# Patient Record
Sex: FEMALE | Race: WHITE | NOT HISPANIC OR LATINO | Employment: OTHER | URBAN - METROPOLITAN AREA
[De-identification: names, ages, dates, MRNs, and addresses within clinical notes are randomized per-mention and may not be internally consistent; named-entity substitution may affect disease eponyms.]

---

## 2017-02-06 RX ORDER — FUROSEMIDE 20 MG/1
TABLET ORAL
Qty: 60 TABLET | Refills: 0 | Status: SHIPPED | OUTPATIENT
Start: 2017-02-06 | End: 2017-06-07 | Stop reason: SDUPTHER

## 2017-02-06 RX ORDER — SPIRONOLACTONE 50 MG/1
TABLET, FILM COATED ORAL
Qty: 60 TABLET | Refills: 0 | Status: SHIPPED | OUTPATIENT
Start: 2017-02-06 | End: 2017-05-04 | Stop reason: SDUPTHER

## 2017-02-09 ENCOUNTER — DOCUMENTATION ONLY (OUTPATIENT)
Dept: FAMILY MEDICINE | Facility: CLINIC | Age: 62
End: 2017-02-09

## 2017-02-09 ENCOUNTER — LAB VISIT (OUTPATIENT)
Dept: LAB | Facility: HOSPITAL | Age: 62
End: 2017-02-09
Attending: INTERNAL MEDICINE
Payer: COMMERCIAL

## 2017-02-09 DIAGNOSIS — Z79.899 ENCOUNTER FOR LONG-TERM (CURRENT) USE OF OTHER MEDICATIONS: ICD-10-CM

## 2017-02-09 DIAGNOSIS — E03.9 UNSPECIFIED HYPOTHYROIDISM: ICD-10-CM

## 2017-02-09 DIAGNOSIS — E78.00 PURE HYPERCHOLESTEROLEMIA: ICD-10-CM

## 2017-02-09 DIAGNOSIS — E10.9 TYPE I (JUVENILE TYPE) DIABETES MELLITUS WITHOUT MENTION OF COMPLICATION, NOT STATED AS UNCONTROLLED: ICD-10-CM

## 2017-02-09 LAB
ALBUMIN SERPL BCP-MCNC: 3.6 G/DL
ALP SERPL-CCNC: 87 U/L
ALT SERPL W/O P-5'-P-CCNC: 16 U/L
ANION GAP SERPL CALC-SCNC: 7 MMOL/L
AST SERPL-CCNC: 21 U/L
BILIRUB SERPL-MCNC: 0.4 MG/DL
BUN SERPL-MCNC: 11 MG/DL
CALCIUM SERPL-MCNC: 8.9 MG/DL
CHLORIDE SERPL-SCNC: 104 MMOL/L
CHOLEST/HDLC SERPL: 3.5 {RATIO}
CO2 SERPL-SCNC: 30 MMOL/L
CREAT SERPL-MCNC: 1 MG/DL
EST. GFR  (AFRICAN AMERICAN): >60 ML/MIN/1.73 M^2
EST. GFR  (NON AFRICAN AMERICAN): >60 ML/MIN/1.73 M^2
GLUCOSE SERPL-MCNC: 121 MG/DL
HDL/CHOLESTEROL RATIO: 28.2 %
HDLC SERPL-MCNC: 195 MG/DL
HDLC SERPL-MCNC: 55 MG/DL
LDLC SERPL CALC-MCNC: 120 MG/DL
NONHDLC SERPL-MCNC: 140 MG/DL
POTASSIUM SERPL-SCNC: 4.2 MMOL/L
PROT SERPL-MCNC: 6.8 G/DL
SODIUM SERPL-SCNC: 141 MMOL/L
TRIGL SERPL-MCNC: 100 MG/DL
TSH SERPL DL<=0.005 MIU/L-ACNC: 0.99 UIU/ML

## 2017-02-09 PROCEDURE — 83036 HEMOGLOBIN GLYCOSYLATED A1C: CPT

## 2017-02-09 PROCEDURE — 80061 LIPID PANEL: CPT

## 2017-02-09 PROCEDURE — 84443 ASSAY THYROID STIM HORMONE: CPT

## 2017-02-09 PROCEDURE — 80053 COMPREHEN METABOLIC PANEL: CPT

## 2017-02-09 PROCEDURE — 36415 COLL VENOUS BLD VENIPUNCTURE: CPT | Mod: PO

## 2017-02-09 NOTE — PROGRESS NOTES
Pre-Visit Chart Review  For Appointment Scheduled on 2-13-17    Health Maintenance Due   Topic Date Due    TETANUS VACCINE  01/22/1975    Pneumococcal PPSV23 (Medium Risk) (1) 01/22/1975    Influenza Vaccine  08/01/2016    Eye Exam  01/14/2017    Zoster Vaccine  01/22/2017

## 2017-02-10 LAB
ESTIMATED AVG GLUCOSE: 126 MG/DL
HBA1C MFR BLD HPLC: 6 %

## 2017-02-13 ENCOUNTER — OFFICE VISIT (OUTPATIENT)
Dept: FAMILY MEDICINE | Facility: CLINIC | Age: 62
End: 2017-02-13
Payer: COMMERCIAL

## 2017-02-13 VITALS
OXYGEN SATURATION: 97 % | WEIGHT: 179.25 LBS | TEMPERATURE: 99 F | RESPIRATION RATE: 20 BRPM | DIASTOLIC BLOOD PRESSURE: 72 MMHG | BODY MASS INDEX: 28.81 KG/M2 | HEART RATE: 90 BPM | SYSTOLIC BLOOD PRESSURE: 125 MMHG | HEIGHT: 66 IN

## 2017-02-13 DIAGNOSIS — I10 GOOD HYPERTENSION CONTROL: ICD-10-CM

## 2017-02-13 DIAGNOSIS — I15.2 HYPERTENSION ASSOCIATED WITH DIABETES: ICD-10-CM

## 2017-02-13 DIAGNOSIS — E11.59 HYPERTENSION ASSOCIATED WITH DIABETES: ICD-10-CM

## 2017-02-13 DIAGNOSIS — E78.5 HYPERLIPIDEMIA ASSOCIATED WITH TYPE 2 DIABETES MELLITUS: ICD-10-CM

## 2017-02-13 DIAGNOSIS — E78.5 HYPERLIPIDEMIA, UNSPECIFIED HYPERLIPIDEMIA TYPE: ICD-10-CM

## 2017-02-13 DIAGNOSIS — E10.319 CONTROLLED TYPE 1 DIABETES MELLITUS WITH RETINOPATHY, MACULAR EDEMA PRESENCE UNSPECIFIED, UNSPECIFIED LATERALITY, UNSPECIFIED RETINOPATHY SEVERITY: ICD-10-CM

## 2017-02-13 DIAGNOSIS — Z00.00 ANNUAL PHYSICAL EXAM: Primary | ICD-10-CM

## 2017-02-13 DIAGNOSIS — E11.69 HYPERLIPIDEMIA ASSOCIATED WITH TYPE 2 DIABETES MELLITUS: ICD-10-CM

## 2017-02-13 PROCEDURE — 3074F SYST BP LT 130 MM HG: CPT | Mod: S$GLB,,, | Performed by: FAMILY MEDICINE

## 2017-02-13 PROCEDURE — 3078F DIAST BP <80 MM HG: CPT | Mod: S$GLB,,, | Performed by: FAMILY MEDICINE

## 2017-02-13 PROCEDURE — 99396 PREV VISIT EST AGE 40-64: CPT | Mod: S$GLB,,, | Performed by: FAMILY MEDICINE

## 2017-02-13 PROCEDURE — 99999 PR PBB SHADOW E&M-EST. PATIENT-LVL III: CPT | Mod: PBBFAC,,, | Performed by: FAMILY MEDICINE

## 2017-02-13 RX ORDER — THIAMINE HCL 100 MG
1 TABLET ORAL DAILY
COMMUNITY
Start: 2017-02-13 | End: 2018-01-04 | Stop reason: ALTCHOICE

## 2017-02-13 RX ORDER — MINOCYCLINE HYDROCHLORIDE 75 MG/1
75 CAPSULE ORAL 2 TIMES DAILY PRN
Refills: 0 | COMMUNITY
Start: 2016-12-14 | End: 2017-12-26 | Stop reason: ALTCHOICE

## 2017-02-13 RX ORDER — PANTOPRAZOLE SODIUM 40 MG/1
40 TABLET, DELAYED RELEASE ORAL 2 TIMES DAILY
Qty: 180 TABLET | Refills: 3 | Status: SHIPPED | OUTPATIENT
Start: 2017-02-13 | End: 2018-02-12 | Stop reason: SDUPTHER

## 2017-02-13 NOTE — PROGRESS NOTES
Subjective:       Patient ID: Kateryna Desai is a 62 y.o. female.    Chief Complaint: Annual Exam    HPI Comments: 62 year old female in for annual exam.  She had diabetes with retinopathy and neuropathy and hypothyroidism followed by dr. Benjamin.  She has a history of foot ulcers followed by Dr. Rhoades.  She had hiatal hernia and gerd, previously followed by Dr. Rose before he retired and will be due for a colonscopy in August of this year so she will be seeing Dr. Gusman or Zulma for that.  She is reluctant to have another colonoscopy as the prep for the previous one in 2007 dehydrated her and impaired her glucose control.    Past Medical History:    Allergy                                                       Arthritis                                                       Comment:degnerative disease low back,muscle spasms,                shoulders    Diabetes mellitus type I                                        Comment:since age 13    Diabetic gastroparesis                                          Comment:takes Cytotec    Diabetic retinopathy of both eyes                               Comment:gets periodic laser treatments    Difficult intubation                                            Comment:as a child had sore throat after a procedure    GERD (gastroesophageal reflux disease)                        Headache(784.0)                                               Hiatal hernia                                                   Comment:with GERD    Hyperlipidemia                                                Hypertension                                                  Infection of the upper respiratory tract        June 2012     Lumbar spinal stenosis                                        Osteopenia                                                    Rosacea                                                       Seizures                                                        Comment:Pt states  during pgy with stroke    Stroke                                          ?           Comment:while pregnant    Tachycardia                                                     Comment:asymptomatic with Toprol    Thyroid disease                                                 Comment:hypothyroidism    Venous insufficiency of leg                                     Comment:improved since EVLT    Past Surgical History:     SECTION                                               VEIN SURGERY                                     2012      Comment:EVLT right greater saphenous, IV sedation    EXOSTECTOMY                                      12         Comment:left foot, local anesthesia, in office    CARDIAC CATHETERIZATION                                      Comment:no stents    EYE SURGERY                                                      Comment:has had many laser procedures for diabetic                retinopathy    COLONOSCOPY                                      2007       Comment:Dr. Rose, 10 year recheck    Review of patient's family history indicates:    Cancer                         Maternal Aunt               Comment: breast    Diabetes                       Maternal Grandmother      Cancer                         Maternal Grandfather        Comment: prostate    Diabetes                       Maternal Grandfather      Diabetes                       Cousin                    Arthritis                      Mother                    Heart disease                  Father                    Stroke                         Father                    No Known Problems              Sister                    No Known Problems              Brother                   No Known Problems              Daughter                  No Known Problems              Paternal Grandmother      No Known Problems              Paternal Grandfather      Alzheimer's disease            Maternal Uncle            " Alcohol abuse                  Maternal Uncle              Comment: x2    Heart disease                  Maternal Uncle            No Known Problems              Paternal Aunt             Heart disease                  Paternal Uncle            Social History    Marital status:              Spouse name:                       Years of education:                 Number of children:               Social History Main Topics    Smoking status: Never Smoker                                                                Smokeless status: Never Used                        Alcohol use: Yes                Comment: social, 1 drink monthly    Drug use: No                Current Outpatient Prescriptions:     BD INSULIN SYRINGE ULTRA-FINE 1/2 mL 31 x 15/64" Syrg, 3 (three) times daily. , Disp: , Rfl: 1    BD ULTRA-FINE JCARLOS PEN NEEDLES 32 gauge x 5/32" Ndle, once daily. , Disp: , Rfl: 0    BENEFIBER, GUAR GUM, ORAL, Take 1 Dose by mouth once daily. , Disp: , Rfl:     biotin 300 mcg Tab, Take 1 tablet by mouth once daily., Disp: , Rfl:     calcium citrate-vitamin D3 315-200 mg (CITRACAL+D) 315-200 mg-unit per tablet, Take 1 tablet by mouth 2 (two) times daily., Disp: , Rfl:     CRESTOR 40 mg Tab, Take 40 mg by mouth once daily. , Disp: , Rfl:     ESTRACE 0.01 % (0.1 mg/gram) vaginal cream, Place vaginally twice a week. , Disp: , Rfl: 0    fluticasone (FLONASE) 50 mcg/actuation nasal spray, 1 spray by Each Nare route once daily., Disp: 16 g, Rfl: 0    furosemide (LASIX) 20 MG tablet, take 1 tablet by mouth twice a day, Disp: 60 tablet, Rfl: 0    gabapentin (NEURONTIN) 600 MG tablet, Take 1 tablet (600 mg total) by mouth once daily., Disp: 30 tablet, Rfl: 11    GLUCAGON EMERGENCY 1 mg injection, 1 mg as needed. , Disp: , Rfl:     HUMALOG 100 unit/mL injection, Inject into the skin. 10-17 units tid sliding scale, Disp: , Rfl: 1    INSULIN GLARGINE,HUM.REC.ANLOG (TOUJEO SOLOSTAR SUBQ), Inject 46 Units into the " skin once daily., Disp: , Rfl:     metoprolol succinate (TOPROL-XL) 25 MG 24 hr tablet, Take 1 tablet (25 mg total) by mouth once daily., Disp: 30 tablet, Rfl: 5    minocycline (MINOCIN,DYNACIN) 75 MG capsule, Take 75 mg by mouth 2 (two) times daily as needed. , Disp: , Rfl: 0    ondansetron (ZOFRAN-ODT) 4 MG TbDL, Take 1 tablet (4 mg total) by mouth every 8 (eight) hours as needed., Disp: 12 tablet, Rfl: 0    ONE TOUCH ULTRA TEST Strp, 6-8 times daily, Disp: , Rfl:     pantoprazole (PROTONIX) 40 MG tablet, Take 1 tablet (40 mg total) by mouth 2 (two) times daily., Disp: 180 tablet, Rfl: 3    spironolactone (ALDACTONE) 50 MG tablet, take 1 tablet by mouth twice a day, Disp: 60 tablet, Rfl: 0    SYNTHROID 88 mcg tablet, Take 88 mcg by mouth once daily., Disp: , Rfl: 0    TRANSDERM-SCOP 1.5 mg (1 mg over 3 days), apply 1 patch BEHIND EAR every 3 days as directed, Disp: , Rfl: 0    trazodone (DESYREL) 50 MG tablet, take 1 tablet by mouth NIGHTLY, Disp: 30 tablet, Rfl: 5    vitamin D 1000 units Tab, Take 1,000 mg by mouth 2 (two) times daily. , Disp: , Rfl:     WELCHOL 625 mg tablet, Take by mouth once daily. 6 tabs qam, Disp: , Rfl:     cyanocobalamin, vitamin B-12, 2,500 mcg Lozg, Place 1 lozenge under the tongue once daily., Disp: , Rfl:     TETANUS VACCINE due on 01/22/1975  Pneumococcal PPSV23 (Medium Risk)(1) due on 01/22/1975  Eye Exam due on 01/14/2017      Review of Systems   Constitutional: Negative for chills, diaphoresis, fatigue, fever and unexpected weight change.   HENT: Positive for congestion, postnasal drip and rhinorrhea. Negative for ear pain, hearing loss, sinus pressure, sneezing, sore throat, tinnitus and trouble swallowing.    Eyes: Negative for itching and visual disturbance.   Respiratory: Negative for cough, chest tightness, shortness of breath and wheezing.    Cardiovascular: Negative for chest pain, palpitations and leg swelling.   Gastrointestinal: Negative for abdominal pain,  blood in stool, constipation, diarrhea, nausea and vomiting.   Genitourinary: Negative for dysuria, frequency, hematuria, menstrual problem, pelvic pain, vaginal bleeding and vaginal discharge.   Musculoskeletal: Negative for arthralgias, back pain, joint swelling and myalgias.   Skin: Negative for color change, pallor and rash.   Neurological: Negative for dizziness and headaches.   Hematological: Negative for adenopathy.   Psychiatric/Behavioral: Negative for sleep disturbance. The patient is not nervous/anxious.        Objective:      Physical Exam   Constitutional: She is oriented to person, place, and time. She appears well-developed. No distress.   Good blood pressure control  Patient is overweight with bmi of 29.4.   Weight is increased by 6.8lb since last visit of 11/12/15.     HENT:   Head: Normocephalic and atraumatic.   Right Ear: External ear normal.   Left Ear: External ear normal.   Nose: Nose normal.   Mouth/Throat: Oropharynx is clear and moist. No oropharyngeal exudate.   Mild nasal turbinate swelling and erythema   Eyes: Conjunctivae and EOM are normal. Pupils are equal, round, and reactive to light. Right eye exhibits no discharge. Left eye exhibits no discharge. No scleral icterus.   Neck: Normal range of motion. Neck supple. No JVD present. No tracheal deviation present. No thyromegaly present.   Cardiovascular: Normal rate, regular rhythm and normal heart sounds.  Exam reveals no gallop and no friction rub.    No murmur heard.  Pulmonary/Chest: Effort normal and breath sounds normal. No respiratory distress. She has no wheezes. She has no rales. She exhibits no tenderness.   Abdominal: Soft. Bowel sounds are normal. She exhibits no distension and no mass. There is no tenderness. There is no rebound and no guarding. No hernia.   Musculoskeletal: Normal range of motion. She exhibits no edema, tenderness or deformity.   She declined foot exam as she is seeing Dr. Rhoades regularly and also having  it done by Dr. Benjamin.   Lymphadenopathy:     She has no cervical adenopathy.   Neurological: She is alert and oriented to person, place, and time. She has normal reflexes. She displays normal reflexes. No cranial nerve deficit. She exhibits normal muscle tone.   Skin: Skin is warm and dry. No rash noted. She is not diaphoretic. No erythema. No pallor.   Psychiatric: She has a normal mood and affect. Her behavior is normal. Judgment and thought content normal.   Nursing note and vitals reviewed.      Assessment:       1. Annual physical exam    2. Good hypertension control    3. Controlled type 1 diabetes mellitus with retinopathy, macular edema presence unspecified, unspecified laterality, unspecified retinopathy severity    4. Hyperlipidemia, unspecified hyperlipidemia type    5. Hypertension associated with diabetes    6. Hyperlipidemia associated with type 2 diabetes mellitus    7. BMI 29.0-29.9,adult        Plan:       1. Annual physical exam  Normal exam    2. Good hypertension control  No changes needed    3. Controlled type 1 diabetes mellitus with retinopathy, macular edema presence unspecified, unspecified laterality, unspecified retinopathy severity  Followed by Dr. Benjamin  Lab Results   Component Value Date    HGBA1C 6.0 02/09/2017       4. Hyperlipidemia, unspecified hyperlipidemia type  Followed by dr. Benjamin  Lab Results   Component Value Date    CHOL 195 02/09/2017    CHOL 183 10/03/2016    CHOL 179 05/28/2016     Lab Results   Component Value Date    HDL 55 02/09/2017    HDL 55 10/03/2016    HDL 47 05/28/2016     Lab Results   Component Value Date    LDLCALC 120.0 02/09/2017    LDLCALC 111.8 10/03/2016    LDLCALC 108.6 05/28/2016     Lab Results   Component Value Date    TRIG 100 02/09/2017    TRIG 81 10/03/2016    TRIG 117 05/28/2016     Lab Results   Component Value Date    CHOLHDL 28.2 02/09/2017    CHOLHDL 30.1 10/03/2016    CHOLHDL 26.3 05/28/2016         5. Hypertension associated with  diabetes  No changes needed    6. Hyperlipidemia associated with type 2 diabetes mellitus      7. BMI 29.0-29.9,adult  Encouraged exercise and weight loss.   had back surgery in December and is still at home recuperating, she plans to resume exercise program when he goes back to work.    Patient readiness: acceptance and barriers:none    During the course of the visit the patient was educated and counseled about the following:     Diabetes:  Discussed general issues about diabetes pathophysiology and management.  Discussed foot care.  Reminded to get yearly retinal exam.  Hypertension:   Medication: no change.  Dietary sodium restriction.  Regular aerobic exercise.    Goals: Diabetes: Maintain Hemoglobin A1C below 7 and Hypertension: Reduce Blood Pressure    Did patient meet goals/outcomes: Yes    The following self management tools provided: blood pressure log  blood glucose log    Patient Instructions (the written plan) was given to the patient/family.     Time spent with patient: 45 minutes

## 2017-02-13 NOTE — MR AVS SNAPSHOT
[facility]  [address] [place] [address]  Phone: [phone]  Fax: [phone]                  Kateryna Desai   2017 1:20 PM   Office Visit    Description:  Female : 1955   Provider:  Endy Mendoza MD   Department:  [facility]           Reason for Visit     Annual Exam                To Do List           Future Appointments        Provider Department Dept Phone    3/7/2017 9:00 AM Lei Rhoades DPM [facility] [phone]      Goals (5 Years of Data)     None       These Medications        Disp Refills Start End    pantoprazole (PROTONIX) 40 MG tablet 180 tablet 3 2017     Take 1 tablet (40 mg total) by mouth 2 (two) times daily. - Oral    [facility] [place], [place] [facility] [address] [place] Ph #: [phone]         PURCHASE these Medications (No prescription required)        Start End    cyanocobalamin, vitamin B-12, 2,500 mcg Lozg 2017     Sig - Route: Place 1 lozenge under the tongue once daily. - Sublingual    Class: OTC      [facility]     [facility] On Call Nurse Care Line -  Assistance  Registered nurses in the [facility] On Call Center provide clinical advisement, health education, appointment booking, and other advisory services.  Call for this free service at [phone].             Medications           Message regarding Medications     Verify the changes and/or additions to your medication regime listed below are the same as discussed with your clinician today.  If any of these changes or additions are incorrect, please notify your healthcare provider.        START taking these NEW medications        Refills    cyanocobalamin, vitamin B-12, 2,500 mcg Lozg     Sig: Place 1 lozenge under the tongue once daily.    Class: OTC    Route: Sublingual      CHANGE how you are taking these medications     Start Taking Instead of    pantoprazole (PROTONIX) 40 MG tablet pantoprazole  "(PROTONIX) 40 MG tablet    Dosage:  Take 1 tablet (40 mg total) by mouth 2 (two) times daily. Dosage:  take 1 tablet by mouth twice a day    Reason for Change:  Reorder       STOP taking these medications     gabapentin (NEURONTIN) 300 MG capsule Take 1 capsule (300 mg total) by mouth every evening.    promethazine (PHENERGAN) 25 MG suppository Place 1 suppository (25 mg total) rectally every 6 (six) hours as needed for Nausea.    insulin glargine 300 unit/mL (1.5 mL) InPn Inject 46 Units into the skin once daily.           Verify that the below list of medications is an accurate representation of the medications you are currently taking.  If none reported, the list may be blank. If incorrect, please contact your healthcare provider. Carry this list with you in case of emergency.           Current Medications     BD INSULIN SYRINGE ULTRA-FINE 1/2 mL 31 x 15/64" Syrg 3 (three) times daily.     BD ULTRA-FINE JCARLOS PEN NEEDLES 32 gauge x 5/32" Ndle once daily.     BENEFIBER, GUAR GUM, ORAL Take 1 Dose by mouth once daily.     biotin 300 mcg Tab Take 1 tablet by mouth once daily.    calcium citrate-vitamin D3 315-200 mg (CITRACAL+D) 315-200 mg-unit per tablet Take 1 tablet by mouth 2 (two) times daily.    CRESTOR 40 mg Tab Take 40 mg by mouth once daily.     ESTRACE 0.01 % (0.1 mg/gram) vaginal cream Place vaginally twice a week.     fluticasone (FLONASE) 50 mcg/actuation nasal spray 1 spray by Each Nare route once daily.    furosemide (LASIX) 20 MG tablet take 1 tablet by mouth twice a day    gabapentin (NEURONTIN) 600 MG tablet Take 1 tablet (600 mg total) by mouth once daily.    GLUCAGON EMERGENCY 1 mg injection 1 mg as needed.     HUMALOG 100 unit/mL injection Inject into the skin. 10-17 units tid sliding scale    INSULIN GLARGINE,HUM.REC.ANLOG (TOUJEO SOLOSTAR SUBQ) Inject 46 Units into the skin once daily.    metoprolol succinate (TOPROL-XL) 25 MG 24 hr tablet Take 1 tablet (25 mg total) by mouth once daily.    " "minocycline (MINOCIN,DYNACIN) 75 MG capsule Take 75 mg by mouth 2 (two) times daily as needed.     ondansetron (ZOFRAN-ODT) 4 MG TbDL Take 1 tablet (4 mg total) by mouth every 8 (eight) hours as needed.    ONE TOUCH ULTRA TEST Strp 6-8 times daily    pantoprazole (PROTONIX) 40 MG tablet Take 1 tablet (40 mg total) by mouth 2 (two) times daily.    spironolactone (ALDACTONE) 50 MG tablet take 1 tablet by mouth twice a day    SYNTHROID 88 mcg tablet Take 88 mcg by mouth once daily.    TRANSDERM-SCOP 1.5 mg (1 mg over 3 days) apply 1 patch BEHIND EAR every 3 days as directed    trazodone (DESYREL) 50 MG tablet take 1 tablet by mouth NIGHTLY    vitamin D 1000 units Tab Take 1,000 mg by mouth 2 (two) times daily.     WELCHOL 625 mg tablet Take by mouth once daily. 6 tabs qam    cyanocobalamin, vitamin B-12, 2,500 mcg Lozg Place 1 lozenge under the tongue once daily.           Clinical Reference Information           Your Vitals Were     BP Pulse Temp Resp Height Weight    125/72 (BP Location: Right arm, Patient Position: Sitting, BP Method: Automatic) 90 98.5 °F (36.9 °C) (Oral) 20 5' 5.5" (1.664 m) 81.3 kg (179 lb 3.7 oz)    SpO2 BMI             97% 29.37 kg/m2         Blood Pressure          Most Recent Value    BP  125/72      Allergies as of 2/13/2017     Bactrim [Sulfamethoxazole-trimethoprim]    Percocet [Oxycodone-acetaminophen]    Iodinated Contrast Media - Iv Dye    Norco [Hydrocodone-acetaminophen]    Niaspan Extended-release [Niacin]      Immunizations Administered on Date of Encounter - 2/13/2017     None      Language Assistance Services     ATTENTION: Language assistance services are available, free of charge. Please call [phone].      ATENCIÓN: Si luis munoz, tiene a arias disposición servicios gratuitos de asistencia lingüística. Llame al [phone].     CHÚ Ý: N?u b?n nói Ti?ng Vi?t, có các d?ch v? h? tr? ngôn ng? mi?n phí dành cho b?n. G?i s? [phone].         [facility] " complies with applicable Federal civil rights laws and does not discriminate on the basis of race, color, national origin, age, disability, or sex.

## 2017-03-07 ENCOUNTER — OFFICE VISIT (OUTPATIENT)
Dept: PODIATRY | Facility: CLINIC | Age: 62
End: 2017-03-07
Payer: COMMERCIAL

## 2017-03-07 VITALS — WEIGHT: 177.69 LBS | HEIGHT: 66 IN | BODY MASS INDEX: 28.56 KG/M2

## 2017-03-07 DIAGNOSIS — E10.42 CONTROLLED TYPE 1 DIABETES MELLITUS WITH DIABETIC POLYNEUROPATHY: Primary | ICD-10-CM

## 2017-03-07 DIAGNOSIS — B35.1 ONYCHOMYCOSIS DUE TO DERMATOPHYTE: ICD-10-CM

## 2017-03-07 DIAGNOSIS — L84 CORN OR CALLUS: ICD-10-CM

## 2017-03-07 DIAGNOSIS — R20.2 PARESTHESIA OF FOOT, BILATERAL: ICD-10-CM

## 2017-03-07 PROCEDURE — 11721 DEBRIDE NAIL 6 OR MORE: CPT | Mod: 59,Q9,S$GLB, | Performed by: PODIATRIST

## 2017-03-07 PROCEDURE — 11056 PARNG/CUTG B9 HYPRKR LES 2-4: CPT | Mod: Q9,S$GLB,, | Performed by: PODIATRIST

## 2017-03-07 PROCEDURE — 99214 OFFICE O/P EST MOD 30 MIN: CPT | Mod: 25,S$GLB,, | Performed by: PODIATRIST

## 2017-03-07 PROCEDURE — 1160F RVW MEDS BY RX/DR IN RCRD: CPT | Mod: S$GLB,,, | Performed by: PODIATRIST

## 2017-03-07 PROCEDURE — 3044F HG A1C LEVEL LT 7.0%: CPT | Mod: S$GLB,,, | Performed by: PODIATRIST

## 2017-03-07 PROCEDURE — 3060F POS MICROALBUMINURIA REV: CPT | Mod: S$GLB,,, | Performed by: PODIATRIST

## 2017-03-07 PROCEDURE — 2022F DILAT RTA XM EVC RTNOPTHY: CPT | Mod: S$GLB,,, | Performed by: PODIATRIST

## 2017-03-07 PROCEDURE — 99999 PR PBB SHADOW E&M-EST. PATIENT-LVL III: CPT | Mod: PBBFAC,,, | Performed by: PODIATRIST

## 2017-03-07 NOTE — MR AVS SNAPSHOT
"    [facility]  [address] [address]  Phone: [phone]                  Kateryna Desai   3/7/2017 9:00 AM   Office Visit    Description:  Female : 1955   Provider:  Lei Rhoades DPM   Department:  [facility]           Reason for Visit     Diabetic Foot Exam     Nail Care     Heel Pain                To Do List           Future Appointments        Provider Department Dept Phone    2017 9:00 AM Lei Rhoades DPM [facility] [phone]      Goals (5 Years of Data)     None      [facility]     [facility] Nurse Care [facility] -  Assistance  Registered nurses in the [facility] On Call Center provide clinical advisement, health education, appointment booking, and other advisory services.  Call for this free service at [phone].             Medications           Message regarding Medications     Verify the changes and/or additions to your medication regime listed below are the same as discussed with your clinician today.  If any of these changes or additions are incorrect, please notify your healthcare provider.             Verify that the below list of medications is an accurate representation of the medications you are currently taking.  If none reported, the list may be blank. If incorrect, please contact your healthcare provider. Carry this list with you in case of emergency.           Current Medications     BD INSULIN SYRINGE ULTRA-FINE 1/2 mL 31 x 15/64" Syrg 3 (three) times daily.     BD ULTRA-FINE JCARLOS PEN NEEDLES 32 gauge x 5/32" Ndle once daily.     BENEFIBER, GUAR GUM, ORAL Take 1 Dose by mouth once daily.     biotin 300 mcg Tab Take 1 tablet by mouth once daily.    calcium citrate-vitamin D3 315-200 mg (CITRACAL+D) 315-200 mg-unit per tablet Take 1 tablet by mouth 2 (two) times daily.    CRESTOR 40 mg Tab Take 40 mg by mouth once daily.     cyanocobalamin, vitamin B-12, 2,500 mcg Lozg Place 1 lozenge under the tongue once " "daily.    ESTRACE 0.01 % (0.1 mg/gram) vaginal cream Place vaginally twice a week.     fluticasone (FLONASE) 50 mcg/actuation nasal spray 1 spray by Each Nare route once daily.    furosemide (LASIX) 20 MG tablet take 1 tablet by mouth twice a day    gabapentin (NEURONTIN) 600 MG tablet Take 1 tablet (600 mg total) by mouth once daily.    GLUCAGON EMERGENCY 1 mg injection 1 mg as needed.     HUMALOG 100 unit/mL injection Inject into the skin. 10-17 units tid sliding scale    INSULIN GLARGINE,HUM.REC.ANLOG (TOUJEO SOLOSTAR SUBQ) Inject 46 Units into the skin once daily.    metoprolol succinate (TOPROL-XL) 25 MG 24 hr tablet Take 1 tablet (25 mg total) by mouth once daily.    minocycline (MINOCIN,DYNACIN) 75 MG capsule Take 75 mg by mouth 2 (two) times daily as needed.     ondansetron (ZOFRAN-ODT) 4 MG TbDL Take 1 tablet (4 mg total) by mouth every 8 (eight) hours as needed.    ONE TOUCH ULTRA TEST Strp 6-8 times daily    pantoprazole (PROTONIX) 40 MG tablet Take 1 tablet (40 mg total) by mouth 2 (two) times daily.    spironolactone (ALDACTONE) 50 MG tablet take 1 tablet by mouth twice a day    SYNTHROID 88 mcg tablet Take 88 mcg by mouth once daily.    TRANSDERM-SCOP 1.5 mg (1 mg over 3 days) apply 1 patch BEHIND EAR every 3 days as directed    trazodone (DESYREL) 50 MG tablet take 1 tablet by mouth NIGHTLY    vitamin D 1000 units Tab Take 1,000 mg by mouth 2 (two) times daily.     WELCHOL 625 mg tablet Take by mouth once daily. 6 tabs qam           Clinical Reference Information           Your Vitals Were     Height Weight BMI          5' 5.5" (1.664 m) 80.6 kg (177 lb 11.1 oz) 29.12 kg/m2        Allergies as of 3/7/2017     Bactrim [Sulfamethoxazole-trimethoprim]    Percocet [Oxycodone-acetaminophen]    Iodinated Contrast Media - Iv Dye    Norco [Hydrocodone-acetaminophen]    Niaspan Extended-release [Niacin]      Immunizations Administered on Date of Encounter - 3/7/2017     None      Language Assistance Services  "    ATTENTION: Language assistance services are available, free of charge. Please call [phone].      ATENCIÓN: Si habla alexander, tiene a arias disposición servicios gratuitos de asistencia lingüística. Llame al [phone].     CHÚ Ý: N?u b?n nói Ti?ng Vi?t, có các d?ch v? h? tr? ngôn ng? mi?n phí dành cho b?n. G?i s? [phone].         [facility] complies with applicable Federal civil rights laws and does not discriminate on the basis of race, color, national origin, age, disability, or sex.

## 2017-03-07 NOTE — PROGRESS NOTES
Subjective:      Patient ID: Kateryna Desai is a 62 y.o. female.    Chief Complaint: Diabetic Foot Exam (GIOVANA Mendoza 12/13/17 A1C 2/9/17 6.0); Nail Care; and Heel Pain (pain at Left heel)    Kateryna is a 62 y.o. female who presents to the clinic for routine evaluation and treatment of diabetic feet. Kateryna has a past medical history of Allergy; Arthritis; Diabetes mellitus type I; Diabetic gastroparesis; Diabetic retinopathy of both eyes; Difficult intubation; GERD (gastroesophageal reflux disease); Headache(784.0); Hiatal hernia; Hyperlipidemia; Hypertension; Infection of the upper respiratory tract (June 2012); Lumbar spinal stenosis; Osteopenia; Rosacea; Seizures; Stroke (1985?); Tachycardia; Thyroid disease; and Venous insufficiency of leg. Patient relates that the oral gabapentin has really helped to lessen burning pain in bilateral foot, specifically around the Lt. Posterior heel.  States that she continues to have callus build up to the tips of bilateral 2nd toe, however, she relates filing the lesions with a pumice stone regularly.  Denies any additional pedal complaints.     PCP: Endy Mendoza MD    Date Last Seen by PCP: 12/13/17      Hemoglobin A1C   Date Value Ref Range Status   02/09/2017 6.0 4.5 - 6.2 % Final     Comment:     According to ADA guidelines, hemoglobin A1C <7.0% represents  optimal control in non-pregnant diabetic patients.  Different  metrics may apply to specific populations.   Standards of Medical Care in Diabetes - 2016.  For the purpose of screening for the presence of diabetes:  <5.7%     Consistent with the absence of diabetes  5.7-6.4%  Consistent with increasing risk for diabetes   (prediabetes)  >or=6.5%  Consistent with diabetes  Currently no consensus exists for use of hemoglobin A1C  for diagnosis of diabetes for children.     10/03/2016 6.0 4.5 - 6.2 % Final     Comment:     According to ADA guidelines, hemoglobin A1C <7.0% represents  optimal control in  non-pregnant diabetic patients.  Different  metrics may apply to specific populations.   Standards of Medical Care in Diabetes - 2016.  For the purpose of screening for the presence of diabetes:  <5.7%     Consistent with the absence of diabetes  5.7-6.4%  Consistent with increasing risk for diabetes   (prediabetes)  >or=6.5%  Consistent with diabetes  Currently no consensus exists for use of hemoglobin A1C  for diagnosis of diabetes for children.     2016 6.2 4.5 - 6.2 % Final           Past Medical History:   Diagnosis Date    Allergy     Arthritis     degnerative disease low back,muscle spasms, shoulders    Diabetes mellitus type I     since age 13    Diabetic gastroparesis     takes Cytotec    Diabetic retinopathy of both eyes     gets periodic laser treatments    Difficult intubation     as a child had sore throat after a procedure    GERD (gastroesophageal reflux disease)     Headache(784.0)     Hiatal hernia     with GERD    Hyperlipidemia     Hypertension     Infection of the upper respiratory tract 2012    Lumbar spinal stenosis     Osteopenia     Rosacea     Seizures     Pt states during pgy with stroke    Stroke ?    while pregnant    Tachycardia     asymptomatic with Toprol    Thyroid disease     hypothyroidism    Venous insufficiency of leg     improved since EVLT       Past Surgical History:   Procedure Laterality Date    CARDIAC CATHETERIZATION      no stents     SECTION      COLONOSCOPY  2007    Dr. Rose, 10 year recheck    EXOSTECTOMY  12    left foot, local anesthesia, in office    EYE SURGERY      has had many laser procedures for diabetic retinopathy    VEIN SURGERY  2012    EVLT right greater saphenous, IV sedation       Family History   Problem Relation Age of Onset    Cancer Maternal Aunt      breast    Diabetes Maternal Grandmother     Cancer Maternal Grandfather      prostate    Diabetes Maternal Grandfather      "Diabetes Cousin     Arthritis Mother     Heart disease Father     Stroke Father     No Known Problems Sister     No Known Problems Brother     No Known Problems Daughter     No Known Problems Paternal Grandmother     No Known Problems Paternal Grandfather     Alzheimer's disease Maternal Uncle     Alcohol abuse Maternal Uncle      x2    Heart disease Maternal Uncle     No Known Problems Paternal Aunt     Heart disease Paternal Uncle        Social History     Social History    Marital status:      Spouse name: N/A    Number of children: N/A    Years of education: N/A     Social History Main Topics    Smoking status: Never Smoker    Smokeless tobacco: Never Used    Alcohol use Yes      Comment: social, 1 drink monthly    Drug use: No    Sexual activity: Not on file     Other Topics Concern    Not on file     Social History Narrative       Current Outpatient Prescriptions   Medication Sig Dispense Refill    BD INSULIN SYRINGE ULTRA-FINE 1/2 mL 31 x 15/64" Syrg 3 (three) times daily.   1    BD ULTRA-FINE JCARLOS PEN NEEDLES 32 gauge x 5/32" Ndle once daily.   0    BENEFIBER, GUAR GUM, ORAL Take 1 Dose by mouth once daily.       biotin 300 mcg Tab Take 1 tablet by mouth once daily.      calcium citrate-vitamin D3 315-200 mg (CITRACAL+D) 315-200 mg-unit per tablet Take 1 tablet by mouth 2 (two) times daily.      CRESTOR 40 mg Tab Take 40 mg by mouth once daily.       cyanocobalamin, vitamin B-12, 2,500 mcg Lozg Place 1 lozenge under the tongue once daily.      ESTRACE 0.01 % (0.1 mg/gram) vaginal cream Place vaginally twice a week.   0    fluticasone (FLONASE) 50 mcg/actuation nasal spray 1 spray by Each Nare route once daily. 16 g 0    furosemide (LASIX) 20 MG tablet take 1 tablet by mouth twice a day 60 tablet 0    gabapentin (NEURONTIN) 600 MG tablet Take 1 tablet (600 mg total) by mouth once daily. 30 tablet 11    GLUCAGON EMERGENCY 1 mg injection 1 mg as needed.       HUMALOG " 100 unit/mL injection Inject into the skin. 10-17 units tid sliding scale  1    INSULIN GLARGINE,HUM.REC.ANLOG (TOUJEO SOLOSTAR SUBQ) Inject 46 Units into the skin once daily.      metoprolol succinate (TOPROL-XL) 25 MG 24 hr tablet Take 1 tablet (25 mg total) by mouth once daily. 30 tablet 5    minocycline (MINOCIN,DYNACIN) 75 MG capsule Take 75 mg by mouth 2 (two) times daily as needed.   0    ondansetron (ZOFRAN-ODT) 4 MG TbDL Take 1 tablet (4 mg total) by mouth every 8 (eight) hours as needed. 12 tablet 0    ONE TOUCH ULTRA TEST Strp 6-8 times daily      pantoprazole (PROTONIX) 40 MG tablet Take 1 tablet (40 mg total) by mouth 2 (two) times daily. 180 tablet 3    spironolactone (ALDACTONE) 50 MG tablet take 1 tablet by mouth twice a day 60 tablet 0    SYNTHROID 88 mcg tablet Take 88 mcg by mouth once daily.  0    TRANSDERM-SCOP 1.5 mg (1 mg over 3 days) apply 1 patch BEHIND EAR every 3 days as directed  0    trazodone (DESYREL) 50 MG tablet take 1 tablet by mouth NIGHTLY 30 tablet 5    vitamin D 1000 units Tab Take 1,000 mg by mouth 2 (two) times daily.       WELCHOL 625 mg tablet Take by mouth once daily. 6 tabs qam       No current facility-administered medications for this visit.        Review of patient's allergies indicates:   Allergen Reactions    Bactrim [sulfamethoxazole-trimethoprim] Rash     Patient experienced on 12/3/14 redness to face and rash to chest and arms/ with no SOB or airway obstruction    Percocet [oxycodone-acetaminophen] Nausea And Vomiting     Also caused dizziness and passed out    Iodinated contrast media - iv dye Swelling and Rash     Says topical iodine OK    Norco [hydrocodone-acetaminophen] Itching, Swelling and Rash     Can tolerate if she takes Benadryl with it    Niaspan extended-release [niacin] Itching and Other (See Comments)     Skin flushing and redness         Review of Systems   Constitution: Negative for chills, decreased appetite, diaphoresis and  fever.   Cardiovascular: Positive for claudication. Negative for leg swelling.   Skin: Positive for color change, dry skin and nail changes. Negative for flushing and itching.   Musculoskeletal: Negative for arthritis, back pain, falls, gout, joint pain and joint swelling.   Neurological: Positive for numbness and paresthesias.   Psychiatric/Behavioral: Negative for altered mental status.           Objective:      Physical Exam   Constitutional: She is oriented to person, place, and time. She appears well-developed and well-nourished. No distress.   Cardiovascular:   Pulses:       Dorsalis pedis pulses are 2+ on the right side, and 2+ on the left side.        Posterior tibial pulses are 1+ on the right side, and 1+ on the left side.   CFT <3 seconds bilateral.  Pedal hair growth decreased bilateral.  Varicosities noted to bilateral lower extremity. Mild nonpitting edema noted to bilateral lower extremity.  Toes are cool to touch bilateral.       Musculoskeletal: Normal range of motion. She exhibits edema. She exhibits no tenderness.   Muscle strength 5/5 in all muscle groups bilateral.  No tenderness nor crepitation with active and passive ROM of foot/ankle joints bilateral.  No pain with palpation of bilateral foot and ankle.  Bilateral pes planus foot type.  Bilateral hallux abducto valgus.  Bilateral semi-rigid contracture of toes 2-5.     Neurological: She is alert and oriented to person, place, and time. She has normal strength. A sensory deficit is present.   Protective sensation per [place] monofilament decreased bilateral.    Vibratory sensation intact bilateral.    Light touch intact bilateral.   Skin: Skin is warm, dry and intact. No abrasion, no bruising, no burn, no ecchymosis, no laceration, no lesion, no petechiae and no rash noted. She is not diaphoretic. No cyanosis or erythema. No pallor. Nails show no clubbing.   Pedal skin is thin and atrophic bilateral.  Toenails x 10 appear thickened by  2 mm's, elongated by 4 mm's, and discolored with subungual debris.  Focal hyperkeratotic lesions noted to bilateral 2nd toe.  No open wounds, interdigital maceration noted bilateral.                    Assessment:       Encounter Diagnoses   Name Primary?    Controlled type 1 diabetes mellitus with diabetic polyneuropathy Yes    Onychomycosis due to dermatophyte     Corn or callus     Paresthesia of foot, bilateral          Plan:       Kateryna was seen today for diabetic foot exam, nail care and heel pain.    Diagnoses and all orders for this visit:    Controlled type 1 diabetes mellitus with diabetic polyneuropathy    Onychomycosis due to dermatophyte    Corn or callus    Paresthesia of foot, bilateral      I counseled the patient on her conditions, their implications and medical management.    Was informed by [facility], that there are no signs of arterial occlusion of bilateral lower extremity.  Previous symptoms of claudication has since resolved.    Advised to continue with oral gabapentin as this has helped to reduce symptoms of paresthesias of bilateral foot.      Shoe inspection. Diabetic Foot Education. Patient reminded of the importance of good nutrition and blood sugar control to help prevent podiatric complications of diabetes. Patient instructed on proper foot hygeine. We discussed wearing proper shoe gear, daily foot inspections, never walking without protective shoe gear, never putting sharp instruments to feet    With patient's permission, nails were aggressively reduced and debrided x 10 to their soft tissue attachment mechanically and with electric , removing all offending nail and debris.  With a sterile #15 scalpel was used to trim lesions x 2 down to smooth appearing skin without incident.  Patient relates relief following the procedure. She will continue to monitor the areas daily, inspect her feet, wear protective shoe gear when ambulatory, moisturizer to maintain skin  integrity and follow in this office in approximately 2-3 months, sooner p.r.n.      Return in about 3 months (around 6/7/2017).    Lei Rhoades DPM

## 2017-03-27 ENCOUNTER — TELEPHONE (OUTPATIENT)
Dept: FAMILY MEDICINE | Facility: CLINIC | Age: 62
End: 2017-03-27

## 2017-03-27 NOTE — TELEPHONE ENCOUNTER
----- Message from Mirna Mai sent at 3/27/2017  7:52 AM CDT -----  Contact: patient  Patient is calling in regards to speak with a Nurse. She fell last week in the tub and over the weekend her body aches and soreness got worse. She wants to know should she come in or should there be an Xray ordered. Please advise.  Call back .  Thanks!

## 2017-03-28 ENCOUNTER — DOCUMENTATION ONLY (OUTPATIENT)
Dept: FAMILY MEDICINE | Facility: CLINIC | Age: 62
End: 2017-03-28

## 2017-03-28 NOTE — PROGRESS NOTES
Pre-Visit Chart Review  For Appointment Scheduled on 03/29/2017    Health Maintenance Due   Topic Date Due    TETANUS VACCINE  01/22/1975    Pneumococcal PPSV23 (Medium Risk) (1) 01/22/1975

## 2017-03-29 ENCOUNTER — OFFICE VISIT (OUTPATIENT)
Dept: FAMILY MEDICINE | Facility: CLINIC | Age: 62
End: 2017-03-29
Payer: COMMERCIAL

## 2017-03-29 ENCOUNTER — HOSPITAL ENCOUNTER (OUTPATIENT)
Dept: RADIOLOGY | Facility: CLINIC | Age: 62
Discharge: HOME | End: 2017-03-29
Attending: FAMILY MEDICINE
Payer: COMMERCIAL

## 2017-03-29 ENCOUNTER — TELEPHONE (OUTPATIENT)
Dept: FAMILY MEDICINE | Facility: CLINIC | Age: 62
End: 2017-03-29

## 2017-03-29 VITALS
HEIGHT: 66 IN | WEIGHT: 177.69 LBS | BODY MASS INDEX: 28.56 KG/M2 | DIASTOLIC BLOOD PRESSURE: 70 MMHG | SYSTOLIC BLOOD PRESSURE: 110 MMHG

## 2017-03-29 DIAGNOSIS — M53.3 SACRAL PAIN: ICD-10-CM

## 2017-03-29 DIAGNOSIS — M53.3 SACRAL PAIN: Primary | ICD-10-CM

## 2017-03-29 DIAGNOSIS — M53.3 COCCYGEAL PAIN, ACUTE: ICD-10-CM

## 2017-03-29 DIAGNOSIS — M25.532 WRIST PAIN, ACUTE, LEFT: ICD-10-CM

## 2017-03-29 DIAGNOSIS — S62.102A WRIST FRACTURE, LEFT, CLOSED, INITIAL ENCOUNTER: Primary | ICD-10-CM

## 2017-03-29 PROCEDURE — 73110 X-RAY EXAM OF WRIST: CPT | Mod: 26,LT,S$GLB, | Performed by: RADIOLOGY

## 2017-03-29 PROCEDURE — 3078F DIAST BP <80 MM HG: CPT | Mod: S$GLB,,, | Performed by: PHYSICIAN ASSISTANT

## 2017-03-29 PROCEDURE — 1160F RVW MEDS BY RX/DR IN RCRD: CPT | Mod: S$GLB,,, | Performed by: PHYSICIAN ASSISTANT

## 2017-03-29 PROCEDURE — 72220 X-RAY EXAM SACRUM TAILBONE: CPT | Mod: TC,PO

## 2017-03-29 PROCEDURE — 73110 X-RAY EXAM OF WRIST: CPT | Mod: TC,PO,LT

## 2017-03-29 PROCEDURE — 72220 X-RAY EXAM SACRUM TAILBONE: CPT | Mod: 26,,, | Performed by: RADIOLOGY

## 2017-03-29 PROCEDURE — 99213 OFFICE O/P EST LOW 20 MIN: CPT | Mod: S$GLB,,, | Performed by: PHYSICIAN ASSISTANT

## 2017-03-29 PROCEDURE — 99999 PR PBB SHADOW E&M-EST. PATIENT-LVL V: CPT | Mod: PBBFAC,,, | Performed by: PHYSICIAN ASSISTANT

## 2017-03-29 PROCEDURE — 3074F SYST BP LT 130 MM HG: CPT | Mod: S$GLB,,, | Performed by: PHYSICIAN ASSISTANT

## 2017-03-29 RX ORDER — MELOXICAM 15 MG/1
15 TABLET ORAL DAILY
Qty: 30 TABLET | Refills: 0 | Status: SHIPPED | OUTPATIENT
Start: 2017-03-29 | End: 2017-05-25

## 2017-03-29 RX ORDER — TIZANIDINE 4 MG/1
4 TABLET ORAL EVERY 8 HOURS
Qty: 30 TABLET | Refills: 0 | Status: SHIPPED | OUTPATIENT
Start: 2017-03-29 | End: 2017-04-08

## 2017-03-29 NOTE — PROGRESS NOTES
Subjective:       Patient ID: Kateryna Desai is a 62 y.o. female.    Chief Complaint: Tailbone Pain and Wrist Pain (left)    HPI Comments: Patient presents for evaluation of tailbone pain and left wrist pain.  She slipped and fell down in her shower 3 day ago.  Pain has increased in intensity since the fall.  She is unable to sleep secondary to pain.  She cannot sit comfortable without pain.  She has tried ice which helps temporarily.  She describes the pain as soreness.      Review of Systems   Gastrointestinal:        Denies bowel changes     Genitourinary:        Denies bladder incontinence or retention      Musculoskeletal: Positive for arthralgias. Negative for back pain, gait problem and joint swelling.   Skin: Positive for color change (right upper arm). Negative for rash and wound.   Neurological: Negative for weakness and numbness.       Objective:      Physical Exam   Constitutional: She appears well-developed and well-nourished. No distress.   Musculoskeletal:        Left wrist: She exhibits decreased range of motion and tenderness. She exhibits no swelling.        Right hip: She exhibits normal range of motion, normal strength and no tenderness.        Left hip: She exhibits normal range of motion, normal strength and no tenderness.        Lumbar back: She exhibits decreased range of motion. She exhibits no tenderness.        Left hand: She exhibits normal range of motion, no tenderness and normal capillary refill. Normal sensation noted. Normal strength noted.   Tender to palpation along the sacrum    Skin:        Vitals reviewed.      Assessment:       1. Sacral pain    2. Coccygeal pain, acute    3. Wrist pain, acute, left        Plan:       Kateryna was seen today for tailbone pain and wrist pain.    Diagnoses and all orders for this visit:    Sacral pain  -     X-Ray Sacrum And Coccyx; Future    Coccygeal pain, acute  -     X-Ray Sacrum And Coccyx; Future  Donut pillow for sitting       Wrist pain, acute, left  -     X-Ray Wrist Complete 3 views Left; Future    Other orders  -     tizanidine (ZANAFLEX) 4 MG tablet; Take 1 tablet (4 mg total) by mouth every 8 (eight) hours.  -     meloxicam (MOBIC) 15 MG tablet; Take 1 tablet (15 mg total) by mouth once daily.    Further recommendations will be made based on results   May continue ice

## 2017-03-29 NOTE — PATIENT INSTRUCTIONS
Diet: Diabetes  Food is an important tool that you can use to control diabetes and stay healthy. Eating well-balanced meals in the correct amounts will help you control your blood glucose levels and prevent low blood sugar reactions. It will also help you reduce the health risks of diabetes. There is no one specific diet that is right for everyone with diabetes. But there are general guidelines to follow. A registered dietitian (RD) will create a tailored diet approach thats just right for you. He or she will also help you plan healthy meals and snacks. If you have any questions, call your dietitian for advice.    Guidelines for success  Talk with your healthcare provider before starting a diabetes diet or weight loss program. If you haven't talked with a dietitian yet, ask your provider for a referral. The following guidelines can help you succeed:  · Select foods from the 6 food groups below. Your dietitian will help you find food choices within each group. He or she will also show you serving sizes and how many servings you can have at each meal.  ¨ Grains, beans, and starchy vegetables  ¨ Vegetables  ¨ Fruit  ¨ Milk or yogurt  ¨ Meat, poultry, fish, or tofu  ¨ Healthy fats  · Check your blood sugar levels as directed by your provider. Take any medicine as prescribed by your provider.  · Learn to read food labels and pick the right portion sizes.  · Eat only the amount of food in your meal plan. Eat about the same amount of food at regular times each day. Dont skip meals. Eat meals 4 to 5 hours apart, with snacks in between.  · Limit alcohol. It raises blood sugar levels. Drink water or calorie-free diet drinks that use safe sweeteners.  · Eat less fat to help lower your risk of heart disease. Use nonfat or low-fat dairy products and lean meats. Avoid fried foods. Use cooking oils that are unsaturated, such as olive, canola, or peanut oil.  · Talk with your dietitian about safe sugar substitutes.  · Avoid  added salt. It can contribute to high blood pressure, which can cause heart disease. People with diabetes already have a risk of high blood pressure and heart disease.  · Stay at a healthy weight. If you need to lose weight, cut down on your portion sizes. But dont skip meals. Exercise is an important part of any weight management program. Talk with your provider about an exercise program thats right for you.  · For more information about the best diet plan for you, talk with a registered dietitian (RD). To find an RD in your area, contact:  ¨ Academy of Nutrition and Dietetics www.eatright.org  ¨ The American Diabetes Association 979-623-8300 www.diabetes.org  Date Last Reviewed: 8/1/2016 © 2000-2016 [employer]. [address] [place] [address], [place] [address]. All rights reserved. This information is not intended as a substitute for professional medical care. Always follow your healthcare professional's instructions.        Controlling High Blood Pressure  High blood pressure (hypertension) is often called the silent killer. This is because many people who have it dont know it. High blood pressure is defined as 140/90 mm Hg or higher. Know your blood pressure and remember to check it regularly. Doing so can save your life. Here are some things you can do to help control your blood pressure.    Choose heart-healthy foods  · Select low-salt, low-fat foods. Limit sodium intake to 2,400 mg per day or the amount suggested by your healthcare provider.  · Limit canned, dried, cured, packaged, and fast foods. These can contain a lot of salt.  · Eat 8 to 10 servings of fruits and vegetables every day.  · Choose lean meats, fish, or chicken.  · Eat whole-grain pasta, brown rice, and beans.  · Eat 2 to 3 servings of low-fat or fat-free dairy products.  · Ask your doctor about the DASH eating plan. This plan helps reduce blood pressure.  · When you go to a restaurant, ask that your meal be prepared with no added  salt.  Maintain a healthy weight  · Ask your healthcare provider how many calories to eat a day. Then stick to that number.  · Ask your healthcare provider what weight range is healthiest for you. If you are overweight, a weight loss of only 3% to 5% of your body weight can help lower blood pressure. Generally, a good weight loss goal is to lose 10% of your body weight in a year.  · Limit snacks and sweets.  · Get regular exercise.  Get up and get active  · Choose activities you enjoy. Find ones you can do with friends or family. This includes bicycling, dancing, walking, and jogging.  · Park farther away from building entrances.  · Use stairs instead of the elevator.  · When you can, walk or bike instead of driving.  · [place] leaves, garden, or do household repairs.  · Be active at a moderate to vigorous level of physical activity for at least 40 minutes for a minimum of 3 to 4 days a week.   Manage stress  · Make time to relax and enjoy life. Find time to laugh.  · Communicate your concerns with your loved ones and your healthcare provider.  · Visit with family and friends, and keep up with hobbies.  Limit alcohol and quit smoking  · Men should have no more than 2 drinks per day.  · Women should have no more than 1 drink per day.  · Talk with your healthcare provider about quitting smoking. Smoking significantly increases your risk for heart disease and stroke. Ask your healthcare provider about community smoking cessation programs and other options.  Medicines  If lifestyle changes arent enough, your healthcare provider may prescribe high blood pressure medicine. Take all medicines as prescribed. If you have any questions about your medicines, ask your healthcare provider before stopping or changing them.   Date Last Reviewed: 4/27/2016  © 4130-4687 [employer]. [address] [place] [address], [place] [address]. All rights reserved. This information is not intended as a substitute for professional medical  care. Always follow your healthcare professional's instructions.

## 2017-03-29 NOTE — TELEPHONE ENCOUNTER
Her xray showed the Possibility of a healing stress fracture of the wrist-  i would like for her to see orthopedics for this.  In the meantime I want her wrist to be immobilized in a wrist splint (can be purchase from drug store)   The sacrum xray is normal   Continue with the recommended donut pillow, mobic and zanaflex & ice

## 2017-03-29 NOTE — MR AVS SNAPSHOT
[facility]  [address] [place] [address]  Phone: [phone]  Fax: [phone]                  Kateryna Desai   3/29/2017 8:20 AM   Office Visit    Description:  Female : 1955   Provider:  MELLISSA Henderson   Department:  [place] - [facility]           Reason for Visit     Tailbone Pain     Wrist Pain           Diagnoses this Visit        Comments    Sacral pain    -  Primary     Coccygeal pain, acute         Wrist pain, acute, left                To Do List           Future Appointments        Provider Department Dept Phone    2017 9:00 AM Lei Rhoades DPM [facility] [phone]      Goals (5 Years of Data)     None       These Medications        Disp Refills Start End    tizanidine (ZANAFLEX) 4 MG tablet 30 tablet 0 3/29/2017 2017    Take 1 tablet (4 mg total) by mouth every 8 (eight) hours. - Oral    Pharmacy: [facility] [address] Ph #: [phone]       meloxicam (MOBIC) 15 MG tablet 30 tablet 0 3/29/2017     Take 1 tablet (15 mg total) by mouth once daily. - Oral    Pharmacy: [facility] [address] Ph #: [phone]         [facility] On [facility]     [facility] -  Assistance  Registered nurses in the [facility] Center provide clinical advisement, health education, appointment booking, and other advisory services.  Call for this free service at [phone].             Medications           Message regarding Medications     Verify the changes and/or additions to your medication regime listed below are the same as discussed with your clinician today.  If any of these changes or additions are incorrect, please notify your healthcare provider.        START taking these NEW medications        Refills    tizanidine (ZANAFLEX) 4 MG tablet 0    Sig: Take 1 tablet (4 mg total) by mouth every 8 (eight) hours.     "Class: Normal    Route: Oral    meloxicam (MOBIC) 15 MG tablet 0    Sig: Take 1 tablet (15 mg total) by mouth once daily.    Class: Normal    Route: Oral           Verify that the below list of medications is an accurate representation of the medications you are currently taking.  If none reported, the list may be blank. If incorrect, please contact your healthcare provider. Carry this list with you in case of emergency.           Current Medications     BD INSULIN SYRINGE ULTRA-FINE 1/2 mL 31 x 15/64" Syrg 3 (three) times daily.     BD ULTRA-FINE JCARLOS PEN NEEDLES 32 gauge x 5/32" Ndle once daily.     BENEFIBER, GUAR GUM, ORAL Take 1 Dose by mouth once daily.     biotin 300 mcg Tab Take 1 tablet by mouth once daily.    calcium citrate-vitamin D3 315-200 mg (CITRACAL+D) 315-200 mg-unit per tablet Take 1 tablet by mouth 2 (two) times daily.    CRESTOR 40 mg Tab Take 40 mg by mouth once daily.     cyanocobalamin, vitamin B-12, 2,500 mcg Lozg Place 1 lozenge under the tongue once daily.    ESTRACE 0.01 % (0.1 mg/gram) vaginal cream Place vaginally twice a week.     fluticasone (FLONASE) 50 mcg/actuation nasal spray 1 spray by Each Nare route once daily.    furosemide (LASIX) 20 MG tablet take 1 tablet by mouth twice a day    gabapentin (NEURONTIN) 600 MG tablet Take 1 tablet (600 mg total) by mouth once daily.    GLUCAGON EMERGENCY 1 mg injection 1 mg as needed.     HUMALOG 100 unit/mL injection Inject into the skin. 10-17 units tid sliding scale    INSULIN GLARGINE,HUM.REC.ANLOG (TOUJEO SOLOSTAR SUBQ) Inject 46 Units into the skin once daily.    metoprolol succinate (TOPROL-XL) 25 MG 24 hr tablet Take 1 tablet (25 mg total) by mouth once daily.    minocycline (MINOCIN,DYNACIN) 75 MG capsule Take 75 mg by mouth 2 (two) times daily as needed.     ondansetron (ZOFRAN-ODT) 4 MG TbDL Take 1 tablet (4 mg total) by mouth every 8 (eight) hours as needed.    ONE TOUCH ULTRA TEST Strp 6-8 times daily    pantoprazole (PROTONIX) " "40 MG tablet Take 1 tablet (40 mg total) by mouth 2 (two) times daily.    spironolactone (ALDACTONE) 50 MG tablet take 1 tablet by mouth twice a day    SYNTHROID 88 mcg tablet Take 88 mcg by mouth once daily.    TRANSDERM-SCOP 1.5 mg (1 mg over 3 days) apply 1 patch BEHIND EAR every 3 days as directed    trazodone (DESYREL) 50 MG tablet take 1 tablet by mouth NIGHTLY    vitamin D 1000 units Tab Take 1,000 mg by mouth 2 (two) times daily.     WELCHOL 625 mg tablet Take by mouth once daily. 6 tabs qam    meloxicam (MOBIC) 15 MG tablet Take 1 tablet (15 mg total) by mouth once daily.    tizanidine (ZANAFLEX) 4 MG tablet Take 1 tablet (4 mg total) by mouth every 8 (eight) hours.           Clinical Reference Information           Your Vitals Were     Height Weight BMI          5' 5.5" (1.664 m) 80.6 kg (177 lb 11.1 oz) 29.12 kg/m2        Allergies as of 3/29/2017     Bactrim [Sulfamethoxazole-trimethoprim]    Percocet [Oxycodone-acetaminophen]    Iodinated Contrast Media - Iv Dye    Norco [Hydrocodone-acetaminophen]    Niaspan Extended-release [Niacin]      Immunizations Administered on Date of Encounter - 3/29/2017     None      Orders Placed During Today's Visit     Future Labs/Procedures Expected by Expires    X-Ray Sacrum And Coccyx  3/29/2017 3/29/2018    X-Ray Wrist Complete 3 views Left  3/29/2017 3/29/2018      Instructions      Diet: Diabetes  Food is an important tool that you can use to control diabetes and stay healthy. Eating well-balanced meals in the correct amounts will help you control your blood glucose levels and prevent low blood sugar reactions. It will also help you reduce the health risks of diabetes. There is no one specific diet that is right for everyone with diabetes. But there are general guidelines to follow. A registered dietitian (RD) will create a tailored diet approach thats just right for you. He or she will also help you plan healthy meals and snacks. If you have any questions, call " your dietitian for advice.    Guidelines for success  Talk with your healthcare provider before starting a diabetes diet or weight loss program. If you haven't talked with a dietitian yet, ask your provider for a referral. The following guidelines can help you succeed:  · Select foods from the 6 food groups below. Your dietitian will help you find food choices within each group. He or she will also show you serving sizes and how many servings you can have at each meal.  ¨ Grains, beans, and starchy vegetables  ¨ Vegetables  ¨ Fruit  ¨ Milk or yogurt  ¨ Meat, poultry, fish, or tofu  ¨ Healthy fats  · Check your blood sugar levels as directed by your provider. Take any medicine as prescribed by your provider.  · Learn to read food labels and pick the right portion sizes.  · Eat only the amount of food in your meal plan. Eat about the same amount of food at regular times each day. Dont skip meals. Eat meals 4 to 5 hours apart, with snacks in between.  · Limit alcohol. It raises blood sugar levels. Drink water or calorie-free diet drinks that use safe sweeteners.  · Eat less fat to help lower your risk of heart disease. Use nonfat or low-fat dairy products and lean meats. Avoid fried foods. Use cooking oils that are unsaturated, such as olive, canola, or peanut oil.  · Talk with your dietitian about safe sugar substitutes.  · Avoid added salt. It can contribute to high blood pressure, which can cause heart disease. People with diabetes already have a risk of high blood pressure and heart disease.  · Stay at a healthy weight. If you need to lose weight, cut down on your portion sizes. But dont skip meals. Exercise is an important part of any weight management program. Talk with your provider about an exercise program thats right for you.  · For more information about the best diet plan for you, talk with a registered dietitian (RD). To find an RD in your area, contact:  ¨ Academy of Nutrition and Dietetics  www.eatright.org  ¨ The American Diabetes Association 064-078-6097 www.diabetes.org  Date Last Reviewed: 8/1/2016 © 2000-2016 [employer]. [address] [place] [address], [place] [address]. All rights reserved. This information is not intended as a substitute for professional medical care. Always follow your healthcare professional's instructions.        Controlling High Blood Pressure  High blood pressure (hypertension) is often called the silent killer. This is because many people who have it dont know it. High blood pressure is defined as 140/90 mm Hg or higher. Know your blood pressure and remember to check it regularly. Doing so can save your life. Here are some things you can do to help control your blood pressure.    Choose heart-healthy foods  · Select low-salt, low-fat foods. Limit sodium intake to 2,400 mg per day or the amount suggested by your healthcare provider.  · Limit canned, dried, cured, packaged, and fast foods. These can contain a lot of salt.  · Eat 8 to 10 servings of fruits and vegetables every day.  · Choose lean meats, fish, or chicken.  · Eat whole-grain pasta, brown rice, and beans.  · Eat 2 to 3 servings of low-fat or fat-free dairy products.  · Ask your doctor about the DASH eating plan. This plan helps reduce blood pressure.  · When you go to a restaurant, ask that your meal be prepared with no added salt.  Maintain a healthy weight  · Ask your healthcare provider how many calories to eat a day. Then stick to that number.  · Ask your healthcare provider what weight range is healthiest for you. If you are overweight, a weight loss of only 3% to 5% of your body weight can help lower blood pressure. Generally, a good weight loss goal is to lose 10% of your body weight in a year.  · Limit snacks and sweets.  · Get regular exercise.  Get up and get active  · Choose activities you enjoy. Find ones you can do with friends or family. This includes bicycling, dancing, walking, and  jogging.  · Park farther away from building entrances.  · Use stairs instead of the elevator.  · When you can, walk or bike instead of driving.  · Morrison leaves, garden, or do household repairs.  · Be active at a moderate to vigorous level of physical activity for at least 40 minutes for a minimum of 3 to 4 days a week.   Manage stress  · Make time to relax and enjoy life. Find time to laugh.  · Communicate your concerns with your loved ones and your healthcare provider.  · Visit with family and friends, and keep up with hobbies.  Limit alcohol and quit smoking  · Men should have no more than 2 drinks per day.  · Women should have no more than 1 drink per day.  · Talk with your healthcare provider about quitting smoking. Smoking significantly increases your risk for heart disease and stroke. Ask your healthcare provider about community smoking cessation programs and other options.  Medicines  If lifestyle changes arent enough, your healthcare provider may prescribe high blood pressure medicine. Take all medicines as prescribed. If you have any questions about your medicines, ask your healthcare provider before stopping or changing them.   Date Last Reviewed: 4/27/2016 © 2000-2016 [employer]. [address] [place] [address]. All rights reserved. This information is not intended as a substitute for professional medical care. Always follow your healthcare professional's instructions.             Language Assistance Services     ATTENTION: Language assistance services are available, free of charge. Please call [phone].      ATENCIÓN: Si habla español, tiene a arias disposición servicios gratuitos de asistencia lingüística. Llame al [phone].     CHÚ Ý: N?u b?n nói Ti?ng Vi?t, có các d?ch v? h? tr? ngôn ng? mi?n phí dành cho b?n. G?i s? [phone].         [facility] complies with applicable Federal civil rights laws and does not discriminate on the basis of race,  color, national origin, age, disability, or sex.

## 2017-04-07 ENCOUNTER — HOSPITAL ENCOUNTER (OUTPATIENT)
Dept: RADIOLOGY | Facility: HOSPITAL | Age: 62
Discharge: HOME | End: 2017-04-07
Attending: ORTHOPAEDIC SURGERY
Payer: COMMERCIAL

## 2017-04-07 ENCOUNTER — OFFICE VISIT (OUTPATIENT)
Dept: ORTHOPEDICS | Facility: CLINIC | Age: 62
End: 2017-04-07
Payer: COMMERCIAL

## 2017-04-07 VITALS
WEIGHT: 177 LBS | DIASTOLIC BLOOD PRESSURE: 56 MMHG | BODY MASS INDEX: 29.49 KG/M2 | HEART RATE: 77 BPM | HEIGHT: 65 IN | SYSTOLIC BLOOD PRESSURE: 106 MMHG

## 2017-04-07 DIAGNOSIS — M79.642 LEFT HAND PAIN: Primary | ICD-10-CM

## 2017-04-07 DIAGNOSIS — M79.642 LEFT HAND PAIN: ICD-10-CM

## 2017-04-07 DIAGNOSIS — S60.032A CONTUSION OF LEFT MIDDLE FINGER WITHOUT DAMAGE TO NAIL, INITIAL ENCOUNTER: ICD-10-CM

## 2017-04-07 DIAGNOSIS — S60.212A CONTUSION OF LEFT WRIST, INITIAL ENCOUNTER: Primary | ICD-10-CM

## 2017-04-07 DIAGNOSIS — M65.332 TRIGGER MIDDLE FINGER OF LEFT HAND: ICD-10-CM

## 2017-04-07 PROCEDURE — 99999 PR PBB SHADOW E&M-EST. PATIENT-LVL III: CPT | Mod: PBBFAC,,, | Performed by: ORTHOPAEDIC SURGERY

## 2017-04-07 PROCEDURE — 73130 X-RAY EXAM OF HAND: CPT | Mod: TC,PN,LT

## 2017-04-07 PROCEDURE — 1160F RVW MEDS BY RX/DR IN RCRD: CPT | Mod: S$GLB,,, | Performed by: ORTHOPAEDIC SURGERY

## 2017-04-07 PROCEDURE — 20550 NJX 1 TENDON SHEATH/LIGAMENT: CPT | Mod: F2,S$GLB,, | Performed by: ORTHOPAEDIC SURGERY

## 2017-04-07 PROCEDURE — 3074F SYST BP LT 130 MM HG: CPT | Mod: S$GLB,,, | Performed by: ORTHOPAEDIC SURGERY

## 2017-04-07 PROCEDURE — 3078F DIAST BP <80 MM HG: CPT | Mod: S$GLB,,, | Performed by: ORTHOPAEDIC SURGERY

## 2017-04-07 PROCEDURE — 99203 OFFICE O/P NEW LOW 30 MIN: CPT | Mod: 25,S$GLB,, | Performed by: ORTHOPAEDIC SURGERY

## 2017-04-07 PROCEDURE — 73130 X-RAY EXAM OF HAND: CPT | Mod: 26,LT,, | Performed by: RADIOLOGY

## 2017-04-07 RX ORDER — TRIAMCINOLONE ACETONIDE 40 MG/ML
40 INJECTION, SUSPENSION INTRA-ARTICULAR; INTRAMUSCULAR
Status: DISCONTINUED | OUTPATIENT
Start: 2017-04-07 | End: 2017-04-07 | Stop reason: HOSPADM

## 2017-04-07 RX ADMIN — TRIAMCINOLONE ACETONIDE 40 MG: 40 INJECTION, SUSPENSION INTRA-ARTICULAR; INTRAMUSCULAR at 02:04

## 2017-04-07 NOTE — PROCEDURES
Tendon Sheath  Date/Time: 4/7/2017 2:00 PM  Performed by: BERNICE GILMORE  Authorized by: BERNICE GILMORE     Consent Done?: Yes (Verbal)  Timeout: prior to procedure the correct patient, procedure, and site was verified    Indications: Pain  Timeout: prior to procedure the correct patient, procedure, and site was verified    Location:  Long finger  Prep: patient was prepped and draped in usual sterile fashion    Approach:  Volar  Medications:  40 mg triamcinolone acetonide 40 mg/mL

## 2017-04-07 NOTE — PROGRESS NOTES
"Past Medical History:   Diagnosis Date    Allergy     Arthritis     degnerative disease low back,muscle spasms, shoulders    Diabetes mellitus type I     since age 13    Diabetic gastroparesis     takes Cytotec    Diabetic retinopathy of both eyes     gets periodic laser treatments    Difficult intubation     as a child had sore throat after a procedure    GERD (gastroesophageal reflux disease)     Headache     Hiatal hernia     with GERD    Hyperlipidemia     Hypertension     Infection of the upper respiratory tract 2012    Lumbar spinal stenosis     Osteopenia     Rosacea     Seizures     Pt states during pgy with stroke    Stroke ?    while pregnant    Tachycardia     asymptomatic with Toprol    Thyroid disease     hypothyroidism    Venous insufficiency of leg     improved since EVLT       Past Surgical History:   Procedure Laterality Date    CARDIAC CATHETERIZATION      no stents     SECTION      COLONOSCOPY  2007    Dr. Rose, 10 year recheck    EXOSTECTOMY  12    left foot, local anesthesia, in office    EYE SURGERY      has had many laser procedures for diabetic retinopathy    VEIN SURGERY  2012    EVLT right greater saphenous, IV sedation       Current Outpatient Prescriptions   Medication Sig    BD INSULIN SYRINGE ULTRA-FINE 1/2 mL 31 x 15/64" Syrg 3 (three) times daily.     BD ULTRA-FINE JCARLOS PEN NEEDLES 32 gauge x 5/32" Ndle once daily.     BENEFIBER, GUAR GUM, ORAL Take 1 Dose by mouth once daily.     biotin 300 mcg Tab Take 1 tablet by mouth once daily.    calcium citrate-vitamin D3 315-200 mg (CITRACAL+D) 315-200 mg-unit per tablet Take 1 tablet by mouth 2 (two) times daily.    CRESTOR 40 mg Tab Take 40 mg by mouth once daily.     cyanocobalamin, vitamin B-12, 2,500 mcg Lozg Place 1 lozenge under the tongue once daily.    ESTRACE 0.01 % (0.1 mg/gram) vaginal cream Place vaginally twice a week.     fluticasone (FLONASE) 50 " mcg/actuation nasal spray 1 spray by Each Nare route once daily.    furosemide (LASIX) 20 MG tablet take 1 tablet by mouth twice a day    gabapentin (NEURONTIN) 600 MG tablet Take 1 tablet (600 mg total) by mouth once daily.    GLUCAGON EMERGENCY 1 mg injection 1 mg as needed.     HUMALOG 100 unit/mL injection Inject into the skin. 10-17 units tid sliding scale    INSULIN GLARGINE,HUM.REC.ANLOG (TOUJEO SOLOSTAR SUBQ) Inject 46 Units into the skin once daily.    meloxicam (MOBIC) 15 MG tablet Take 1 tablet (15 mg total) by mouth once daily.    metoprolol succinate (TOPROL-XL) 25 MG 24 hr tablet Take 1 tablet (25 mg total) by mouth once daily.    minocycline (MINOCIN,DYNACIN) 75 MG capsule Take 75 mg by mouth 2 (two) times daily as needed.     ondansetron (ZOFRAN-ODT) 4 MG TbDL Take 1 tablet (4 mg total) by mouth every 8 (eight) hours as needed.    ONE TOUCH ULTRA TEST Strp 6-8 times daily    pantoprazole (PROTONIX) 40 MG tablet Take 1 tablet (40 mg total) by mouth 2 (two) times daily.    spironolactone (ALDACTONE) 50 MG tablet take 1 tablet by mouth twice a day    SYNTHROID 88 mcg tablet Take 88 mcg by mouth once daily.    tizanidine (ZANAFLEX) 4 MG tablet Take 1 tablet (4 mg total) by mouth every 8 (eight) hours.    TRANSDERM-SCOP 1.5 mg (1 mg over 3 days) apply 1 patch BEHIND EAR every 3 days as directed    trazodone (DESYREL) 50 MG tablet take 1 tablet by mouth NIGHTLY    vitamin D 1000 units Tab Take 1,000 mg by mouth 2 (two) times daily.     WELCHOL 625 mg tablet Take by mouth once daily. 6 tabs qam     No current facility-administered medications for this visit.        Review of patient's allergies indicates:   Allergen Reactions    Bactrim [sulfamethoxazole-trimethoprim] Rash     Patient experienced on 12/3/14 redness to face and rash to chest and arms/ with no SOB or airway obstruction    Percocet [oxycodone-acetaminophen] Nausea And Vomiting     Also caused dizziness and passed out     Iodinated contrast media - iv dye Swelling and Rash     Says topical iodine OK    Norco [hydrocodone-acetaminophen] Itching, Swelling and Rash     Can tolerate if she takes Benadryl with it    Niaspan extended-release [niacin] Itching and Other (See Comments)     Skin flushing and redness       Family History   Problem Relation Age of Onset    Cancer Maternal Aunt      breast    Diabetes Maternal Grandmother     Cancer Maternal Grandfather      prostate    Diabetes Maternal Grandfather     Diabetes Cousin     Arthritis Mother     Heart disease Father     Stroke Father     No Known Problems Sister     No Known Problems Brother     No Known Problems Daughter     No Known Problems Paternal Grandmother     No Known Problems Paternal Grandfather     Alzheimer's disease Maternal Uncle     Alcohol abuse Maternal Uncle      x2    Heart disease Maternal Uncle     No Known Problems Paternal Aunt     Heart disease Paternal Uncle        Social History     Social History    Marital status:      Spouse name: N/A    Number of children: N/A    Years of education: N/A     Occupational History    Not on file.     Social History Main Topics    Smoking status: Never Smoker    Smokeless tobacco: Never Used    Alcohol use Yes      Comment: social, 1 drink monthly    Drug use: No    Sexual activity: Not on file     Other Topics Concern    Not on file     Social History Narrative       Chief Complaint:   Chief Complaint   Patient presents with    Left Wrist - Pain       Consulting Physician: Arvind Pino MD    History of present illness:    This is a 62 y.o. year old female who complains of left wrist and finger pain since a fall in the tub on 3/18/17.  She puts her pain at a 4 out of 10.  She states her pain is on the radial side of the wrist and that she is unable to fully extend her left middle finger.  She also reports popping in the middle finger when trying to extend it.  She is worse with use  "of the hand.    Review of Systems:    Constitution: Denies chills, fever, and sweats.  HENT: Denies headaches or blurry vision.  Cardiovascular: Denies chest pain or irregular heart beat.  Respiratory: Denies cough or shortness of breath.  Gastrointestinal: Denies abdominal pain, nausea, or vomiting.  Musculoskeletal:  Denies muscle cramps.  Neurological: Denies dizziness or focal weakness.  Psychiatric/Behavioral: Normal mental status.  Hematologic/Lymphatic: Denies bleeding problem or easy bruising/bleeding.  Skin: Denies rash or suspicious lesions.    Examination:    Vital Signs:    Vitals:    04/07/17 1259   BP: (!) 106/56   Pulse: 77   Weight: 80.3 kg (177 lb)   Height: 5' 5" (1.651 m)   PainSc:   4   PainLoc: Wrist       Body mass index is 29.45 kg/(m^2).    This a well-developed, well nourished patient in no acute distress.    Alert and oriented and cooperative to examination.       Physical Exam: Left Wrist Exam    Skin  Scars:   None  Rash:   None    Inspection  Erythema:  None  Bruising:  None  Swelling:  None  Masses  None  Lymphadenopathy: None    Coordination:  Normal  Instability:  None    Range of Motion  Volar Flexion:  Normal  Dorsal Extension: Normal  Radial Deviation: Normal  Ulnar Deviation: Normal  Finger ROM:  Full    Strength:  Normal  except middle finger    Tenderness  Radial:   mild  Ulnar:   None  Snuffbox:  None    Pulse:   2+ radial  Sensation:  Intact      Left Hand Exam    Skin  Scars:   None  Rash:   None    Inspection  Erythema:  None  Bruising:  None  Swelling:  Middle finger PIP  Masses:  None  Lymphadenopathy: None    Coordination:  Normal  Instability:  None    Range of Motion  Finger ROM:  Full except middle PIP, trigger middle    Strength:  Normal  with pain and restricted ROM Middle  Tenderness:  a1 pulley middle    Pulse:   2+ radial  Capillary Refill: Normal    Sensation:  Intact          Imaging: X-rays ordered and reviewed today of the hand and wrist reveal no " obvious bony abnormality, fracture or dislocation.  There is evidence of what appears to be an old healing injury which she reports occurred back in October of last year.        Assessment: Contusion of left wrist, initial encounter    Contusion of left middle finger without damage to nail, initial encounter    Trigger middle finger of left hand  -     Tendon Sheath        Plan:  In regards to her wrist she has some tenderness over the radial styloid.  There is a questionable step-off and possible old healing fracture.  There is some periosteal reaction consistent with an old injury.  We will go ahead and immobilize her wrist for comfort.  In regards to her middle finger she is unable to fully extend her middle finger PIP.  This is a active and passive constraint.  I'm concerned that she might have something blocking her joint.  We will refer her to Dr. Macias for evaluation.  Finally she has trigger finger of the left middle finger.  We injected the tender spot over the A1 pulley and hopefully this will resolve that for her.  She can follow-up with us as needed.      DISCLAIMER: This note may have been dictated using voice recognition software and may contain grammatical errors.     NOTE: Consult report sent to referring provider via [employer].

## 2017-04-10 ENCOUNTER — TELEPHONE (OUTPATIENT)
Dept: ORTHOPEDICS | Facility: CLINIC | Age: 62
End: 2017-04-10

## 2017-04-10 NOTE — TELEPHONE ENCOUNTER
Left message for patient to return call to office to schedule appointment.    ----- Message from Emliia Sosa LPN sent at 4/10/2017 10:53 AM CDT -----  Dr. Iqbal has referred this patient to be seen by Dr. Macias.  Please contact her to schedule appt.      Thank you,  Emilia

## 2017-04-21 ENCOUNTER — LAB VISIT (OUTPATIENT)
Dept: LAB | Facility: HOSPITAL | Age: 62
End: 2017-04-21
Attending: INTERNAL MEDICINE
Payer: COMMERCIAL

## 2017-04-21 DIAGNOSIS — I10 HYPERTENSION, ESSENTIAL: ICD-10-CM

## 2017-04-21 DIAGNOSIS — N18.30 CHRONIC KIDNEY DISEASE, STAGE III (MODERATE): ICD-10-CM

## 2017-04-21 DIAGNOSIS — N17.9 ACUTE KIDNEY FAILURE, UNSPECIFIED: ICD-10-CM

## 2017-04-21 LAB
25(OH)D3+25(OH)D2 SERPL-MCNC: 56 NG/ML
ALBUMIN SERPL BCP-MCNC: 3.7 G/DL
ANION GAP SERPL CALC-SCNC: 10 MMOL/L
BUN SERPL-MCNC: 16 MG/DL
CALCIUM SERPL-MCNC: 9.3 MG/DL
CHLORIDE SERPL-SCNC: 103 MMOL/L
CO2 SERPL-SCNC: 26 MMOL/L
CREAT SERPL-MCNC: 1.2 MG/DL
EST. GFR  (AFRICAN AMERICAN): 56.8 ML/MIN/1.73 M^2
EST. GFR  (NON AFRICAN AMERICAN): 49.2 ML/MIN/1.73 M^2
GLUCOSE SERPL-MCNC: 227 MG/DL
PHOSPHATE SERPL-MCNC: 3.1 MG/DL
PHOSPHATE SERPL-MCNC: 3.1 MG/DL
POTASSIUM SERPL-SCNC: 4.6 MMOL/L
PTH-INTACT SERPL-MCNC: 83 PG/ML
SODIUM SERPL-SCNC: 139 MMOL/L

## 2017-04-21 PROCEDURE — 82306 VITAMIN D 25 HYDROXY: CPT

## 2017-04-21 PROCEDURE — 36415 COLL VENOUS BLD VENIPUNCTURE: CPT | Mod: PO

## 2017-04-21 PROCEDURE — 83970 ASSAY OF PARATHORMONE: CPT

## 2017-04-21 PROCEDURE — 80069 RENAL FUNCTION PANEL: CPT

## 2017-04-27 ENCOUNTER — LAB VISIT (OUTPATIENT)
Dept: LAB | Facility: HOSPITAL | Age: 62
End: 2017-04-27
Attending: OBSTETRICS & GYNECOLOGY
Payer: COMMERCIAL

## 2017-04-27 DIAGNOSIS — R31.9 HEMATURIA: ICD-10-CM

## 2017-04-27 DIAGNOSIS — R31.9 HEMATURIA: Primary | ICD-10-CM

## 2017-04-27 LAB
BACTERIA #/AREA URNS AUTO: NORMAL /HPF
BILIRUB UR QL STRIP: NEGATIVE
CAOX CRY UR QL COMP ASSIST: NORMAL
CLARITY UR REFRACT.AUTO: ABNORMAL
COLOR UR AUTO: ABNORMAL
GLUCOSE UR QL STRIP: ABNORMAL
HGB UR QL STRIP: NEGATIVE
HYALINE CASTS UR QL AUTO: 1 /LPF
KETONES UR QL STRIP: ABNORMAL
LEUKOCYTE ESTERASE UR QL STRIP: NEGATIVE
MICROSCOPIC COMMENT: NORMAL
NITRITE UR QL STRIP: NEGATIVE
PH UR STRIP: 5 [PH] (ref 5–8)
PROT UR QL STRIP: NEGATIVE
RBC #/AREA URNS AUTO: 3 /HPF (ref 0–4)
SP GR UR STRIP: 1.03 (ref 1–1.03)
SQUAMOUS #/AREA URNS AUTO: 3 /HPF
URN SPEC COLLECT METH UR: ABNORMAL
UROBILINOGEN UR STRIP-ACNC: NEGATIVE EU/DL
WBC #/AREA URNS AUTO: 1 /HPF (ref 0–5)

## 2017-04-27 PROCEDURE — 81001 URINALYSIS AUTO W/SCOPE: CPT

## 2017-04-28 RX ORDER — METOPROLOL SUCCINATE 25 MG/1
TABLET, EXTENDED RELEASE ORAL
Qty: 30 TABLET | Refills: 11 | Status: SHIPPED | OUTPATIENT
Start: 2017-04-28 | End: 2018-04-14 | Stop reason: SDUPTHER

## 2017-05-04 RX ORDER — SPIRONOLACTONE 50 MG/1
TABLET, FILM COATED ORAL
Qty: 60 TABLET | Refills: 5 | Status: SHIPPED | OUTPATIENT
Start: 2017-05-04 | End: 2017-10-19 | Stop reason: SDUPTHER

## 2017-05-24 ENCOUNTER — TELEPHONE (OUTPATIENT)
Dept: PODIATRY | Facility: CLINIC | Age: 62
End: 2017-05-24

## 2017-05-24 ENCOUNTER — TELEPHONE (OUTPATIENT)
Dept: VASCULAR SURGERY | Facility: CLINIC | Age: 62
End: 2017-05-24

## 2017-05-24 DIAGNOSIS — R60.0 BILATERAL EDEMA OF LOWER EXTREMITY: Primary | ICD-10-CM

## 2017-05-24 NOTE — TELEPHONE ENCOUNTER
----- Message from Sonja Blue sent at 5/24/2017  7:37 AM CDT -----  Patient is requesting a return call regarding her referral to a vascular specialist contact patient at [phone].    Thank you

## 2017-05-24 NOTE — TELEPHONE ENCOUNTER
Spoke with patient asking if she would be able to get some information from [facility], instructed Patient to call them and find out what they would need to release her information, Verbalized understanding.

## 2017-05-24 NOTE — TELEPHONE ENCOUNTER
----- Message from Edis Martinez sent at 5/24/2017  8:21 AM CDT -----  Contact: same  Patient called in and last saw Dr. Armen torres back in 2012 for poor circulation and pain in legs and now patient stated she is having the same issues.  Patient stated her right leg is worse then left.    Patient would like to see if should could schedule and appt with Dr. Armen torres.  Patient is a current and established patient of [facility] with Dr. Endy Mendoza as her PCP.    Patient call back number is [phone]

## 2017-05-25 ENCOUNTER — OFFICE VISIT (OUTPATIENT)
Dept: FAMILY MEDICINE | Facility: CLINIC | Age: 62
End: 2017-05-25
Payer: COMMERCIAL

## 2017-05-25 ENCOUNTER — HOSPITAL ENCOUNTER (OUTPATIENT)
Dept: RADIOLOGY | Facility: CLINIC | Age: 62
Discharge: HOME | End: 2017-05-25
Attending: FAMILY MEDICINE
Payer: COMMERCIAL

## 2017-05-25 VITALS
DIASTOLIC BLOOD PRESSURE: 74 MMHG | HEART RATE: 92 BPM | BODY MASS INDEX: 28.53 KG/M2 | HEIGHT: 66 IN | WEIGHT: 177.5 LBS | RESPIRATION RATE: 18 BRPM | TEMPERATURE: 99 F | SYSTOLIC BLOOD PRESSURE: 125 MMHG

## 2017-05-25 DIAGNOSIS — M79.661 RIGHT CALF PAIN: ICD-10-CM

## 2017-05-25 DIAGNOSIS — M79.661 RIGHT CALF PAIN: Primary | ICD-10-CM

## 2017-05-25 PROCEDURE — 93971 EXTREMITY STUDY: CPT | Mod: TC,PO

## 2017-05-25 PROCEDURE — 99214 OFFICE O/P EST MOD 30 MIN: CPT | Mod: S$GLB,,, | Performed by: FAMILY MEDICINE

## 2017-05-25 PROCEDURE — 93971 EXTREMITY STUDY: CPT | Mod: 26,,, | Performed by: RADIOLOGY

## 2017-05-25 PROCEDURE — 99999 PR PBB SHADOW E&M-EST. PATIENT-LVL III: CPT | Mod: PBBFAC,,, | Performed by: FAMILY MEDICINE

## 2017-05-25 RX ORDER — HYDROCODONE BITARTRATE AND ACETAMINOPHEN 5; 325 MG/1; MG/1
1 TABLET ORAL EVERY 4 HOURS PRN
Qty: 30 TABLET | Refills: 0 | Status: SHIPPED | OUTPATIENT
Start: 2017-05-25 | End: 2017-12-26

## 2017-05-25 RX ORDER — DOXYCYCLINE HYCLATE 50 MG/1
1 CAPSULE ORAL 2 TIMES DAILY PRN
Refills: 0 | COMMUNITY
Start: 2017-04-05 | End: 2019-02-18 | Stop reason: CLARIF

## 2017-05-25 RX ORDER — DAPSONE 50 MG/G
1 GEL TOPICAL 2 TIMES DAILY PRN
Refills: 0 | COMMUNITY
Start: 2017-05-22

## 2017-05-25 NOTE — PROGRESS NOTES
"Subjective:       Patient ID: Kateryna Desai is a 62 y.o. female.    Chief Complaint: Leg Pain (X 2 weeks, lower right throbbing leg pain, hx vericose veins)    Has had Vein and Arterial eval in past - Dr Weston" and -  an outside vascular clinic a few months ago; procedures recommended but unable to do so as taking care of her  who is post-op back surgery.           Leg Pain    There was no injury mechanism. The pain is present in the right leg. The quality of the pain is described as aching. Pain severity now: Mod to severe. The symptoms are aggravated by palpation and movement. She has tried elevation (tramadol) for the symptoms. The treatment provided no relief.     Review of Systems   Constitutional: Negative for fever.   Respiratory: Negative for shortness of breath.    Cardiovascular: Negative for chest pain.   Gastrointestinal: Negative for abdominal pain and nausea.   Skin: Negative for rash.   All other systems reviewed and are negative.      Objective:      Physical Exam   Constitutional: She appears well-developed and well-nourished.   Eyes: Pupils are equal, round, and reactive to light. No scleral icterus.   Neck: Neck supple.   Cardiovascular: Normal rate and regular rhythm.    No murmur heard.  Pulmonary/Chest: Effort normal and breath sounds normal.   Musculoskeletal: She exhibits no edema or tenderness.   Pain to palpation of right calf; trace edema right leg.   Lymphadenopathy:     She has no cervical adenopathy.   Skin: Skin is warm and dry.       Assessment:       1. Right calf pain        Plan:         Kateryna was seen today for leg pain.    Diagnoses and all orders for this visit:    Right calf pain  -     US Lower Extremity Veins Right; Future  -     hydrocodone-acetaminophen 5-325mg (NORCO) 5-325 mg per tablet; Take 1 tablet by mouth every 4 (four) hours as needed for Pain.     US - negative for DVT; elevate and rest leg but do AROM to prevent stasis; f/u with Dr Weston" as " planned.

## 2017-06-07 RX ORDER — FUROSEMIDE 20 MG/1
TABLET ORAL
Qty: 60 TABLET | Refills: 5 | Status: SHIPPED | OUTPATIENT
Start: 2017-06-07 | End: 2017-11-29 | Stop reason: SDUPTHER

## 2017-06-08 DIAGNOSIS — E11.9 TYPE 2 DIABETES MELLITUS WITHOUT COMPLICATION: ICD-10-CM

## 2017-06-10 ENCOUNTER — LAB VISIT (OUTPATIENT)
Dept: LAB | Facility: HOSPITAL | Age: 62
End: 2017-06-10
Attending: INTERNAL MEDICINE
Payer: COMMERCIAL

## 2017-06-10 DIAGNOSIS — E03.9 UNSPECIFIED HYPOTHYROIDISM: ICD-10-CM

## 2017-06-10 DIAGNOSIS — E78.00 PURE HYPERCHOLESTEROLEMIA: ICD-10-CM

## 2017-06-10 DIAGNOSIS — E10.9 DIABETES MELLITUS TYPE I: ICD-10-CM

## 2017-06-10 DIAGNOSIS — E03.9 UNSPECIFIED HYPOTHYROIDISM: Primary | ICD-10-CM

## 2017-06-10 LAB
ANION GAP SERPL CALC-SCNC: 10 MMOL/L
BUN SERPL-MCNC: 15 MG/DL
CALCIUM SERPL-MCNC: 9.4 MG/DL
CHLORIDE SERPL-SCNC: 102 MMOL/L
CHOLEST/HDLC SERPL: 3.3 {RATIO}
CO2 SERPL-SCNC: 28 MMOL/L
CREAT SERPL-MCNC: 1 MG/DL
EST. GFR  (AFRICAN AMERICAN): >60 ML/MIN/1.73 M^2
EST. GFR  (NON AFRICAN AMERICAN): >60 ML/MIN/1.73 M^2
GLUCOSE SERPL-MCNC: 136 MG/DL
HDL/CHOLESTEROL RATIO: 30.3 %
HDLC SERPL-MCNC: 198 MG/DL
HDLC SERPL-MCNC: 60 MG/DL
LDLC SERPL CALC-MCNC: 119.2 MG/DL
NONHDLC SERPL-MCNC: 138 MG/DL
POTASSIUM SERPL-SCNC: 3.8 MMOL/L
SODIUM SERPL-SCNC: 140 MMOL/L
TRIGL SERPL-MCNC: 94 MG/DL

## 2017-06-10 PROCEDURE — 80061 LIPID PANEL: CPT

## 2017-06-10 PROCEDURE — 36415 COLL VENOUS BLD VENIPUNCTURE: CPT | Mod: PO

## 2017-06-10 PROCEDURE — 80048 BASIC METABOLIC PNL TOTAL CA: CPT

## 2017-06-10 PROCEDURE — 83036 HEMOGLOBIN GLYCOSYLATED A1C: CPT

## 2017-06-11 LAB
ESTIMATED AVG GLUCOSE: 134 MG/DL
HBA1C MFR BLD HPLC: 6.3 %

## 2017-06-13 ENCOUNTER — OFFICE VISIT (OUTPATIENT)
Dept: PODIATRY | Facility: CLINIC | Age: 62
End: 2017-06-13
Payer: COMMERCIAL

## 2017-06-13 VITALS — WEIGHT: 175.94 LBS | HEIGHT: 66 IN | BODY MASS INDEX: 28.27 KG/M2

## 2017-06-13 DIAGNOSIS — R20.2 PARESTHESIA OF FOOT, BILATERAL: ICD-10-CM

## 2017-06-13 DIAGNOSIS — E10.42 CONTROLLED TYPE 1 DIABETES MELLITUS WITH DIABETIC POLYNEUROPATHY: Primary | ICD-10-CM

## 2017-06-13 DIAGNOSIS — L84 CORN OR CALLUS: ICD-10-CM

## 2017-06-13 DIAGNOSIS — B35.1 ONYCHOMYCOSIS DUE TO DERMATOPHYTE: ICD-10-CM

## 2017-06-13 PROCEDURE — 99999 PR PBB SHADOW E&M-EST. PATIENT-LVL IV: CPT | Mod: PBBFAC,,, | Performed by: PODIATRIST

## 2017-06-13 PROCEDURE — 11721 DEBRIDE NAIL 6 OR MORE: CPT | Mod: 59,Q9,S$GLB, | Performed by: PODIATRIST

## 2017-06-13 PROCEDURE — 99499 UNLISTED E&M SERVICE: CPT | Mod: S$GLB,,, | Performed by: PODIATRIST

## 2017-06-13 PROCEDURE — 11056 PARNG/CUTG B9 HYPRKR LES 2-4: CPT | Mod: Q9,S$GLB,, | Performed by: PODIATRIST

## 2017-06-13 NOTE — PROGRESS NOTES
Subjective:      Patient ID: Kateryna Desai is a 62 y.o. female.    Chief Complaint: Diabetic Foot Exam (GIOVANA Mendoza  5/25/17  A1C 6/10/17  6.3) and Nail Care    Kateryna is a 62 y.o. female who presents to the clinic for routine evaluation and treatment of diabetic feet. Kateryna has a past medical history of Allergy; Arthritis; Diabetes mellitus type I; Diabetic gastroparesis; Diabetic retinopathy of both eyes; Difficult intubation; GERD (gastroesophageal reflux disease); Headache; Hiatal hernia; Hyperlipidemia; Hypertension; Infection of the upper respiratory tract (June 2012); Lumbar spinal stenosis; Osteopenia; Rosacea; Seizures; Stroke (1985?); Tachycardia; Thyroid disease; and Venous insufficiency of leg. Patient relates that the oral gabapentin has really helped to lessen burning pain, however, she has discontinued use due to weight gain.  States that previous pain symptoms associated with neuropathy are manageable even without the medication.  Also, relates having continued callus build up to the tips of bilateral 2nd toe, however, she continues filing the lesions with a pumice stone regularly.  Denies any additional pedal complaints.      PCP: Endy Mendoza MD    Date Last Seen by PCP: 5/25/17      Hemoglobin A1C   Date Value Ref Range Status   06/10/2017 6.3 (H) 4.5 - 6.2 % Final     Comment:     According to ADA guidelines, hemoglobin A1C <7.0% represents  optimal control in non-pregnant diabetic patients.  Different  metrics may apply to specific populations.   Standards of Medical Care in Diabetes - 2016.  For the purpose of screening for the presence of diabetes:  <5.7%     Consistent with the absence of diabetes  5.7-6.4%  Consistent with increasing risk for diabetes   (prediabetes)  >or=6.5%  Consistent with diabetes  Currently no consensus exists for use of hemoglobin A1C  for diagnosis of diabetes for children.     02/09/2017 6.0 4.5 - 6.2 % Final     Comment:     According to ADA  guidelines, hemoglobin A1C <7.0% represents  optimal control in non-pregnant diabetic patients.  Different  metrics may apply to specific populations.   Standards of Medical Care in Diabetes - 2016.  For the purpose of screening for the presence of diabetes:  <5.7%     Consistent with the absence of diabetes  5.7-6.4%  Consistent with increasing risk for diabetes   (prediabetes)  >or=6.5%  Consistent with diabetes  Currently no consensus exists for use of hemoglobin A1C  for diagnosis of diabetes for children.     10/03/2016 6.0 4.5 - 6.2 % Final     Comment:     According to ADA guidelines, hemoglobin A1C <7.0% represents  optimal control in non-pregnant diabetic patients.  Different  metrics may apply to specific populations.   Standards of Medical Care in Diabetes - 2016.  For the purpose of screening for the presence of diabetes:  <5.7%     Consistent with the absence of diabetes  5.7-6.4%  Consistent with increasing risk for diabetes   (prediabetes)  >or=6.5%  Consistent with diabetes  Currently no consensus exists for use of hemoglobin A1C  for diagnosis of diabetes for children.             Past Medical History:   Diagnosis Date    Allergy     Arthritis     degnerative disease low back,muscle spasms, shoulders    Diabetes mellitus type I     since age 13    Diabetic gastroparesis     takes Cytotec    Diabetic retinopathy of both eyes     gets periodic laser treatments    Difficult intubation     as a child had sore throat after a procedure    GERD (gastroesophageal reflux disease)     Headache     Hiatal hernia     with GERD    Hyperlipidemia     Hypertension     Infection of the upper respiratory tract June 2012    Lumbar spinal stenosis     Osteopenia     Rosacea     Seizures     Pt states during pgy with stroke    Stroke 1985?    while pregnant    Tachycardia     asymptomatic with Toprol    Thyroid disease     hypothyroidism    Venous insufficiency of leg     improved since EVLT  "      Past Surgical History:   Procedure Laterality Date    CARDIAC CATHETERIZATION      no stents     SECTION      COLONOSCOPY  2007    Dr. Rose, 10 year recheck    EXOSTECTOMY  12    left foot, local anesthesia, in office    EYE SURGERY      has had many laser procedures for diabetic retinopathy    VEIN SURGERY  2012    EVLT right greater saphenous, IV sedation       Family History   Problem Relation Age of Onset    Cancer Maternal Aunt      breast    Diabetes Maternal Grandmother     Cancer Maternal Grandfather      prostate    Diabetes Maternal Grandfather     Diabetes Cousin     Arthritis Mother     Heart disease Father     Stroke Father     No Known Problems Sister     No Known Problems Brother     No Known Problems Daughter     No Known Problems Paternal Grandmother     No Known Problems Paternal Grandfather     Alzheimer's disease Maternal Uncle     Alcohol abuse Maternal Uncle      x2    Heart disease Maternal Uncle     No Known Problems Paternal Aunt     Heart disease Paternal Uncle        Social History     Social History    Marital status:      Spouse name: N/A    Number of children: N/A    Years of education: N/A     Social History Main Topics    Smoking status: Never Smoker    Smokeless tobacco: Never Used    Alcohol use Yes      Comment: social, 1 drink monthly    Drug use: No    Sexual activity: Not on file     Other Topics Concern    Not on file     Social History Narrative    No narrative on file       Current Outpatient Prescriptions   Medication Sig Dispense Refill    ACZONE 5 % topical gel   0    BD INSULIN SYRINGE ULTRA-FINE 1/2 mL 31 x 15/64" Syrg 3 (three) times daily.   1    BD ULTRA-FINE JCARLOS PEN NEEDLES 32 gauge x 5/32" Ndle once daily.   0    BENEFIBER, GUAR GUM, ORAL Take 1 Dose by mouth once daily.       biotin 300 mcg Tab Take 1 tablet by mouth once daily.      calcium citrate-vitamin D3 315-200 mg " (CITRACAL+D) 315-200 mg-unit per tablet Take 1 tablet by mouth 2 (two) times daily.      CRESTOR 40 mg Tab Take 40 mg by mouth once daily.       cyanocobalamin, vitamin B-12, 2,500 mcg Lozg Place 1 lozenge under the tongue once daily.      doxycycline (VIBRAMYCIN) 50 MG capsule Take 1 capsule by mouth 2 (two) times daily as needed.  0    ESTRACE 0.01 % (0.1 mg/gram) vaginal cream Place vaginally twice a week.   0    fluticasone (FLONASE) 50 mcg/actuation nasal spray 1 spray by Each Nare route once daily. 16 g 0    furosemide (LASIX) 20 MG tablet take 1 tablet by mouth twice a day 60 tablet 5    GLUCAGON EMERGENCY 1 mg injection 1 mg as needed.       HUMALOG 100 unit/mL injection Inject into the skin. 10-17 units tid sliding scale  1    hydrocodone-acetaminophen 5-325mg (NORCO) 5-325 mg per tablet Take 1 tablet by mouth every 4 (four) hours as needed for Pain. 30 tablet 0    INSULIN GLARGINE,HUM.REC.ANLOG (TOUJEO SOLOSTAR SUBQ) Inject 46 Units into the skin once daily.      metoprolol succinate (TOPROL-XL) 25 MG 24 hr tablet take 1 tablet by mouth once daily 30 tablet 11    minocycline (MINOCIN,DYNACIN) 75 MG capsule Take 75 mg by mouth 2 (two) times daily as needed.   0    ondansetron (ZOFRAN-ODT) 4 MG TbDL Take 1 tablet (4 mg total) by mouth every 8 (eight) hours as needed. 12 tablet 0    ONE TOUCH ULTRA TEST Strp 6-8 times daily      pantoprazole (PROTONIX) 40 MG tablet Take 1 tablet (40 mg total) by mouth 2 (two) times daily. 180 tablet 3    spironolactone (ALDACTONE) 50 MG tablet take 1 tablet by mouth twice a day 60 tablet 5    SYNTHROID 88 mcg tablet Take 88 mcg by mouth once daily.  0    vitamin D 1000 units Tab Take 1,000 mg by mouth 2 (two) times daily.       WELCHOL 625 mg tablet Take by mouth once daily. 6 tabs qam       No current facility-administered medications for this visit.        Review of patient's allergies indicates:   Allergen Reactions    Bactrim  [sulfamethoxazole-trimethoprim] Rash     Patient experienced on 12/3/14 redness to face and rash to chest and arms/ with no SOB or airway obstruction    Percocet [oxycodone-acetaminophen] Nausea And Vomiting     Also caused dizziness and passed out    Iodinated contrast media - iv dye Swelling and Rash     Says topical iodine OK    Norco [hydrocodone-acetaminophen] Itching, Swelling and Rash     Can tolerate if she takes Benadryl with it    Niaspan extended-release [niacin] Itching and Other (See Comments)     Skin flushing and redness         Review of Systems   Constitution: Negative for chills, decreased appetite, diaphoresis and fever.   Cardiovascular: Negative for claudication and leg swelling.   Skin: Positive for color change, dry skin and nail changes. Negative for flushing and itching.   Musculoskeletal: Negative for arthritis, back pain, falls, gout, joint pain and joint swelling.   Neurological: Positive for numbness and paresthesias.   Psychiatric/Behavioral: Negative for altered mental status.           Objective:      Physical Exam   Constitutional: She is oriented to person, place, and time. She appears well-developed and well-nourished. No distress.   Cardiovascular:   Pulses:       Dorsalis pedis pulses are 2+ on the right side, and 2+ on the left side.        Posterior tibial pulses are 1+ on the right side, and 1+ on the left side.   CFT <3 seconds bilateral.  Pedal hair growth decreased bilateral.  Varicosities noted to bilateral lower extremity. Mild nonpitting edema noted to bilateral lower extremity.  Toes are cool to touch bilateral.       Musculoskeletal: Normal range of motion. She exhibits edema. She exhibits no tenderness.   Muscle strength 5/5 in all muscle groups bilateral.  No tenderness nor crepitation with active and passive ROM of foot/ankle joints bilateral.  No pain with palpation of bilateral foot and ankle.  Bilateral pes planus foot type.  Bilateral hallux abducto valgus.   Bilateral semi-rigid contracture of toes 2-5.     Neurological: She is alert and oriented to person, place, and time. She has normal strength. A sensory deficit is present.   Protective sensation per [place] monofilament decreased bilateral.    Vibratory sensation intact bilateral.    Light touch intact bilateral.   Skin: Skin is warm, dry and intact. Lesion noted. No abrasion, no bruising, no burn, no ecchymosis, no laceration, no petechiae and no rash noted. She is not diaphoretic. No cyanosis or erythema. No pallor. Nails show no clubbing.   Pedal skin is thin and atrophic bilateral.  Toenails x 10 appear thickened by 2 mm's, elongated by 2 mm's, and discolored with subungual debris.  Focal hyperkeratotic lesions noted to the tip of bilateral 2nd toe.  No open wounds, interdigital maceration noted bilateral.                    Assessment:       Encounter Diagnoses   Name Primary?    Controlled type 1 diabetes mellitus with diabetic polyneuropathy Yes    Onychomycosis due to dermatophyte     Corn or callus     Paresthesia of foot, bilateral          Plan:       Kateryna was seen today for diabetic foot exam and nail care.    Diagnoses and all orders for this visit:    Controlled type 1 diabetes mellitus with diabetic polyneuropathy    Onychomycosis due to dermatophyte    Corn or callus    Paresthesia of foot, bilateral      I counseled the patient on her conditions, their implications and medical management.    Being that patient has discontinued taking gabapentin, I suggested that she continue with taking a B-complex vitamin and begin taking 600mg of alpha lipoic acid daily.    Shoe inspection. Diabetic Foot Education. Patient reminded of the importance of good nutrition and blood sugar control to help prevent podiatric complications of diabetes. Patient instructed on proper foot hygeine. We discussed wearing proper shoe gear, daily foot inspections, never walking without protective shoe gear, never  putting sharp instruments to feet    With patient's permission, nails were aggressively reduced and debrided x 10 to their soft tissue attachment mechanically and with electric , removing all offending nail and debris.  With a sterile #15 scalpel was used to trim lesions x 2 down to smooth appearing skin without incident.  Patient relates relief following the procedure. She will continue to monitor the areas daily, inspect her feet, wear protective shoe gear when ambulatory, moisturizer to maintain skin integrity and follow in this office in approximately 2-3 months, sooner p.r.n.    Return in about 3 months (around 9/13/2017).    Lei Rhoades DPM

## 2017-07-10 ENCOUNTER — TELEPHONE (OUTPATIENT)
Dept: PODIATRY | Facility: CLINIC | Age: 62
End: 2017-07-10

## 2017-07-10 NOTE — TELEPHONE ENCOUNTER
----- Message from Corrine Wilson sent at 7/10/2017 12:12 PM CDT -----  Contact: [phone]  Patient is requesting a call back from the nurse stated she need the vascular results sent to dr araujo, she stated the test wasn't done at [facility] but the results was sent to dr hankins.    Please call the patient upon request at phone number [phone].

## 2017-07-10 NOTE — TELEPHONE ENCOUNTER
Attempted to call patient to inform her that  did not get all results from Vascular tests, advises patient to contact Vascular office, Will attempt to call back.Could not leave message.

## 2017-07-11 ENCOUNTER — TELEPHONE (OUTPATIENT)
Dept: VASCULAR SURGERY | Facility: CLINIC | Age: 62
End: 2017-07-11

## 2017-07-11 NOTE — TELEPHONE ENCOUNTER
----- Message from Luz Marina France sent at 7/11/2017 12:56 PM CDT -----  Contact: self  Patient [phone] is asking if the medical records have been received from [facility] [place]/please advise

## 2017-07-11 NOTE — TELEPHONE ENCOUNTER
----- Message from Mirna Mai sent at 7/11/2017  2:09 PM CDT -----  Contact: patient  Patient returning a missed call. Please advise. Call to pod. Call connected to pod. Warm transferred.  Thanks!

## 2017-07-13 ENCOUNTER — OFFICE VISIT (OUTPATIENT)
Dept: VASCULAR SURGERY | Facility: CLINIC | Age: 62
End: 2017-07-13
Payer: COMMERCIAL

## 2017-07-13 VITALS
HEIGHT: 66 IN | SYSTOLIC BLOOD PRESSURE: 116 MMHG | BODY MASS INDEX: 28.12 KG/M2 | HEART RATE: 93 BPM | WEIGHT: 175 LBS | DIASTOLIC BLOOD PRESSURE: 69 MMHG | RESPIRATION RATE: 18 BRPM

## 2017-07-13 DIAGNOSIS — I83.813 VARICOSE VEINS OF BOTH LOWER EXTREMITIES WITH PAIN: ICD-10-CM

## 2017-07-13 DIAGNOSIS — I87.2 VENOUS INSUFFICIENCY OF BOTH LOWER EXTREMITIES: Primary | ICD-10-CM

## 2017-07-13 PROCEDURE — 99999 PR PBB SHADOW E&M-EST. PATIENT-LVL III: CPT | Mod: PBBFAC,,, | Performed by: THORACIC SURGERY (CARDIOTHORACIC VASCULAR SURGERY)

## 2017-07-13 PROCEDURE — 99205 OFFICE O/P NEW HI 60 MIN: CPT | Mod: S$GLB,,, | Performed by: THORACIC SURGERY (CARDIOTHORACIC VASCULAR SURGERY)

## 2017-07-13 NOTE — PROGRESS NOTES
OFFICE VISIT      The patient is a 62-year-old female who underwent endovenous laser treatment of right greater saphenous vein in 2012.  I did that procedure and the patient states that she got a lot of relief of her pain and swelling after the procedure was done.  For relief lasted until about last year.  About a year ago she started to develop the same symptoms that she had prior to the EVLT.  She states that she gets pain in bilateral lower extremities which is much worse towards the end of the day and that might it is the worse.  She elevates the leg and get some relief.  She is also experiencing some edema which is worse towards the end of the day.  Over the last several years she's been developing darkening of the skin on the anteromedial aspect of the distal one third of bilateral legs.  She went to see another vascular surgeon and she was told that she had venous insufficiency.  However she change her mind about having her procedures done by a vascular surgeon but I'm unclear as to why.  She came to me for another opinion.      Past Medical History:   Diagnosis Date    Allergy     Arthritis     degnerative disease low back,muscle spasms, shoulders    Diabetes mellitus type I     since age 13    Diabetic gastroparesis     takes Cytotec    Diabetic retinopathy of both eyes     gets periodic laser treatments    Difficult intubation     as a child had sore throat after a procedure    GERD (gastroesophageal reflux disease)     Headache(784.0)     Hiatal hernia     with GERD    Hyperlipidemia     Hypertension     Infection of the upper respiratory tract June 2012    Lumbar spinal stenosis     Osteopenia     Rosacea     Seizures     Pt states during pgy with stroke    Stroke 1985?    while pregnant    Tachycardia     asymptomatic with Toprol    Thyroid disease     hypothyroidism    Venous insufficiency of leg     improved since EVLT       Past Surgical History:   Procedure Laterality Date     "BUNIONECTOMY Right 2012    CARDIAC CATHETERIZATION      no stents     SECTION      COLONOSCOPY  2007    Dr. Rose, 10 year recheck    EXOSTECTOMY  12    left foot, local anesthesia, in office    EYE SURGERY      has had many laser procedures for diabetic retinopathy    VEIN SURGERY  2012    EVLT right greater saphenous, IV sedation       Review of patient's allergies indicates:   Allergen Reactions    Bactrim [sulfamethoxazole-trimethoprim] Rash     Patient experienced on 12/3/14 redness to face and rash to chest and arms/ with no SOB or airway obstruction    Percocet [oxycodone-acetaminophen] Nausea And Vomiting     Also caused dizziness and passed out    Iodinated contrast- oral and iv dye Swelling and Rash     Says topical iodine OK    Niaspan extended-release [niacin] Itching and Other (See Comments)     Skin flushing and redness    Norco [hydrocodone-acetaminophen] Itching, Swelling and Rash     Can tolerate if she takes Benadryl with it       Current Outpatient Prescriptions   Medication Sig Dispense Refill    ACZONE 5 % topical gel   0    BD INSULIN SYRINGE ULTRA-FINE 1/2 mL 31 x 15/64" Syrg 3 (three) times daily.   1    BD ULTRA-FINE JCARLOS PEN NEEDLES 32 gauge x 5/32" Ndle once daily.   0    BENEFIBER, GUAR GUM, ORAL Take 1 Dose by mouth once daily.       biotin 300 mcg Tab Take 1 tablet by mouth once daily.      calcium citrate-vitamin D3 315-200 mg (CITRACAL+D) 315-200 mg-unit per tablet Take 1 tablet by mouth 2 (two) times daily.      CRESTOR 40 mg Tab Take 40 mg by mouth once daily.       cyanocobalamin, vitamin B-12, 2,500 mcg Lozg Place 1 lozenge under the tongue once daily.      doxycycline (VIBRAMYCIN) 50 MG capsule Take 1 capsule by mouth 2 (two) times daily as needed.  0    ESTRACE 0.01 % (0.1 mg/gram) vaginal cream Place vaginally twice a week.   0    fluticasone (FLONASE) 50 mcg/actuation nasal spray 1 spray by Each Nare route once daily. 16 g 0 "    furosemide (LASIX) 20 MG tablet take 1 tablet by mouth twice a day 60 tablet 5    GLUCAGON EMERGENCY 1 mg injection 1 mg as needed.       HUMALOG 100 unit/mL injection Inject into the skin. 10-17 units tid sliding scale  1    hydrocodone-acetaminophen 5-325mg (NORCO) 5-325 mg per tablet Take 1 tablet by mouth every 4 (four) hours as needed for Pain. 30 tablet 0    INSULIN GLARGINE,HUM.REC.ANLOG (TOUJEO SOLOSTAR SUBQ) Inject 46 Units into the skin once daily.      metoprolol succinate (TOPROL-XL) 25 MG 24 hr tablet take 1 tablet by mouth once daily 30 tablet 11    ondansetron (ZOFRAN-ODT) 4 MG TbDL Take 1 tablet (4 mg total) by mouth every 8 (eight) hours as needed. 12 tablet 0    ONE TOUCH ULTRA TEST Strp 6-8 times daily      pantoprazole (PROTONIX) 40 MG tablet Take 1 tablet (40 mg total) by mouth 2 (two) times daily. 180 tablet 3    spironolactone (ALDACTONE) 50 MG tablet take 1 tablet by mouth twice a day 60 tablet 5    SYNTHROID 88 mcg tablet Take 88 mcg by mouth once daily.  0    vitamin D 1000 units Tab Take 1,000 mg by mouth 2 (two) times daily.       WELCHOL 625 mg tablet Take by mouth once daily. 6 tabs qam      minocycline (MINOCIN,DYNACIN) 75 MG capsule Take 75 mg by mouth 2 (two) times daily as needed.   0     No current facility-administered medications for this visit.        Family History   Problem Relation Age of Onset    Cancer Maternal Aunt      breast    Diabetes Maternal Grandmother     Cancer Maternal Grandfather      prostate    Diabetes Maternal Grandfather     Diabetes Cousin     Arthritis Mother     Heart disease Father     Stroke Father     No Known Problems Sister     No Known Problems Brother     No Known Problems Daughter     No Known Problems Paternal Grandmother     No Known Problems Paternal Grandfather     Alzheimer's disease Maternal Uncle     Alcohol abuse Maternal Uncle      x2    Heart disease Maternal Uncle     No Known Problems Paternal Aunt      Heart disease Paternal Uncle        Social History     Social History    Marital status:      Spouse name: N/A    Number of children: N/A    Years of education: N/A     Social History Main Topics    Smoking status: Never Smoker    Smokeless tobacco: Never Used    Alcohol use Yes      Comment: social, 1 drink monthly    Drug use: No    Sexual activity: Yes     Partners: Male     Other Topics Concern    None     Social History Narrative    None       REVIEW OF SYSTEMS: She complains of pain, hyperpigmentation, edema and varicose veins,A bilateral lower extremities.  Leg elevation helps.  All other systems are reviewed and are negative        PHYSICAL EXAM:  Physical Exam   Constitutional: She is oriented to person, place, and time. She appears well-developed and well-nourished. No distress.   HENT:   Head: Normocephalic and atraumatic.   Eyes: Conjunctivae and EOM are normal. Pupils are equal, round, and reactive to light.   Neck: Normal range of motion. Neck supple. No JVD present. No tracheal deviation present.   Cardiovascular: Normal rate.    Pulmonary/Chest: Effort normal. She exhibits no tenderness.   Abdominal: Soft. She exhibits no distension. There is no tenderness. There is no rebound and no guarding.   Musculoskeletal: She exhibits edema.   Neurological: She is alert and oriented to person, place, and time. No cranial nerve deficit. She exhibits normal muscle tone. Coordination normal.   Skin: Skin is warm. She is not diaphoretic.   Varicose veins of bilateral lower extremities.  She has an area of approximately 4-5 cm in diameter of hyperpigmentation on the anteromedial aspect of the distal one third of bilateral legs.   PULSE EXAMINATION: 2+ brachial, 2+ radial, 2+ femoral, 2+ posterior tibial pulses bilaterally, 2+ right dorsalis pedis pulse    Vitals:    07/13/17 1519   BP: 116/69   Pulse: 93   Resp: 18         IMPRESSION:  1. varicose veins with pain and edema bilateral lower  extremities.  2.  Venous stasis of bilateral lower extremities.    3.  Status post endovenous laser ablation of the right greater saphenous vein.  4.  Venous insufficiency of the right greater saphenous vein.     5.  Diabetes mellitus      RECOMMENDATIONS: Patient had a venous ultrasound was done.  The vascular surgeon's office but I cannot use these to guide therapy.  I do not have any idea about the quality of these studies.  We will obtain new venous ultrasound was done in the radiology department and then recommendations will follow up.  I explained to the patient that if her left greater saphenous vein is normal in size then we will not ablate it since she is a diabetic and normal size greater saphenous vein can be used as conduit for bypass.  We will await the results of the ultrasound.        Lamine Whipple MD

## 2017-07-14 DIAGNOSIS — I87.2 VENOUS INSUFFICIENCY: Primary | ICD-10-CM

## 2017-07-14 DIAGNOSIS — R60.0 LOCALIZED EDEMA: ICD-10-CM

## 2017-07-17 ENCOUNTER — TELEPHONE (OUTPATIENT)
Dept: PODIATRY | Facility: CLINIC | Age: 62
End: 2017-07-17

## 2017-07-17 ENCOUNTER — HOSPITAL ENCOUNTER (OUTPATIENT)
Dept: RADIOLOGY | Facility: CLINIC | Age: 62
Discharge: HOME | End: 2017-07-17
Attending: THORACIC SURGERY (CARDIOTHORACIC VASCULAR SURGERY)
Payer: COMMERCIAL

## 2017-07-17 DIAGNOSIS — I87.2 VENOUS INSUFFICIENCY: ICD-10-CM

## 2017-07-17 DIAGNOSIS — R60.0 LOCALIZED EDEMA: ICD-10-CM

## 2017-07-17 PROCEDURE — 93970 EXTREMITY STUDY: CPT | Mod: TC,PO

## 2017-07-17 PROCEDURE — 93970 EXTREMITY STUDY: CPT | Mod: 26,,, | Performed by: RADIOLOGY

## 2017-07-17 NOTE — TELEPHONE ENCOUNTER
----- Message from Simran Ardon sent at 7/17/2017  3:02 PM CDT -----  Contact: Luzmaria- [phone]  Pharmacy called regarding rx refill tramadol 50 mg or above patient.  Thanks!

## 2017-07-18 ENCOUNTER — TELEPHONE (OUTPATIENT)
Dept: VASCULAR SURGERY | Facility: CLINIC | Age: 62
End: 2017-07-18

## 2017-07-18 DIAGNOSIS — I87.2 VENOUS INSUFFICIENCY OF BOTH LOWER EXTREMITIES: Primary | ICD-10-CM

## 2017-07-18 NOTE — TELEPHONE ENCOUNTER
Advised of results and 's recommendations regarding ultrasounds.  No interventions needed at this time.  Continue with compression stockings,  elevation of BLE and walking exercise.  Will repeat studies in 1 year.  Verbalized understanding.

## 2017-07-24 ENCOUNTER — TELEPHONE (OUTPATIENT)
Dept: INTERNAL MEDICINE | Facility: CLINIC | Age: 62
End: 2017-07-24

## 2017-07-24 NOTE — TELEPHONE ENCOUNTER
Informed she has not been seen by Dr. Alexander, requesting letter stating she has not been seen by him.

## 2017-07-24 NOTE — TELEPHONE ENCOUNTER
----- Message from Krista Pak sent at 7/24/2017  9:09 AM CDT -----  Contact: gely dan/law firm  Would like to speak to nurse about pt francisco for records. Please call back at [phone]. thanks

## 2017-08-15 ENCOUNTER — TELEPHONE (OUTPATIENT)
Dept: PODIATRY | Facility: CLINIC | Age: 62
End: 2017-08-15

## 2017-08-15 DIAGNOSIS — E10.42 CONTROLLED TYPE 1 DIABETES MELLITUS WITH DIABETIC POLYNEUROPATHY: ICD-10-CM

## 2017-08-15 DIAGNOSIS — R20.2 PARESTHESIA OF FOOT, BILATERAL: Primary | ICD-10-CM

## 2017-08-15 RX ORDER — TRAMADOL HYDROCHLORIDE 100 MG/1
100 TABLET, EXTENDED RELEASE ORAL DAILY
Qty: 30 TABLET | Refills: 1 | Status: SHIPPED | OUTPATIENT
Start: 2017-08-15 | End: 2017-10-17

## 2017-08-15 NOTE — TELEPHONE ENCOUNTER
----- Message from Jay Parra sent at 8/15/2017  1:13 PM CDT -----  Contact: patient  Place call to pod,Patient called to advise that she can come at anytime.just call to advise when script is ready at [phone]. Thanks,

## 2017-08-15 NOTE — TELEPHONE ENCOUNTER
----- Message from Simran Ardon sent at 8/15/2017  9:07 AM CDT -----  Contact: self   [phone]  Patient called asking for the nurse to leave written rx at the  . Thanks!

## 2017-09-07 ENCOUNTER — OFFICE VISIT (OUTPATIENT)
Dept: FAMILY MEDICINE | Facility: CLINIC | Age: 62
End: 2017-09-07
Payer: COMMERCIAL

## 2017-09-07 VITALS
DIASTOLIC BLOOD PRESSURE: 69 MMHG | SYSTOLIC BLOOD PRESSURE: 120 MMHG | HEART RATE: 99 BPM | WEIGHT: 169.06 LBS | BODY MASS INDEX: 27.17 KG/M2 | HEIGHT: 66 IN | TEMPERATURE: 99 F

## 2017-09-07 DIAGNOSIS — J06.9 VIRAL URI WITH COUGH: Primary | ICD-10-CM

## 2017-09-07 PROCEDURE — 3008F BODY MASS INDEX DOCD: CPT | Mod: S$GLB,,, | Performed by: FAMILY MEDICINE

## 2017-09-07 PROCEDURE — 99999 PR PBB SHADOW E&M-EST. PATIENT-LVL III: CPT | Mod: PBBFAC,,, | Performed by: FAMILY MEDICINE

## 2017-09-07 PROCEDURE — 99214 OFFICE O/P EST MOD 30 MIN: CPT | Mod: S$GLB,,, | Performed by: FAMILY MEDICINE

## 2017-09-07 PROCEDURE — 3078F DIAST BP <80 MM HG: CPT | Mod: S$GLB,,, | Performed by: FAMILY MEDICINE

## 2017-09-07 PROCEDURE — 3074F SYST BP LT 130 MM HG: CPT | Mod: S$GLB,,, | Performed by: FAMILY MEDICINE

## 2017-09-07 RX ORDER — CODEINE PHOSPHATE AND GUAIFENESIN 10; 100 MG/5ML; MG/5ML
5 SOLUTION ORAL EVERY 4 HOURS PRN
Qty: 180 ML | Refills: 0 | Status: SHIPPED | OUTPATIENT
Start: 2017-09-07 | End: 2017-12-26

## 2017-09-07 RX ORDER — ALBUTEROL SULFATE 90 UG/1
2 AEROSOL, METERED RESPIRATORY (INHALATION) 4 TIMES DAILY
Qty: 1 INHALER | Refills: 1 | Status: SHIPPED | OUTPATIENT
Start: 2017-09-07 | End: 2017-12-26

## 2017-09-07 NOTE — PROGRESS NOTES
Subjective:       Patient ID: Kateryna Desai is a 62 y.o. female.    Chief Complaint: URI    URI    This is a new problem. Episode onset: 10 days. The problem has been waxing and waning. There has been no fever. Associated symptoms include chest pain, congestion, coughing, rhinorrhea and a sore throat. Pertinent negatives include no abdominal pain, nausea or rash. She has tried antihistamine (Ciro DM) for the symptoms.     Review of Systems   Constitutional: Negative for fever.   HENT: Positive for congestion, rhinorrhea and sore throat.    Respiratory: Positive for cough. Negative for shortness of breath.    Cardiovascular: Positive for chest pain.   Gastrointestinal: Negative for abdominal pain and nausea.   Skin: Negative for rash.   All other systems reviewed and are negative.      Objective:      Physical Exam   Constitutional: She appears well-developed. No distress.   HENT:   Right Ear: Tympanic membrane is not erythematous.   Left Ear: Tympanic membrane is not erythematous.   Nose: Mucosal edema present. Right sinus exhibits no maxillary sinus tenderness. Left sinus exhibits no maxillary sinus tenderness.   Mouth/Throat: Posterior oropharyngeal erythema present.   Neck: Neck supple.   Cardiovascular: Normal rate and regular rhythm.    No murmur heard.  Pulmonary/Chest: Effort normal and breath sounds normal.   Lymphadenopathy:     She has no cervical adenopathy.       Assessment:       1. Viral URI with cough        Plan:         Kateryna was seen today for uri.    Diagnoses and all orders for this visit:    Viral URI with cough  -     albuterol 90 mcg/actuation inhaler; Inhale 2 puffs into the lungs 4 (four) times daily.  -     guaifenesin-codeine 100-10 mg/5 ml (TUSSI-ORGANIDIN NR)  mg/5 mL syrup; Take 5 mLs by mouth every 4 (four) hours as needed for Cough.

## 2017-09-19 ENCOUNTER — TELEPHONE (OUTPATIENT)
Dept: PODIATRY | Facility: CLINIC | Age: 62
End: 2017-09-19

## 2017-09-19 NOTE — TELEPHONE ENCOUNTER
Attempted to call patient to reschedule appointment due to  no longer having clinic in [place], Left message on both numbers provided.

## 2017-09-19 NOTE — TELEPHONE ENCOUNTER
----- Message from Simran Ardon sent at 9/19/2017 11:13 AM CDT -----  Contact: self   [phone] 1:30 pm  Patient states she is returning the nurse phone call. Thanks!

## 2017-10-03 ENCOUNTER — OFFICE VISIT (OUTPATIENT)
Dept: PODIATRY | Facility: CLINIC | Age: 62
End: 2017-10-03
Payer: COMMERCIAL

## 2017-10-03 VITALS — WEIGHT: 169.06 LBS | HEIGHT: 66 IN | BODY MASS INDEX: 27.17 KG/M2

## 2017-10-03 DIAGNOSIS — B35.1 ONYCHOMYCOSIS DUE TO DERMATOPHYTE: ICD-10-CM

## 2017-10-03 DIAGNOSIS — R20.2 PARESTHESIA OF FOOT, BILATERAL: ICD-10-CM

## 2017-10-03 DIAGNOSIS — M20.42 HAMMER TOES OF BOTH FEET: ICD-10-CM

## 2017-10-03 DIAGNOSIS — M72.2 PLANTAR FASCIITIS: ICD-10-CM

## 2017-10-03 DIAGNOSIS — L84 CORN OR CALLUS: ICD-10-CM

## 2017-10-03 DIAGNOSIS — E10.42 CONTROLLED TYPE 1 DIABETES MELLITUS WITH DIABETIC POLYNEUROPATHY: Primary | ICD-10-CM

## 2017-10-03 DIAGNOSIS — M20.41 HAMMER TOES OF BOTH FEET: ICD-10-CM

## 2017-10-03 DIAGNOSIS — M21.6X9 EQUINUS DEFORMITY OF FOOT: ICD-10-CM

## 2017-10-03 PROCEDURE — 99499 UNLISTED E&M SERVICE: CPT | Mod: S$GLB,,, | Performed by: PODIATRIST

## 2017-10-03 PROCEDURE — 99999 PR PBB SHADOW E&M-EST. PATIENT-LVL III: CPT | Mod: PBBFAC,,, | Performed by: PODIATRIST

## 2017-10-03 PROCEDURE — 11056 PARNG/CUTG B9 HYPRKR LES 2-4: CPT | Mod: Q9,S$GLB,, | Performed by: PODIATRIST

## 2017-10-03 PROCEDURE — 11721 DEBRIDE NAIL 6 OR MORE: CPT | Mod: 59,Q9,S$GLB, | Performed by: PODIATRIST

## 2017-10-19 RX ORDER — SPIRONOLACTONE 50 MG/1
TABLET, FILM COATED ORAL
Qty: 60 TABLET | Refills: 1 | Status: SHIPPED | OUTPATIENT
Start: 2017-10-19 | End: 2017-12-16 | Stop reason: SDUPTHER

## 2017-10-20 ENCOUNTER — LAB VISIT (OUTPATIENT)
Dept: LAB | Facility: HOSPITAL | Age: 62
End: 2017-10-20
Attending: INTERNAL MEDICINE
Payer: COMMERCIAL

## 2017-10-20 DIAGNOSIS — N18.30 CHRONIC KIDNEY DISEASE, STAGE III (MODERATE): Primary | ICD-10-CM

## 2017-10-20 DIAGNOSIS — E10.9 DIABETES MELLITUS TYPE I: Primary | ICD-10-CM

## 2017-10-20 DIAGNOSIS — E78.00 PURE HYPERCHOLESTEROLEMIA: ICD-10-CM

## 2017-10-20 DIAGNOSIS — E11.22 TYPE 2 DIABETES MELLITUS WITH END-STAGE RENAL DISEASE: ICD-10-CM

## 2017-10-20 DIAGNOSIS — I51.9 MYXEDEMA HEART DISEASE: ICD-10-CM

## 2017-10-20 DIAGNOSIS — E10.9 DIABETES MELLITUS TYPE I: ICD-10-CM

## 2017-10-20 DIAGNOSIS — Z12.31 VISIT FOR SCREENING MAMMOGRAM: Primary | ICD-10-CM

## 2017-10-20 DIAGNOSIS — E03.9 MYXEDEMA HEART DISEASE: ICD-10-CM

## 2017-10-20 DIAGNOSIS — Z79.899 POLYPHARMACY: ICD-10-CM

## 2017-10-20 DIAGNOSIS — N18.6 TYPE 2 DIABETES MELLITUS WITH END-STAGE RENAL DISEASE: ICD-10-CM

## 2017-10-20 DIAGNOSIS — I10 HYPERTENSION, ESSENTIAL: ICD-10-CM

## 2017-10-20 LAB
ALBUMIN SERPL BCP-MCNC: 3.4 G/DL
ALP SERPL-CCNC: 85 U/L
ALT SERPL W/O P-5'-P-CCNC: 17 U/L
ANION GAP SERPL CALC-SCNC: 9 MMOL/L
AST SERPL-CCNC: 22 U/L
BILIRUB SERPL-MCNC: 0.4 MG/DL
BUN SERPL-MCNC: 6 MG/DL
CALCIUM SERPL-MCNC: 9.4 MG/DL
CHLORIDE SERPL-SCNC: 106 MMOL/L
CHOLEST SERPL-MCNC: 176 MG/DL
CHOLEST/HDLC SERPL: 3.5 {RATIO}
CO2 SERPL-SCNC: 28 MMOL/L
CREAT SERPL-MCNC: 1 MG/DL
EST. GFR  (AFRICAN AMERICAN): >60 ML/MIN/1.73 M^2
EST. GFR  (NON AFRICAN AMERICAN): >60 ML/MIN/1.73 M^2
ESTIMATED AVG GLUCOSE: 126 MG/DL
GLUCOSE SERPL-MCNC: 97 MG/DL
HBA1C MFR BLD HPLC: 6 %
HDLC SERPL-MCNC: 51 MG/DL
HDLC SERPL: 29 %
LDLC SERPL CALC-MCNC: 109.8 MG/DL
NONHDLC SERPL-MCNC: 125 MG/DL
POTASSIUM SERPL-SCNC: 4 MMOL/L
PROT SERPL-MCNC: 6.8 G/DL
SODIUM SERPL-SCNC: 143 MMOL/L
TRIGL SERPL-MCNC: 76 MG/DL
TSH SERPL DL<=0.005 MIU/L-ACNC: 2.15 UIU/ML

## 2017-10-20 PROCEDURE — 80061 LIPID PANEL: CPT

## 2017-10-20 PROCEDURE — 80053 COMPREHEN METABOLIC PANEL: CPT

## 2017-10-20 PROCEDURE — 84443 ASSAY THYROID STIM HORMONE: CPT

## 2017-10-20 PROCEDURE — 83036 HEMOGLOBIN GLYCOSYLATED A1C: CPT

## 2017-10-20 PROCEDURE — 36415 COLL VENOUS BLD VENIPUNCTURE: CPT | Mod: PO

## 2017-10-27 ENCOUNTER — HOSPITAL ENCOUNTER (OUTPATIENT)
Dept: RADIOLOGY | Facility: CLINIC | Age: 62
Discharge: HOME | End: 2017-10-27
Attending: OBSTETRICS & GYNECOLOGY
Payer: COMMERCIAL

## 2017-10-27 DIAGNOSIS — Z12.31 VISIT FOR SCREENING MAMMOGRAM: ICD-10-CM

## 2017-10-27 PROCEDURE — 77067 SCR MAMMO BI INCL CAD: CPT | Mod: TC

## 2017-10-27 PROCEDURE — 77063 BREAST TOMOSYNTHESIS BI: CPT | Mod: 26,,, | Performed by: RADIOLOGY

## 2017-10-27 PROCEDURE — 77067 SCR MAMMO BI INCL CAD: CPT | Mod: 26,,, | Performed by: RADIOLOGY

## 2017-11-15 ENCOUNTER — HOSPITAL ENCOUNTER (OUTPATIENT)
Dept: RADIOLOGY | Facility: HOSPITAL | Age: 62
Discharge: HOME | End: 2017-11-15
Attending: OBSTETRICS & GYNECOLOGY
Payer: COMMERCIAL

## 2017-11-15 DIAGNOSIS — R92.8 ABNORMAL MAMMOGRAM OF LEFT BREAST: ICD-10-CM

## 2017-11-15 PROCEDURE — 76642 ULTRASOUND BREAST LIMITED: CPT | Mod: TC,LT

## 2017-11-15 PROCEDURE — 77061 BREAST TOMOSYNTHESIS UNI: CPT | Mod: TC,LT

## 2017-11-29 RX ORDER — FUROSEMIDE 20 MG/1
TABLET ORAL
Qty: 60 TABLET | Refills: 5 | Status: ON HOLD | OUTPATIENT
Start: 2017-11-29 | End: 2017-12-28 | Stop reason: HOSPADM

## 2017-12-18 ENCOUNTER — HOSPITAL ENCOUNTER (EMERGENCY)
Facility: HOSPITAL | Age: 62
Discharge: HOME | End: 2017-12-18
Attending: EMERGENCY MEDICINE
Payer: COMMERCIAL

## 2017-12-18 VITALS
DIASTOLIC BLOOD PRESSURE: 60 MMHG | RESPIRATION RATE: 20 BRPM | BODY MASS INDEX: 27.16 KG/M2 | HEIGHT: 66 IN | TEMPERATURE: 99 F | HEART RATE: 92 BPM | WEIGHT: 169 LBS | SYSTOLIC BLOOD PRESSURE: 124 MMHG | OXYGEN SATURATION: 97 %

## 2017-12-18 DIAGNOSIS — K59.00 CONSTIPATION, UNSPECIFIED CONSTIPATION TYPE: Primary | ICD-10-CM

## 2017-12-18 DIAGNOSIS — R10.9 ABDOMINAL PAIN, UNSPECIFIED ABDOMINAL LOCATION: ICD-10-CM

## 2017-12-18 LAB
ALBUMIN SERPL BCP-MCNC: 3.7 G/DL
ALP SERPL-CCNC: 71 U/L
ALT SERPL W/O P-5'-P-CCNC: 19 U/L
ANION GAP SERPL CALC-SCNC: 10 MMOL/L
AST SERPL-CCNC: 23 U/L
BACTERIA #/AREA URNS HPF: ABNORMAL /HPF
BASOPHILS # BLD AUTO: 0 K/UL
BASOPHILS NFR BLD: 0.2 %
BILIRUB SERPL-MCNC: 0.3 MG/DL
BILIRUB UR QL STRIP: NEGATIVE
BUN SERPL-MCNC: 10 MG/DL
CALCIUM SERPL-MCNC: 9.2 MG/DL
CHLORIDE SERPL-SCNC: 96 MMOL/L
CLARITY UR: ABNORMAL
CO2 SERPL-SCNC: 30 MMOL/L
COLOR UR: YELLOW
CREAT SERPL-MCNC: 1.2 MG/DL
DIFFERENTIAL METHOD: ABNORMAL
EOSINOPHIL # BLD AUTO: 0 K/UL
EOSINOPHIL NFR BLD: 0.6 %
ERYTHROCYTE [DISTWIDTH] IN BLOOD BY AUTOMATED COUNT: 14.1 %
EST. GFR  (AFRICAN AMERICAN): 57 ML/MIN/1.73 M^2
EST. GFR  (NON AFRICAN AMERICAN): 49 ML/MIN/1.73 M^2
GLUCOSE SERPL-MCNC: 205 MG/DL
GLUCOSE UR QL STRIP: NEGATIVE
HCT VFR BLD AUTO: 36.6 %
HGB BLD-MCNC: 12.2 G/DL
HGB UR QL STRIP: NEGATIVE
KETONES UR QL STRIP: NEGATIVE
LEUKOCYTE ESTERASE UR QL STRIP: ABNORMAL
LYMPHOCYTES # BLD AUTO: 1.1 K/UL
LYMPHOCYTES NFR BLD: 16.4 %
MCH RBC QN AUTO: 30.7 PG
MCHC RBC AUTO-ENTMCNC: 33.3 G/DL
MCV RBC AUTO: 92 FL
MICROSCOPIC COMMENT: ABNORMAL
MONOCYTES # BLD AUTO: 0.3 K/UL
MONOCYTES NFR BLD: 4.4 %
NEUTROPHILS # BLD AUTO: 5.5 K/UL
NEUTROPHILS NFR BLD: 78.4 %
NITRITE UR QL STRIP: NEGATIVE
PH UR STRIP: 5 [PH] (ref 5–8)
PLATELET # BLD AUTO: 206 K/UL
PMV BLD AUTO: 8.6 FL
POTASSIUM SERPL-SCNC: 3 MMOL/L
PROT SERPL-MCNC: 6.7 G/DL
PROT UR QL STRIP: NEGATIVE
RBC # BLD AUTO: 3.98 M/UL
RBC #/AREA URNS HPF: 0 /HPF (ref 0–4)
SODIUM SERPL-SCNC: 136 MMOL/L
SP GR UR STRIP: 1.02 (ref 1–1.03)
URN SPEC COLLECT METH UR: ABNORMAL
UROBILINOGEN UR STRIP-ACNC: NEGATIVE EU/DL
WBC # BLD AUTO: 7 K/UL
WBC #/AREA URNS HPF: 80 /HPF (ref 0–5)
WBC CLUMPS URNS QL MICRO: ABNORMAL

## 2017-12-18 PROCEDURE — 85025 COMPLETE CBC W/AUTO DIFF WBC: CPT

## 2017-12-18 PROCEDURE — 81000 URINALYSIS NONAUTO W/SCOPE: CPT

## 2017-12-18 PROCEDURE — 25000003 PHARM REV CODE 250: Performed by: PHYSICIAN ASSISTANT

## 2017-12-18 PROCEDURE — 63600175 PHARM REV CODE 636 W HCPCS: Performed by: PHYSICIAN ASSISTANT

## 2017-12-18 PROCEDURE — 96374 THER/PROPH/DIAG INJ IV PUSH: CPT

## 2017-12-18 PROCEDURE — 80053 COMPREHEN METABOLIC PANEL: CPT

## 2017-12-18 PROCEDURE — 36415 COLL VENOUS BLD VENIPUNCTURE: CPT

## 2017-12-18 PROCEDURE — 99284 EMERGENCY DEPT VISIT MOD MDM: CPT | Mod: 25

## 2017-12-18 RX ORDER — MORPHINE SULFATE 4 MG/ML
INJECTION, SOLUTION INTRAMUSCULAR; INTRAVENOUS
Status: DISCONTINUED
Start: 2017-12-18 | End: 2017-12-19 | Stop reason: HOSPADM

## 2017-12-18 RX ORDER — SODIUM CHLORIDE 9 MG/ML
1000 INJECTION, SOLUTION INTRAVENOUS
Status: COMPLETED | OUTPATIENT
Start: 2017-12-18 | End: 2017-12-18

## 2017-12-18 RX ORDER — MORPHINE SULFATE 4 MG/ML
4 INJECTION, SOLUTION INTRAMUSCULAR; INTRAVENOUS
Status: COMPLETED | OUTPATIENT
Start: 2017-12-18 | End: 2017-12-18

## 2017-12-18 RX ORDER — SPIRONOLACTONE 50 MG/1
TABLET, FILM COATED ORAL
Qty: 60 TABLET | Refills: 1 | Status: ON HOLD | OUTPATIENT
Start: 2017-12-18 | End: 2017-12-28 | Stop reason: HOSPADM

## 2017-12-18 RX ADMIN — MORPHINE SULFATE 4 MG: 4 INJECTION, SOLUTION INTRAMUSCULAR; INTRAVENOUS at 10:12

## 2017-12-18 RX ADMIN — SODIUM CHLORIDE 1000 ML: 0.9 INJECTION, SOLUTION INTRAVENOUS at 07:12

## 2017-12-19 NOTE — ED NOTES
Patient identifiers for Kateryna Desai checked and correct.  LOC: Patient is awake, alert, and aware of environment with an appropriate affect. Patient is oriented x 3 and speaking appropriately.  APPEARANCE: Patient resting comfortably and in no acute distress. Patient is clean and well groomed, patient's clothing is properly fastened.  SKIN: The skin is warm and dry. Patient has normal skin turgor and moist mucus membrances. Skin is intact; no bruising or breakdown noted.  MUSKULOSKELETAL: Patient is moving all extremities well, no obvious deformities noted. Pulses intact.   RESPIRATORY: Airway is open and patent. Respirations are spontaneous and non-labored with normal effort and rate.  CARDIAC: Patient has a normal rate and rhythm. No peripheral edema noted. Capillary refill < 3 seconds.  ABDOMEN: No distention noted. Bowel sounds active in all 4 quadrants. Soft but tender upon palpation to LLQ.  NEUROLOGICAL: PERRL. Facial expression is symmetrical. Hand grasps are equal bilaterally. Normal sensation in all extremities when touched with finger.  Allergies reported:   Review of patient's allergies indicates:   Allergen Reactions    Bactrim [sulfamethoxazole-trimethoprim] Rash     Patient experienced on 12/3/14 redness to face and rash to chest and arms/ with no SOB or airway obstruction    Percocet [oxycodone-acetaminophen] Nausea And Vomiting     Also caused dizziness and passed out    Iodinated contrast- oral and iv dye Swelling and Rash     Says topical iodine OK    Niaspan extended-release [niacin] Itching and Other (See Comments)     Skin flushing and redness    Norco [hydrocodone-acetaminophen] Itching, Swelling and Rash     Can tolerate if she takes Benadryl with it

## 2017-12-19 NOTE — ED PROVIDER NOTES
"Encounter Date: 12/18/2017    SCRIBE #1 NOTE: Rose Mary PLASCENCIA, am scribing for, and in the presence of, Mckayla Jacques PA-C.       History     Chief Complaint   Patient presents with    Urinary Tract Infection    Dysuria       12/18/2017 7:18 PM     Chief complaint: Abdominal Pain      Kateryna Desai is a 62 y.o. female with history of HTN, HLD, DM1 diabetic gastroparesis who presents to the ED with complaint lower abdominal pain that began this morning. She states that the pain worsened when straining to urinate. She describes suprapubic and LLQ abdominal pain that is constant and radiates into the left flank. Patient reports an episode when she became "clammy and overheated" earlier today, but states it only lasted a few minutes. Patient states that she also was concerned that her blood sugar was elevated in the 260s ths evening and states her blood sugar is usually well-controlled. Her last bowel movement was this morning and was normal. Patient states that her last abdominal CT scan was likely in 2014 and she has an appointment with GI on December 28. She denies measured fever, diarrhea, blood in stool, dysuria, or hematuria. She denies history of diverticulitis, kidney stones, but endorses previous RUI.       The history is provided by the patient.     Review of patient's allergies indicates:   Allergen Reactions    Bactrim [sulfamethoxazole-trimethoprim] Rash     Patient experienced on 12/3/14 redness to face and rash to chest and arms/ with no SOB or airway obstruction    Percocet [oxycodone-acetaminophen] Nausea And Vomiting     Also caused dizziness and passed out    Iodinated contrast- oral and iv dye Swelling and Rash     Says topical iodine OK    Niaspan extended-release [niacin] Itching and Other (See Comments)     Skin flushing and redness    Norco [hydrocodone-acetaminophen] Itching, Swelling and Rash     Can tolerate if she takes Benadryl with it     Past Medical History: "   Diagnosis Date    Allergy     Arthritis     degnerative disease low back,muscle spasms, shoulders    Diabetes mellitus type I     since age 13    Diabetic gastroparesis     takes Cytotec    Diabetic retinopathy of both eyes     gets periodic laser treatments    Difficult intubation     as a child had sore throat after a procedure    GERD (gastroesophageal reflux disease)     Headache(784.0)     Hiatal hernia     with GERD    Hyperlipidemia     Hypertension     Infection of the upper respiratory tract 2012    Lumbar spinal stenosis     Osteopenia     Rosacea     Seizures     Pt states during pgy with stroke    Stroke ?    while pregnant    Tachycardia     asymptomatic with Toprol    Thyroid disease     hypothyroidism    Venous insufficiency of leg     improved since EVLT     Past Surgical History:   Procedure Laterality Date    BUNIONECTOMY Right 2012    CARDIAC CATHETERIZATION      no stents     SECTION      COLONOSCOPY  2007    Dr. Rose, 10 year recheck    EXOSTECTOMY  12    left foot, local anesthesia, in office    EYE SURGERY      has had many laser procedures for diabetic retinopathy    VEIN SURGERY  2012    EVLT right greater saphenous, IV sedation     Family History   Problem Relation Age of Onset    Cancer Maternal Aunt      breast    Breast cancer Maternal Aunt     Diabetes Maternal Grandmother     Breast cancer Maternal Grandmother     Cancer Maternal Grandfather      prostate    Diabetes Maternal Grandfather     Diabetes Cousin     Arthritis Mother     Heart disease Father     Stroke Father     No Known Problems Sister     No Known Problems Brother     No Known Problems Daughter     No Known Problems Paternal Grandmother     No Known Problems Paternal Grandfather     Alzheimer's disease Maternal Uncle     Alcohol abuse Maternal Uncle      x2    Heart disease Maternal Uncle     No Known Problems Paternal Aunt     Heart  disease Paternal Uncle      Social History   Substance Use Topics    Smoking status: Never Smoker    Smokeless tobacco: Never Used    Alcohol use Yes      Comment: social, 1 drink monthly     Review of Systems   Constitutional: Negative for chills and fever.   HENT: Negative for facial swelling and trouble swallowing.    Eyes: Negative for discharge.   Respiratory: Negative for cough, chest tightness, shortness of breath and wheezing.    Cardiovascular: Negative for chest pain and palpitations.   Gastrointestinal: Positive for abdominal pain. Negative for blood in stool, diarrhea, nausea and vomiting.   Genitourinary: Positive for flank pain. Negative for dysuria and hematuria.   Musculoskeletal: Negative for arthralgias, back pain, joint swelling, myalgias, neck pain and neck stiffness.   Skin: Negative for color change, pallor, rash and wound.   Neurological: Negative for dizziness, syncope, weakness, light-headedness, numbness and headaches.   Hematological: Does not bruise/bleed easily.   Psychiatric/Behavioral: The patient is not nervous/anxious.        Physical Exam     Initial Vitals [12/18/17 1704]   BP Pulse Resp Temp SpO2   124/60 92 20 98.6 °F (37 °C) 97 %      MAP       81.33         Physical Exam    Nursing note and vitals reviewed.  Constitutional: She appears well-developed and well-nourished. She is not diaphoretic. No distress.   HENT:   Head: Normocephalic and atraumatic.   Mouth/Throat: Oropharynx is clear and moist.   Eyes: Conjunctivae are normal.   Neck: Normal range of motion. Neck supple.   Cardiovascular: Normal rate, regular rhythm, normal heart sounds and intact distal pulses.   No murmur heard.  Pulmonary/Chest: Breath sounds normal. No respiratory distress. She has no wheezes. She has no rhonchi. She has no rales.   Abdominal: Soft. She exhibits no distension and no mass. There is tenderness.   Suprapubic and LLQ abdominal pain on palpation.  No CVA tenderness noted.     Musculoskeletal: Normal range of motion. She exhibits no edema or tenderness.   Neurological: She is alert and oriented to person, place, and time. She has normal strength. No sensory deficit.   Skin: Skin is warm and dry. No rash and no abscess noted. No erythema.   Psychiatric: She has a normal mood and affect.         ED Course   Procedures  Labs Reviewed   URINALYSIS   CBC W/ AUTO DIFFERENTIAL   COMPREHENSIVE METABOLIC PANEL             Medical Decision Making:   History:   Old Medical Records: I decided to obtain old medical records.  Differential Diagnosis:   UTI  Diverticulitis  Pyelonephritis  Renal stone  Constipation  Clinical Tests:   Lab Tests: Ordered and Reviewed  Radiological Study: Ordered and Reviewed       APC / Resident Notes:   Urinalysis shows no evidence for infection.  Labs are stable, but she does have mild hypokalemia, which we will treat with IV fluids.  CT abdomen and pelvis shows evidence for constipation, but no other acute abnormalities.  She is made aware the findings.  She voices understanding and is agreeable to the plan for discharge home.  She will increase her Benefiber to twice daily and will start using Colace at night.  Urine culture will be sent, but no antibiotics needed today.  She is given specific return precautions.       Scribe Attestation:   Scribe #1: I performed the above scribed service and the documentation accurately describes the services I performed. I attest to the accuracy of the note.    I, Mckayla Jacques PA-C, personally performed the services described in this documentation. All medical record entries made by the scribe were at my direction and in my presence.  I have reviewed the chart and agree that the record reflects my personal performance and is accurate and complete. Mckayla Jacques PA-C.  1:47 AM 12/19/2017          ED Course      Clinical Impression:   There were no encounter diagnoses.          No diagnosis found.      1. Constipation,  unspecified constipation type    2. Abdominal pain, unspecified abdominal location                   Mckayla Jacques PA-C  12/19/17 0144

## 2017-12-26 ENCOUNTER — DOCUMENTATION ONLY (OUTPATIENT)
Dept: FAMILY MEDICINE | Facility: CLINIC | Age: 62
End: 2017-12-26

## 2017-12-26 ENCOUNTER — OFFICE VISIT (OUTPATIENT)
Dept: FAMILY MEDICINE | Facility: CLINIC | Age: 62
End: 2017-12-26
Attending: FAMILY MEDICINE
Payer: COMMERCIAL

## 2017-12-26 VITALS
HEIGHT: 66 IN | RESPIRATION RATE: 20 BRPM | OXYGEN SATURATION: 96 % | SYSTOLIC BLOOD PRESSURE: 116 MMHG | TEMPERATURE: 99 F | DIASTOLIC BLOOD PRESSURE: 56 MMHG | HEART RATE: 90 BPM | WEIGHT: 168 LBS | BODY MASS INDEX: 27 KG/M2

## 2017-12-26 DIAGNOSIS — E10.319 CONTROLLED TYPE 1 DIABETES MELLITUS WITH RETINOPATHY, MACULAR EDEMA PRESENCE UNSPECIFIED, UNSPECIFIED LATERALITY, UNSPECIFIED RETINOPATHY SEVERITY: ICD-10-CM

## 2017-12-26 DIAGNOSIS — J01.00 ACUTE NON-RECURRENT MAXILLARY SINUSITIS: ICD-10-CM

## 2017-12-26 DIAGNOSIS — J10.1 INFLUENZA A: Primary | ICD-10-CM

## 2017-12-26 PROCEDURE — 99214 OFFICE O/P EST MOD 30 MIN: CPT | Mod: S$GLB,,, | Performed by: FAMILY MEDICINE

## 2017-12-26 PROCEDURE — 99999 PR PBB SHADOW E&M-EST. PATIENT-LVL III: CPT | Mod: PBBFAC,,, | Performed by: FAMILY MEDICINE

## 2017-12-26 RX ORDER — TRAMADOL HYDROCHLORIDE 100 MG/1
100 TABLET, EXTENDED RELEASE ORAL DAILY PRN
COMMUNITY
Start: 2017-12-07 | End: 2018-01-04

## 2017-12-26 RX ORDER — INSULIN GLARGINE 300 U/ML
INJECTION, SOLUTION SUBCUTANEOUS
Refills: 0 | COMMUNITY
Start: 2017-12-18 | End: 2017-12-26 | Stop reason: SDUPTHER

## 2017-12-26 RX ORDER — AMOXICILLIN AND CLAVULANATE POTASSIUM 875; 125 MG/1; MG/1
1 TABLET, FILM COATED ORAL EVERY 12 HOURS
Qty: 20 TABLET | Refills: 0 | Status: SHIPPED | OUTPATIENT
Start: 2017-12-26 | End: 2018-07-12

## 2017-12-26 RX ORDER — BENZONATATE 100 MG/1
CAPSULE ORAL
Qty: 60 CAPSULE | Refills: 1 | Status: SHIPPED | OUTPATIENT
Start: 2017-12-26 | End: 2018-01-14 | Stop reason: SDUPTHER

## 2017-12-26 RX ORDER — ESTRADIOL 10 UG/1
1 INSERT VAGINAL
COMMUNITY
Start: 2017-12-17

## 2017-12-26 RX ORDER — EZETIMIBE 10 MG/1
TABLET ORAL
COMMUNITY
Start: 2017-11-28 | End: 2017-12-26

## 2017-12-26 RX ORDER — PREDNISONE 20 MG/1
40 TABLET ORAL DAILY
Qty: 6 TABLET | Refills: 0 | Status: SHIPPED | OUTPATIENT
Start: 2017-12-26 | End: 2017-12-29

## 2017-12-26 RX ORDER — OSELTAMIVIR PHOSPHATE 75 MG/1
75 CAPSULE ORAL 2 TIMES DAILY
COMMUNITY
Start: 2017-12-22 | End: 2017-12-27 | Stop reason: ALTCHOICE

## 2017-12-26 NOTE — PROGRESS NOTES
Pre-Visit Chart Review  For Appointment Scheduled on 12-26-17    Health Maintenance Due   Topic Date Due    TETANUS VACCINE  01/22/1975    Pneumococcal PPSV23 (Medium Risk) (1) 01/22/1975    Influenza Vaccine  08/01/2017    Colonoscopy  08/13/2017

## 2017-12-26 NOTE — PROGRESS NOTES
Subjective:       Patient ID: Kateryna Desai is a 62 y.o. female.    Chief Complaint: Follow-up ([facility])    62-year-old female, type I diabetic followed by Dr. Benjamin under good control was driving to [place] to visit family last week when she began experiencing headaches malaise bodyaches and fever.  Instead of going to the family home she checked into a hotel and went to an urgent care facility, [facility], where she was diagnosed with influenza type A and placed on Tamiflu and Norco.  She developed itching swelling and rash from the [place] and was unable to tolerate it.  She is still on the Tamiflu with 2 doses left to take after she completed this morning's.  Her fever has declined somewhat but she is experiencing intense facial pressure and headaches and a worsening dry and nonproductive cough.  She has some hoarseness.  The cough seems to be coming from the laryngeal area.  She is on sliding scale insulin and her blood sugars peaked at about 171 so far.    Past Medical History:  No date: Allergy  No date: Arthritis      Comment: degnerative disease low back,muscle spasms,                shoulders  12/22/2017: Asian flu type A  No date: Diabetes mellitus type I      Comment: since age 13  No date: Diabetic gastroparesis      Comment: takes Cytotec  No date: Diabetic retinopathy of both eyes      Comment: gets periodic laser treatments  No date: Difficult intubation      Comment: as a child had sore throat after a procedure  No date: GERD (gastroesophageal reflux disease)  No date: Headache(784.0)  No date: Hiatal hernia      Comment: with GERD  No date: Hyperlipidemia  No date: Hypertension  June 2012: Infection of the upper respiratory tract  No date: Lumbar spinal stenosis  No date: Osteopenia  No date: Rosacea  No date: Seizures      Comment: Pt states during pgy with stroke  1985?: Stroke      Comment: while pregnant  No date: Tachycardia      Comment: asymptomatic with Toprol  No date:  "Thyroid disease      Comment: hypothyroidism  No date: Venous insufficiency of leg      Comment: improved since EVLT    Past Surgical History:  2012: BUNIONECTOMY Right  2008: CARDIAC CATHETERIZATION      Comment: no stents  No date:  SECTION  2007: COLONOSCOPY      Comment: Dr. Rose, 10 year recheck  12: EXOSTECTOMY      Comment: left foot, local anesthesia, in office  No date: EYE SURGERY      Comment: has had many laser procedures for diabetic                retinopathy  2012: VEIN SURGERY      Comment: EVLT right greater saphenous, IV sedation      Current Outpatient Prescriptions:     ACZONE 5 % topical gel, Apply topically 2 (two) times daily as needed. , Disp: , Rfl: 0    BD INSULIN SYRINGE ULTRA-FINE 1/2 mL 31 x 15/64" Syrg, 3 (three) times daily. , Disp: , Rfl: 1    BD ULTRA-FINE JCARLOS PEN NEEDLES 32 gauge x 5/32" Ndle, once daily. , Disp: , Rfl: 0    BENEFIBER, GUAR GUM, ORAL, Take 1 Dose by mouth 2 (two) times daily. , Disp: , Rfl:     biotin 300 mcg Tab, Take 1 tablet by mouth once daily., Disp: , Rfl:     calcium citrate-vitamin D3 315-200 mg (CITRACAL+D) 315-200 mg-unit per tablet, Take 1 tablet by mouth 2 (two) times daily., Disp: , Rfl:     CRESTOR 40 mg Tab, Take 40 mg by mouth once daily. , Disp: , Rfl:     cyanocobalamin, vitamin B-12, 2,500 mcg Lozg, Place 1 lozenge under the tongue once daily., Disp: , Rfl:     doxycycline (VIBRAMYCIN) 50 MG capsule, Take 1 capsule by mouth 2 (two) times daily as needed., Disp: , Rfl: 0    ESTRACE 0.01 % (0.1 mg/gram) vaginal cream, Place vaginally twice a week. , Disp: , Rfl: 0    estradiol 10 mcg Tab, Take 1 tablet by mouth twice a week. , Disp: , Rfl:     fluticasone (FLONASE) 50 mcg/actuation nasal spray, 1 spray by Each Nare route once daily., Disp: 16 g, Rfl: 0    furosemide (LASIX) 20 MG tablet, take 1 tablet by mouth twice a day, Disp: 60 tablet, Rfl: 5    GLUCAGON EMERGENCY 1 mg injection, 1 mg as needed. , Disp: " , Rfl:     HUMALOG 100 unit/mL injection, Inject into the skin. 10-17 units tid sliding scale, Disp: , Rfl: 1    INSULIN GLARGINE,HUM.REC.ANLOG (TOUJEO SOLOSTAR SUBQ), Inject 46 Units into the skin once daily., Disp: , Rfl:     metoprolol succinate (TOPROL-XL) 25 MG 24 hr tablet, take 1 tablet by mouth once daily, Disp: 30 tablet, Rfl: 11    ondansetron (ZOFRAN-ODT) 4 MG TbDL, Take 1 tablet (4 mg total) by mouth every 8 (eight) hours as needed., Disp: 12 tablet, Rfl: 0    ONE TOUCH ULTRA TEST Strp, 6-8 times daily, Disp: , Rfl:     oseltamivir (TAMIFLU) 75 MG capsule, Take 75 mg by mouth 2 (two) times daily. , Disp: , Rfl:     pantoprazole (PROTONIX) 40 MG tablet, Take 1 tablet (40 mg total) by mouth 2 (two) times daily., Disp: 180 tablet, Rfl: 3    spironolactone (ALDACTONE) 50 MG tablet, take 1 tablet by mouth twice a day, Disp: 60 tablet, Rfl: 1    SYNTHROID 88 mcg tablet, Take 88 mcg by mouth once daily., Disp: , Rfl: 0    traMADol (ULTRAM-ER) 100 MG Tb24, Take 100 mg by mouth daily as needed. , Disp: , Rfl:     vitamin D 1000 units Tab, Take 1,000 mg by mouth once daily. , Disp: , Rfl:         Review of Systems   Constitutional: Positive for chills and fever.   HENT: Positive for congestion, ear pain, sinus pain and sinus pressure.    Respiratory: Positive for cough and chest tightness. Negative for shortness of breath and wheezing.    Musculoskeletal: Positive for myalgias.   Neurological: Positive for headaches. Negative for dizziness and light-headedness.       Objective:      Physical Exam   Constitutional: She appears well-developed. No distress.   No active fever at this time.  She is coughing repeatedly   HENT:   Head: Normocephalic and atraumatic.   Right Ear: Hearing, external ear and ear canal normal. Tympanic membrane is erythematous and bulging. Tympanic membrane is not injected. Tympanic membrane mobility is abnormal. A middle ear effusion is present.   Left Ear: Hearing, external ear  and ear canal normal. Tympanic membrane is bulging. Tympanic membrane is not injected and not erythematous. Tympanic membrane mobility is abnormal. A middle ear effusion is present.   Nose: Mucosal edema and rhinorrhea present. Right sinus exhibits maxillary sinus tenderness and frontal sinus tenderness. Left sinus exhibits no maxillary sinus tenderness and no frontal sinus tenderness.   Mouth/Throat: Oropharyngeal exudate, posterior oropharyngeal edema and posterior oropharyngeal erythema present. No tonsillar abscesses.   Eyes: EOM are normal. No scleral icterus.   Neck: Normal range of motion. Neck supple. No JVD present. No tracheal deviation present. No thyromegaly present.   Cardiovascular: Normal rate, regular rhythm and normal heart sounds.  Exam reveals no gallop and no friction rub.    No murmur heard.  Pulmonary/Chest: Effort normal and breath sounds normal. No respiratory distress. She has no wheezes. She has no rales. She exhibits no tenderness.   Lymphadenopathy:     She has no cervical adenopathy.   Skin: She is not diaphoretic.   Nursing note and vitals reviewed.      Assessment:       1. Influenza A    2. Acute non-recurrent maxillary sinusitis    3. Controlled type 1 diabetes mellitus with retinopathy, macular edema presence unspecified, unspecified laterality, unspecified retinopathy severity        Plan:       1. Influenza A  On Tamiflu    2. Acute non-recurrent maxillary sinusitis  Secondary infection  - predniSONE (DELTASONE) 20 MG tablet; Take 2 tablets (40 mg total) by mouth once daily.  Dispense: 6 tablet; Refill: 0  - amoxicillin-clavulanate 875-125mg (AUGMENTIN) 875-125 mg per tablet; Take 1 tablet by mouth every 12 (twelve) hours.  Dispense: 20 tablet; Refill: 0  - benzonatate (TESSALON) 100 MG capsule; 1-2 tid prn cough  Dispense: 60 capsule; Refill: 1    3. Controlled type 1 diabetes mellitus with retinopathy, macular edema presence unspecified, unspecified laterality, unspecified  retinopathy severity  Warned to expect increase in blood sugar with a brief course of steroids and increase fluid intake

## 2017-12-27 ENCOUNTER — HOSPITAL ENCOUNTER (OUTPATIENT)
Facility: HOSPITAL | Age: 62
Discharge: HOME | DRG: 312 | End: 2017-12-28
Attending: EMERGENCY MEDICINE | Admitting: HOSPITALIST
Payer: COMMERCIAL

## 2017-12-27 DIAGNOSIS — S09.93XA HEAD, FACE & NECK INJURY: ICD-10-CM

## 2017-12-27 DIAGNOSIS — S09.90XA HEAD INJURY: ICD-10-CM

## 2017-12-27 DIAGNOSIS — R55 SYNCOPE: ICD-10-CM

## 2017-12-27 DIAGNOSIS — R55 SYNCOPE, UNSPECIFIED SYNCOPE TYPE: Primary | ICD-10-CM

## 2017-12-27 DIAGNOSIS — S49.90XA SHOULDER INJURY: ICD-10-CM

## 2017-12-27 DIAGNOSIS — S19.9XXA NECK INJURY: ICD-10-CM

## 2017-12-27 DIAGNOSIS — S19.9XXA HEAD, FACE & NECK INJURY: ICD-10-CM

## 2017-12-27 DIAGNOSIS — S89.90XA KNEE INJURY: ICD-10-CM

## 2017-12-27 DIAGNOSIS — S09.90XA HEAD, FACE & NECK INJURY: ICD-10-CM

## 2017-12-27 DIAGNOSIS — R94.31 PROLONGED Q-T INTERVAL ON ECG: ICD-10-CM

## 2017-12-27 LAB
ALBUMIN SERPL BCP-MCNC: 3.7 G/DL
ALP SERPL-CCNC: 76 U/L
ALT SERPL W/O P-5'-P-CCNC: 30 U/L
ANION GAP SERPL CALC-SCNC: 15 MMOL/L
AST SERPL-CCNC: 30 U/L
BASOPHILS # BLD AUTO: 0 K/UL
BASOPHILS NFR BLD: 0 %
BILIRUB SERPL-MCNC: 0.4 MG/DL
BUN SERPL-MCNC: 16 MG/DL
CALCIUM SERPL-MCNC: 9.9 MG/DL
CHLORIDE SERPL-SCNC: 92 MMOL/L
CO2 SERPL-SCNC: 28 MMOL/L
CREAT SERPL-MCNC: 1.2 MG/DL
DIFFERENTIAL METHOD: ABNORMAL
EOSINOPHIL # BLD AUTO: 0 K/UL
EOSINOPHIL NFR BLD: 0 %
ERYTHROCYTE [DISTWIDTH] IN BLOOD BY AUTOMATED COUNT: 14.1 %
EST. GFR  (AFRICAN AMERICAN): 57 ML/MIN/1.73 M^2
EST. GFR  (NON AFRICAN AMERICAN): 49 ML/MIN/1.73 M^2
GLUCOSE SERPL-MCNC: 319 MG/DL
HCT VFR BLD AUTO: 39 %
HGB BLD-MCNC: 13.2 G/DL
LYMPHOCYTES # BLD AUTO: 0.6 K/UL
LYMPHOCYTES NFR BLD: 9.8 %
MCH RBC QN AUTO: 30.9 PG
MCHC RBC AUTO-ENTMCNC: 33.9 G/DL
MCV RBC AUTO: 91 FL
MONOCYTES # BLD AUTO: 0.3 K/UL
MONOCYTES NFR BLD: 4.3 %
NEUTROPHILS # BLD AUTO: 5.2 K/UL
NEUTROPHILS NFR BLD: 85.9 %
PLATELET # BLD AUTO: 199 K/UL
PMV BLD AUTO: 9.1 FL
POCT GLUCOSE: 95 MG/DL (ref 70–110)
POTASSIUM SERPL-SCNC: 3.5 MMOL/L
PROT SERPL-MCNC: 7.4 G/DL
RBC # BLD AUTO: 4.27 M/UL
SODIUM SERPL-SCNC: 135 MMOL/L
T4 FREE SERPL-MCNC: 1.36 NG/DL
TROPONIN I SERPL DL<=0.01 NG/ML-MCNC: 0.01 NG/ML
TSH SERPL DL<=0.005 MIU/L-ACNC: 0.29 UIU/ML
WBC # BLD AUTO: 6 K/UL

## 2017-12-27 PROCEDURE — 85025 COMPLETE CBC W/AUTO DIFF WBC: CPT

## 2017-12-27 PROCEDURE — G0378 HOSPITAL OBSERVATION PER HR: HCPCS

## 2017-12-27 PROCEDURE — 99285 EMERGENCY DEPT VISIT HI MDM: CPT | Mod: 25

## 2017-12-27 PROCEDURE — 93005 ELECTROCARDIOGRAM TRACING: CPT

## 2017-12-27 PROCEDURE — 36415 COLL VENOUS BLD VENIPUNCTURE: CPT

## 2017-12-27 PROCEDURE — 12000002 HC ACUTE/MED SURGE SEMI-PRIVATE ROOM

## 2017-12-27 PROCEDURE — 63600175 PHARM REV CODE 636 W HCPCS: Performed by: EMERGENCY MEDICINE

## 2017-12-27 PROCEDURE — 84443 ASSAY THYROID STIM HORMONE: CPT

## 2017-12-27 PROCEDURE — 83036 HEMOGLOBIN GLYCOSYLATED A1C: CPT

## 2017-12-27 PROCEDURE — 80053 COMPREHEN METABOLIC PANEL: CPT

## 2017-12-27 PROCEDURE — 96361 HYDRATE IV INFUSION ADD-ON: CPT

## 2017-12-27 PROCEDURE — 84484 ASSAY OF TROPONIN QUANT: CPT

## 2017-12-27 PROCEDURE — 25000003 PHARM REV CODE 250: Performed by: HOSPITALIST

## 2017-12-27 PROCEDURE — 84439 ASSAY OF FREE THYROXINE: CPT

## 2017-12-27 PROCEDURE — 25000003 PHARM REV CODE 250: Performed by: EMERGENCY MEDICINE

## 2017-12-27 PROCEDURE — 25000003 PHARM REV CODE 250: Performed by: NURSE PRACTITIONER

## 2017-12-27 PROCEDURE — 96374 THER/PROPH/DIAG INJ IV PUSH: CPT

## 2017-12-27 RX ORDER — BENZONATATE 100 MG/1
100 CAPSULE ORAL
Status: COMPLETED | OUTPATIENT
Start: 2017-12-27 | End: 2017-12-27

## 2017-12-27 RX ORDER — IBUPROFEN 200 MG
16 TABLET ORAL
Status: DISCONTINUED | OUTPATIENT
Start: 2017-12-27 | End: 2017-12-28 | Stop reason: HOSPADM

## 2017-12-27 RX ORDER — POTASSIUM CHLORIDE 20 MEQ/15ML
40 SOLUTION ORAL
Status: DISCONTINUED | OUTPATIENT
Start: 2017-12-27 | End: 2017-12-28 | Stop reason: HOSPADM

## 2017-12-27 RX ORDER — EZETIMIBE 10 MG/1
10 TABLET ORAL DAILY
Status: DISCONTINUED | OUTPATIENT
Start: 2017-12-28 | End: 2017-12-28 | Stop reason: HOSPADM

## 2017-12-27 RX ORDER — ROSUVASTATIN CALCIUM 20 MG/1
40 TABLET, COATED ORAL DAILY
Status: DISCONTINUED | OUTPATIENT
Start: 2017-12-28 | End: 2017-12-28 | Stop reason: HOSPADM

## 2017-12-27 RX ORDER — LANOLIN ALCOHOL/MO/W.PET/CERES
800 CREAM (GRAM) TOPICAL
Status: DISCONTINUED | OUTPATIENT
Start: 2017-12-27 | End: 2017-12-28 | Stop reason: HOSPADM

## 2017-12-27 RX ORDER — FERROUS SULFATE, DRIED 160(50) MG
1 TABLET, EXTENDED RELEASE ORAL 2 TIMES DAILY
Status: DISCONTINUED | OUTPATIENT
Start: 2017-12-27 | End: 2017-12-28 | Stop reason: HOSPADM

## 2017-12-27 RX ORDER — BENZONATATE 100 MG/1
100 CAPSULE ORAL 3 TIMES DAILY PRN
Status: DISCONTINUED | OUTPATIENT
Start: 2017-12-27 | End: 2017-12-28 | Stop reason: HOSPADM

## 2017-12-27 RX ORDER — SODIUM,POTASSIUM PHOSPHATES 280-250MG
2 POWDER IN PACKET (EA) ORAL
Status: DISCONTINUED | OUTPATIENT
Start: 2017-12-27 | End: 2017-12-28 | Stop reason: HOSPADM

## 2017-12-27 RX ORDER — PANTOPRAZOLE SODIUM 40 MG/1
40 TABLET, DELAYED RELEASE ORAL 2 TIMES DAILY
Status: DISCONTINUED | OUTPATIENT
Start: 2017-12-28 | End: 2017-12-28 | Stop reason: HOSPADM

## 2017-12-27 RX ORDER — PANTOPRAZOLE SODIUM 40 MG/1
40 TABLET, DELAYED RELEASE ORAL 2 TIMES DAILY
Status: DISCONTINUED | OUTPATIENT
Start: 2017-12-27 | End: 2017-12-27

## 2017-12-27 RX ORDER — TRAMADOL HYDROCHLORIDE 50 MG/1
100 TABLET ORAL EVERY 6 HOURS PRN
Status: DISCONTINUED | OUTPATIENT
Start: 2017-12-27 | End: 2017-12-28 | Stop reason: HOSPADM

## 2017-12-27 RX ORDER — INSULIN ASPART 100 [IU]/ML
1-10 INJECTION, SOLUTION INTRAVENOUS; SUBCUTANEOUS
Status: DISCONTINUED | OUTPATIENT
Start: 2017-12-27 | End: 2017-12-28 | Stop reason: HOSPADM

## 2017-12-27 RX ORDER — GLUCAGON 1 MG
1 KIT INJECTION
Status: DISCONTINUED | OUTPATIENT
Start: 2017-12-27 | End: 2017-12-28 | Stop reason: HOSPADM

## 2017-12-27 RX ORDER — EZETIMIBE 10 MG/1
10 TABLET ORAL DAILY
COMMUNITY

## 2017-12-27 RX ORDER — POLYETHYLENE GLYCOL 3350 17 G/17G
17 POWDER, FOR SOLUTION ORAL DAILY
Status: DISCONTINUED | OUTPATIENT
Start: 2017-12-28 | End: 2017-12-28 | Stop reason: HOSPADM

## 2017-12-27 RX ORDER — IBUPROFEN 200 MG
24 TABLET ORAL
Status: DISCONTINUED | OUTPATIENT
Start: 2017-12-27 | End: 2017-12-28 | Stop reason: HOSPADM

## 2017-12-27 RX ORDER — ONDANSETRON 2 MG/ML
4 INJECTION INTRAMUSCULAR; INTRAVENOUS EVERY 8 HOURS PRN
Status: DISCONTINUED | OUTPATIENT
Start: 2017-12-27 | End: 2017-12-28 | Stop reason: HOSPADM

## 2017-12-27 RX ORDER — MORPHINE SULFATE 4 MG/ML
4 INJECTION, SOLUTION INTRAMUSCULAR; INTRAVENOUS
Status: COMPLETED | OUTPATIENT
Start: 2017-12-27 | End: 2017-12-27

## 2017-12-27 RX ORDER — LEVOTHYROXINE SODIUM 88 UG/1
88 TABLET ORAL NIGHTLY
Status: DISCONTINUED | OUTPATIENT
Start: 2017-12-27 | End: 2017-12-28 | Stop reason: HOSPADM

## 2017-12-27 RX ORDER — IBUPROFEN 600 MG/1
600 TABLET ORAL EVERY 6 HOURS PRN
Status: DISCONTINUED | OUTPATIENT
Start: 2017-12-27 | End: 2017-12-28 | Stop reason: HOSPADM

## 2017-12-27 RX ORDER — AMOXICILLIN AND CLAVULANATE POTASSIUM 875; 125 MG/1; MG/1
1 TABLET, FILM COATED ORAL EVERY 12 HOURS
Status: DISCONTINUED | OUTPATIENT
Start: 2017-12-27 | End: 2017-12-28 | Stop reason: HOSPADM

## 2017-12-27 RX ORDER — PREDNISONE 20 MG/1
40 TABLET ORAL DAILY
Status: DISCONTINUED | OUTPATIENT
Start: 2017-12-28 | End: 2017-12-28 | Stop reason: HOSPADM

## 2017-12-27 RX ORDER — INSULIN GLARGINE 300 [IU]/ML
46 INJECTION, SOLUTION SUBCUTANEOUS DAILY
Status: DISCONTINUED | OUTPATIENT
Start: 2017-12-28 | End: 2017-12-28 | Stop reason: HOSPADM

## 2017-12-27 RX ADMIN — LEVOTHYROXINE SODIUM 88 MCG: 88 TABLET ORAL at 10:12

## 2017-12-27 RX ADMIN — MORPHINE SULFATE 4 MG: 4 INJECTION INTRAVENOUS at 04:12

## 2017-12-27 RX ADMIN — BENZONATATE 100 MG: 100 CAPSULE ORAL at 05:12

## 2017-12-27 RX ADMIN — AMOXICILLIN AND CLAVULANATE POTASSIUM 1 TABLET: 875; 125 TABLET, FILM COATED ORAL at 10:12

## 2017-12-27 RX ADMIN — SODIUM CHLORIDE 1000 ML: 0.9 INJECTION, SOLUTION INTRAVENOUS at 03:12

## 2017-12-27 RX ADMIN — CALCIUM CARBONATE 500 MG (1,250 MG)-VITAMIN D3 200 UNIT TABLET 1 TABLET: at 10:12

## 2017-12-27 RX ADMIN — TRAMADOL HYDROCHLORIDE 100 MG: 50 TABLET, COATED ORAL at 10:12

## 2017-12-27 NOTE — ED PROVIDER NOTES
"Encounter Date: 12/27/2017    SCRIBE #1 NOTE: IBrenda, am scribing for, and in the presence of, Dr. Bullard.       History     Chief Complaint   Patient presents with    Fall     2 hrs ago fell forward hitting face on tile floor. Pos loc.        12/27/2017 2:30 PM     Chief complaint: Syncope       Kateryna Desai is a 62 y.o. female with a hx of DM (complaint with medication), HTN, HLD, CVA, Seizures, GERD and Arthritis who presents to the ED with complaints of left shoulder pain, facial pain, left knee pain after a syncopal episodes 2 hours ago on a tile floor. Per spouse, pt "cracked her tooth" and was seen by a dentist PTA. Pt doesn't remembers the fall or walking to the recliner. She states she " went to the restroom and woke up in the recliner." Spouse states there was a "pool of blood" on the floor. Pt was recently dx with Influenza and finished Tamiflu today. She endorsed dizziness x 1 week. She was seen by PCP, Dr Mendoza, and dx with a sinus infection and started on Prednisone, Augmentin, and Tessalon. She denies vomiting and diarrhea. Allergens include Bactrim, Percocet, Iodinated contrast, Niaspan, and Norco.       The history is provided by the patient and the spouse.     Review of patient's allergies indicates:   Allergen Reactions    Bactrim [sulfamethoxazole-trimethoprim] Rash     Patient experienced on 12/3/14 redness to face and rash to chest and arms/ with no SOB or airway obstruction    Percocet [oxycodone-acetaminophen] Nausea And Vomiting     Also caused dizziness and passed out    Iodinated contrast- oral and iv dye Swelling and Rash     Says topical iodine OK    Niaspan extended-release [niacin] Itching and Other (See Comments)     Skin flushing and redness    Norco [hydrocodone-acetaminophen] Itching, Swelling and Rash     Can tolerate if she takes Benadryl with it     Past Medical History:   Diagnosis Date    Allergy     Arthritis     degnerative disease low " back,muscle spasms, shoulders    Asian flu type A 2017    Diabetes mellitus type I     since age 13    Diabetic gastroparesis     takes Cytotec    Diabetic retinopathy of both eyes     gets periodic laser treatments    Difficult intubation     as a child had sore throat after a procedure    GERD (gastroesophageal reflux disease)     Headache(784.0)     Hiatal hernia     with GERD    Hyperlipidemia     Hypertension     Infection of the upper respiratory tract 2012    Lumbar spinal stenosis     Osteopenia     Rosacea     Seizures     Pt states during pgy with stroke    Stroke ?    while pregnant    Tachycardia     asymptomatic with Toprol    Thyroid disease     hypothyroidism    Venous insufficiency of leg     improved since EVLT     Past Surgical History:   Procedure Laterality Date    BUNIONECTOMY Right 2012    CARDIAC CATHETERIZATION      no stents     SECTION      COLONOSCOPY  2007    Dr. Rose, 10 year recheck    EXOSTECTOMY  12    left foot, local anesthesia, in office    EYE SURGERY      has had many laser procedures for diabetic retinopathy    VEIN SURGERY  2012    EVLT right greater saphenous, IV sedation     Family History   Problem Relation Age of Onset    Cancer Maternal Aunt      breast    Breast cancer Maternal Aunt     Diabetes Maternal Grandmother     Breast cancer Maternal Grandmother     Cancer Maternal Grandfather      prostate    Diabetes Maternal Grandfather     Diabetes Cousin     Arthritis Mother     Heart disease Father     Stroke Father     No Known Problems Sister     No Known Problems Brother     No Known Problems Daughter     Alzheimer's disease Maternal Uncle     Alcohol abuse Maternal Uncle      x2    Heart disease Maternal Uncle     No Known Problems Paternal Aunt     Heart disease Paternal Uncle      Social History   Substance Use Topics    Smoking status: Never Smoker    Smokeless tobacco: Never  "Used    Alcohol use Yes      Comment: social, 1 drink monthly     Review of Systems   Constitutional: Negative for fever.   HENT: Positive for dental problem (resolved PTA). Negative for sore throat.         + for "facial pain"     Eyes: Negative for redness.   Respiratory: Negative for shortness of breath.    Cardiovascular: Negative for chest pain.   Gastrointestinal: Negative for diarrhea, nausea and vomiting.   Genitourinary: Negative for dysuria.   Musculoskeletal: Positive for myalgias. Negative for back pain.   Skin: Negative for rash.   Neurological: Positive for dizziness and syncope. Negative for weakness.   Hematological: Does not bruise/bleed easily.       Physical Exam     Initial Vitals [12/27/17 1342]   BP Pulse Resp Temp SpO2   (!) 102/59 90 16 98.1 °F (36.7 °C) 100 %      MAP       73.33         Physical Exam    Nursing note and vitals reviewed.  Constitutional: She appears well-developed and well-nourished. She is not diaphoretic. No distress.   HENT:   Head: Normocephalic.   Mouth/Throat: Lacerations present.   Ecchymosis, edema, and tenderness to upper lip with a laceration noted to inner lip. Fluid noted behind bilateral TMs.   Eyes: EOM are normal. Pupils are equal, round, and reactive to light.   Neck: Normal range of motion. Neck supple.   Cardiovascular: Normal rate, regular rhythm, normal heart sounds and intact distal pulses. Exam reveals no gallop and no friction rub.    No murmur heard.  Pulmonary/Chest: Breath sounds normal. No respiratory distress. She has no wheezes. She has no rhonchi. She has no rales.   Abdominal: Soft. Bowel sounds are normal. There is no tenderness. There is no rebound and no guarding.   Musculoskeletal: Normal range of motion.   Tenderness to left shoulder. Ecchymosis and tenderness to left knee.    Neurological: She is alert and oriented to person, place, and time.   Skin: Skin is warm and dry.   Psychiatric: She has a normal mood and affect. Her behavior is " normal. Judgment and thought content normal.         ED Course   Procedures  Labs Reviewed   COMPREHENSIVE METABOLIC PANEL - Abnormal; Notable for the following:        Result Value    Sodium 135 (*)     Chloride 92 (*)     Glucose 319 (*)     eGFR if  57 (*)     eGFR if non  49 (*)     All other components within normal limits   CBC W/ AUTO DIFFERENTIAL - Abnormal; Notable for the following:     MPV 9.1 (*)     Lymph # 0.6 (*)     Gran% 85.9 (*)     Lymph% 9.8 (*)     All other components within normal limits   TROPONIN I     EKG Readings: (Independently Interpreted)   NSR at a rate of 98 bpm. Normal axis. Prolonged QT waves. No acute ST segments.           Medical Decision Making:   Initial Assessment:   8-year-old female presented with polytrauma status post a syncopal episode.  Differential Diagnosis:   My differential diagnosis includes intracranial hemorrhage, facial fx, dehydration, and vasovagal syncope.      Clinical Tests:   Lab Tests: Ordered and Reviewed  Radiological Study: Ordered and Reviewed  Medical Tests: Ordered and Reviewed  ED Management:  The patient was emergently evaluated in the emergency Department, her evaluation was significant for an elderly female with polytrauma noted.  The patient's CT scans showed no acute processes.  The patient's x-ray showed no acute abnormalities per my independent interpretation.  The patient's labs showed no acute processes.  The patient was aggressively treated with IV fluids and IV narcotic pain medication.  The patient's EKG was concerning for a prolonged QTC on multiple EKGs done in the emergency department.  This could be the etiology of the patient's syncopal episode.  I will admit the patient to the hospitalist service for cardiac monitoring and further workup.  I've discussed the case with the hospitalist on-call, Dr. Murray.  She has accepted the patient for admission.    Other:   I have discussed this case with another  health care provider.            Scribe Attestation:   Scribe #1: I performed the above scribed service and the documentation accurately describes the services I performed. I attest to the accuracy of the note.        I, Dr. Jerrod Bullard, personally performed the services described in this documentation. All medical record entries made by the scribe were at my direction and in my presence.  I have reviewed the chart and agree that the record reflects my personal performance and is accurate and complete. Jerrod Bullard MD.  5:43 PM 12/27/2017       ED Course      Clinical Impression:     1. Syncope, unspecified syncope type    2. Head injury    3. Head, face & neck injury    4. Neck injury    5. Shoulder injury    6. Knee injury    7. Prolonged Q-T interval on ECG                               Jerrod Bullard MD  12/27/17 0959

## 2017-12-28 VITALS
HEART RATE: 92 BPM | WEIGHT: 165.38 LBS | OXYGEN SATURATION: 98 % | RESPIRATION RATE: 18 BRPM | HEIGHT: 66 IN | BODY MASS INDEX: 26.58 KG/M2 | SYSTOLIC BLOOD PRESSURE: 112 MMHG | DIASTOLIC BLOOD PRESSURE: 58 MMHG | TEMPERATURE: 98 F

## 2017-12-28 PROBLEM — J01.90 ACUTE SINUSITIS: Status: ACTIVE | Noted: 2017-12-28

## 2017-12-28 LAB
ALBUMIN SERPL BCP-MCNC: 3.3 G/DL
ANION GAP SERPL CALC-SCNC: 12 MMOL/L
ANION GAP SERPL CALC-SCNC: 9 MMOL/L
BILIRUB UR QL STRIP: NEGATIVE
BUN SERPL-MCNC: 12 MG/DL
BUN SERPL-MCNC: 13 MG/DL
CALCIUM SERPL-MCNC: 8.9 MG/DL
CALCIUM SERPL-MCNC: 9.4 MG/DL
CHLORIDE SERPL-SCNC: 96 MMOL/L
CHLORIDE SERPL-SCNC: 97 MMOL/L
CLARITY UR: CLEAR
CO2 SERPL-SCNC: 31 MMOL/L
CO2 SERPL-SCNC: 31 MMOL/L
COLOR UR: YELLOW
CREAT SERPL-MCNC: 0.8 MG/DL
CREAT SERPL-MCNC: 0.9 MG/DL
EST. GFR  (AFRICAN AMERICAN): >60 ML/MIN/1.73 M^2
EST. GFR  (AFRICAN AMERICAN): >60 ML/MIN/1.73 M^2
EST. GFR  (NON AFRICAN AMERICAN): >60 ML/MIN/1.73 M^2
EST. GFR  (NON AFRICAN AMERICAN): >60 ML/MIN/1.73 M^2
ESTIMATED AVG GLUCOSE: 126 MG/DL
GLUCOSE SERPL-MCNC: 104 MG/DL
GLUCOSE SERPL-MCNC: 197 MG/DL
GLUCOSE UR QL STRIP: NEGATIVE
HBA1C MFR BLD HPLC: 6 %
HGB UR QL STRIP: NEGATIVE
KETONES UR QL STRIP: ABNORMAL
LEUKOCYTE ESTERASE UR QL STRIP: NEGATIVE
MAGNESIUM SERPL-MCNC: 1.7 MG/DL
MAGNESIUM SERPL-MCNC: 1.7 MG/DL
MAGNESIUM SERPL-MCNC: 2.1 MG/DL
NITRITE UR QL STRIP: NEGATIVE
PH UR STRIP: 6 [PH] (ref 5–8)
PHOSPHATE SERPL-MCNC: 1.5 MG/DL
PHOSPHATE SERPL-MCNC: 2.2 MG/DL
POCT GLUCOSE: 106 MG/DL (ref 70–110)
POCT GLUCOSE: 114 MG/DL (ref 70–110)
POCT GLUCOSE: 128 MG/DL (ref 70–110)
POCT GLUCOSE: 291 MG/DL (ref 70–110)
POTASSIUM SERPL-SCNC: 2.9 MMOL/L
POTASSIUM SERPL-SCNC: 3.6 MMOL/L
PROT UR QL STRIP: NEGATIVE
SODIUM SERPL-SCNC: 137 MMOL/L
SODIUM SERPL-SCNC: 139 MMOL/L
SP GR UR STRIP: 1.01 (ref 1–1.03)
TROPONIN I SERPL DL<=0.01 NG/ML-MCNC: 0.01 NG/ML
TROPONIN I SERPL DL<=0.01 NG/ML-MCNC: 0.01 NG/ML
URN SPEC COLLECT METH UR: ABNORMAL
UROBILINOGEN UR STRIP-ACNC: NEGATIVE EU/DL

## 2017-12-28 PROCEDURE — 80048 BASIC METABOLIC PNL TOTAL CA: CPT

## 2017-12-28 PROCEDURE — 99223 1ST HOSP IP/OBS HIGH 75: CPT | Mod: ,,, | Performed by: INTERNAL MEDICINE

## 2017-12-28 PROCEDURE — 94761 N-INVAS EAR/PLS OXIMETRY MLT: CPT

## 2017-12-28 PROCEDURE — 93306 TTE W/DOPPLER COMPLETE: CPT

## 2017-12-28 PROCEDURE — 36415 COLL VENOUS BLD VENIPUNCTURE: CPT

## 2017-12-28 PROCEDURE — 25000003 PHARM REV CODE 250: Performed by: NURSE PRACTITIONER

## 2017-12-28 PROCEDURE — 84100 ASSAY OF PHOSPHORUS: CPT

## 2017-12-28 PROCEDURE — 63600175 PHARM REV CODE 636 W HCPCS: Performed by: NURSE PRACTITIONER

## 2017-12-28 PROCEDURE — 83735 ASSAY OF MAGNESIUM: CPT | Mod: 91

## 2017-12-28 PROCEDURE — 63600175 PHARM REV CODE 636 W HCPCS: Performed by: HOSPITALIST

## 2017-12-28 PROCEDURE — 83735 ASSAY OF MAGNESIUM: CPT

## 2017-12-28 PROCEDURE — 81003 URINALYSIS AUTO W/O SCOPE: CPT

## 2017-12-28 PROCEDURE — 84484 ASSAY OF TROPONIN QUANT: CPT | Mod: 91

## 2017-12-28 PROCEDURE — 25000003 PHARM REV CODE 250: Performed by: HOSPITALIST

## 2017-12-28 PROCEDURE — G0378 HOSPITAL OBSERVATION PER HR: HCPCS

## 2017-12-28 PROCEDURE — 80069 RENAL FUNCTION PANEL: CPT

## 2017-12-28 RX ORDER — POTASSIUM CHLORIDE 20 MEQ/1
40 TABLET, EXTENDED RELEASE ORAL ONCE
Status: COMPLETED | OUTPATIENT
Start: 2017-12-28 | End: 2017-12-28

## 2017-12-28 RX ORDER — POTASSIUM CHLORIDE 7.45 MG/ML
10 INJECTION INTRAVENOUS ONCE
Status: DISCONTINUED | OUTPATIENT
Start: 2017-12-28 | End: 2017-12-28 | Stop reason: HOSPADM

## 2017-12-28 RX ORDER — POTASSIUM CHLORIDE 20 MEQ/1
40 TABLET, EXTENDED RELEASE ORAL DAILY
Qty: 30 TABLET | Refills: 0 | Status: SHIPPED | OUTPATIENT
Start: 2017-12-28 | End: 2019-04-22

## 2017-12-28 RX ORDER — POTASSIUM CHLORIDE 20 MEQ/1
40 TABLET, EXTENDED RELEASE ORAL DAILY
Status: DISCONTINUED | OUTPATIENT
Start: 2017-12-28 | End: 2017-12-28 | Stop reason: HOSPADM

## 2017-12-28 RX ORDER — LANOLIN ALCOHOL/MO/W.PET/CERES
400 CREAM (GRAM) TOPICAL DAILY
Status: DISCONTINUED | OUTPATIENT
Start: 2017-12-29 | End: 2017-12-28 | Stop reason: HOSPADM

## 2017-12-28 RX ORDER — POTASSIUM CHLORIDE 7.45 MG/ML
10 INJECTION INTRAVENOUS ONCE
Status: COMPLETED | OUTPATIENT
Start: 2017-12-28 | End: 2017-12-28

## 2017-12-28 RX ADMIN — POTASSIUM CHLORIDE 10 MEQ: 7.46 INJECTION, SOLUTION INTRAVENOUS at 08:12

## 2017-12-28 RX ADMIN — CALCIUM CARBONATE 500 MG (1,250 MG)-VITAMIN D3 200 UNIT TABLET 1 TABLET: at 08:12

## 2017-12-28 RX ADMIN — INSULIN GLARGINE 46 UNITS: 300 INJECTION, SOLUTION SUBCUTANEOUS at 08:12

## 2017-12-28 RX ADMIN — EZETIMIBE 10 MG: 10 TABLET ORAL at 08:12

## 2017-12-28 RX ADMIN — ROSUVASTATIN CALCIUM 40 MG: 20 TABLET, FILM COATED ORAL at 08:12

## 2017-12-28 RX ADMIN — PANTOPRAZOLE SODIUM 40 MG: 40 TABLET, DELAYED RELEASE ORAL at 05:12

## 2017-12-28 RX ADMIN — AMOXICILLIN AND CLAVULANATE POTASSIUM 1 TABLET: 875; 125 TABLET, FILM COATED ORAL at 08:12

## 2017-12-28 RX ADMIN — TRAMADOL HYDROCHLORIDE 100 MG: 50 TABLET, COATED ORAL at 05:12

## 2017-12-28 RX ADMIN — PREDNISONE 40 MG: 20 TABLET ORAL at 08:12

## 2017-12-28 RX ADMIN — Medication 800 MG: at 03:12

## 2017-12-28 RX ADMIN — BENZONATATE 100 MG: 100 CAPSULE ORAL at 12:12

## 2017-12-28 RX ADMIN — POTASSIUM CHLORIDE 40 MEQ: 20 TABLET, EXTENDED RELEASE ORAL at 08:12

## 2017-12-28 NOTE — NURSING
Results for DOUG CHAU (MRN [id number]) as of 12/28/2017 17:36   Ref. Range 12/28/2017 16:36   POCT Glucose Latest Ref Range: 70 - 110 mg/dL 291 (H)     Pt administered 23 units of home Humalog

## 2017-12-28 NOTE — ED NOTES
Pt aaox4, resp even and unlabored, skin pink warm and dry. Dried blood observed to lips. Pt reports she was sitting on her toilet, then woke up on the sofa. Pt does not recall what happened or recall falling.  reports blood was on bathroom floor. Pt able to make all needs known. Continent of bowel and bladder. Assisted to restroom with wheelchair

## 2017-12-28 NOTE — PLAN OF CARE
PCP is Dr Mendoza.  Verified insurance as [employer].  Pharmacy is [facility].  Lives with spouse; independent with ADL's.  Denies HH.  Discharge plan is home no needs.       12/28/17 1544   Discharge Assessment   Assessment Type Discharge Planning Assessment   Confirmed/corrected address and phone number on facesheet? Yes   Assessment information obtained from? Patient   Prior to hospitilization cognitive status: Alert/Oriented   Prior to hospitalization functional status: Independent   Current cognitive status: Alert/Oriented   Current Functional Status: Independent   Lives With spouse   Able to Return to Prior Arrangements yes   Is patient able to care for self after discharge? Yes   Patient's perception of discharge disposition home or selfcare   Readmission Within The Last 30 Days no previous admission in last 30 days   Patient currently being followed by outpatient case management? No   Patient currently receives any other outside agency services? No   Equipment Currently Used at Home glucometer;walker, rolling;shower chair   Do you have any problems affording any of your prescribed medications? No   Is the patient taking medications as prescribed? yes   Does the patient have transportation home? Yes   Transportation Available family or friend will provide   Does the patient receive services at the [facility]? No   Discharge Plan A Home   Patient/Family In Agreement With Plan yes

## 2017-12-28 NOTE — NURSING
Received the order from JENNIFER Vergara NP to allow pt to follow her home insulins orders and use insulin from home. Will continue to monitor BS and cover with pt's own SS. Informed pt that the nurse caring for her needs to be notified if she covers herself and with what amount she covered with.

## 2017-12-28 NOTE — PLAN OF CARE
Problem: Patient Care Overview  Goal: Plan of Care Review  Outcome: Ongoing (interventions implemented as appropriate)  POC reviewed with pt with verbalized understanding. Safety maintained, free from falls, glycemic monitoring maintained. Mental status intact w/o any changes throughout shift. Call light in reach, bed locked/lowest position. Srx2.

## 2017-12-28 NOTE — NURSING
Pt checked her own blood sugar @ 2000. Reported a BS of 390 and covered herself with SS per her endocriologist's orders with 23units of Humolog.

## 2017-12-28 NOTE — NURSING
Results for DOUG CHAU (MRN [id number]) as of 12/28/2017 07:32   Ref. Range 12/28/2017 06:31   Potassium Latest Ref Range: 3.5 - 5.1 mmol/L 2.9 (L)     Dr. Murray notified. New orders received

## 2017-12-28 NOTE — ASSESSMENT & PLAN NOTE
Patient diagnosed by Dr. Mendoza yesterday and placed on Augmentin and prednisone.  Will continue home medications while hospitalized and monitor for any needed changes.

## 2017-12-28 NOTE — H&P
"[facility] Medicine  History & Physical    Patient Name: Kateryna Desai  MRN: [id number]  Admission Date: 12/27/2017  Attending Physician: Rosemary Murray MD   Primary Care Provider: Endy Mendoza MD         Patient information was obtained from patient and ER records.     Subjective:     Principal Problem:Syncope    Chief Complaint:   Chief Complaint   Patient presents with    Fall     2 hrs ago fell forward hitting face on tile floor. Pos loc.         HPI: Kateryna Desai is a 62 y.o. female with a PMHx of Type 1 DM, HTN, HLD, CVA, and GERD.  She is admitted to the service of Hospital Medicine with a diagnosis of syncope.  She presented to the ED with complaints of left shoulder pain, facial pain, left knee pain after a syncopal episode at home in the bathroom. Per spouse, pt "cracked her tooth" and was seen by a dentist PTA. Pt doesn't remember the fall or walking to the recliner. She states she "went to the restroom when [employer] came on and woke up in the recliner with blood on her arms." She checked her blood glucose when she woke and it was 140 mg/dl.  She called her spouse, who came home and said there was a "pool of blood" on the floor. Pt was recently dx with Influenza and finished Tamiflu today. She endorsed dizziness x 1 week. She was seen by PCP, Dr Mendoza, and dx with a sinus infection and started on Prednisone, Augmentin, and Tessalon yesterday. She denied chest pain, abdominal pain, vomiting and diarrhea.       Past Medical History:   Diagnosis Date    Allergy     Arthritis     degnerative disease low back,muscle spasms, shoulders    Asian flu type A 12/22/2017    Diabetes mellitus type I     since age 13    Diabetic gastroparesis     takes Cytotec    Diabetic retinopathy of both eyes     gets periodic laser treatments    Difficult intubation     as a child had sore throat after a procedure    Encounter for blood transfusion     " GERD (gastroesophageal reflux disease)     Headache(784.0)     Hiatal hernia     with GERD    Hyperlipidemia     Hypertension     Infection of the upper respiratory tract 2012    Lumbar spinal stenosis     Osteopenia     Rosacea     Seizures     Pt states during pgy with stroke    Stroke ?    while pregnant    Tachycardia     asymptomatic with Toprol    Thyroid disease     hypothyroidism    Venous insufficiency of leg     improved since EVLT       Past Surgical History:   Procedure Laterality Date    BUNIONECTOMY Right 2012    CARDIAC CATHETERIZATION      no stents     SECTION      COLONOSCOPY  2007    Dr. Rose, 10 year recheck    EXOSTECTOMY  12    left foot, local anesthesia, in office    EYE SURGERY      has had many laser procedures for diabetic retinopathy    VASCULAR SURGERY      VEIN SURGERY  2012    EVLT right greater saphenous, IV sedation       Review of patient's allergies indicates:   Allergen Reactions    Bactrim [sulfamethoxazole-trimethoprim] Rash     Patient experienced on 12/3/14 redness to face and rash to chest and arms/ with no SOB or airway obstruction    Heparin analogues     Percocet [oxycodone-acetaminophen] Nausea And Vomiting     Also caused dizziness and passed out    Iodinated contrast- oral and iv dye Swelling and Rash     Says topical iodine OK    Niaspan extended-release [niacin] Itching and Other (See Comments)     Skin flushing and redness    Norco [hydrocodone-acetaminophen] Itching, Swelling and Rash     Can tolerate if she takes Benadryl with it       No current facility-administered medications on file prior to encounter.      Current Outpatient Prescriptions on File Prior to Encounter   Medication Sig    ACZONE 5 % topical gel Apply topically 2 (two) times daily as needed.     amoxicillin-clavulanate 875-125mg (AUGMENTIN) 875-125 mg per tablet Take 1 tablet by mouth every 12 (twelve) hours.    BENEFIBER, GUAR  GUM, ORAL Take 1 Dose by mouth 2 (two) times daily.     benzonatate (TESSALON) 100 MG capsule 1-2 tid prn cough    biotin 300 mcg Tab Take 1 tablet by mouth once daily.    calcium citrate-vitamin D3 315-200 mg (CITRACAL+D) 315-200 mg-unit per tablet Take 1 tablet by mouth 2 (two) times daily.    CRESTOR 40 mg Tab Take 40 mg by mouth once daily.     cyanocobalamin, vitamin B-12, 2,500 mcg Lozg Place 1 lozenge under the tongue once daily.    ESTRACE 0.01 % (0.1 mg/gram) vaginal cream Place vaginally twice a week. Tuesday, Saturday    estradiol 10 mcg Tab Take 1 tablet by mouth twice a week. Tuesday, Saturday    fluticasone (FLONASE) 50 mcg/actuation nasal spray 1 spray by Each Nare route once daily. (Patient taking differently: 1 spray by Each Nare route daily as needed for Rhinitis. )    furosemide (LASIX) 20 MG tablet take 1 tablet by mouth twice a day    HUMALOG 100 unit/mL injection Inject into the skin. 10-17 units tid sliding scale    INSULIN GLARGINE,HUM.REC.ANLOG (TOUJEO SOLOSTAR SUBQ) Inject 46 Units into the skin every morning.     metoprolol succinate (TOPROL-XL) 25 MG 24 hr tablet take 1 tablet by mouth once daily    pantoprazole (PROTONIX) 40 MG tablet Take 1 tablet (40 mg total) by mouth 2 (two) times daily.    predniSONE (DELTASONE) 20 MG tablet Take 2 tablets (40 mg total) by mouth once daily.    spironolactone (ALDACTONE) 50 MG tablet take 1 tablet by mouth twice a day    SYNTHROID 88 mcg tablet Take 88 mcg by mouth every evening.     traMADol (ULTRAM-ER) 100 MG Tb24 Take 100 mg by mouth daily as needed (pain).     vitamin D 1000 units Tab Take 1,000 mg by mouth once daily.     doxycycline (VIBRAMYCIN) 50 MG capsule Take 1 capsule by mouth 2 (two) times daily as needed (roseacea).     GLUCAGON EMERGENCY 1 mg injection 1 mg as needed.     ondansetron (ZOFRAN-ODT) 4 MG TbDL Take 1 tablet (4 mg total) by mouth every 8 (eight) hours as needed.    [DISCONTINUED] BD INSULIN SYRINGE  "ULTRA-FINE 1/2 mL 31 x 15/64" Syrg 3 (three) times daily.     [DISCONTINUED] BD ULTRA-FINE JCARLOS PEN NEEDLES 32 gauge x 5/32" Ndle once daily.     [DISCONTINUED] ONE TOUCH ULTRA TEST Strp 6-8 times daily    [DISCONTINUED] oseltamivir (TAMIFLU) 75 MG capsule Take 75 mg by mouth 2 (two) times daily.      Family History     Problem Relation (Age of Onset)    Alcohol abuse Maternal Uncle    Alzheimer's disease Maternal Uncle    Arthritis Mother    Breast cancer Maternal Aunt, Maternal Grandmother    Cancer Maternal Aunt, Maternal Grandfather    Diabetes Maternal Grandmother, Maternal Grandfather, Cousin    Heart disease Father, Maternal Uncle, Paternal Uncle    No Known Problems Sister, Brother, Daughter, Paternal Aunt    Stroke Father        Social History Main Topics    Smoking status: Never Smoker    Smokeless tobacco: Never Used    Alcohol use Yes      Comment: social, 1 drink monthly    Drug use: No    Sexual activity: Yes     Partners: Male     Review of Systems   Constitutional: Positive for appetite change (decreased), chills, fatigue and fever.   HENT: Positive for ear pain (diagnosed serous otitis media), sinus pain and sinus pressure. Negative for hearing loss, postnasal drip, sore throat and trouble swallowing.    Eyes: Negative for photophobia and visual disturbance.   Respiratory: Positive for cough. Negative for shortness of breath and wheezing.    Cardiovascular: Negative for chest pain, palpitations and leg swelling.   Gastrointestinal: Negative for abdominal pain, diarrhea, nausea and vomiting.   Endocrine: Negative for polydipsia, polyphagia and polyuria.   Genitourinary: Negative for dysuria, frequency and urgency.   Musculoskeletal: Negative for gait problem, neck pain and neck stiffness.   Skin: Positive for wound (left upper lip). Negative for rash.   Neurological: Positive for dizziness and syncope. Negative for weakness, numbness and headaches.   Hematological: Does not bruise/bleed " easily.   Psychiatric/Behavioral: Negative for confusion. The patient is not nervous/anxious.      Objective:     Vital Signs (Most Recent):  Temp: 99.1 °F (37.3 °C) (12/27/17 1956)  Pulse: 100 (12/27/17 2133)  Resp: 12 (12/27/17 1956)  BP: (!) 102/58 (12/27/17 2133)  SpO2: 97 % (12/27/17 1956) Vital Signs (24h Range):  Temp:  [98.1 °F (36.7 °C)-99.1 °F (37.3 °C)] 99.1 °F (37.3 °C)  Pulse:  [] 100  Resp:  [12-16] 12  SpO2:  [96 %-100 %] 97 %  BP: (102-135)/(55-72) 102/58     Weight: 75 kg (165 lb 5.5 oz)  Body mass index is 26.69 kg/m².    Physical Exam   Constitutional: She is oriented to person, place, and time. She appears well-developed and well-nourished. No distress.   HENT:   Head: Normocephalic.   Tenderness noted to left forehead, eyebrow and maxillary sinus area.   Eyes: Conjunctivae and EOM are normal. Pupils are equal, round, and reactive to light.   Neck: Normal range of motion. Neck supple. No tracheal deviation present. No thyromegaly present.   Cardiovascular: Normal rate, regular rhythm, normal heart sounds and intact distal pulses.  Exam reveals no gallop and no friction rub.    No murmur heard.  Pulses:       Dorsalis pedis pulses are 2+ on the right side, and 2+ on the left side.   Pulmonary/Chest: Effort normal and breath sounds normal. No accessory muscle usage. No respiratory distress. She has no wheezes. She has no rhonchi. She has no rales.   Abdominal: Soft. Bowel sounds are normal. She exhibits no distension and no mass. There is no tenderness.   Musculoskeletal: Normal range of motion. She exhibits tenderness (left shoulder). She exhibits no edema or deformity.   Neurological: She is alert and oriented to person, place, and time. She has normal strength. No cranial nerve deficit. She exhibits normal muscle tone. Coordination normal.   Skin: Skin is warm and dry. Capillary refill takes less than 2 seconds. Laceration (left upper lip) noted. No rash noted. She is not diaphoretic.  There is erythema (Left knee bruising).   Psychiatric: She has a normal mood and affect. Her speech is normal and behavior is normal.   Vitals reviewed.        CRANIAL NERVES     CN III, IV, VI   Pupils are equal, round, and reactive to light.  Extraocular motions are normal.        Significant Labs:   CBC:   Recent Labs  Lab 12/27/17  1528   WBC 6.00   HGB 13.2   HCT 39.0        CMP:   Recent Labs  Lab 12/27/17  1528   *   K 3.5   CL 92*   CO2 28   *   BUN 16   CREATININE 1.2   CALCIUM 9.9   PROT 7.4   ALBUMIN 3.7   BILITOT 0.4   ALKPHOS 76   AST 30   ALT 30   ANIONGAP 15   EGFRNONAA 49*     Magnesium: No results for input(s): MG in the last 48 hours.  Troponin:   Recent Labs  Lab 12/27/17  1718   TROPONINI 0.008     TSH:   Recent Labs  Lab 12/27/17  1528   TSH 0.291*       Significant Imaging: I have reviewed all pertinent imaging results/findings within the past 24 hours.     EKG: NSR, Rate 98 bpm, Prolonged QT    CT Maxillofacial: Negative CT of the face.  CT Head: Negative head CT. The  Xray left knee: Negative radiographs of the left knee.    CT Cspine:  1. There is no acute fracture or traumatic malalignment.    2.  There is degenerative change at multiple levels with disc bulge, uncovertebral spurring and/or facet joint arthropathy.  There is, however, no significant spinal canal stenosis.  There is borderline to mild spinal stenosis at the C5-6 level where there is moderate to marked disc space narrowing.  Also, there is moderate left lateral recess and foraminal stenosis at this level.  Otherwise, the foramina are patent.    3.  There is a 1.5 x 0.7 x 0.8 cm nodule in the right lobe of the thyroid gland.  This could be further evaluated on a non-emergent basis with thyroid ultrasound.      Assessment/Plan:     * Syncope    May possibly be orthostatic 2/2 to diuretics and BB's or vasovagal in nature.    Trend CBC, CMP, Mg, Phos  Check Troponin x 3  Echocardiogram and Carotid U/S  Consult  cardiology and follow recommendations         Acute sinusitis    Patient diagnosed by Dr. Mendoza yesterday and placed on Augmentin and prednisone.  Will continue home medications while hospitalized and monitor for any needed changes.        Long QT interval    Consult cardiology  Avoid QT prolonging medications           Acquired hypothyroidism    Chronic problem. Reviewed TSH level which is low, with normal Free T4 level. Will continue home medications while hospitalized and evaluate for any changes.           Hypertension     Controlled currently.  Monitor vital signs.  Will stop BB and Lasix for now 2/2 to possible orthostatic hypotension with syncope and restart when clinically appropriate.  Was bolused 1L NS in ER.  Recheck Orthostatic V/S in am.        Diabetes mellitus type I    Well controlled.  Sees Dr. Padilla outpatient.  Allow patient to utilize home medication regimen due to rigidity and past experiences that left patient brittle for 3 months.  Accuchecks AC/HS  HgA1C  Diabetic diet            VTE Risk Mitigation         Ordered     Medium Risk of VTE  TEDS/SCD's    12/27/17 1941             Leticia Tom NP  Department of Hospital Medicine   [facility]

## 2017-12-28 NOTE — ASSESSMENT & PLAN NOTE
May possibly be orthostatic 2/2 to diuretics and BB's or vasovagal in nature.    Trend CBC, CMP, Mg, Phos  Check Troponin x 3  Echocardiogram and Carotid U/S  Consult cardiology and follow recommendations

## 2017-12-28 NOTE — NURSING
Received pt from the ED via wheelchair. VSS.  Oriented to room. Discussed pt's wishes of wanting to take home insulin and insulin regimen. Spouse at bedside.

## 2017-12-28 NOTE — PLAN OF CARE
1203  Patient sleeping at this time; no family in room.       12/28/17 1221   Discharge Assessment   Assessment Type Discharge Planning Assessment

## 2017-12-28 NOTE — HPI
"PCP: Dr. Mendoza  Endocrine: Dr. Benjamin  Podiatry: Dr. Rhoades  Cardiology: Prior heart cath with Dr. Queen in 2008  Nephrology: Dr. Rodriguez   Vascular: Dr. Cottrell     63 y/o female with a h/o type 1 DM, HTN, HLD, prior CVA, GERD, and hypothyroidism who was admitted to the hospital medicine service last night for syncopal episode.     As per initial HPI done 12/27/2017:  She presented to the ED with complaints of left shoulder pain, facial pain, left knee pain after a syncopal episode at home in the bathroom. Per spouse, pt "cracked her tooth" and was seen by a dentist PTA. Pt doesn't remember the fall or walking to the recliner. She states she "went to the restroom when [employer] came on and woke up in the recliner with blood on her arms." She checked her blood glucose when she woke and it was 140 mg/dl.  She called her spouse, who came home and said there was a "pool of blood" on the floor. Pt was recently dx with Influenza and finished Tamiflu today. She endorsed dizziness x 1 week. She was seen by PCP, Dr Mendoza, and dx with a sinus infection and started on Prednisone, Augmentin, and Tessalon yesterday. She denied chest pain, abdominal pain, vomiting and diarrhea.         "

## 2017-12-28 NOTE — CONSULTS
"[facility]  Cardiology  Consult Note    Patient Name: Kateryna Desai  MRN: [id number]  Admission Date: 12/27/2017  Hospital Length of Stay: 1 days  Code Status: Full Code   Attending Provider: Rosemary Wall MD   Consulting Provider: Ela Obando NP  Primary Care Physician: Endy Mendoza MD  Principal Problem:Syncope    Patient information was obtained from patient, spouse, and medical record.     Inpatient consult to Cardiology  Consult performed by: ELA OBANDO  Consult ordered by: ROSEMARY WALL  Reason for consult: syncope, prolonged QT interval on EKG       This is a new patient to our group   Subjective:     Chief Complaint: Fall with LOC     HPI:   PCP: Dr. Mendoza  Endocrine: Dr. Benjamin  Podiatry: Dr. Rhoades  Cardiology: Prior heart cath with Dr. Queen in 2008  Nephrology: Dr. Rodriguez   Vascular: Dr. Cottrell     63 y/o female with a h/o type 1 DM, HTN, HLD, prior CVA, GERD, and hypothyroidism who was admitted to the hospital medicine service last night for syncopal episode.     As per initial HPI done 12/27/2017:  She presented to the ED with complaints of left shoulder pain, facial pain, left knee pain after a syncopal episode at home in the bathroom. Per spouse, pt "cracked her tooth" and was seen by a dentist PTA. Pt doesn't remember the fall or walking to the recliner. She states she "went to the restroom when [employer] came on and woke up in the recliner with blood on her arms." She checked her blood glucose when she woke and it was 140 mg/dl.  She called her spouse, who came home and said there was a "pool of blood" on the floor. Pt was recently dx with Influenza and finished Tamiflu today. She endorsed dizziness x 1 week. She was seen by PCP, Dr Mendoza, and dx with a sinus infection and started on Prednisone, Augmentin, and Tessalon yesterday. She denied chest pain, abdominal pain, vomiting and diarrhea.           Past Medical History:   Diagnosis Date "    Allergy     Arthritis     degnerative disease low back,muscle spasms, shoulders    Asian flu type A 2017    Diabetes mellitus type I     since age 13    Diabetic gastroparesis     takes Cytotec    Diabetic retinopathy of both eyes     gets periodic laser treatments    Difficult intubation     as a child had sore throat after a procedure    Encounter for blood transfusion     GERD (gastroesophageal reflux disease)     Headache(784.0)     Hiatal hernia     with GERD    Hyperlipidemia     Hypertension     Infection of the upper respiratory tract 2012    Lumbar spinal stenosis     Osteopenia     Rosacea     Seizures     Pt states during pgy with stroke    Stroke ?    while pregnant    Tachycardia     asymptomatic with Toprol    Thyroid disease     hypothyroidism    Venous insufficiency of leg     improved since EVLT       Past Surgical History:   Procedure Laterality Date    BUNIONECTOMY Right 2012    CARDIAC CATHETERIZATION      no stents     SECTION      COLONOSCOPY  2007    Dr. Rose, 10 year recheck    EXOSTECTOMY  12    left foot, local anesthesia, in office    EYE SURGERY      has had many laser procedures for diabetic retinopathy    VASCULAR SURGERY      VEIN SURGERY  2012    EVLT right greater saphenous, IV sedation       Review of patient's allergies indicates:   Allergen Reactions    Bactrim [sulfamethoxazole-trimethoprim] Rash     Patient experienced on 12/3/14 redness to face and rash to chest and arms/ with no SOB or airway obstruction    Heparin analogues     Percocet [oxycodone-acetaminophen] Nausea And Vomiting     Also caused dizziness and passed out    Iodinated contrast- oral and iv dye Swelling and Rash     Says topical iodine OK    Niaspan extended-release [niacin] Itching and Other (See Comments)     Skin flushing and redness    Norco [hydrocodone-acetaminophen] Itching, Swelling and Rash     Can tolerate if she  takes Benadryl with it       No current facility-administered medications on file prior to encounter.      Current Outpatient Prescriptions on File Prior to Encounter   Medication Sig    ACZONE 5 % topical gel Apply topically 2 (two) times daily as needed.     amoxicillin-clavulanate 875-125mg (AUGMENTIN) 875-125 mg per tablet Take 1 tablet by mouth every 12 (twelve) hours.    BENEFIBER, GUAR GUM, ORAL Take 1 Dose by mouth 2 (two) times daily.     benzonatate (TESSALON) 100 MG capsule 1-2 tid prn cough    biotin 300 mcg Tab Take 1 tablet by mouth once daily.    calcium citrate-vitamin D3 315-200 mg (CITRACAL+D) 315-200 mg-unit per tablet Take 1 tablet by mouth 2 (two) times daily.    CRESTOR 40 mg Tab Take 40 mg by mouth once daily.     cyanocobalamin, vitamin B-12, 2,500 mcg Lozg Place 1 lozenge under the tongue once daily.    ESTRACE 0.01 % (0.1 mg/gram) vaginal cream Place vaginally twice a week. Tuesday, Saturday    estradiol 10 mcg Tab Take 1 tablet by mouth twice a week. Tuesday, Saturday    fluticasone (FLONASE) 50 mcg/actuation nasal spray 1 spray by Each Nare route once daily. (Patient taking differently: 1 spray by Each Nare route daily as needed for Rhinitis. )    furosemide (LASIX) 20 MG tablet take 1 tablet by mouth twice a day    HUMALOG 100 unit/mL injection Inject into the skin. 10-17 units tid sliding scale    INSULIN GLARGINE,HUM.REC.ANLOG (TOUJEO SOLOSTAR SUBQ) Inject 46 Units into the skin every morning.     metoprolol succinate (TOPROL-XL) 25 MG 24 hr tablet take 1 tablet by mouth once daily    pantoprazole (PROTONIX) 40 MG tablet Take 1 tablet (40 mg total) by mouth 2 (two) times daily.    predniSONE (DELTASONE) 20 MG tablet Take 2 tablets (40 mg total) by mouth once daily.    spironolactone (ALDACTONE) 50 MG tablet take 1 tablet by mouth twice a day    SYNTHROID 88 mcg tablet Take 88 mcg by mouth every evening.     traMADol (ULTRAM-ER) 100 MG Tb24 Take 100 mg by mouth daily  as needed (pain).     vitamin D 1000 units Tab Take 1,000 mg by mouth once daily.     doxycycline (VIBRAMYCIN) 50 MG capsule Take 1 capsule by mouth 2 (two) times daily as needed (roseacea).     GLUCAGON EMERGENCY 1 mg injection 1 mg as needed.     ondansetron (ZOFRAN-ODT) 4 MG TbDL Take 1 tablet (4 mg total) by mouth every 8 (eight) hours as needed.     Family History     Problem Relation (Age of Onset)    Alcohol abuse Maternal Uncle    Alzheimer's disease Maternal Uncle    Arthritis Mother    Breast cancer Maternal Aunt, Maternal Grandmother    Cancer Maternal Aunt, Maternal Grandfather    Diabetes Maternal Grandmother, Maternal Grandfather, Cousin    Heart disease Father, Maternal Uncle, Paternal Uncle    No Known Problems Sister, Brother, Daughter, Paternal Aunt    Stroke Father        Social History Main Topics    Smoking status: Never Smoker    Smokeless tobacco: Never Used    Alcohol use Yes      Comment: social, 1 drink monthly    Drug use: No    Sexual activity: Yes     Partners: Male     Review of Systems   Constitution: Positive for decreased appetite, fever (now resolved x 2 days), weakness and malaise/fatigue. Negative for chills, diaphoresis and weight gain.   HENT: Positive for congestion and sore throat.    Eyes: Negative for visual disturbance.   Cardiovascular: Positive for syncope. Negative for chest pain, dyspnea on exertion, irregular heartbeat, leg swelling, orthopnea and palpitations.   Respiratory: Positive for cough. Negative for hemoptysis, shortness of breath and sleep disturbances due to breathing.    Endocrine: Negative for cold intolerance, heat intolerance, polydipsia, polyphagia and polyuria.   Hematologic/Lymphatic: Does not bruise/bleed easily.   Skin: Negative for color change and rash.        Lip laceration. Bruise left face and left knee   Musculoskeletal: Positive for back pain and neck pain.        Facial pain, left shoulder pain, left knee pain. Denies h/o frequent  falls    Gastrointestinal: Positive for nausea. Negative for abdominal pain, constipation, diarrhea, heartburn and vomiting.   Genitourinary: Negative for dysuria and hematuria.   Neurological: Positive for dizziness and headaches. Negative for aphonia, focal weakness, numbness, paresthesias and sensory change.   Psychiatric/Behavioral: Negative for depression. The patient is nervous/anxious (anxious ).    Allergic/Immunologic: Negative for persistent infections.     Objective:     Vital Signs (Most Recent):  Temp: 97.7 °F (36.5 °C) (12/28/17 1114)  Pulse: 92 (12/28/17 1116)  Resp: 20 (12/28/17 1114)  BP: (!) 101/59 (12/28/17 1116)  SpO2: 95 % (12/28/17 1114) Vital Signs (24h Range):  Temp:  [97.5 °F (36.4 °C)-99.1 °F (37.3 °C)] 97.7 °F (36.5 °C)  Pulse:  [] 92  Resp:  [12-20] 20  SpO2:  [95 %-100 %] 95 %  BP: (101-135)/(52-72) 101/59     Weight: 75 kg (165 lb 5.5 oz)  Body mass index is 26.69 kg/m².    SpO2: 95 %  O2 Device (Oxygen Therapy): room air      Intake/Output Summary (Last 24 hours) at 12/28/17 1518  Last data filed at 12/28/17 0700   Gross per 24 hour   Intake              400 ml   Output                0 ml   Net              400 ml       Lines/Drains/Airways          No matching active lines, drains, or airways          Physical Exam   Constitutional: She is oriented to person, place, and time. She appears well-developed and well-nourished. No distress.   HENT:   Head: Normocephalic.   Facial tenderness, lip wound    Eyes: Conjunctivae and EOM are normal. Right eye exhibits no discharge. Left eye exhibits no discharge. No scleral icterus.   Neck: Normal range of motion. Neck supple. No JVD present. Carotid bruit is not present.   Cardiovascular: Normal rate, regular rhythm and intact distal pulses.    No murmur heard.  Pulses:       Radial pulses are 2+ on the right side, and 2+ on the left side.        Dorsalis pedis pulses are 2+ on the right side, and 2+ on the left side.   Pulmonary/Chest:  Effort normal and breath sounds normal. No respiratory distress. She has no wheezes. She has no rales.   Abdominal: Soft. Bowel sounds are normal. There is no tenderness. There is no guarding.   Musculoskeletal:   No evidence of LE pitting edema.    Neurological: She is alert and oriented to person, place, and time.   Follows commands, moves all extremities.    Skin: Skin is warm and dry. She is not diaphoretic. No cyanosis. Nails show no clubbing.   Psychiatric: She has a normal mood and affect. Her behavior is normal.   Nursing note and vitals reviewed.      Significant Labs:   Blood Culture: No results for input(s): LABBLOO in the last 48 hours., BMP:   Recent Labs  Lab 12/27/17  1528 12/28/17  0027 12/28/17  0631   *  --  104   *  --  139   K 3.5  --  2.9*   CL 92*  --  96   CO2 28  --  31*   BUN 16  --  13   CREATININE 1.2  --  0.9   CALCIUM 9.9  --  8.9   MG  --  1.7 1.7   , CMP   Recent Labs  Lab 12/27/17  1528 12/28/17  0631   * 139   K 3.5 2.9*   CL 92* 96   CO2 28 31*   * 104   BUN 16 13   CREATININE 1.2 0.9   CALCIUM 9.9 8.9   PROT 7.4  --    ALBUMIN 3.7  --    BILITOT 0.4  --    ALKPHOS 76  --    AST 30  --    ALT 30  --    ANIONGAP 15 12   ESTGFRAFRICA 57* >60   EGFRNONAA 49* >60   , CBC   Recent Labs  Lab 12/27/17  1528   WBC 6.00   HGB 13.2   HCT 39.0      , INR No results for input(s): INR, PROTIME in the last 48 hours. and Troponin   Recent Labs  Lab 12/27/17  1718 12/28/17  0027 12/28/17  0631   TROPONINI 0.008 0.006 0.010       Significant Imaging  Echo 12/29/2017:  Results pending    Carotid US 12/29/2017:  Although there is atherosclerotic plaque and calcification bilaterally, significant stenosis by real-time ultrasound or NASCET criteria is not found     CXR 12/27/2017:  Technique: Single AP portable chest radiograph.    Findings:Cardiac size is normal. No alveolar opacity, pleural effusion or pneumothorax is seen. No radiographically apparent pulmonary nodule  is seen. Atherosclerosis is seen in the aortic arch. Cardiac monitor leads overlie the chest. No acute osseous abnormality is seen.    Large patient body habitus.  Impression:  1.  No acute radiographic findings in the chest.    Maxillofacial CT without contrast 12/27/2017:   Negative CT of the face.    Head CT 12/27/2017:   Negative head CT. The      Assessment and Plan:     Active Hospital Problems    Diagnosis    *Syncope    Acute sinusitis    Long QT interval    Acquired hypothyroidism    Diabetes mellitus type I     since age 13      Hypertension     63 y/o female with a h/o type 1 DM, HTN, HLD, prior possible CVA vs hypoglycemia while pregnant in 1985, GERD, and hypothyroidism who was admitted to the hospital medicine service last night for syncopal episode. EKG x 2 showed prolonged QT intervals. QTc of 508 ms on initial EKG done yesterday vs QTc of 514ms on second EKG. Had QTc of 475 back in 05/2016. No evidence of acute ischemia on EKG. Serial trop x 2 are neg. Patient with recent illness, treated with tamilflu for influenza A, started on abx by PCP yesterday for sinus infection. Had angiogram in 2008 with Dr. Palomino in [place]. Patient states that she intially saw him for tachycardia which is managed with BB (Toprol 25mg). Angiogram was normal according to the patient.     + orthostatic hypotension this admission. She mentions that she has been on Lasix and Spironolactone for years, both of which are currently on hold. Also with hypokalemia and low magnesium this admission, IV and po repletion ordered by primary team. Denies family h/o sudden death and premature CAD. Patient expresses that her mother was diagnosed with an arrhythmia.     - Further eval with TTE today, results pending  - Monitor lytes, keep K > 4.0 and Mg > 2.0.   - Would leave spironolactone and lasix on hold as they can be contributing to her low K and Mg levels with can lead to arrhythmia   - Patient expressed that she would like  to be discharged today, however, Dr. Lambert advised her that it would be beneficial to be monitored overnight and correct electrolyte abnormalities.   - order placed for 30 day event monitor, patent can f/u in cards clinic in 4 - 6 weeks to review results.   - monitor on telemetry     This patient has been discussed with and evaluated by my collaborating physician, Dr. Lambert.  Please see Dr. Lambert's MD attestation above for additional information/recommendations.       Ela Obando NP  Cardiology   [facility]

## 2017-12-28 NOTE — PLAN OF CARE
Patient is in the shower at this time.       12/28/17 1457   Discharge Assessment   Assessment Type Discharge Planning Assessment

## 2017-12-28 NOTE — SUBJECTIVE & OBJECTIVE
Past Medical History:   Diagnosis Date    Allergy     Arthritis     degnerative disease low back,muscle spasms, shoulders    Asian flu type A 2017    Diabetes mellitus type I     since age 13    Diabetic gastroparesis     takes Cytotec    Diabetic retinopathy of both eyes     gets periodic laser treatments    Difficult intubation     as a child had sore throat after a procedure    Encounter for blood transfusion     GERD (gastroesophageal reflux disease)     Headache(784.0)     Hiatal hernia     with GERD    Hyperlipidemia     Hypertension     Infection of the upper respiratory tract 2012    Lumbar spinal stenosis     Osteopenia     Rosacea     Seizures     Pt states during pgy with stroke    Stroke ?    while pregnant    Tachycardia     asymptomatic with Toprol    Thyroid disease     hypothyroidism    Venous insufficiency of leg     improved since EVLT       Past Surgical History:   Procedure Laterality Date    BUNIONECTOMY Right 2012    CARDIAC CATHETERIZATION      no stents     SECTION      COLONOSCOPY  2007    Dr. Rose, 10 year recheck    EXOSTECTOMY  12    left foot, local anesthesia, in office    EYE SURGERY      has had many laser procedures for diabetic retinopathy    VASCULAR SURGERY      VEIN SURGERY  2012    EVLT right greater saphenous, IV sedation       Review of patient's allergies indicates:   Allergen Reactions    Bactrim [sulfamethoxazole-trimethoprim] Rash     Patient experienced on 12/3/14 redness to face and rash to chest and arms/ with no SOB or airway obstruction    Heparin analogues     Percocet [oxycodone-acetaminophen] Nausea And Vomiting     Also caused dizziness and passed out    Iodinated contrast- oral and iv dye Swelling and Rash     Says topical iodine OK    Niaspan extended-release [niacin] Itching and Other (See Comments)     Skin flushing and redness    Norco [hydrocodone-acetaminophen] Itching,  Swelling and Rash     Can tolerate if she takes Benadryl with it       No current facility-administered medications on file prior to encounter.      Current Outpatient Prescriptions on File Prior to Encounter   Medication Sig    ACZONE 5 % topical gel Apply topically 2 (two) times daily as needed.     amoxicillin-clavulanate 875-125mg (AUGMENTIN) 875-125 mg per tablet Take 1 tablet by mouth every 12 (twelve) hours.    BENEFIBER, GUAR GUM, ORAL Take 1 Dose by mouth 2 (two) times daily.     benzonatate (TESSALON) 100 MG capsule 1-2 tid prn cough    biotin 300 mcg Tab Take 1 tablet by mouth once daily.    calcium citrate-vitamin D3 315-200 mg (CITRACAL+D) 315-200 mg-unit per tablet Take 1 tablet by mouth 2 (two) times daily.    CRESTOR 40 mg Tab Take 40 mg by mouth once daily.     cyanocobalamin, vitamin B-12, 2,500 mcg Lozg Place 1 lozenge under the tongue once daily.    ESTRACE 0.01 % (0.1 mg/gram) vaginal cream Place vaginally twice a week. Tuesday, Saturday    estradiol 10 mcg Tab Take 1 tablet by mouth twice a week. Tuesday, Saturday    fluticasone (FLONASE) 50 mcg/actuation nasal spray 1 spray by Each Nare route once daily. (Patient taking differently: 1 spray by Each Nare route daily as needed for Rhinitis. )    furosemide (LASIX) 20 MG tablet take 1 tablet by mouth twice a day    HUMALOG 100 unit/mL injection Inject into the skin. 10-17 units tid sliding scale    INSULIN GLARGINE,HUM.REC.ANLOG (TOUJEO SOLOSTAR SUBQ) Inject 46 Units into the skin every morning.     metoprolol succinate (TOPROL-XL) 25 MG 24 hr tablet take 1 tablet by mouth once daily    pantoprazole (PROTONIX) 40 MG tablet Take 1 tablet (40 mg total) by mouth 2 (two) times daily.    predniSONE (DELTASONE) 20 MG tablet Take 2 tablets (40 mg total) by mouth once daily.    spironolactone (ALDACTONE) 50 MG tablet take 1 tablet by mouth twice a day    SYNTHROID 88 mcg tablet Take 88 mcg by mouth every evening.     traMADol  "(ULTRAM-ER) 100 MG Tb24 Take 100 mg by mouth daily as needed (pain).     vitamin D 1000 units Tab Take 1,000 mg by mouth once daily.     doxycycline (VIBRAMYCIN) 50 MG capsule Take 1 capsule by mouth 2 (two) times daily as needed (roseacea).     GLUCAGON EMERGENCY 1 mg injection 1 mg as needed.     ondansetron (ZOFRAN-ODT) 4 MG TbDL Take 1 tablet (4 mg total) by mouth every 8 (eight) hours as needed.    [DISCONTINUED] BD INSULIN SYRINGE ULTRA-FINE 1/2 mL 31 x 15/64" Syrg 3 (three) times daily.     [DISCONTINUED] BD ULTRA-FINE JCARLOS PEN NEEDLES 32 gauge x 5/32" Ndle once daily.     [DISCONTINUED] ONE TOUCH ULTRA TEST Strp 6-8 times daily    [DISCONTINUED] oseltamivir (TAMIFLU) 75 MG capsule Take 75 mg by mouth 2 (two) times daily.      Family History     Problem Relation (Age of Onset)    Alcohol abuse Maternal Uncle    Alzheimer's disease Maternal Uncle    Arthritis Mother    Breast cancer Maternal Aunt, Maternal Grandmother    Cancer Maternal Aunt, Maternal Grandfather    Diabetes Maternal Grandmother, Maternal Grandfather, Cousin    Heart disease Father, Maternal Uncle, Paternal Uncle    No Known Problems Sister, Brother, Daughter, Paternal Aunt    Stroke Father        Social History Main Topics    Smoking status: Never Smoker    Smokeless tobacco: Never Used    Alcohol use Yes      Comment: social, 1 drink monthly    Drug use: No    Sexual activity: Yes     Partners: Male     Review of Systems   Constitutional: Positive for appetite change (decreased), chills, fatigue and fever.   HENT: Positive for ear pain (diagnosed serous otitis media), sinus pain and sinus pressure. Negative for hearing loss, postnasal drip, sore throat and trouble swallowing.    Eyes: Negative for photophobia and visual disturbance.   Respiratory: Positive for cough. Negative for shortness of breath and wheezing.    Cardiovascular: Negative for chest pain, palpitations and leg swelling.   Gastrointestinal: Negative for abdominal " pain, diarrhea, nausea and vomiting.   Endocrine: Negative for polydipsia, polyphagia and polyuria.   Genitourinary: Negative for dysuria, frequency and urgency.   Musculoskeletal: Negative for gait problem, neck pain and neck stiffness.   Skin: Positive for wound (left upper lip). Negative for rash.   Neurological: Positive for dizziness and syncope. Negative for weakness, numbness and headaches.   Hematological: Does not bruise/bleed easily.   Psychiatric/Behavioral: Negative for confusion. The patient is not nervous/anxious.      Objective:     Vital Signs (Most Recent):  Temp: 99.1 °F (37.3 °C) (12/27/17 1956)  Pulse: 100 (12/27/17 2133)  Resp: 12 (12/27/17 1956)  BP: (!) 102/58 (12/27/17 2133)  SpO2: 97 % (12/27/17 1956) Vital Signs (24h Range):  Temp:  [98.1 °F (36.7 °C)-99.1 °F (37.3 °C)] 99.1 °F (37.3 °C)  Pulse:  [] 100  Resp:  [12-16] 12  SpO2:  [96 %-100 %] 97 %  BP: (102-135)/(55-72) 102/58     Weight: 75 kg (165 lb 5.5 oz)  Body mass index is 26.69 kg/m².    Physical Exam   Constitutional: She is oriented to person, place, and time. She appears well-developed and well-nourished. No distress.   HENT:   Head: Normocephalic.   Tenderness noted to left forehead, eyebrow and maxillary sinus area.   Eyes: Conjunctivae and EOM are normal. Pupils are equal, round, and reactive to light.   Neck: Normal range of motion. Neck supple. No tracheal deviation present. No thyromegaly present.   Cardiovascular: Normal rate, regular rhythm, normal heart sounds and intact distal pulses.  Exam reveals no gallop and no friction rub.    No murmur heard.  Pulses:       Dorsalis pedis pulses are 2+ on the right side, and 2+ on the left side.   Pulmonary/Chest: Effort normal and breath sounds normal. No accessory muscle usage. No respiratory distress. She has no wheezes. She has no rhonchi. She has no rales.   Abdominal: Soft. Bowel sounds are normal. She exhibits no distension and no mass. There is no tenderness.    Musculoskeletal: Normal range of motion. She exhibits tenderness (left shoulder). She exhibits no edema or deformity.   Neurological: She is alert and oriented to person, place, and time. She has normal strength. No cranial nerve deficit. She exhibits normal muscle tone. Coordination normal.   Skin: Skin is warm and dry. Capillary refill takes less than 2 seconds. Laceration (left upper lip) noted. No rash noted. She is not diaphoretic. There is erythema (Left knee bruising).   Psychiatric: She has a normal mood and affect. Her speech is normal and behavior is normal.   Vitals reviewed.        CRANIAL NERVES     CN III, IV, VI   Pupils are equal, round, and reactive to light.  Extraocular motions are normal.        Significant Labs:   CBC:   Recent Labs  Lab 12/27/17  1528   WBC 6.00   HGB 13.2   HCT 39.0        CMP:   Recent Labs  Lab 12/27/17  1528   *   K 3.5   CL 92*   CO2 28   *   BUN 16   CREATININE 1.2   CALCIUM 9.9   PROT 7.4   ALBUMIN 3.7   BILITOT 0.4   ALKPHOS 76   AST 30   ALT 30   ANIONGAP 15   EGFRNONAA 49*     Magnesium: No results for input(s): MG in the last 48 hours.  Troponin:   Recent Labs  Lab 12/27/17  1718   TROPONINI 0.008     TSH:   Recent Labs  Lab 12/27/17  1528   TSH 0.291*       Significant Imaging: I have reviewed all pertinent imaging results/findings within the past 24 hours.     EKG: NSR, Rate 98 bpm, Prolonged QT    CT Maxillofacial: Negative CT of the face.  CT Head: Negative head CT. The  Xray left knee: Negative radiographs of the left knee.      1. There is no acute fracture or traumatic malalignment.    2.  There is degenerative change at multiple levels with disc bulge, uncovertebral spurring and/or facet joint arthropathy.  There is, however, no significant spinal canal stenosis.  There is borderline to mild spinal stenosis at the C5-6 level where there is moderate to marked disc space narrowing.  Also, there is moderate left lateral recess and foraminal  stenosis at this level.  Otherwise, the foramina are patent.    3.  There is a 1.5 x 0.7 x 0.8 cm nodule in the right lobe of the thyroid gland.  This could be further evaluated on a non-emergent basis with thyroid ultrasound.

## 2017-12-28 NOTE — ASSESSMENT & PLAN NOTE
Chronic problem.  Will continue home medications while hospitalized and evaluate for any changes.

## 2017-12-28 NOTE — HPI
"Kateryna Desai is a 62 y.o. female with a PMHx of Type 1 DM, HTN, HLD, CVA, and GERD.  She is admitted to the service of Hospital Medicine with a diagnosis of syncope.  She presented to the ED with complaints of left shoulder pain, facial pain, left knee pain after a syncopal episode at home in the bathroom. Per spouse, pt "cracked her tooth" and was seen by a dentist PTA. Pt doesn't remember the fall or walking to the recliner. She states she "went to the restroom when [facility] [employer] [facility] came on and woke up in the recliner with blood on her arms." She checked her blood glucose when she woke and it was 140 mg/dl.  She called her spouse, who came home and said there was a "pool of blood" on the floor. Pt was recently dx with Influenza and finished Tamiflu today. She endorsed dizziness x 1 week. She was seen by PCP, Dr Mendoza, and dx with a sinus infection and started on Prednisone, Augmentin, and Tessalon yesterday. She denied chest pain, abdominal pain, vomiting and diarrhea.     "

## 2017-12-28 NOTE — ASSESSMENT & PLAN NOTE
Well controlled.  Sees Dr. Padilla outpatient.  Allow patient to utilize home medication regimen due to rigidity and past experiences that left patient brittle for 3 months.  Accuchecks AC/HS  HgA1C  Diabetic diet

## 2017-12-28 NOTE — ASSESSMENT & PLAN NOTE
Controlled currently.  Monitor vital signs.  Will stop BB and Lasix for now 2/2 to possible orthostatic hypotension with syncope and restart when clinically appropriate.

## 2017-12-28 NOTE — SUBJECTIVE & OBJECTIVE
Past Medical History:   Diagnosis Date    Allergy     Arthritis     degnerative disease low back,muscle spasms, shoulders    Asian flu type A 2017    Diabetes mellitus type I     since age 13    Diabetic gastroparesis     takes Cytotec    Diabetic retinopathy of both eyes     gets periodic laser treatments    Difficult intubation     as a child had sore throat after a procedure    Encounter for blood transfusion     GERD (gastroesophageal reflux disease)     Headache(784.0)     Hiatal hernia     with GERD    Hyperlipidemia     Hypertension     Infection of the upper respiratory tract 2012    Lumbar spinal stenosis     Osteopenia     Rosacea     Seizures     Pt states during pgy with stroke    Stroke ?    while pregnant    Tachycardia     asymptomatic with Toprol    Thyroid disease     hypothyroidism    Venous insufficiency of leg     improved since EVLT       Past Surgical History:   Procedure Laterality Date    BUNIONECTOMY Right 2012    CARDIAC CATHETERIZATION      no stents     SECTION      COLONOSCOPY  2007    Dr. Rose, 10 year recheck    EXOSTECTOMY  12    left foot, local anesthesia, in office    EYE SURGERY      has had many laser procedures for diabetic retinopathy    VASCULAR SURGERY      VEIN SURGERY  2012    EVLT right greater saphenous, IV sedation       Review of patient's allergies indicates:   Allergen Reactions    Bactrim [sulfamethoxazole-trimethoprim] Rash     Patient experienced on 12/3/14 redness to face and rash to chest and arms/ with no SOB or airway obstruction    Heparin analogues     Percocet [oxycodone-acetaminophen] Nausea And Vomiting     Also caused dizziness and passed out    Iodinated contrast- oral and iv dye Swelling and Rash     Says topical iodine OK    Niaspan extended-release [niacin] Itching and Other (See Comments)     Skin flushing and redness    Norco [hydrocodone-acetaminophen] Itching,  Swelling and Rash     Can tolerate if she takes Benadryl with it       No current facility-administered medications on file prior to encounter.      Current Outpatient Prescriptions on File Prior to Encounter   Medication Sig    ACZONE 5 % topical gel Apply topically 2 (two) times daily as needed.     amoxicillin-clavulanate 875-125mg (AUGMENTIN) 875-125 mg per tablet Take 1 tablet by mouth every 12 (twelve) hours.    BENEFIBER, GUAR GUM, ORAL Take 1 Dose by mouth 2 (two) times daily.     benzonatate (TESSALON) 100 MG capsule 1-2 tid prn cough    biotin 300 mcg Tab Take 1 tablet by mouth once daily.    calcium citrate-vitamin D3 315-200 mg (CITRACAL+D) 315-200 mg-unit per tablet Take 1 tablet by mouth 2 (two) times daily.    CRESTOR 40 mg Tab Take 40 mg by mouth once daily.     cyanocobalamin, vitamin B-12, 2,500 mcg Lozg Place 1 lozenge under the tongue once daily.    ESTRACE 0.01 % (0.1 mg/gram) vaginal cream Place vaginally twice a week. Tuesday, Saturday    estradiol 10 mcg Tab Take 1 tablet by mouth twice a week. Tuesday, Saturday    fluticasone (FLONASE) 50 mcg/actuation nasal spray 1 spray by Each Nare route once daily. (Patient taking differently: 1 spray by Each Nare route daily as needed for Rhinitis. )    furosemide (LASIX) 20 MG tablet take 1 tablet by mouth twice a day    HUMALOG 100 unit/mL injection Inject into the skin. 10-17 units tid sliding scale    INSULIN GLARGINE,HUM.REC.ANLOG (TOUJEO SOLOSTAR SUBQ) Inject 46 Units into the skin every morning.     metoprolol succinate (TOPROL-XL) 25 MG 24 hr tablet take 1 tablet by mouth once daily    pantoprazole (PROTONIX) 40 MG tablet Take 1 tablet (40 mg total) by mouth 2 (two) times daily.    predniSONE (DELTASONE) 20 MG tablet Take 2 tablets (40 mg total) by mouth once daily.    spironolactone (ALDACTONE) 50 MG tablet take 1 tablet by mouth twice a day    SYNTHROID 88 mcg tablet Take 88 mcg by mouth every evening.     traMADol  (ULTRAM-ER) 100 MG Tb24 Take 100 mg by mouth daily as needed (pain).     vitamin D 1000 units Tab Take 1,000 mg by mouth once daily.     doxycycline (VIBRAMYCIN) 50 MG capsule Take 1 capsule by mouth 2 (two) times daily as needed (roseacea).     GLUCAGON EMERGENCY 1 mg injection 1 mg as needed.     ondansetron (ZOFRAN-ODT) 4 MG TbDL Take 1 tablet (4 mg total) by mouth every 8 (eight) hours as needed.     Family History     Problem Relation (Age of Onset)    Alcohol abuse Maternal Uncle    Alzheimer's disease Maternal Uncle    Arthritis Mother    Breast cancer Maternal Aunt, Maternal Grandmother    Cancer Maternal Aunt, Maternal Grandfather    Diabetes Maternal Grandmother, Maternal Grandfather, Cousin    Heart disease Father, Maternal Uncle, Paternal Uncle    No Known Problems Sister, Brother, Daughter, Paternal Aunt    Stroke Father        Social History Main Topics    Smoking status: Never Smoker    Smokeless tobacco: Never Used    Alcohol use Yes      Comment: social, 1 drink monthly    Drug use: No    Sexual activity: Yes     Partners: Male     Review of Systems   Constitution: Positive for decreased appetite, fever (now resolved x 2 days), weakness and malaise/fatigue. Negative for chills, diaphoresis and weight gain.   HENT: Positive for congestion and sore throat.    Eyes: Negative for visual disturbance.   Cardiovascular: Positive for syncope. Negative for chest pain, dyspnea on exertion, irregular heartbeat, leg swelling, orthopnea and palpitations.   Respiratory: Positive for cough. Negative for hemoptysis, shortness of breath and sleep disturbances due to breathing.    Endocrine: Negative for cold intolerance, heat intolerance, polydipsia, polyphagia and polyuria.   Hematologic/Lymphatic: Does not bruise/bleed easily.   Skin: Negative for color change and rash.        Lip laceration. Bruise left face and left knee   Musculoskeletal: Positive for back pain and neck pain.        Facial pain, left  shoulder pain, left knee pain. Denies h/o frequent falls    Gastrointestinal: Positive for nausea. Negative for abdominal pain, constipation, diarrhea, heartburn and vomiting.   Genitourinary: Negative for dysuria and hematuria.   Neurological: Positive for dizziness and headaches. Negative for aphonia, focal weakness, numbness, paresthesias and sensory change.   Psychiatric/Behavioral: Negative for depression. The patient is nervous/anxious (anxious ).    Allergic/Immunologic: Negative for persistent infections.     Objective:     Vital Signs (Most Recent):  Temp: 97.7 °F (36.5 °C) (12/28/17 1114)  Pulse: 92 (12/28/17 1116)  Resp: 20 (12/28/17 1114)  BP: (!) 101/59 (12/28/17 1116)  SpO2: 95 % (12/28/17 1114) Vital Signs (24h Range):  Temp:  [97.5 °F (36.4 °C)-99.1 °F (37.3 °C)] 97.7 °F (36.5 °C)  Pulse:  [] 92  Resp:  [12-20] 20  SpO2:  [95 %-100 %] 95 %  BP: (101-135)/(52-72) 101/59     Weight: 75 kg (165 lb 5.5 oz)  Body mass index is 26.69 kg/m².    SpO2: 95 %  O2 Device (Oxygen Therapy): room air      Intake/Output Summary (Last 24 hours) at 12/28/17 1518  Last data filed at 12/28/17 0700   Gross per 24 hour   Intake              400 ml   Output                0 ml   Net              400 ml       Lines/Drains/Airways          No matching active lines, drains, or airways          Physical Exam   Constitutional: She is oriented to person, place, and time. She appears well-developed and well-nourished. No distress.   HENT:   Head: Normocephalic.   Facial tenderness, lip wound    Eyes: Conjunctivae and EOM are normal. Right eye exhibits no discharge. Left eye exhibits no discharge. No scleral icterus.   Neck: Normal range of motion. Neck supple. No JVD present. Carotid bruit is not present.   Cardiovascular: Normal rate, regular rhythm and intact distal pulses.    No murmur heard.  Pulses:       Radial pulses are 2+ on the right side, and 2+ on the left side.        Dorsalis pedis pulses are 2+ on the right  side, and 2+ on the left side.   Pulmonary/Chest: Effort normal and breath sounds normal. No respiratory distress. She has no wheezes. She has no rales.   Abdominal: Soft. Bowel sounds are normal. There is no tenderness. There is no guarding.   Musculoskeletal:   No evidence of LE pitting edema.    Neurological: She is alert and oriented to person, place, and time.   Follows commands, moves all extremities.    Skin: Skin is warm and dry. She is not diaphoretic. No cyanosis. Nails show no clubbing.   Psychiatric: She has a normal mood and affect. Her behavior is normal.   Nursing note and vitals reviewed.      Significant Labs:   Blood Culture: No results for input(s): LABBLOO in the last 48 hours., BMP:   Recent Labs  Lab 12/27/17  1528 12/28/17  0027 12/28/17  0631   *  --  104   *  --  139   K 3.5  --  2.9*   CL 92*  --  96   CO2 28  --  31*   BUN 16  --  13   CREATININE 1.2  --  0.9   CALCIUM 9.9  --  8.9   MG  --  1.7 1.7   , CMP   Recent Labs  Lab 12/27/17  1528 12/28/17  0631   * 139   K 3.5 2.9*   CL 92* 96   CO2 28 31*   * 104   BUN 16 13   CREATININE 1.2 0.9   CALCIUM 9.9 8.9   PROT 7.4  --    ALBUMIN 3.7  --    BILITOT 0.4  --    ALKPHOS 76  --    AST 30  --    ALT 30  --    ANIONGAP 15 12   ESTGFRAFRICA 57* >60   EGFRNONAA 49* >60   , CBC   Recent Labs  Lab 12/27/17  1528   WBC 6.00   HGB 13.2   HCT 39.0      , INR No results for input(s): INR, PROTIME in the last 48 hours. and Troponin   Recent Labs  Lab 12/27/17  1718 12/28/17  0027 12/28/17  0631   TROPONINI 0.008 0.006 0.010       Significant Imaging  Echo 12/29/2017:  Results pending    Carotid US 12/29/2017:  Although there is atherosclerotic plaque and calcification bilaterally, significant stenosis by real-time ultrasound or NASCET criteria is not found     CXR 12/27/2017:  Technique: Single AP portable chest radiograph.    Findings:Cardiac size is normal. No alveolar opacity, pleural effusion or pneumothorax is  seen. No radiographically apparent pulmonary nodule is seen. Atherosclerosis is seen in the aortic arch. Cardiac monitor leads overlie the chest. No acute osseous abnormality is seen.    Large patient body habitus.  Impression:  1.  No acute radiographic findings in the chest.    Maxillofacial CT without contrast 12/27/2017:   Negative CT of the face.    Head CT 12/27/2017:   Negative head CT. The

## 2017-12-29 ENCOUNTER — TELEPHONE (OUTPATIENT)
Dept: CARDIOLOGY | Facility: CLINIC | Age: 62
End: 2017-12-29

## 2017-12-29 ENCOUNTER — TELEPHONE (OUTPATIENT)
Dept: MEDSURG UNIT | Facility: HOSPITAL | Age: 62
End: 2017-12-29

## 2017-12-29 ENCOUNTER — TELEPHONE (OUTPATIENT)
Dept: HEPATOLOGY | Facility: HOSPITAL | Age: 62
End: 2017-12-29

## 2017-12-29 NOTE — TELEPHONE ENCOUNTER
----- Message from Petra Chávez sent at 12/29/2017  8:41 AM CST -----  Schedule a  Hospital f/u visit from [facility] in 1 week. Next appointment was showing 1-18.  Please call patient at [phone]

## 2017-12-29 NOTE — HOSPITAL COURSE
Patient admitted with syncope-noted to have orthostatic changes and hypok and mag. Lasix and aldactone held and orthostatics improved. Electrolytes replaced and rechecked. Long discussion had with pt and  re diuretic use; Cardiology recommended dc them. bp remained stable. Pt to fu with cardiology and pcp-needs bmp in one week -return sooner or to ER if sx worsen.She had no further syncopal episodes or any focal neurologic signs or symptoms so MRI was not performed.   Echo pending time of dc. Carotid ultrasound wnl     Pt also had eval by cardiology for prolonged QTc-and holter monitor recommended -to be placed outpt.   And she will fu with cardiology as per following note:  Further eval with TTE today, results pending  - Monitor lytes, keep K > 4.0 and Mg > 2.0.   - Would leave spironolactone and lasix on hold as they can be contributing to her low K and Mg levels with can lead to arrhythmia   - Patient expressed that she would like to be discharged today, however, Dr. Lambert advised her that it would be beneficial to be monitored overnight and correct electrolyte abnormalities.   - order placed for 30 day event monitor, patent can f/u in [facility] clinic in 4 - 6 weeks to review results.   - monitor on telemetry

## 2017-12-29 NOTE — DISCHARGE SUMMARY
"[facility] Medicine  Discharge Summary      Patient Name: Kateryna Desai  MRN: [id number]  Admission Date: 12/27/2017  Hospital Length of Stay: 1 days  Discharge Date and Time: No discharge date for patient encounter.  Attending Physician: Rosemary Murray MD   Discharging Provider: Rosemary Murray MD  Primary Care Provider: Endy Mendoza MD      HPI:   Kateryna Desai is a 62 y.o. female with a PMHx of Type 1 DM, HTN, HLD, CVA, and GERD.  She is admitted to the service of Hospital Medicine with a diagnosis of syncope.  She presented to the ED with complaints of left shoulder pain, facial pain, left knee pain after a syncopal episode at home in the bathroom. Per spouse, pt "cracked her tooth" and was seen by a dentist PTA. Pt doesn't remember the fall or walking to the recliner. She states she "went to the restroom when [employer] came on and woke up in the recliner with blood on her arms." She checked her blood glucose when she woke and it was 140 mg/dl.  She called her spouse, who came home and said there was a "pool of blood" on the floor. Pt was recently dx with Influenza and finished Tamiflu today. She endorsed dizziness x 1 week. She was seen by PCP, Dr Mendoza, and dx with a sinus infection and started on Prednisone, Augmentin, and Tessalon yesterday. She denied chest pain, abdominal pain, vomiting and diarrhea.       * No surgery found *      Hospital Course:   Patient admitted with syncope-noted to have orthostatic changes and hypok and mag. Lasix and aldactone held and orthostatics improved. Electrolytes replaced and rechecked. Long discussion had with pt and  re diuretic use; Cardiology recommended dc them. bp remained stable. Pt to fu with cardiology and pcp-needs bmp in one week -return sooner or to ER if sx worsen.She had no further syncopal episodes or any focal neurologic signs or symptoms so MRI was not performed.   Echo pending time of " dc. Carotid ultrasound wnl     Pt also had eval by cardiology for prolonged QTc-and holter monitor recommended -to be placed outpt.   And she will fu with cardiology as per following note:  Further eval with TTE today, results pending  - Monitor lytes, keep K > 4.0 and Mg > 2.0.   - Would leave spironolactone and lasix on hold as they can be contributing to her low K and Mg levels with can lead to arrhythmia   - Patient expressed that she would like to be discharged today, however, Dr. Lambert advised her that it would be beneficial to be monitored overnight and correct electrolyte abnormalities.   - order placed for 30 day event monitor, patent can f/u in cards clinic in 4 - 6 weeks to review results.   - monitor on telemetry    Addend-CT head and facial bones  And CT neck unremarkable     Consults:   Consults         Status Ordering Provider     Inpatient consult to Cardiology  Once     Provider:  Amor White MD    Completed [place]          No new Assessment & Plan notes have been filed under this hospital service since the last note was generated.  Service: Hospital Medicine    Final Active Diagnoses:    Diagnosis Date Noted POA    PRINCIPAL PROBLEM:  Syncope [R55] 12/27/2017 Yes    Acute sinusitis [J01.90] 12/28/2017 Yes    Long QT interval [R94.31] 12/27/2017 Yes    Acquired hypothyroidism [E03.9]  Yes    Diabetes mellitus type I [E10.9]  Yes    Hypertension [I10]  Yes      Problems Resolved During this Admission:    Diagnosis Date Noted Date Resolved POA       Discharged Condition: good    Disposition: Home or Self Care    Follow Up:  Follow-up Information     Tonio Lambert MD In 1 week.    Specialty:  Cardiology  Why:  Hospital f/u for syncope, review event monitor results in 4 - 6 weeks   Contact information:  [address]  [address]  [facility] [id number] [phone]                 Patient Instructions:   No discharge procedures on file.    Significant Diagnostic Studies:   Results for  DOUG CHAU (MRN [id number]) as of 12/28/2017 19:06   Ref. Range 12/27/2017 15:28   WBC Latest Ref Range: 3.90 - 12.70 K/uL 6.00   RBC Latest Ref Range: 4.00 - 5.40 M/uL 4.27   Hemoglobin Latest Ref Range: 12.0 - 16.0 g/dL 13.2   Hematocrit Latest Ref Range: 37.0 - 48.5 % 39.0   MCV Latest Ref Range: 82 - 98 fL 91   MCH Latest Ref Range: 27.0 - 31.0 pg 30.9   MCHC Latest Ref Range: 32.0 - 36.0 g/dL 33.9   RDW Latest Ref Range: 11.5 - 14.5 % 14.1   Platelets Latest Ref Range: 150 - 350 K/uL 199     Results for DOUG CHAU (MRN [id number]) as of 12/28/2017 19:06   Ref. Range 12/28/2017 06:31 12/28/2017 18:16   Sodium Latest Ref Range: 136 - 145 mmol/L 139 137   Potassium Latest Ref Range: 3.5 - 5.1 mmol/L 2.9 (L) 3.6   Chloride Latest Ref Range: 95 - 110 mmol/L 96 97   CO2 Latest Ref Range: 23 - 29 mmol/L 31 (H) 31 (H)   Anion Gap Latest Ref Range: 8 - 16 mmol/L 12 9   BUN, Bld Latest Ref Range: 6 - 20 mg/dL 13 12   Creatinine Latest Ref Range: 0.5 - 1.4 mg/dL 0.9 0.8   eGFR if non African American Latest Ref Range: >60 mL/min/1.73 m^2 >60 >60   eGFR if  Latest Ref Range: >60 mL/min/1.73 m^2 >60 >60   Glucose Latest Ref Range: 70 - 110 mg/dL 104 197 (H)   Calcium Latest Ref Range: 8.7 - 10.5 mg/dL 8.9 9.4   Phosphorus Latest Ref Range: 2.7 - 4.5 mg/dL 2.2 (L) 1.5 (L)   Magnesium Latest Ref Range: 1.6 - 2.6 mg/dL 1.7 2.1   Albumin Latest Ref Range: 3.5 - 5.2 g/dL  3.3 (L)   Results for DOUG CHAU (MRN [id number]) as of 12/28/2017 19:06   Ref. Range 12/27/2017 17:18 12/28/2017 00:27 12/28/2017 06:31   Troponin I Latest Ref Range: 0.000 - 0.026 ng/mL 0.008 0.006 0.010     Results for DOUG CHAU (MRN [id number]) as of 12/28/2017 19:06   Ref. Range 12/27/2017 15:28   Hemoglobin A1C Latest Ref Range: 4.0 - 5.6 % 6.0 (H)   Estimated Avg Glucose Latest Ref Range: 68 - 131 mg/dL 126   Results for DOUG CHAU (MRN [id number]) as of 12/28/2017 19:06   Ref. Range  12/28/2017 12:47   Specimen UA Unknown Urine, Clean Catch   Color, UA Latest Ref Range: Yellow, Straw, [place]  Yellow   pH, UA Latest Ref Range: 5.0 - 8.0  6.0   Specific Gravity, UA Latest Ref Range: 1.005 - 1.030  1.015   Appearance, UA Latest Ref Range: Clear  Clear   Protein, UA Latest Ref Range: Negative  Negative   Glucose, UA Latest Ref Range: Negative  Negative   Ketones, UA Latest Ref Range: Negative  Trace (A)   Occult Blood UA Latest Ref Range: Negative  Negative   Nitrite, UA Latest Ref Range: Negative  Negative   Urobilinogen, UA Latest Ref Range: <2.0 EU/dL Negative   Bilirubin (UA) Latest Ref Range: Negative  Negative   Leukocytes, UA Latest Ref Range: Negative  Negative     CXR-  Impression       1.  No acute radiographic findings in the chest.         Pending Diagnostic Studies:      echocardiogram          Medications:  Reconciled Home Medications:   Current Discharge Medication List      START taking these medications    Details   potassium chloride SA (K-DUR,KLOR-CON) 20 MEQ tablet Take 2 tablets (40 mEq total) by mouth once daily. When you take a lasix pill  Qty: 30 tablet, Refills: 0         CONTINUE these medications which have NOT CHANGED    Details   ACZONE 5 % topical gel Apply topically 2 (two) times daily as needed.   Refills: 0      amoxicillin-clavulanate 875-125mg (AUGMENTIN) 875-125 mg per tablet Take 1 tablet by mouth every 12 (twelve) hours.  Qty: 20 tablet, Refills: 0    Associated Diagnoses: Acute non-recurrent maxillary sinusitis      BENEFIBER, GUAR GUM, ORAL Take 1 Dose by mouth 2 (two) times daily.       benzonatate (TESSALON) 100 MG capsule 1-2 tid prn cough  Qty: 60 capsule, Refills: 1    Associated Diagnoses: Acute non-recurrent maxillary sinusitis      biotin 300 mcg Tab Take 1 tablet by mouth once daily.      calcium citrate-vitamin D3 315-200 mg (CITRACAL+D) 315-200 mg-unit per tablet Take 1 tablet by mouth 2 (two) times daily.      CRESTOR 40 mg Tab Take 40 mg by mouth  once daily.       cyanocobalamin, vitamin B-12, 2,500 mcg Lozg Place 1 lozenge under the tongue once daily.      ESTRACE 0.01 % (0.1 mg/gram) vaginal cream Place vaginally twice a week. Tuesday, Saturday  Refills: 0      estradiol 10 mcg Tab Take 1 tablet by mouth twice a week. Tuesday, Saturday      ezetimibe (ZETIA) 10 mg tablet Take 10 mg by mouth once daily.      fluticasone (FLONASE) 50 mcg/actuation nasal spray 1 spray by Each Nare route once daily.  Qty: 16 g, Refills: 0    Associated Diagnoses: URI, acute      HUMALOG 100 unit/mL injection Inject into the skin. 10-17 units tid sliding scale  Refills: 1      INSULIN GLARGINE,HUM.REC.ANLOG (TOUJEO SOLOSTAR SUBQ) Inject 46 Units into the skin every morning.       metoprolol succinate (TOPROL-XL) 25 MG 24 hr tablet take 1 tablet by mouth once daily  Qty: 30 tablet, Refills: 11      pantoprazole (PROTONIX) 40 MG tablet Take 1 tablet (40 mg total) by mouth 2 (two) times daily.  Qty: 180 tablet, Refills: 3      predniSONE (DELTASONE) 20 MG tablet Take 2 tablets (40 mg total) by mouth once daily.  Qty: 6 tablet, Refills: 0    Associated Diagnoses: Acute non-recurrent maxillary sinusitis      SYNTHROID 88 mcg tablet Take 88 mcg by mouth every evening.   Refills: 0      traMADol (ULTRAM-ER) 100 MG Tb24 Take 100 mg by mouth daily as needed (pain).       vitamin D 1000 units Tab Take 1,000 mg by mouth once daily.       doxycycline (VIBRAMYCIN) 50 MG capsule Take 1 capsule by mouth 2 (two) times daily as needed (roseacea).   Refills: 0      GLUCAGON EMERGENCY 1 mg injection 1 mg as needed.       ondansetron (ZOFRAN-ODT) 4 MG TbDL Take 1 tablet (4 mg total) by mouth every 8 (eight) hours as needed.  Qty: 12 tablet, Refills: 0         STOP taking these medications       furosemide (LASIX) 20 MG tablet Comments:   Reason for Stopping:         spironolactone (ALDACTONE) 50 MG tablet Comments:   Reason for Stopping:               Indwelling Lines/Drains at time of discharge:    Lines/Drains/Airways          No matching active lines, drains, or airways          Time spent on the discharge of patient: 34 minutes  Patient was seen and examined on the date of discharge and determined to be suitable for discharge.  Discussed with pt, , rn, cardiology NP today        Rosemary Murray MD  Department of Hospital Medicine  [facility]

## 2017-12-29 NOTE — TELEPHONE ENCOUNTER
Called and spoke with Ms. Kateryna, Advised her per her discharge paperwork that she needs a 4-6 week hosp f/u to review her 30 day event monitor results. She verbalized understanding. No further issues noted.

## 2017-12-29 NOTE — NURSING
Discharge instructions reviewed with pt and , understanding verbalized. All questions answered. IV DC/ed, catheter intact. Tele box returned.

## 2018-01-02 ENCOUNTER — TELEPHONE (OUTPATIENT)
Dept: FAMILY MEDICINE | Facility: CLINIC | Age: 63
End: 2018-01-02

## 2018-01-02 NOTE — TELEPHONE ENCOUNTER
----- Message from Sierra Snider sent at 1/2/2018 12:32 PM CST -----  Contact: self   Patient was seen on 12/26 and states she is still on medication and is not getting better   Please call to advise on what she needs to do   Please call back [phone]

## 2018-01-03 ENCOUNTER — TELEPHONE (OUTPATIENT)
Dept: FAMILY MEDICINE | Facility: CLINIC | Age: 63
End: 2018-01-03

## 2018-01-03 ENCOUNTER — DOCUMENTATION ONLY (OUTPATIENT)
Dept: FAMILY MEDICINE | Facility: CLINIC | Age: 63
End: 2018-01-03

## 2018-01-03 NOTE — TELEPHONE ENCOUNTER
Contacted by phone room via messenger. Message states patient doesn't want ER f/u. Called patient who would like to be seen for sinus infection. Patient was seen 12/26/17 for sinusitis and prescribed antibiotics. Educated patient she needs to complete course of antibiotics prescribed and follow Rx directions. Informed patient no available appointments today. Advised patient to proceed to Urgent Care. Patient states she will no longer use [facility].

## 2018-01-03 NOTE — PROGRESS NOTES
Pre-Visit Chart Review  For Appointment Scheduled on 1-4-18    Health Maintenance Due   Topic Date Due    Colonoscopy  08/13/2017

## 2018-01-03 NOTE — TELEPHONE ENCOUNTER
----- Message from Simran Ardon sent at 1/3/2018  9:12 AM CST -----  Contact: Self  [phone]  Patient called asking if the provider will see the patient for sinus infection.  Thanks!

## 2018-01-03 NOTE — TELEPHONE ENCOUNTER
----- Message from Edis Martinez sent at 1/3/2018 11:11 AM CST -----  Contact: same  Patient called in and wanted to let Yesenia know that she has transportation and will be at her appt tomorrow Thursday 1/4/18 for Dr. Mendoza at 2pm.

## 2018-01-03 NOTE — TELEPHONE ENCOUNTER
Advised patient to continue taking her antibiotics and if not better to contact her pcp.  Patient verbalized understanding.

## 2018-01-04 ENCOUNTER — LAB VISIT (OUTPATIENT)
Dept: LAB | Facility: HOSPITAL | Age: 63
End: 2018-01-04
Attending: FAMILY MEDICINE
Payer: COMMERCIAL

## 2018-01-04 ENCOUNTER — OFFICE VISIT (OUTPATIENT)
Dept: FAMILY MEDICINE | Facility: CLINIC | Age: 63
End: 2018-01-04
Attending: FAMILY MEDICINE
Payer: COMMERCIAL

## 2018-01-04 VITALS
DIASTOLIC BLOOD PRESSURE: 56 MMHG | OXYGEN SATURATION: 98 % | RESPIRATION RATE: 16 BRPM | TEMPERATURE: 99 F | WEIGHT: 170.19 LBS | BODY MASS INDEX: 27.35 KG/M2 | HEIGHT: 66 IN | HEART RATE: 97 BPM | SYSTOLIC BLOOD PRESSURE: 114 MMHG

## 2018-01-04 DIAGNOSIS — R94.31 LONG QT INTERVAL: ICD-10-CM

## 2018-01-04 DIAGNOSIS — E87.6 HYPOKALEMIA: ICD-10-CM

## 2018-01-04 DIAGNOSIS — E83.42 HYPOMAGNESEMIA: ICD-10-CM

## 2018-01-04 DIAGNOSIS — S00.83XA CONTUSION OF FACE, INITIAL ENCOUNTER: ICD-10-CM

## 2018-01-04 DIAGNOSIS — R07.89 RIGHT-SIDED CHEST WALL PAIN: ICD-10-CM

## 2018-01-04 DIAGNOSIS — R55 SYNCOPE, UNSPECIFIED SYNCOPE TYPE: Primary | ICD-10-CM

## 2018-01-04 LAB
ANION GAP SERPL CALC-SCNC: 8 MMOL/L
BUN SERPL-MCNC: 10 MG/DL
CALCIUM SERPL-MCNC: 9.6 MG/DL
CHLORIDE SERPL-SCNC: 102 MMOL/L
CO2 SERPL-SCNC: 28 MMOL/L
CREAT SERPL-MCNC: 1 MG/DL
DIASTOLIC DYSFUNCTION: NO
EST. GFR  (AFRICAN AMERICAN): >60 ML/MIN/1.73 M^2
EST. GFR  (NON AFRICAN AMERICAN): >60 ML/MIN/1.73 M^2
ESTIMATED PA SYSTOLIC PRESSURE: 24.9
GLUCOSE SERPL-MCNC: 183 MG/DL
MAGNESIUM SERPL-MCNC: 2.2 MG/DL
POTASSIUM SERPL-SCNC: 4.9 MMOL/L
RETIRED EF AND QEF - SEE NOTES: 65 (ref 55–65)
SODIUM SERPL-SCNC: 138 MMOL/L
TRICUSPID VALVE REGURGITATION: NORMAL

## 2018-01-04 PROCEDURE — 80048 BASIC METABOLIC PNL TOTAL CA: CPT

## 2018-01-04 PROCEDURE — 83735 ASSAY OF MAGNESIUM: CPT

## 2018-01-04 PROCEDURE — 36415 COLL VENOUS BLD VENIPUNCTURE: CPT | Mod: PO

## 2018-01-04 PROCEDURE — 99214 OFFICE O/P EST MOD 30 MIN: CPT | Mod: S$GLB,,, | Performed by: FAMILY MEDICINE

## 2018-01-04 PROCEDURE — 99999 PR PBB SHADOW E&M-EST. PATIENT-LVL III: CPT | Mod: PBBFAC,,, | Performed by: FAMILY MEDICINE

## 2018-01-04 RX ORDER — ACETAMINOPHEN 500 MG
1 TABLET ORAL DAILY
COMMUNITY
End: 2022-11-29

## 2018-01-04 RX ORDER — PERPHENAZINE 16 MG
2 TABLET ORAL DAILY
COMMUNITY

## 2018-01-04 RX ORDER — HYDROCODONE BITARTRATE AND ACETAMINOPHEN 5; 325 MG/1; MG/1
1 TABLET ORAL
Refills: 0 | COMMUNITY
Start: 2017-12-29 | End: 2018-07-12

## 2018-01-04 RX ORDER — BLOOD SUGAR DIAGNOSTIC
STRIP MISCELLANEOUS
Refills: 0 | COMMUNITY
Start: 2017-12-29 | End: 2019-02-18 | Stop reason: CLARIF

## 2018-01-04 RX ORDER — PHENYLEPHRINE HCL 10 MG
500 TABLET ORAL 2 TIMES DAILY
COMMUNITY

## 2018-01-04 NOTE — PROGRESS NOTES
Subjective:       Patient ID: Kateryna Desai is a 62 y.o. female.    Chief Complaint: Hospital Follow Up    62-year-old female coming in to follow-up from a short hospital stay from December 27-28.  The patient had gone to the bathroom following which she had a syncopal episode.  She awakened in an adjoining room sitting in a chair with her mouth and nose bloody, upper lip blood on the bathroom floor and facial and right chest pain and bruising.  She went to the emergency room at [facility] where she was found to have orthostatic hypotension low potassium and low magnesium levels.  The QTC on the EKG was lengthened.  Workup in the hospital was otherwise negative, she was given IV electrolyte replacement and kept on a monitor with no report of any arrhythmias found.  Postdischarge she was placed on an event monitor which she is wearing at this time.  An echocardiogram showed no abnormalities other than some right ventricular enlargement.  She has contusions over the right chest centered about the insertion of the serratus anterior muscle but no shortness of breath or any serious difficulty taking a breath.  She has some contusions of the left forehead and the left lateral malar area.  She had some chips on the front teeth but no jaw pain or chin injury.  It appears as if she had flushed the toilet then blacked out probably catching her right arm on the sink to her right side with a bruise over the lateral right arm and injuries to the chest wall on that side and fell onto the floor striking the left side of the face and nose and injuring her teeth.  Her  found bloody hand prints on the wall leading from the bathroom to the room where she awakened.  At this point she is nearly pain-free with the right sided chest wall pain her most uncomfortable complaint followed by some bruising to the knees.    Postdischarge she was taken off of diuretics and placed on potassium 20 mEq 2 daily for a 10 day  course.  Her electrolytes on the day of discharge had returned to normal.      Past Medical History:  No date: Allergy  No date: Arthritis      Comment: degnerative disease low back,muscle spasms,                shoulders  2017: Asian flu type A  No date: Diabetes mellitus type I      Comment: since age 13  No date: Diabetic gastroparesis      Comment: takes Cytotec  No date: Diabetic retinopathy of both eyes      Comment: gets periodic laser treatments  No date: Difficult intubation      Comment: as a child had sore throat after a procedure  No date: Encounter for blood transfusion  No date: GERD (gastroesophageal reflux disease)  No date: Headache(784.0)  No date: Hiatal hernia      Comment: with GERD  No date: Hyperlipidemia  No date: Hypertension  2012: Infection of the upper respiratory tract  No date: Lumbar spinal stenosis  No date: Osteopenia  No date: Rosacea  No date: Seizures      Comment: Pt states during pgy with stroke  ?: Stroke      Comment: while pregnant  No date: Tachycardia      Comment: asymptomatic with Toprol  No date: Thyroid disease      Comment: hypothyroidism  No date: Venous insufficiency of leg      Comment: improved since EVLT    Past Surgical History:  2012: BUNIONECTOMY Right  : CARDIAC CATHETERIZATION      Comment: no stents  No date:  SECTION  2007: COLONOSCOPY      Comment: Dr. Rose, 10 year recheck  12: EXOSTECTOMY      Comment: left foot, local anesthesia, in office  No date: EYE SURGERY      Comment: has had many laser procedures for diabetic                retinopathy  No date: VASCULAR SURGERY  2012: VEIN SURGERY      Comment: EVLT right greater saphenous, IV sedation      Current Outpatient Prescriptions:     ACZONE 5 % topical gel, Apply topically 2 (two) times daily as needed. , Disp: , Rfl: 0    alpha lipoic acid 600 mg Cap, Take 1 capsule by mouth once daily., Disp: , Rfl:     amoxicillin-clavulanate 875-125mg (AUGMENTIN)  875-125 mg per tablet, Take 1 tablet by mouth every 12 (twelve) hours., Disp: 20 tablet, Rfl: 0    ascorbic acid, vitamin C, (VITAMIN C) 100 MG tablet, Take 100 mg by mouth 2 (two) times daily., Disp: , Rfl:     BENEFIBER, GUAR GUM, ORAL, Take 1 Dose by mouth 2 (two) times daily as needed. , Disp: , Rfl:     benzonatate (TESSALON) 100 MG capsule, 1-2 tid prn cough, Disp: 60 capsule, Rfl: 1    calcium citrate-vitamin D3 315-200 mg (CITRACAL+D) 315-200 mg-unit per tablet, Take 1 tablet by mouth 2 (two) times daily., Disp: , Rfl:     cholecalciferol, vitamin D3, (VITAMIN D3) 2,000 unit Cap, Take 1 capsule by mouth once daily., Disp: , Rfl:     cinnamon bark 500 mg capsule, Take 500 mg by mouth 2 (two) times daily., Disp: , Rfl:     CRESTOR 40 mg Tab, Take 40 mg by mouth once daily. , Disp: , Rfl:     DOCOSAHEXANOIC ACID/EPA (FISH OIL ORAL), Take 1,200 mg by mouth 2 (two) times daily., Disp: , Rfl:     doxycycline (VIBRAMYCIN) 50 MG capsule, Take 1 capsule by mouth 2 (two) times daily as needed (roseacea). , Disp: , Rfl: 0    ESTRACE 0.01 % (0.1 mg/gram) vaginal cream, Place vaginally twice a week. Tuesday, Saturday, Disp: , Rfl: 0    estradiol 10 mcg Tab, Take 1 tablet by mouth twice a week. Tuesday, Saturday, Disp: , Rfl:     ezetimibe (ZETIA) 10 mg tablet, Take 10 mg by mouth once daily., Disp: , Rfl:     fluticasone (FLONASE) 50 mcg/actuation nasal spray, 1 spray by Each Nare route once daily. (Patient taking differently: 1 spray by Each Nare route daily as needed for Rhinitis. ), Disp: 16 g, Rfl: 0    GLUCAGON EMERGENCY 1 mg injection, 1 mg as needed. , Disp: , Rfl:     HUMALOG 100 unit/mL injection, Inject into the skin. 10-17 units tid sliding scale, Disp: , Rfl: 1    hydrocodone-acetaminophen 5-325mg (NORCO) 5-325 mg per tablet, Take 1 tablet by mouth every 4 to 6 hours as needed. DDS Dr Ramon, Disp: , Rfl: 0    INSULIN GLARGINE,HUM.REC.ANLOG (TOUJEO SOLOSTAR SUBQ), Inject 46 Units into the  skin every morning. , Disp: , Rfl:     L.ACID/L.CASEI/B.BIF/B.CEDRIC/FOS (PROBIOTIC BLEND ORAL), Take 1 capsule by mouth every Mon, Wed, Fri., Disp: , Rfl:     metoprolol succinate (TOPROL-XL) 25 MG 24 hr tablet, take 1 tablet by mouth once daily, Disp: 30 tablet, Rfl: 11    ondansetron (ZOFRAN-ODT) 4 MG TbDL, Take 1 tablet (4 mg total) by mouth every 8 (eight) hours as needed., Disp: 12 tablet, Rfl: 0    ONETOUCH ULTRA TEST Strp, , Disp: , Rfl: 0    pantoprazole (PROTONIX) 40 MG tablet, Take 1 tablet (40 mg total) by mouth 2 (two) times daily., Disp: 180 tablet, Rfl: 3    potassium chloride SA (K-DUR,KLOR-CON) 20 MEQ tablet, Take 2 tablets (40 mEq total) by mouth once daily. When you take a lasix pill, Disp: 30 tablet, Rfl: 0    SYNTHROID 88 mcg tablet, Take 88 mcg by mouth every evening. , Disp: , Rfl: 0    VITAMIN B COMPLEX VIT C NO.4 (SUPER B COMPLEX + C ORAL), Take 1 tablet by mouth once daily., Disp: , Rfl:           Review of Systems   Respiratory: Negative for chest tightness and shortness of breath.    Cardiovascular: Positive for chest pain. Negative for palpitations.       Objective:      Physical Exam   Constitutional: She is oriented to person, place, and time. She appears well-developed. No distress.   Good blood pressure control  Mildly overweight with BMI 27.5 she is up 2.2 pounds from her previous visit with me December 26   HENT:   Head: Normocephalic and atraumatic.   Right Ear: Tympanic membrane is injected and bulging. Tympanic membrane mobility is abnormal. A middle ear effusion is present.   Left Ear: Tympanic membrane is bulging. Tympanic membrane is not injected and not erythematous. Tympanic membrane mobility is abnormal.  No middle ear effusion.   Nose: Mucosal edema present. No rhinorrhea. Right sinus exhibits no maxillary sinus tenderness and no frontal sinus tenderness. Left sinus exhibits maxillary sinus tenderness (Tender over the malar ridge, not felt to be sinus related). Left  sinus exhibits no frontal sinus tenderness.   Cardiovascular: Normal rate, regular rhythm and normal heart sounds.  Exam reveals no gallop and no friction rub.    No murmur heard.  Pulmonary/Chest: Effort normal and breath sounds normal. No respiratory distress. She has no wheezes. She has no rales. She exhibits tenderness (Tender over insertion of right serratus anterior).   Neurological: She is alert and oriented to person, place, and time.   Skin: She is not diaphoretic.        Psychiatric: She has a normal mood and affect. Her behavior is normal. Thought content normal.   Nursing note and vitals reviewed.      Assessment:       1. Syncope, unspecified syncope type    2. Long QT interval    3. Hypokalemia    4. Hypomagnesemia    5. Contusion of face, initial encounter    6. Right-sided chest wall pain        Plan:       1. Syncope, unspecified syncope type  Resolved, probably secondary to combination electrolyte disturbances with possible cardiac dysrhythmia and vasovagal effect    2. Long QT interval  Most likely secondary to transient electrolyte disturbances.  EKGs in 2015 and 2016 did not demonstrate this    3. Hypokalemia  - Basic metabolic panel; Future    4. Hypomagnesemia  - Magnesium; Future    5. Contusion of face, initial encounter    6. Right-sided chest wall pain

## 2018-01-05 ENCOUNTER — OFFICE VISIT (OUTPATIENT)
Dept: PODIATRY | Facility: CLINIC | Age: 63
End: 2018-01-05
Payer: COMMERCIAL

## 2018-01-05 VITALS — BODY MASS INDEX: 27.35 KG/M2 | HEIGHT: 66 IN | WEIGHT: 170.19 LBS

## 2018-01-05 DIAGNOSIS — B35.1 ONYCHOMYCOSIS DUE TO DERMATOPHYTE: ICD-10-CM

## 2018-01-05 DIAGNOSIS — M20.42 HAMMER TOES OF BOTH FEET: ICD-10-CM

## 2018-01-05 DIAGNOSIS — M21.6X9 EQUINUS DEFORMITY OF FOOT: ICD-10-CM

## 2018-01-05 DIAGNOSIS — R20.2 PARESTHESIA OF FOOT, BILATERAL: ICD-10-CM

## 2018-01-05 DIAGNOSIS — E10.42 CONTROLLED TYPE 1 DIABETES MELLITUS WITH DIABETIC POLYNEUROPATHY: Primary | ICD-10-CM

## 2018-01-05 DIAGNOSIS — M20.41 HAMMER TOES OF BOTH FEET: ICD-10-CM

## 2018-01-05 DIAGNOSIS — M72.2 PLANTAR FASCIITIS: ICD-10-CM

## 2018-01-05 PROCEDURE — 11721 DEBRIDE NAIL 6 OR MORE: CPT | Mod: Q9,S$GLB,, | Performed by: PODIATRIST

## 2018-01-05 PROCEDURE — 99999 PR PBB SHADOW E&M-EST. PATIENT-LVL III: CPT | Mod: PBBFAC,,, | Performed by: PODIATRIST

## 2018-01-05 PROCEDURE — 99213 OFFICE O/P EST LOW 20 MIN: CPT | Mod: 25,S$GLB,, | Performed by: PODIATRIST

## 2018-01-05 RX ORDER — TRAMADOL HYDROCHLORIDE 100 MG/1
100 TABLET, EXTENDED RELEASE ORAL DAILY
Qty: 20 TABLET | Refills: 1 | Status: SHIPPED | OUTPATIENT
Start: 2018-01-05 | End: 2018-02-14

## 2018-01-06 NOTE — PROGRESS NOTES
Subjective:      Patient ID: Kateryna Desai is a 62 y.o. female.    Chief Complaint: Diabetic Foot Exam (GIOVANA Mendoza 12/26/17   A1C 12/27/17  6.0); Nail Care; and Heel Pain (Left )    Kateryna is a 62 y.o. female who presents to the clinic for routine evaluation and treatment of diabetic feet. Kateryna has a past medical history of Allergy; Arthritis; Asian flu type A (12/22/2017); Diabetes mellitus type I; Diabetic gastroparesis; Diabetic retinopathy of both eyes; Difficult intubation; Encounter for blood transfusion; GERD (gastroesophageal reflux disease); Headache(784.0); Hiatal hernia; Hyperlipidemia; Hypertension; Infection of the upper respiratory tract (June 2012); Lumbar spinal stenosis; Osteopenia; Rosacea; Seizures; Stroke (1985?); Tachycardia; Thyroid disease; and Venous insufficiency of leg. Patient continues toe experience Lt. Sided heel pain.  Describes pain as sharp and rates as a 2/10.  States symptoms are exacerbated with weight bearing after periods of rest and alleviated with rest.  Has been stretching the calf muscles daily and continues to wear supportive shoe gear.  Denies trauma to the affected heel.  She also continues to struggle with neuropathy pain, however, finds that symptoms are well managed with occasional use of tramadol.  States that on days when symptoms are at their worst, she takes one tramadol to help her obtain restful sleep.  Denies any additional pedal complaints.      PCP: Endy Mendoza MD    Date Last Seen by PCP: 12/26/17      Hemoglobin A1C   Date Value Ref Range Status   12/27/2017 6.0 (H) 4.0 - 5.6 % Final     Comment:     According to ADA guidelines, hemoglobin A1c <7.0% represents  optimal control in non-pregnant diabetic patients. Different  metrics may apply to specific patient populations.   Standards of Medical Care in Diabetes-2016.  For the purpose of screening for the presence of diabetes:  <5.7%     Consistent with the absence of diabetes  5.7-6.4%   Consistent with increasing risk for diabetes   (prediabetes)  >or=6.5%  Consistent with diabetes  Currently, no consensus exists for use of hemoglobin A1c  for diagnosis of diabetes for children.  This Hemoglobin A1c assay has significant interference with fetal   hemoglobin   (HbF). The results are invalid for patients with abnormal amounts of   HbF,   including those with known Hereditary Persistence   of Fetal Hemoglobin. Heterozygous hemoglobin variants (HbAS, HbAC,   HbAD, HbAE, HbA2) do not significantly interfere with this assay;   however, presence of multiple variants in a sample may impact the %   interference.     12/27/2017 6.0 (H) 4.0 - 5.6 % Final     Comment:     According to ADA guidelines, hemoglobin A1c <7.0% represents  optimal control in non-pregnant diabetic patients. Different  metrics may apply to specific patient populations.   Standards of Medical Care in Diabetes-2016.  For the purpose of screening for the presence of diabetes:  <5.7%     Consistent with the absence of diabetes  5.7-6.4%  Consistent with increasing risk for diabetes   (prediabetes)  >or=6.5%  Consistent with diabetes  Currently, no consensus exists for use of hemoglobin A1c  for diagnosis of diabetes for children.  This Hemoglobin A1c assay has significant interference with fetal   hemoglobin   (HbF). The results are invalid for patients with abnormal amounts of   HbF,   including those with known Hereditary Persistence   of Fetal Hemoglobin. Heterozygous hemoglobin variants (HbAS, HbAC,   HbAD, HbAE, HbA2) do not significantly interfere with this assay;   however, presence of multiple variants in a sample may impact the %   interference.     12/27/2017 6.0 (H) 4.0 - 5.6 % Final     Comment:     According to ADA guidelines, hemoglobin A1c <7.0% represents  optimal control in non-pregnant diabetic patients. Different  metrics may apply to specific patient populations.   Standards of Medical Care in Diabetes-2016.  For the  purpose of screening for the presence of diabetes:  <5.7%     Consistent with the absence of diabetes  5.7-6.4%  Consistent with increasing risk for diabetes   (prediabetes)  >or=6.5%  Consistent with diabetes  Currently, no consensus exists for use of hemoglobin A1c  for diagnosis of diabetes for children.  This Hemoglobin A1c assay has significant interference with fetal   hemoglobin   (HbF). The results are invalid for patients with abnormal amounts of   HbF,   including those with known Hereditary Persistence   of Fetal Hemoglobin. Heterozygous hemoglobin variants (HbAS, HbAC,   HbAD, HbAE, HbA2) do not significantly interfere with this assay;   however, presence of multiple variants in a sample may impact the %   interference.             Past Medical History:   Diagnosis Date    Allergy     Arthritis     degnerative disease low back,muscle spasms, shoulders    Asian flu type A 2017    Diabetes mellitus type I     since age 13    Diabetic gastroparesis     takes Cytotec    Diabetic retinopathy of both eyes     gets periodic laser treatments    Difficult intubation     as a child had sore throat after a procedure    Encounter for blood transfusion     GERD (gastroesophageal reflux disease)     Headache(784.0)     Hiatal hernia     with GERD    Hyperlipidemia     Hypertension     Infection of the upper respiratory tract 2012    Lumbar spinal stenosis     Osteopenia     Rosacea     Seizures     Pt states during pgy with stroke    Stroke ?    while pregnant    Tachycardia     asymptomatic with Toprol    Thyroid disease     hypothyroidism    Venous insufficiency of leg     improved since EVLT       Past Surgical History:   Procedure Laterality Date    BUNIONECTOMY Right 2012    CARDIAC CATHETERIZATION      no stents     SECTION      COLONOSCOPY  2007    Dr. Rose, 10 year recheck    EXOSTECTOMY  12    left foot, local anesthesia, in office    EYE  SURGERY      has had many laser procedures for diabetic retinopathy    VASCULAR SURGERY      VEIN SURGERY  April 2012    EVLT right greater saphenous, IV sedation       Family History   Problem Relation Age of Onset    Cancer Maternal Aunt      breast    Breast cancer Maternal Aunt     Diabetes Maternal Grandmother     Breast cancer Maternal Grandmother     Cancer Maternal Grandfather      prostate    Diabetes Maternal Grandfather     Diabetes Cousin     Arthritis Mother     Cancer Mother     Melanoma Mother     Heart disease Father     Stroke Father     No Known Problems Sister     No Known Problems Brother     No Known Problems Daughter     Alzheimer's disease Maternal Uncle     Alcohol abuse Maternal Uncle      x2    Heart disease Maternal Uncle     No Known Problems Paternal Aunt     Heart disease Paternal Uncle        Social History     Social History    Marital status:      Spouse name: N/A    Number of children: N/A    Years of education: N/A     Social History Main Topics    Smoking status: Never Smoker    Smokeless tobacco: Never Used    Alcohol use Yes      Comment: social, 1 drink monthly    Drug use: No    Sexual activity: Yes     Partners: Male     Other Topics Concern    Not on file     Social History Narrative    No narrative on file       Current Outpatient Prescriptions   Medication Sig Dispense Refill    ACZONE 5 % topical gel Apply topically 2 (two) times daily as needed.   0    alpha lipoic acid 600 mg Cap Take 1 capsule by mouth once daily.      amoxicillin-clavulanate 875-125mg (AUGMENTIN) 875-125 mg per tablet Take 1 tablet by mouth every 12 (twelve) hours. 20 tablet 0    ascorbic acid, vitamin C, (VITAMIN C) 100 MG tablet Take 100 mg by mouth 2 (two) times daily.      BENEFIBER, GUAR GUM, ORAL Take 1 Dose by mouth 2 (two) times daily as needed.       benzonatate (TESSALON) 100 MG capsule 1-2 tid prn cough 60 capsule 1    calcium citrate-vitamin D3  315-200 mg (CITRACAL+D) 315-200 mg-unit per tablet Take 1 tablet by mouth 2 (two) times daily.      cholecalciferol, vitamin D3, (VITAMIN D3) 2,000 unit Cap Take 1 capsule by mouth once daily.      cinnamon bark 500 mg capsule Take 500 mg by mouth 2 (two) times daily.      CRESTOR 40 mg Tab Take 40 mg by mouth once daily.       DOCOSAHEXANOIC ACID/EPA (FISH OIL ORAL) Take 1,200 mg by mouth 2 (two) times daily.      doxycycline (VIBRAMYCIN) 50 MG capsule Take 1 capsule by mouth 2 (two) times daily as needed (roseacea).   0    ESTRACE 0.01 % (0.1 mg/gram) vaginal cream Place vaginally twice a week. Tuesday, Saturday  0    estradiol 10 mcg Tab Take 1 tablet by mouth twice a week. Tuesday, Saturday      ezetimibe (ZETIA) 10 mg tablet Take 10 mg by mouth once daily.      fluticasone (FLONASE) 50 mcg/actuation nasal spray 1 spray by Each Nare route once daily. (Patient taking differently: 1 spray by Each Nare route daily as needed for Rhinitis. ) 16 g 0    GLUCAGON EMERGENCY 1 mg injection 1 mg as needed.       HUMALOG 100 unit/mL injection Inject into the skin. 10-17 units tid sliding scale  1    hydrocodone-acetaminophen 5-325mg (NORCO) 5-325 mg per tablet Take 1 tablet by mouth every 4 to 6 hours as needed. DDS Dr Ramon  0    INSULIN GLARGINE,HUM.REC.ANLOG (TOUJEO SOLOSTAR SUBQ) Inject 46 Units into the skin every morning.       L.ACID/L.CASEI/B.BIF/B.CEDRIC/FOS (PROBIOTIC BLEND ORAL) Take 1 capsule by mouth every Mon, Wed, Fri.      metoprolol succinate (TOPROL-XL) 25 MG 24 hr tablet take 1 tablet by mouth once daily 30 tablet 11    ondansetron (ZOFRAN-ODT) 4 MG TbDL Take 1 tablet (4 mg total) by mouth every 8 (eight) hours as needed. 12 tablet 0    ONETOUCH ULTRA TEST Strp   0    pantoprazole (PROTONIX) 40 MG tablet Take 1 tablet (40 mg total) by mouth 2 (two) times daily. 180 tablet 3    potassium chloride SA (K-DUR,KLOR-CON) 20 MEQ tablet Take 2 tablets (40 mEq total) by mouth once daily. When  you take a lasix pill 30 tablet 0    SYNTHROID 88 mcg tablet Take 88 mcg by mouth every evening.   0    VITAMIN B COMPLEX VIT C NO.4 (SUPER B COMPLEX + C ORAL) Take 1 tablet by mouth once daily.      traMADol (ULTRAM-ER) 100 MG Tb24 Take 1 tablet (100 mg total) by mouth once daily. 20 tablet 1     No current facility-administered medications for this visit.        Review of patient's allergies indicates:   Allergen Reactions    Bactrim [sulfamethoxazole-trimethoprim] Rash     Patient experienced on 12/3/14 redness to face and rash to chest and arms/ with no SOB or airway obstruction    Percocet [oxycodone-acetaminophen] Nausea And Vomiting     Also caused dizziness and passed out    Iodinated contrast media - iv dye Swelling and Rash     Says topical iodine OK    Norco [hydrocodone-acetaminophen] Itching, Swelling and Rash     Can tolerate if she takes Benadryl with it    Niaspan extended-release [niacin] Itching and Other (See Comments)     Skin flushing and redness         Review of Systems   Constitution: Negative for chills, decreased appetite, diaphoresis and fever.   Cardiovascular: Negative for claudication and leg swelling.   Skin: Positive for color change and nail changes. Negative for dry skin, flushing and itching.   Musculoskeletal: Positive for myalgias. Negative for arthritis, back pain, falls, gout, joint pain and joint swelling.   Neurological: Positive for numbness and paresthesias.   Psychiatric/Behavioral: Negative for altered mental status.           Objective:      Physical Exam   Constitutional: She is oriented to person, place, and time. She appears well-developed and well-nourished. No distress.   Cardiovascular:   Pulses:       Dorsalis pedis pulses are 2+ on the right side, and 2+ on the left side.        Posterior tibial pulses are 1+ on the right side, and 1+ on the left side.   CFT <3 seconds bilateral.  Pedal hair growth decreased bilateral.  Varicosities noted to bilateral  lower extremity. Mild nonpitting edema noted to bilateral lower extremity.  Toes are cool to touch bilateral.       Musculoskeletal: Normal range of motion. She exhibits edema and tenderness.   Muscle strength 5/5 in all muscle groups bilateral.  No tenderness nor crepitation with active and passive ROM of foot/ankle joints bilateral.  Moderate pain with palpation of Lt. Medial calcaneal tubercle. (-) single heel squeeze test. Bilateral gastrocnemius equinus.  Bilateral pes planus foot type.  Bilateral hallux abducto valgus.  Bilateral semi-rigid contracture of toes 2-5.     Neurological: She is alert and oriented to person, place, and time. She has normal strength. A sensory deficit is present.   Protective sensation per Platter-Abigail monofilament decreased bilateral.    Vibratory sensation intact bilateral.    Light touch intact bilateral.   Skin: Skin is warm, dry and intact. No abrasion, no bruising, no burn, no ecchymosis, no laceration, no lesion, no petechiae and no rash noted. She is not diaphoretic. No cyanosis or erythema. No pallor. Nails show no clubbing.   Pedal skin is thin and atrophic bilateral.  Toenails x 10 appear thickened by 2 mm's, elongated by 2 mm's, and discolored with subungual debris. No open wounds, interdigital maceration noted bilateral.                    Assessment:       Encounter Diagnoses   Name Primary?    Controlled type 1 diabetes mellitus with diabetic polyneuropathy Yes    Onychomycosis due to dermatophyte     Hammer toes of both feet     Paresthesia of foot, bilateral     Plantar fasciitis     Equinus deformity of foot          Plan:       Kateryna was seen today for diabetic foot exam, nail care and heel pain.    Diagnoses and all orders for this visit:    Controlled type 1 diabetes mellitus with diabetic polyneuropathy  -     traMADol (ULTRAM-ER) 100 MG Tb24; Take 1 tablet (100 mg total) by mouth once daily.    Onychomycosis due to dermatophyte    Hammer toes of  both feet    Paresthesia of foot, bilateral  -     traMADol (ULTRAM-ER) 100 MG Tb24; Take 1 tablet (100 mg total) by mouth once daily.    Plantar fasciitis    Equinus deformity of foot      I counseled the patient on her conditions, their implications and medical management.    - Discussed continuance of stretching exercises to address equinus.    - Recommend continued use of supportive shoes and avoidance of barefoot walking, flip flops, and Crocs, as this will exacerbate current symptoms.      - Recommend icing the affected heel a minimum of 20 minutes daily.    - Discussed avoidance of high impact activities such as squatting, stooping, and running as these activities will exacerbate symptoms.      - May consider applying a topical analgesic (biofreeze) to help with pain symptoms.      - Also, discussed wearing a night splint to better address bilateral equinus.    - Shoe inspection. Diabetic Foot Education. Patient reminded of the importance of good nutrition and blood sugar control to help prevent podiatric complications of diabetes. Patient instructed on proper foot hygeine. We discussed wearing proper shoe gear, daily foot inspections, never walking without protective shoe gear, never putting sharp instruments to feet    - With patient's permission, nails were aggressively reduced and debrided x 10 to their soft tissue attachment mechanically and with electric , removing all offending nail and debris.  Patient relates relief following the procedure. She will continue to monitor the areas daily, inspect her feet, wear protective shoe gear when ambulatory, moisturizer to maintain skin integrity and follow in this office in approximately 2-3 months, sooner p.r.n.    Return in about 3 months (around 4/5/2018).    Lei Rhoades DPM

## 2018-01-14 DIAGNOSIS — J01.00 ACUTE NON-RECURRENT MAXILLARY SINUSITIS: ICD-10-CM

## 2018-01-16 RX ORDER — BENZONATATE 100 MG/1
CAPSULE ORAL
Qty: 60 CAPSULE | Refills: 1 | Status: SHIPPED | OUTPATIENT
Start: 2018-01-16 | End: 2018-02-23 | Stop reason: SDUPTHER

## 2018-01-19 ENCOUNTER — TELEPHONE (OUTPATIENT)
Dept: CARDIOLOGY | Facility: CLINIC | Age: 63
End: 2018-01-19

## 2018-01-19 NOTE — TELEPHONE ENCOUNTER
Called and spoke with MsMark Kateryna. She stated that the stickers for her 30 day event monitor have made blisters on her skin. She stated that the company advised her to call us to see if she could send it back. She stated she no longer wanted to wear it. I advised I would let Dr. Lambert know what was going on. She verbalized understanding. No further issues noted.

## 2018-01-19 NOTE — TELEPHONE ENCOUNTER
----- Message from Jay Parra sent at 1/19/2018  8:42 AM CST -----  Contact: patient  Patient called to advise that she took the heart monitor off stated her skin is close to blistering. Due to the sticker on the monitor Requesting a call back at [phone] need to know what to do. Thanks,

## 2018-02-12 RX ORDER — PANTOPRAZOLE SODIUM 40 MG/1
TABLET, DELAYED RELEASE ORAL
Qty: 180 TABLET | Refills: 3 | Status: SHIPPED | OUTPATIENT
Start: 2018-02-12 | End: 2019-02-19 | Stop reason: SDUPTHER

## 2018-02-12 RX ORDER — SPIRONOLACTONE 50 MG/1
TABLET, FILM COATED ORAL
Qty: 60 TABLET | Refills: 1 | OUTPATIENT
Start: 2018-02-12

## 2018-02-21 ENCOUNTER — OFFICE VISIT (OUTPATIENT)
Dept: CARDIOLOGY | Facility: CLINIC | Age: 63
End: 2018-02-21
Payer: COMMERCIAL

## 2018-02-21 VITALS
HEART RATE: 92 BPM | SYSTOLIC BLOOD PRESSURE: 126 MMHG | HEIGHT: 66 IN | OXYGEN SATURATION: 99 % | BODY MASS INDEX: 27.39 KG/M2 | DIASTOLIC BLOOD PRESSURE: 72 MMHG | WEIGHT: 170.44 LBS

## 2018-02-21 DIAGNOSIS — Z91.89 AT RISK FOR CORONARY ARTERY DISEASE: ICD-10-CM

## 2018-02-21 DIAGNOSIS — R55 SYNCOPE, UNSPECIFIED SYNCOPE TYPE: Primary | ICD-10-CM

## 2018-02-21 PROCEDURE — 99215 OFFICE O/P EST HI 40 MIN: CPT | Mod: S$GLB,,, | Performed by: INTERNAL MEDICINE

## 2018-02-21 PROCEDURE — 99999 PR PBB SHADOW E&M-EST. PATIENT-LVL III: CPT | Mod: PBBFAC,,, | Performed by: INTERNAL MEDICINE

## 2018-02-21 PROCEDURE — 3008F BODY MASS INDEX DOCD: CPT | Mod: S$GLB,,, | Performed by: INTERNAL MEDICINE

## 2018-02-21 RX ORDER — FUROSEMIDE 20 MG/1
TABLET ORAL
COMMUNITY
Start: 2018-01-24 | End: 2018-08-03 | Stop reason: SDUPTHER

## 2018-02-21 RX ORDER — SPIRONOLACTONE 50 MG/1
TABLET, FILM COATED ORAL
COMMUNITY
Start: 2018-01-14 | End: 2018-10-19

## 2018-02-21 RX ORDER — PEN NEEDLE, DIABETIC 31 GX5/16"
NEEDLE, DISPOSABLE MISCELLANEOUS
Refills: 0 | COMMUNITY
Start: 2018-01-08 | End: 2019-02-18 | Stop reason: CLARIF

## 2018-02-21 NOTE — LETTER
February 21, 2018      Ela Obando NP  [address]  [address]  [place] [address]           [facility] [facility]  [address]  [place] [address]  Phone: [phone]          Patient: Kateryna Desai   MR Number: [id number]   YOB: 1955   Date of Visit: 2/21/2018       Dear Ela Obando:    Thank you for referring Kateryna Desai to me for evaluation. Attached you will find relevant portions of my assessment and plan of care.    If you have questions, please do not hesitate to call me. I look forward to following Kateryna Desai along with you.    Sincerely,    Tonio Lambert MD    Enclosure  CC:  No Recipients    If you would like to receive this communication electronically, please contact [email or web address] or [phone] to request more information on [facility] access.    For providers and/or their staff who would like to refer a patient to [facility], please contact us through our one-stop-shop provider referral line, [facility] , at [phone].    If you feel you have received this communication in error or would no longer like to receive these types of communications, please e-mail [email or web address]

## 2018-02-21 NOTE — PROGRESS NOTES
[facility]    CC: syncope    Patient ID: Kateryna Desai is a 63 y.o. female with a past medical history of diabetes, syncope  HPI  Patient was admitted few weeks back with an episode of syncope.  She not feeling well for the last few days to the syncope because of flu.  She went to the restroom.  The next thing she remembers waking up in her living room.  She had blood all over the face also on the bathroom floor.  She was alone in her house at that point hence it seems like she might have had a transient loss of consciousness hit her head leading to the bleeding.  Subsequently she ambulated to the living room.  Has not happened to her in the past.  She checked her blood sugar and it was 140 at that point.      Past Medical History:   Diagnosis Date    Allergy     Arthritis     degnerative disease low back,muscle spasms, shoulders    Asian flu type A 2017    Diabetes mellitus type I     since age 13    Diabetic gastroparesis     takes Cytotec    Diabetic retinopathy of both eyes     gets periodic laser treatments    Difficult intubation     as a child had sore throat after a procedure    Encounter for blood transfusion     GERD (gastroesophageal reflux disease)     Headache(784.0)     Hiatal hernia     with GERD    Hyperlipidemia     Hypertension     Infection of the upper respiratory tract 2012    Lumbar spinal stenosis     Osteopenia     Rosacea     Seizures     Pt states during pgy with stroke    Stroke ?    while pregnant    Tachycardia     asymptomatic with Toprol    Thyroid disease     hypothyroidism    Venous insufficiency of leg     improved since EVLT     Past Surgical History:   Procedure Laterality Date    BUNIONECTOMY Right 2012    CARDIAC CATHETERIZATION      no stents     SECTION      COLONOSCOPY  2007    Dr. Rose, 10 year recheck    EXOSTECTOMY  12    left foot, local anesthesia, in office    EYE SURGERY       has had many laser procedures for diabetic retinopathy    VASCULAR SURGERY      VEIN SURGERY  April 2012    EVLT right greater saphenous, IV sedation     Social History     Social History    Marital status:      Spouse name: N/A    Number of children: N/A    Years of education: N/A     Occupational History    Not on file.     Social History Main Topics    Smoking status: Never Smoker    Smokeless tobacco: Never Used    Alcohol use Yes      Comment: social, 1 drink monthly    Drug use: No    Sexual activity: Yes     Partners: Male     Other Topics Concern    Not on file     Social History Narrative    No narrative on file     Family History   Problem Relation Age of Onset    Cancer Maternal Aunt      breast    Breast cancer Maternal Aunt     Diabetes Maternal Grandmother     Breast cancer Maternal Grandmother     Cancer Maternal Grandfather      prostate    Diabetes Maternal Grandfather     Diabetes Cousin     Arthritis Mother     Cancer Mother     Melanoma Mother     Heart disease Father     Stroke Father     No Known Problems Sister     No Known Problems Brother     No Known Problems Daughter     Alzheimer's disease Maternal Uncle     Alcohol abuse Maternal Uncle      x2    Heart disease Maternal Uncle     No Known Problems Paternal Aunt     Heart disease Paternal Uncle        Review of patient's allergies indicates:   Allergen Reactions    Bactrim [sulfamethoxazole-trimethoprim] Rash     Patient experienced on 12/3/14 redness to face and rash to chest and arms/ with no SOB or airway obstruction    Heparin analogues     Percocet [oxycodone-acetaminophen] Nausea And Vomiting     Also caused dizziness and passed out    Iodinated contrast- oral and iv dye Swelling and Rash     Says topical iodine OK    Niaspan extended-release [niacin] Itching and Other (See Comments)     Skin flushing and redness    Norco [hydrocodone-acetaminophen] Itching, Swelling and Rash     Can  "tolerate if she takes Benadryl with it       Medication List with Changes/Refills   Current Medications    ACZONE 5 % TOPICAL GEL    Apply topically 2 (two) times daily as needed.     ALPHA LIPOIC ACID 600 MG CAP    Take 1 capsule by mouth once daily.    AMOXICILLIN-CLAVULANATE 875-125MG (AUGMENTIN) 875-125 MG PER TABLET    Take 1 tablet by mouth every 12 (twelve) hours.    ASCORBIC ACID, VITAMIN C, (VITAMIN C) 100 MG TABLET    Take 100 mg by mouth 2 (two) times daily.    BD ULTRA-FINE JCARLOS PEN NEEDLES 32 GAUGE X 5/32" NDLE    USE ONE NEEDLE ONCE DAILY    BENEFIBER, GUAR GUM, ORAL    Take 1 Dose by mouth 2 (two) times daily as needed.     BENZONATATE (TESSALON) 100 MG CAPSULE    take 1 to 2 capsules by mouth three times a day if needed for cough    CALCIUM CITRATE-VITAMIN D3 315-200 MG (CITRACAL+D) 315-200 MG-UNIT PER TABLET    Take 1 tablet by mouth 2 (two) times daily.    CHOLECALCIFEROL, VITAMIN D3, (VITAMIN D3) 2,000 UNIT CAP    Take 1 capsule by mouth once daily.    CINNAMON BARK 500 MG CAPSULE    Take 500 mg by mouth 2 (two) times daily.    CRESTOR 40 MG TAB    Take 40 mg by mouth once daily.     DOCOSAHEXANOIC ACID/EPA (FISH OIL ORAL)    Take 1,200 mg by mouth 2 (two) times daily.    DOXYCYCLINE (VIBRAMYCIN) 50 MG CAPSULE    Take 1 capsule by mouth 2 (two) times daily as needed (roseacea).     ESTRACE 0.01 % (0.1 MG/GRAM) VAGINAL CREAM    Place vaginally twice a week. Tuesday, Saturday    ESTRADIOL 10 MCG TAB    Take 1 tablet by mouth twice a week. Tuesday, Saturday    EZETIMIBE (ZETIA) 10 MG TABLET    Take 10 mg by mouth once daily.    FLUTICASONE (FLONASE) 50 MCG/ACTUATION NASAL SPRAY    1 spray by Each Nare route once daily.    FUROSEMIDE (LASIX) 20 MG TABLET        GLUCAGON EMERGENCY 1 MG INJECTION    1 mg as needed.     HUMALOG 100 UNIT/ML INJECTION    Inject into the skin. 10-17 units tid sliding scale    HYDROCODONE-ACETAMINOPHEN 5-325MG (NORCO) 5-325 MG PER TABLET    Take 1 tablet by mouth every 4 to " "6 hours as needed. DDS Dr Ramon    INSULIN GLARGINE,HUM.REC.ANLOG (TOUJEO SOLOSTAR SUBQ)    Inject 46 Units into the skin every morning.     L.ACID/L.CASEI/B.BIF/B.CEDRIC/FOS (PROBIOTIC BLEND ORAL)    Take 1 capsule by mouth every Mon, Wed, Fri.    METOPROLOL SUCCINATE (TOPROL-XL) 25 MG 24 HR TABLET    take 1 tablet by mouth once daily    ONDANSETRON (ZOFRAN-ODT) 4 MG TBDL    Take 1 tablet (4 mg total) by mouth every 8 (eight) hours as needed.    ONETOUCH ULTRA TEST STRP        PANTOPRAZOLE (PROTONIX) 40 MG TABLET    take 1 tablet by mouth twice a day    POTASSIUM CHLORIDE SA (K-DUR,KLOR-CON) 20 MEQ TABLET    Take 2 tablets (40 mEq total) by mouth once daily. When you take a lasix pill    SPIRONOLACTONE (ALDACTONE) 50 MG TABLET        SYNTHROID 88 MCG TABLET    Take 88 mcg by mouth every evening.     VITAMIN B COMPLEX VIT C NO.4 (SUPER B COMPLEX + C ORAL)    Take 1 tablet by mouth once daily.       Review of Systems  Constitution: Denies chills, fever, and sweats.  HENT: Denies headaches or blurry vision.  Cardiovascular: Denies chest pain or irregular heart beat.  Respiratory: Denies cough or shortness of breath.  Gastrointestinal: Denies abdominal pain, nausea, or vomiting.  Musculoskeletal: Denies muscle cramps.  Neurological: Denies dizziness or focal weakness.  Psychiatric/Behavioral: Normal mental status.  Hematologic/Lymphatic: Denies bleeding problem or easy bruising/bleeding.  Skin: Denies rash or suspicious lesions    Physical Examination  /72 (BP Location: Left arm, Patient Position: Sitting)   Pulse 92   Ht 5' 6" (1.676 m)   Wt 77.3 kg (170 lb 6.7 oz)   SpO2 99%   BMI 27.51 kg/m²     Constitutional: No acute distress, conversant  HEENT: Sclera anicteric, Pupils equal, round and reactive to light, extraocular motions intact, Oropharynx clear  Neck: No JVD, no carotid bruits  Cardiovascular: regular rate and rhythm, es murmur, rubs or gallops, normal S1/S2  Pulmonary: Clear to auscultation " bilaterally  Abdominal: Abdomen soft, nontender, nondistended, positive bowel sounds  Extremities: No lower extremity edema,   Pulses:  Carotid pulses are 2+ on the right side, and 2+ on the left side.  Radial pulses are 2+ on the right side, and 2+ on the left side.       Skin: No ecchymosis, erythema, or ulcers  Psych: Alert and oriented x 3, appropriate affect  Neuro: CNII-XII intact, no focal deficits    Labs:  Most Recent Data  CBC:   Lab Results   Component Value Date    WBC 6.00 12/27/2017    HGB 13.2 12/27/2017    HCT 39.0 12/27/2017     12/27/2017    MCV 91 12/27/2017    RDW 14.1 12/27/2017     BMP:   Lab Results   Component Value Date     01/04/2018    K 4.9 01/04/2018     01/04/2018    CO2 28 01/04/2018    BUN 10 01/04/2018    CREATININE 1.0 01/04/2018     (H) 01/04/2018    CALCIUM 9.6 01/04/2018    MG 2.2 01/04/2018    PHOS 1.5 (L) 12/28/2017     LFTS;   Lab Results   Component Value Date    PROT 7.4 12/27/2017    ALBUMIN 3.3 (L) 12/28/2017    BILITOT 0.4 12/27/2017    AST 30 12/27/2017    ALKPHOS 76 12/27/2017    ALT 30 12/27/2017     COAGS: No results found for: INR, PROTIME, PTT  FLP:   Lab Results   Component Value Date    CHOL 176 10/20/2017    HDL 51 10/20/2017    LDLCALC 109.8 10/20/2017    TRIG 76 10/20/2017    CHOLHDL 29.0 10/20/2017     CARDIAC:   Lab Results   Component Value Date    TROPONINI 0.010 12/28/2017       Imaging:    TEST DESCRIPTION   Single strip presented for review with symptom of chest pain. In sinus arrhythmia, rate of 94 bpm.    CONCLUSIONS   Limited data, chest pain without ST changes, in sinus arrhythmia.    EKG:   Normal sinus rhythm  Prolonged QT    Echo:  CONCLUSIONS     1 - Normal left ventricular systolic function (EF 60-65%).     2 - No wall motion abnormalities.     3 - Normal left ventricular diastolic function.     4 - Right ventricular enlargement with normal systolic function.     5 - The estimated PA systolic pressure is 25 mmHg.      Stress Testing:      I have personally reviewed these images and echo data    Assessment/Plan:  Diagnoses and all orders for this visit:    Syncope, unspecified syncope type  -     NM Multi Tread Stress Cardiac Component; Future  -     US Carotid Bilateral; Future    At risk for coronary artery disease  -     NM Myocardial Perfusion Spect Multi Exer; Future        In addition we would put a Loop recorder in her once we have the results of the above.              Total duration of face to face visit time 45 minutes.  Total time spent counseling greater than fifty percent of total visit time.  Counseling included discussion regarding imaging findings, diagnosis, possibilities, treatment options, risks and benefits.  The patient had many questions regarding the options and long-term effect which were all answered to my best ability.      Tonio Lambert MD,MRCP,RPVI,FACC,FSCAI.  Interventional Cardiology   Phone [phone]

## 2018-02-23 DIAGNOSIS — J01.00 ACUTE NON-RECURRENT MAXILLARY SINUSITIS: ICD-10-CM

## 2018-02-23 RX ORDER — BENZONATATE 100 MG/1
CAPSULE ORAL
Qty: 60 CAPSULE | Refills: 1 | Status: SHIPPED | OUTPATIENT
Start: 2018-02-23 | End: 2018-03-14 | Stop reason: SDUPTHER

## 2018-03-01 ENCOUNTER — HOSPITAL ENCOUNTER (OUTPATIENT)
Dept: RADIOLOGY | Facility: HOSPITAL | Age: 63
Discharge: HOME | End: 2018-03-01
Attending: INTERNAL MEDICINE
Payer: COMMERCIAL

## 2018-03-01 DIAGNOSIS — R55 SYNCOPE, UNSPECIFIED SYNCOPE TYPE: ICD-10-CM

## 2018-03-01 PROCEDURE — 93880 EXTRACRANIAL BILAT STUDY: CPT | Mod: 26,,, | Performed by: RADIOLOGY

## 2018-03-01 PROCEDURE — 93880 EXTRACRANIAL BILAT STUDY: CPT | Mod: TC

## 2018-03-08 ENCOUNTER — HOSPITAL ENCOUNTER (OUTPATIENT)
Dept: RADIOLOGY | Facility: HOSPITAL | Age: 63
Discharge: HOME | End: 2018-03-08
Attending: INTERNAL MEDICINE
Payer: COMMERCIAL

## 2018-03-08 ENCOUNTER — HOSPITAL ENCOUNTER (OUTPATIENT)
Dept: CARDIOLOGY | Facility: HOSPITAL | Age: 63
Discharge: HOME | End: 2018-03-08
Attending: INTERNAL MEDICINE
Payer: COMMERCIAL

## 2018-03-08 DIAGNOSIS — Z91.89 AT RISK FOR CORONARY ARTERY DISEASE: ICD-10-CM

## 2018-03-08 DIAGNOSIS — R55 SYNCOPE, UNSPECIFIED SYNCOPE TYPE: ICD-10-CM

## 2018-03-08 PROCEDURE — 93017 CV STRESS TEST TRACING ONLY: CPT

## 2018-03-08 PROCEDURE — 93018 CV STRESS TEST I&R ONLY: CPT | Mod: ,,, | Performed by: INTERNAL MEDICINE

## 2018-03-08 PROCEDURE — A9502 TC99M TETROFOSMIN: HCPCS

## 2018-03-08 PROCEDURE — 78452 HT MUSCLE IMAGE SPECT MULT: CPT | Mod: 26,,, | Performed by: INTERNAL MEDICINE

## 2018-03-08 PROCEDURE — 93016 CV STRESS TEST SUPVJ ONLY: CPT | Mod: ,,, | Performed by: INTERNAL MEDICINE

## 2018-03-09 LAB — DIASTOLIC DYSFUNCTION: NO

## 2018-03-10 ENCOUNTER — LAB VISIT (OUTPATIENT)
Dept: LAB | Facility: HOSPITAL | Age: 63
End: 2018-03-10
Attending: INTERNAL MEDICINE
Payer: COMMERCIAL

## 2018-03-10 DIAGNOSIS — E10.9 DIABETES MELLITUS TYPE I: ICD-10-CM

## 2018-03-10 DIAGNOSIS — E78.00 PURE HYPERCHOLESTEROLEMIA: Primary | ICD-10-CM

## 2018-03-10 DIAGNOSIS — I51.9 MYXEDEMA HEART DISEASE: ICD-10-CM

## 2018-03-10 DIAGNOSIS — E03.9 MYXEDEMA HEART DISEASE: ICD-10-CM

## 2018-03-10 DIAGNOSIS — Z79.4 ENCOUNTER FOR LONG-TERM (CURRENT) USE OF INSULIN: ICD-10-CM

## 2018-03-10 DIAGNOSIS — E78.00 PURE HYPERCHOLESTEROLEMIA: ICD-10-CM

## 2018-03-10 LAB
ALBUMIN SERPL BCP-MCNC: 3.9 G/DL
ALP SERPL-CCNC: 70 U/L
ALT SERPL W/O P-5'-P-CCNC: 17 U/L
ANION GAP SERPL CALC-SCNC: 11 MMOL/L
AST SERPL-CCNC: 21 U/L
BILIRUB SERPL-MCNC: 0.4 MG/DL
BUN SERPL-MCNC: 10 MG/DL
CALCIUM SERPL-MCNC: 9.7 MG/DL
CHLORIDE SERPL-SCNC: 103 MMOL/L
CHOLEST SERPL-MCNC: 202 MG/DL
CHOLEST/HDLC SERPL: 3.3 {RATIO}
CO2 SERPL-SCNC: 30 MMOL/L
CREAT SERPL-MCNC: 1 MG/DL
EST. GFR  (AFRICAN AMERICAN): >60 ML/MIN/1.73 M^2
EST. GFR  (NON AFRICAN AMERICAN): >60 ML/MIN/1.73 M^2
ESTIMATED AVG GLUCOSE: 120 MG/DL
GLUCOSE SERPL-MCNC: 117 MG/DL
HBA1C MFR BLD HPLC: 5.8 %
HDLC SERPL-MCNC: 61 MG/DL
HDLC SERPL: 30.2 %
LDLC SERPL CALC-MCNC: 121.8 MG/DL
NONHDLC SERPL-MCNC: 141 MG/DL
POTASSIUM SERPL-SCNC: 4 MMOL/L
PROT SERPL-MCNC: 6.9 G/DL
SODIUM SERPL-SCNC: 144 MMOL/L
TRIGL SERPL-MCNC: 96 MG/DL
TSH SERPL DL<=0.005 MIU/L-ACNC: 1.43 UIU/ML

## 2018-03-10 PROCEDURE — 80061 LIPID PANEL: CPT

## 2018-03-10 PROCEDURE — 84443 ASSAY THYROID STIM HORMONE: CPT

## 2018-03-10 PROCEDURE — 36415 COLL VENOUS BLD VENIPUNCTURE: CPT | Mod: PO

## 2018-03-10 PROCEDURE — 83036 HEMOGLOBIN GLYCOSYLATED A1C: CPT

## 2018-03-10 PROCEDURE — 80053 COMPREHEN METABOLIC PANEL: CPT

## 2018-03-14 DIAGNOSIS — J01.00 ACUTE NON-RECURRENT MAXILLARY SINUSITIS: ICD-10-CM

## 2018-03-14 RX ORDER — BENZONATATE 100 MG/1
CAPSULE ORAL
Qty: 60 CAPSULE | Refills: 0 | Status: SHIPPED | OUTPATIENT
Start: 2018-03-14 | End: 2018-10-19

## 2018-03-28 ENCOUNTER — TELEPHONE (OUTPATIENT)
Dept: CARDIOLOGY | Facility: CLINIC | Age: 63
End: 2018-03-28

## 2018-03-28 NOTE — TELEPHONE ENCOUNTER
----- Message from Christina Trujillo sent at 3/28/2018  4:11 PM CDT -----  Contact: patient  Patient Lola Desai, [phone] is calling regarding test results.  Please advise.  Thanks!

## 2018-04-02 PROBLEM — J01.90 ACUTE SINUSITIS: Status: RESOLVED | Noted: 2017-12-28 | Resolved: 2018-04-02

## 2018-04-05 ENCOUNTER — OFFICE VISIT (OUTPATIENT)
Dept: PODIATRY | Facility: CLINIC | Age: 63
End: 2018-04-05
Payer: COMMERCIAL

## 2018-04-05 VITALS — HEIGHT: 66 IN | WEIGHT: 167.69 LBS | BODY MASS INDEX: 26.95 KG/M2

## 2018-04-05 DIAGNOSIS — M20.41 HAMMER TOES OF BOTH FEET: ICD-10-CM

## 2018-04-05 DIAGNOSIS — B35.1 ONYCHOMYCOSIS DUE TO DERMATOPHYTE: ICD-10-CM

## 2018-04-05 DIAGNOSIS — M79.672 PAIN OF LEFT HEEL: ICD-10-CM

## 2018-04-05 DIAGNOSIS — R20.2 PARESTHESIA OF FOOT, BILATERAL: ICD-10-CM

## 2018-04-05 DIAGNOSIS — E10.42 CONTROLLED TYPE 1 DIABETES MELLITUS WITH DIABETIC POLYNEUROPATHY: Primary | ICD-10-CM

## 2018-04-05 DIAGNOSIS — M20.42 HAMMER TOES OF BOTH FEET: ICD-10-CM

## 2018-04-05 PROCEDURE — 99499 UNLISTED E&M SERVICE: CPT | Mod: S$GLB,,, | Performed by: PODIATRIST

## 2018-04-05 PROCEDURE — 11721 DEBRIDE NAIL 6 OR MORE: CPT | Mod: Q9,S$GLB,, | Performed by: PODIATRIST

## 2018-04-05 PROCEDURE — 99999 PR PBB SHADOW E&M-EST. PATIENT-LVL IV: CPT | Mod: PBBFAC,,, | Performed by: PODIATRIST

## 2018-04-05 NOTE — PROGRESS NOTES
Subjective:      Patient ID: Kateryna Desai is a 63 y.o. female.    Chief Complaint: Diabetic Foot Exam (GIOVANA Mendoza  1/4/18  A1C 3/10/18 5.8) and Nail Care    Kateryna is a 63 y.o. female who presents to the clinic for routine evaluation and treatment of diabetic feet. Kateryna has a past medical history of Allergy; Arthritis; Asian flu type A (12/22/2017); Diabetes mellitus type I; Diabetic gastroparesis; Diabetic retinopathy of both eyes; Difficult intubation; Encounter for blood transfusion; GERD (gastroesophageal reflux disease); Headache(784.0); Hiatal hernia; Hyperlipidemia; Hypertension; Infection of the upper respiratory tract (June 2012); Lumbar spinal stenosis; Osteopenia; Rosacea; Seizures; Stroke (1985?); Tachycardia; Thyroid disease; and Venous insufficiency of leg. Patient relates that prior Lt. Sided plantar fasciitis has greatly improved with performing daily stretching exercises.  States that she is more concerned with pain in the Lt. Posterior heel.  Notes having tenderness in this region since healing of the prior ulceration, however, is concerned by the level of sensitivity.  Relates having to use a pillow as a prop in efforts to minimize pressure to the site.  Currently rates pain as a 0/10. Patient also relates that her neuropathy pain has been minimal as of late.  Attributes this to tight glycemic control.  Relates taking tramadol to address paresthesias once or twice weekly.  Denies any additional pedal complaints.      PCP: Endy Mendoza MD    Date Last Seen by PCP:  1/4/18      Hemoglobin A1C   Date Value Ref Range Status   03/10/2018 5.8 (H) 4.0 - 5.6 % Final     Comment:     According to ADA guidelines, hemoglobin A1c <7.0% represents  optimal control in non-pregnant diabetic patients. Different  metrics may apply to specific patient populations.   Standards of Medical Care in Diabetes-2016.  For the purpose of screening for the presence of diabetes:  <5.7%      Consistent with the absence of diabetes  5.7-6.4%  Consistent with increasing risk for diabetes   (prediabetes)  >or=6.5%  Consistent with diabetes  Currently, no consensus exists for use of hemoglobin A1c  for diagnosis of diabetes for children.  This Hemoglobin A1c assay has significant interference with fetal   hemoglobin   (HbF). The results are invalid for patients with abnormal amounts of   HbF,   including those with known Hereditary Persistence   of Fetal Hemoglobin. Heterozygous hemoglobin variants (HbAS, HbAC,   HbAD, HbAE, HbA2) do not significantly interfere with this assay;   however, presence of multiple variants in a sample may impact the %   interference.     12/27/2017 6.0 (H) 4.0 - 5.6 % Final     Comment:     According to ADA guidelines, hemoglobin A1c <7.0% represents  optimal control in non-pregnant diabetic patients. Different  metrics may apply to specific patient populations.   Standards of Medical Care in Diabetes-2016.  For the purpose of screening for the presence of diabetes:  <5.7%     Consistent with the absence of diabetes  5.7-6.4%  Consistent with increasing risk for diabetes   (prediabetes)  >or=6.5%  Consistent with diabetes  Currently, no consensus exists for use of hemoglobin A1c  for diagnosis of diabetes for children.  This Hemoglobin A1c assay has significant interference with fetal   hemoglobin   (HbF). The results are invalid for patients with abnormal amounts of   HbF,   including those with known Hereditary Persistence   of Fetal Hemoglobin. Heterozygous hemoglobin variants (HbAS, HbAC,   HbAD, HbAE, HbA2) do not significantly interfere with this assay;   however, presence of multiple variants in a sample may impact the %   interference.     12/27/2017 6.0 (H) 4.0 - 5.6 % Final     Comment:     According to ADA guidelines, hemoglobin A1c <7.0% represents  optimal control in non-pregnant diabetic patients. Different  metrics may apply to specific patient populations.    Standards of Medical Care in Diabetes-2016.  For the purpose of screening for the presence of diabetes:  <5.7%     Consistent with the absence of diabetes  5.7-6.4%  Consistent with increasing risk for diabetes   (prediabetes)  >or=6.5%  Consistent with diabetes  Currently, no consensus exists for use of hemoglobin A1c  for diagnosis of diabetes for children.  This Hemoglobin A1c assay has significant interference with fetal   hemoglobin   (HbF). The results are invalid for patients with abnormal amounts of   HbF,   including those with known Hereditary Persistence   of Fetal Hemoglobin. Heterozygous hemoglobin variants (HbAS, HbAC,   HbAD, HbAE, HbA2) do not significantly interfere with this assay;   however, presence of multiple variants in a sample may impact the %   interference.     12/27/2017 6.0 (H) 4.0 - 5.6 % Final     Comment:     According to ADA guidelines, hemoglobin A1c <7.0% represents  optimal control in non-pregnant diabetic patients. Different  metrics may apply to specific patient populations.   Standards of Medical Care in Diabetes-2016.  For the purpose of screening for the presence of diabetes:  <5.7%     Consistent with the absence of diabetes  5.7-6.4%  Consistent with increasing risk for diabetes   (prediabetes)  >or=6.5%  Consistent with diabetes  Currently, no consensus exists for use of hemoglobin A1c  for diagnosis of diabetes for children.  This Hemoglobin A1c assay has significant interference with fetal   hemoglobin   (HbF). The results are invalid for patients with abnormal amounts of   HbF,   including those with known Hereditary Persistence   of Fetal Hemoglobin. Heterozygous hemoglobin variants (HbAS, HbAC,   HbAD, HbAE, HbA2) do not significantly interfere with this assay;   however, presence of multiple variants in a sample may impact the %   interference.             Past Medical History:   Diagnosis Date    Allergy     Arthritis     degnerative disease low back,muscle  spasms, shoulders    Asian flu type A 2017    Diabetes mellitus type I     since age 13    Diabetic gastroparesis     takes Cytotec    Diabetic retinopathy of both eyes     gets periodic laser treatments    Difficult intubation     as a child had sore throat after a procedure    Encounter for blood transfusion     GERD (gastroesophageal reflux disease)     Headache(784.0)     Hiatal hernia     with GERD    Hyperlipidemia     Hypertension     Infection of the upper respiratory tract 2012    Lumbar spinal stenosis     Osteopenia     Rosacea     Seizures     Pt states during pgy with stroke    Stroke ?    while pregnant    Tachycardia     asymptomatic with Toprol    Thyroid disease     hypothyroidism    Venous insufficiency of leg     improved since EVLT       Past Surgical History:   Procedure Laterality Date    BUNIONECTOMY Right 2012    CARDIAC CATHETERIZATION      no stents     SECTION      COLONOSCOPY  2007    Dr. Rose, 10 year recheck    EXOSTECTOMY  12    left foot, local anesthesia, in office    EYE SURGERY      has had many laser procedures for diabetic retinopathy    VASCULAR SURGERY      VEIN SURGERY  2012    EVLT right greater saphenous, IV sedation       Family History   Problem Relation Age of Onset    Cancer Maternal Aunt      breast    Breast cancer Maternal Aunt     Diabetes Maternal Grandmother     Breast cancer Maternal Grandmother     Cancer Maternal Grandfather      prostate    Diabetes Maternal Grandfather     Diabetes Cousin     Arthritis Mother     Cancer Mother     Melanoma Mother     Heart disease Father     Stroke Father     No Known Problems Sister     No Known Problems Brother     No Known Problems Daughter     Alzheimer's disease Maternal Uncle     Alcohol abuse Maternal Uncle      x2    Heart disease Maternal Uncle     No Known Problems Paternal Aunt     Heart disease Paternal Uncle        Social  "History     Social History    Marital status:      Spouse name: N/A    Number of children: N/A    Years of education: N/A     Social History Main Topics    Smoking status: Never Smoker    Smokeless tobacco: Never Used    Alcohol use Yes      Comment: social, 1 drink monthly    Drug use: No    Sexual activity: Yes     Partners: Male     Other Topics Concern    Not on file     Social History Narrative    No narrative on file       Current Outpatient Prescriptions   Medication Sig Dispense Refill    ACZONE 5 % topical gel Apply topically 2 (two) times daily as needed.   0    alpha lipoic acid 600 mg Cap Take 1 capsule by mouth once daily.      amoxicillin-clavulanate 875-125mg (AUGMENTIN) 875-125 mg per tablet Take 1 tablet by mouth every 12 (twelve) hours. 20 tablet 0    ascorbic acid, vitamin C, (VITAMIN C) 100 MG tablet Take 100 mg by mouth 2 (two) times daily.      BD ULTRA-FINE JCARLOS PEN NEEDLES 32 gauge x 5/32" Ndle USE ONE NEEDLE ONCE DAILY  0    BENEFIBER, GUAR GUM, ORAL Take 1 Dose by mouth 2 (two) times daily as needed.       benzonatate (TESSALON) 100 MG capsule take 1 to 2 capsules by mouth three times a day if needed for cough 60 capsule 0    calcium citrate-vitamin D3 315-200 mg (CITRACAL+D) 315-200 mg-unit per tablet Take 1 tablet by mouth 2 (two) times daily.      cholecalciferol, vitamin D3, (VITAMIN D3) 2,000 unit Cap Take 1 capsule by mouth once daily.      cinnamon bark 500 mg capsule Take 500 mg by mouth 2 (two) times daily.      CRESTOR 40 mg Tab Take 40 mg by mouth once daily.       DOCOSAHEXANOIC ACID/EPA (FISH OIL ORAL) Take 1,200 mg by mouth 2 (two) times daily.      doxycycline (VIBRAMYCIN) 50 MG capsule Take 1 capsule by mouth 2 (two) times daily as needed (roseacea).   0    ESTRACE 0.01 % (0.1 mg/gram) vaginal cream Place vaginally twice a week. Tuesday, Saturday  0    estradiol 10 mcg Tab Take 1 tablet by mouth twice a week. Tuesday, Saturday      ezetimibe " (ZETIA) 10 mg tablet Take 10 mg by mouth once daily.      fluticasone (FLONASE) 50 mcg/actuation nasal spray 1 spray by Each Nare route once daily. (Patient taking differently: 1 spray by Each Nare route daily as needed for Rhinitis. ) 16 g 0    furosemide (LASIX) 20 MG tablet       GLUCAGON EMERGENCY 1 mg injection 1 mg as needed.       HUMALOG 100 unit/mL injection Inject into the skin. 10-17 units tid sliding scale  1    hydrocodone-acetaminophen 5-325mg (NORCO) 5-325 mg per tablet Take 1 tablet by mouth every 4 to 6 hours as needed. DDS Dr Ramon  0    INSULIN GLARGINE,HUM.REC.ANLOG (TOUJEO SOLOSTAR SUBQ) Inject 46 Units into the skin every morning.       L.ACID/L.CASEI/B.BIF/B.CEDRIC/FOS (PROBIOTIC BLEND ORAL) Take 1 capsule by mouth every Mon, Wed, Fri.      metoprolol succinate (TOPROL-XL) 25 MG 24 hr tablet take 1 tablet by mouth once daily 30 tablet 11    ondansetron (ZOFRAN-ODT) 4 MG TbDL Take 1 tablet (4 mg total) by mouth every 8 (eight) hours as needed. 12 tablet 0    ONETOUCH ULTRA TEST Strp   0    pantoprazole (PROTONIX) 40 MG tablet take 1 tablet by mouth twice a day 180 tablet 3    potassium chloride SA (K-DUR,KLOR-CON) 20 MEQ tablet Take 2 tablets (40 mEq total) by mouth once daily. When you take a lasix pill 30 tablet 0    spironolactone (ALDACTONE) 50 MG tablet       SYNTHROID 88 mcg tablet Take 88 mcg by mouth every evening.   0    VITAMIN B COMPLEX VIT C NO.4 (SUPER B COMPLEX + C ORAL) Take 1 tablet by mouth once daily.       No current facility-administered medications for this visit.        Review of patient's allergies indicates:   Allergen Reactions    Bactrim [sulfamethoxazole-trimethoprim] Rash     Patient experienced on 12/3/14 redness to face and rash to chest and arms/ with no SOB or airway obstruction    Percocet [oxycodone-acetaminophen] Nausea And Vomiting     Also caused dizziness and passed out    Iodinated contrast media - iv dye Swelling and Rash     Says  topical iodine OK    Norco [hydrocodone-acetaminophen] Itching, Swelling and Rash     Can tolerate if she takes Benadryl with it    Niaspan extended-release [niacin] Itching and Other (See Comments)     Skin flushing and redness         Review of Systems   Constitution: Negative for chills, decreased appetite, diaphoresis and fever.   Cardiovascular: Negative for claudication and leg swelling.   Skin: Positive for color change and nail changes. Negative for dry skin, flushing and itching.   Musculoskeletal: Positive for myalgias. Negative for arthritis, back pain, falls, gout, joint pain and joint swelling.   Neurological: Positive for numbness and paresthesias.   Psychiatric/Behavioral: Negative for altered mental status.           Objective:      Physical Exam   Constitutional: She is oriented to person, place, and time. She appears well-developed and well-nourished. No distress.   Cardiovascular:   Pulses:       Dorsalis pedis pulses are 2+ on the right side, and 2+ on the left side.        Posterior tibial pulses are 1+ on the right side, and 1+ on the left side.   CFT <3 seconds bilateral.  Pedal hair growth decreased bilateral.  Varicosities noted to bilateral lower extremity. Mild nonpitting edema noted to bilateral lower extremity.  Toes are cool to touch bilateral.       Musculoskeletal: Normal range of motion. She exhibits edema and tenderness.   Muscle strength 5/5 in all muscle groups bilateral.  No tenderness nor crepitation with active and passive ROM of foot/ankle joints bilateral.  Mild pain with palpation to the cicatrix of the Lt. Posterior heel.   Bilateral pes planus foot type.  Bilateral hallux abducto valgus.  Bilateral semi-rigid contracture of toes 2-5.     Neurological: She is alert and oriented to person, place, and time. She has normal strength. A sensory deficit is present.   Protective sensation per [place] monofilament decreased bilateral.    Vibratory sensation intact  bilateral.    Light touch intact bilateral.   Skin: Skin is warm, dry and intact. No abrasion, no bruising, no burn, no ecchymosis, no laceration, no lesion, no petechiae and no rash noted. She is not diaphoretic. No cyanosis or erythema. No pallor. Nails show no clubbing.   Toenails x 10 appear thickened by 2 mm's, elongated by 2 mm's, and discolored with subungual debris. No open wounds, interdigital maceration noted bilateral.  Cicatrix of the Lt. Posterior heel is well healed with no adjacent break in skin integrity.  Site is atrophic in appearance.                     Assessment:       Encounter Diagnoses   Name Primary?    Controlled type 1 diabetes mellitus with diabetic polyneuropathy Yes    Onychomycosis due to dermatophyte     Hammer toes of both feet     Paresthesia of foot, bilateral     Pain of left heel          Plan:       Kateryna was seen today for diabetic foot exam and nail care.    Diagnoses and all orders for this visit:    Controlled type 1 diabetes mellitus with diabetic polyneuropathy    Onychomycosis due to dermatophyte    Hammer toes of both feet    Paresthesia of foot, bilateral    Pain of left heel  -     X-Ray Calcaneus 2 View Left; Future  -     Sedimentation rate, manual; Future  -     C-reactive protein; Future      I counseled the patient on her conditions, their implications and medical management.    - Suspect that Lt. Posterior heel pain is secondary to scarring at the site following healing of a prior ulceration.  Will order labs to rule out deeper infection at the site as well as an x-ray of the calcaneus.    - Advised to continue wearing shoe gear to accommodate for digital deformities.      - Shoe inspection. Diabetic Foot Education. Patient reminded of the importance of good nutrition and blood sugar control to help prevent podiatric complications of diabetes. Patient instructed on proper foot hygeine. We discussed wearing proper shoe gear, daily foot inspections, never  walking without protective shoe gear, never putting sharp instruments to feet    - With patient's permission, nails were aggressively reduced and debrided x 10 to their soft tissue attachment mechanically and with electric , removing all offending nail and debris.  Patient relates relief following the procedure. She will continue to monitor the areas daily, inspect her feet, wear protective shoe gear when ambulatory, moisturizer to maintain skin integrity and follow in this office in approximately 2-3 months, sooner p.r.n.    Follow-up in about 3 months (around 7/5/2018).    Lei Rhoades DPM

## 2018-04-06 ENCOUNTER — HOSPITAL ENCOUNTER (OUTPATIENT)
Dept: RADIOLOGY | Facility: CLINIC | Age: 63
Discharge: HOME | End: 2018-04-06
Attending: PODIATRIST
Payer: COMMERCIAL

## 2018-04-06 DIAGNOSIS — M79.672 PAIN OF LEFT HEEL: ICD-10-CM

## 2018-04-06 PROCEDURE — 73650 X-RAY EXAM OF HEEL: CPT | Mod: TC,FY,PO,LT

## 2018-04-06 PROCEDURE — 73650 X-RAY EXAM OF HEEL: CPT | Mod: 26,LT,S$GLB, | Performed by: RADIOLOGY

## 2018-04-16 RX ORDER — METOPROLOL SUCCINATE 25 MG/1
TABLET, EXTENDED RELEASE ORAL
Qty: 30 TABLET | Refills: 10 | Status: SHIPPED | OUTPATIENT
Start: 2018-04-16 | End: 2019-04-15 | Stop reason: SDUPTHER

## 2018-05-03 ENCOUNTER — LAB VISIT (OUTPATIENT)
Dept: LAB | Facility: HOSPITAL | Age: 63
End: 2018-05-03
Attending: INTERNAL MEDICINE
Payer: COMMERCIAL

## 2018-05-03 DIAGNOSIS — I10 ESSENTIAL HYPERTENSION, MALIGNANT: ICD-10-CM

## 2018-05-03 DIAGNOSIS — E11.22 TYPE 2 DIABETES MELLITUS WITH END-STAGE RENAL DISEASE: ICD-10-CM

## 2018-05-03 DIAGNOSIS — N25.81 SECONDARY HYPERPARATHYROIDISM OF RENAL ORIGIN: ICD-10-CM

## 2018-05-03 DIAGNOSIS — N18.30 CHRONIC KIDNEY DISEASE, STAGE III (MODERATE): ICD-10-CM

## 2018-05-03 DIAGNOSIS — N18.6 TYPE 2 DIABETES MELLITUS WITH END-STAGE RENAL DISEASE: ICD-10-CM

## 2018-05-03 DIAGNOSIS — N18.30 CHRONIC KIDNEY DISEASE, STAGE III (MODERATE): Primary | ICD-10-CM

## 2018-05-03 LAB
25(OH)D3+25(OH)D2 SERPL-MCNC: 32 NG/ML
ALBUMIN SERPL BCP-MCNC: 3.6 G/DL
ANION GAP SERPL CALC-SCNC: 9 MMOL/L
BASOPHILS # BLD AUTO: 0.02 K/UL
BASOPHILS NFR BLD: 0.4 %
BUN SERPL-MCNC: 11 MG/DL
CALCIUM SERPL-MCNC: 9.5 MG/DL
CHLORIDE SERPL-SCNC: 99 MMOL/L
CO2 SERPL-SCNC: 32 MMOL/L
CREAT SERPL-MCNC: 1.2 MG/DL
DIFFERENTIAL METHOD: ABNORMAL
EOSINOPHIL # BLD AUTO: 0.1 K/UL
EOSINOPHIL NFR BLD: 2.3 %
ERYTHROCYTE [DISTWIDTH] IN BLOOD BY AUTOMATED COUNT: 14.4 %
EST. GFR  (AFRICAN AMERICAN): 56.4 ML/MIN/1.73 M^2
EST. GFR  (NON AFRICAN AMERICAN): 48.9 ML/MIN/1.73 M^2
GLUCOSE SERPL-MCNC: 194 MG/DL
HCT VFR BLD AUTO: 40.6 %
HGB BLD-MCNC: 12.4 G/DL
IMM GRANULOCYTES # BLD AUTO: 0.01 K/UL
IMM GRANULOCYTES NFR BLD AUTO: 0.2 %
LYMPHOCYTES # BLD AUTO: 0.8 K/UL
LYMPHOCYTES NFR BLD: 15.2 %
MCH RBC QN AUTO: 29.8 PG
MCHC RBC AUTO-ENTMCNC: 30.5 G/DL
MCV RBC AUTO: 98 FL
MONOCYTES # BLD AUTO: 0.3 K/UL
MONOCYTES NFR BLD: 6 %
NEUTROPHILS # BLD AUTO: 4.2 K/UL
NEUTROPHILS NFR BLD: 75.9 %
NRBC BLD-RTO: 0 /100 WBC
PHOSPHATE SERPL-MCNC: 3.2 MG/DL
PLATELET # BLD AUTO: 185 K/UL
PMV BLD AUTO: 11 FL
POTASSIUM SERPL-SCNC: 3.4 MMOL/L
PTH-INTACT SERPL-MCNC: 80 PG/ML
RBC # BLD AUTO: 4.16 M/UL
SODIUM SERPL-SCNC: 140 MMOL/L
WBC # BLD AUTO: 5.54 K/UL

## 2018-05-03 PROCEDURE — 83970 ASSAY OF PARATHORMONE: CPT

## 2018-05-03 PROCEDURE — 36415 COLL VENOUS BLD VENIPUNCTURE: CPT | Mod: PO

## 2018-05-03 PROCEDURE — 82306 VITAMIN D 25 HYDROXY: CPT

## 2018-05-03 PROCEDURE — 80069 RENAL FUNCTION PANEL: CPT

## 2018-05-03 PROCEDURE — 85025 COMPLETE CBC W/AUTO DIFF WBC: CPT

## 2018-05-03 RX ORDER — TRAMADOL HYDROCHLORIDE 100 MG/1
TABLET, EXTENDED RELEASE ORAL
Qty: 30 TABLET | Refills: 1 | OUTPATIENT
Start: 2018-05-03

## 2018-05-21 RX ORDER — FUROSEMIDE 20 MG/1
TABLET ORAL
Qty: 60 TABLET | Refills: 5 | OUTPATIENT
Start: 2018-05-21

## 2018-05-24 RX ORDER — TRAMADOL HYDROCHLORIDE 100 MG/1
TABLET, EXTENDED RELEASE ORAL
Qty: 30 TABLET | Refills: 1 | OUTPATIENT
Start: 2018-05-24

## 2018-06-04 ENCOUNTER — LAB VISIT (OUTPATIENT)
Dept: LAB | Facility: HOSPITAL | Age: 63
End: 2018-06-04
Attending: INTERNAL MEDICINE
Payer: COMMERCIAL

## 2018-06-04 DIAGNOSIS — N18.30 CHRONIC KIDNEY DISEASE, STAGE III (MODERATE): ICD-10-CM

## 2018-06-04 DIAGNOSIS — I10 HYPERTENSION, ESSENTIAL: Primary | ICD-10-CM

## 2018-06-04 DIAGNOSIS — E87.5 HYPERKALEMIA: ICD-10-CM

## 2018-06-04 LAB
ALBUMIN SERPL BCP-MCNC: 4 G/DL
ANION GAP SERPL CALC-SCNC: 9 MMOL/L
BUN SERPL-MCNC: 10 MG/DL
CALCIUM SERPL-MCNC: 10 MG/DL
CHLORIDE SERPL-SCNC: 103 MMOL/L
CO2 SERPL-SCNC: 30 MMOL/L
CREAT SERPL-MCNC: 1 MG/DL
EST. GFR  (AFRICAN AMERICAN): >60 ML/MIN/1.73 M^2
EST. GFR  (NON AFRICAN AMERICAN): >60 ML/MIN/1.73 M^2
GLUCOSE SERPL-MCNC: 41 MG/DL
MAGNESIUM SERPL-MCNC: 2.1 MG/DL
PHOSPHATE SERPL-MCNC: 3.8 MG/DL
POTASSIUM SERPL-SCNC: 3.9 MMOL/L
SODIUM SERPL-SCNC: 142 MMOL/L

## 2018-06-04 PROCEDURE — 36415 COLL VENOUS BLD VENIPUNCTURE: CPT | Mod: PO

## 2018-06-04 PROCEDURE — 80069 RENAL FUNCTION PANEL: CPT

## 2018-06-04 PROCEDURE — 83735 ASSAY OF MAGNESIUM: CPT

## 2018-06-07 RX ORDER — SPIRONOLACTONE 50 MG/1
TABLET, FILM COATED ORAL
Qty: 60 TABLET | Refills: 1 | OUTPATIENT
Start: 2018-06-07

## 2018-06-07 NOTE — TELEPHONE ENCOUNTER
Discontinued due to syncope by cardiology in hospital in December 2017.  Not supposed to be taking this at all.

## 2018-06-08 RX ORDER — SPIRONOLACTONE 50 MG/1
TABLET, FILM COATED ORAL
Qty: 60 TABLET | Refills: 1 | OUTPATIENT
Start: 2018-06-08

## 2018-06-20 ENCOUNTER — TELEPHONE (OUTPATIENT)
Dept: FAMILY MEDICINE | Facility: CLINIC | Age: 63
End: 2018-06-20

## 2018-06-20 NOTE — TELEPHONE ENCOUNTER
ANDRÉS from hospital said to stop it, not to reduce it.  Dr. Lambert's nurse put it back on medlist on January 14 apparently because she was continuing to take it.  Dr. Lambert's notes say nothing about it.  She needs to check with him whether or not he wants her to take it.  She has not had a refill on it since last year.

## 2018-06-20 NOTE — TELEPHONE ENCOUNTER
----- Message from Pat Hayward sent at 6/20/2018  8:21 AM CDT -----  Contact: self  Type:  RX Refill Request    Who Called:  self  Refill or New Rx:  new  RX Name and Strength:  spironolactone (ALDACTONE) 50 MG tablet  How is the patient currently taking it? (ex. 1XDay):  2XDay  Is this a 30 day or 90 day RX:  30  Preferred Pharmacy with phone number:  [facility] [address] [facility] [phone]  Local or Mail Order:  local  Ordering Provider:  Dr Kelly Cartagena Call Back Number:  [phone]  Additional Information:  Patient states pharmacy has sent previous request and are waiting on a response from doctor. Please call patient when sent. Thanks!

## 2018-06-20 NOTE — TELEPHONE ENCOUNTER
Patient says she was put back on Spironolactone 50mg one daily which is what er doctor at [facility] to reduce dose to. Has been taking this since er visit in December 2017. Needs refill.

## 2018-06-22 NOTE — TELEPHONE ENCOUNTER
Called and spoke with MsMark Kateryna, she stated that she was originally put on hit to help slow down the growth of facial hair, and it really helps. She checked with her nephrologist and he was fine with her taking it. I advised I would have to speak with Dr. Lambert because it is no longer on her list for me to refill. She verbalized understanding. No further issues noted.

## 2018-06-22 NOTE — TELEPHONE ENCOUNTER
----- Message from Herlinda Flowers sent at 6/22/2018  8:13 AM CDT -----  Contact: Patient  Type:  Patient Returning Call    Who Called:  Patient   Who Left Message for Patient:  Aarti  Does the patient know what this is regarding?:  Prescription  Best Call Back Number:    Additional Information:

## 2018-06-28 ENCOUNTER — PATIENT OUTREACH (OUTPATIENT)
Dept: ADMINISTRATIVE | Facility: HOSPITAL | Age: 63
End: 2018-06-28

## 2018-06-29 ENCOUNTER — TELEPHONE (OUTPATIENT)
Dept: FAMILY MEDICINE | Facility: CLINIC | Age: 63
End: 2018-06-29

## 2018-06-29 ENCOUNTER — HOSPITAL ENCOUNTER (EMERGENCY)
Facility: HOSPITAL | Age: 63
Discharge: HOME | End: 2018-06-29
Attending: EMERGENCY MEDICINE
Payer: COMMERCIAL

## 2018-06-29 ENCOUNTER — TELEPHONE (OUTPATIENT)
Dept: VASCULAR SURGERY | Facility: CLINIC | Age: 63
End: 2018-06-29

## 2018-06-29 VITALS
HEART RATE: 85 BPM | SYSTOLIC BLOOD PRESSURE: 119 MMHG | BODY MASS INDEX: 26.84 KG/M2 | WEIGHT: 167 LBS | OXYGEN SATURATION: 99 % | HEIGHT: 66 IN | DIASTOLIC BLOOD PRESSURE: 66 MMHG | TEMPERATURE: 98 F | RESPIRATION RATE: 18 BRPM

## 2018-06-29 DIAGNOSIS — S86.892A SHIN SPLINT, LEFT, INITIAL ENCOUNTER: ICD-10-CM

## 2018-06-29 DIAGNOSIS — M79.605 LEFT LEG PAIN: Primary | ICD-10-CM

## 2018-06-29 DIAGNOSIS — S86.912A MUSCLE STRAIN OF LEFT LOWER LEG, INITIAL ENCOUNTER: ICD-10-CM

## 2018-06-29 PROCEDURE — 99284 EMERGENCY DEPT VISIT MOD MDM: CPT

## 2018-06-29 PROCEDURE — 25000003 PHARM REV CODE 250: Performed by: PHYSICIAN ASSISTANT

## 2018-06-29 RX ORDER — DIPHENHYDRAMINE HCL 25 MG
25 CAPSULE ORAL
Status: COMPLETED | OUTPATIENT
Start: 2018-06-29 | End: 2018-06-29

## 2018-06-29 RX ORDER — DICLOFENAC SODIUM 25 MG/1
50 TABLET, DELAYED RELEASE ORAL ONCE
Status: COMPLETED | OUTPATIENT
Start: 2018-06-29 | End: 2018-06-29

## 2018-06-29 RX ORDER — HYDROCODONE BITARTRATE AND ACETAMINOPHEN 5; 325 MG/1; MG/1
1 TABLET ORAL
Status: COMPLETED | OUTPATIENT
Start: 2018-06-29 | End: 2018-06-29

## 2018-06-29 RX ORDER — DICLOFENAC SODIUM 75 MG/1
75 TABLET, DELAYED RELEASE ORAL 2 TIMES DAILY PRN
Qty: 30 TABLET | Refills: 0 | Status: SHIPPED | OUTPATIENT
Start: 2018-06-29 | End: 2019-02-18 | Stop reason: CLARIF

## 2018-06-29 RX ORDER — ASPIRIN 81 MG/1
81 TABLET ORAL DAILY
COMMUNITY

## 2018-06-29 RX ADMIN — DIPHENHYDRAMINE HYDROCHLORIDE 25 MG: 25 CAPSULE ORAL at 12:06

## 2018-06-29 RX ADMIN — DICLOFENAC SODIUM 50 MG: 25 TABLET, DELAYED RELEASE ORAL at 11:06

## 2018-06-29 RX ADMIN — HYDROCODONE BITARTRATE AND ACETAMINOPHEN 1 TABLET: 5; 325 TABLET ORAL at 12:06

## 2018-06-29 NOTE — TELEPHONE ENCOUNTER
----- Message from Nora Sampson sent at 6/29/2018  8:32 AM CDT -----  Contact: patient  Patient needing to speak with nurse regarding pain in her veins in lower left leg      Please call to advise [phone] (home)

## 2018-06-29 NOTE — TELEPHONE ENCOUNTER
----- Message from Ahmet Segura sent at 6/29/2018  7:42 AM CDT -----  Contact: pt  Pt is calling to see if she can be seen today(pain in left leg, possible varicose veins)pls call pt back and advise  Call Back#[phone]  Thanks

## 2018-06-29 NOTE — ED PROVIDER NOTES
"Encounter Date: 6/29/2018    SCRIBE #1 NOTE: I, Nati Hunter , am scribing for, and in the presence of, KUMAR Jacques.       History     Chief Complaint   Patient presents with    Leg Pain     left lower leg pain , denies injury        06/29/2018 11:05 AM     Chief complaint: Left leg pain       Kateryna Desai is a 63 y.o. female with a PMHx of DM, HTN, hyperlipidemia, and thyroid disease who presents to the ED with complaints of left leg pain with an onset x 3 days ago with exacerbation today. The patient reports that she has been having a throbbing pain along the front of her left leg that extends to her foot. Denies any calf involvement. She states that she was managing the pain with Tramadol, ice, and elevation, but today none of these have given any relief. She denies any recent injury or trauma to the area. She claims that she has been getting intermittent "laurent horses" in her left leg and her  believes that the veins in the leg look "more prominent" than usual. The patient admits that the pain has been so bad that it has brought her to tears. She is worried that she may have a blood clot. The patient reports that she has had varicose veins in the past, but this pain feels different from the pain she gets from from the varicose veins. She claims that she has nerve damage in her left heal. The patient reports that she leads a relatively active lifestyle. The patient denies dizziness, fever and swelling. She states that she is on a low dose aspirin.           The history is provided by the patient.     Review of patient's allergies indicates:   Allergen Reactions    Bactrim [sulfamethoxazole-trimethoprim] Rash     Patient experienced on 12/3/14 redness to face and rash to chest and arms/ with no SOB or airway obstruction    Heparin analogues     Percocet [oxycodone-acetaminophen] Nausea And Vomiting     Also caused dizziness and passed out    Iodinated contrast- oral " and iv dye Swelling and Rash     Says topical iodine OK    Niaspan extended-release [niacin] Itching and Other (See Comments)     Skin flushing and redness    Norco [hydrocodone-acetaminophen] Itching, Swelling and Rash     Can tolerate if she takes Benadryl with it     Past Medical History:   Diagnosis Date    Allergy     Arthritis     degnerative disease low back,muscle spasms, shoulders    Asian flu type A 2017    Diabetes mellitus type I     since age 13    Diabetic gastroparesis     takes Cytotec    Diabetic retinopathy of both eyes     gets periodic laser treatments    Difficult intubation     as a child had sore throat after a procedure    Encounter for blood transfusion     GERD (gastroesophageal reflux disease)     Headache(784.0)     Hiatal hernia     with GERD    Hyperlipidemia     Hypertension     Infection of the upper respiratory tract 2012    Lumbar spinal stenosis     Osteopenia     Rosacea     Seizures     Pt states during pgy with stroke    Stroke ?    while pregnant    Tachycardia     asymptomatic with Toprol    Thyroid disease     hypothyroidism    Venous insufficiency of leg     improved since EVLT     Past Surgical History:   Procedure Laterality Date    BUNIONECTOMY Right 2012    CARDIAC CATHETERIZATION      no stents     SECTION      COLONOSCOPY  2007    Dr. Rose, 10 year recheck    EXOSTECTOMY  12    left foot, local anesthesia, in office    EYE SURGERY      has had many laser procedures for diabetic retinopathy    VASCULAR SURGERY      VEIN SURGERY  2012    EVLT right greater saphenous, IV sedation     Family History   Problem Relation Age of Onset    Cancer Maternal Aunt         breast    Breast cancer Maternal Aunt     Diabetes Maternal Grandmother     Breast cancer Maternal Grandmother     Cancer Maternal Grandfather         prostate    Diabetes Maternal Grandfather     Diabetes Cousin     Arthritis  Mother     Cancer Mother     Melanoma Mother     Heart disease Father     Stroke Father     No Known Problems Sister     No Known Problems Brother     No Known Problems Daughter     Alzheimer's disease Maternal Uncle     Alcohol abuse Maternal Uncle         x2    Heart disease Maternal Uncle     No Known Problems Paternal Aunt     Heart disease Paternal Uncle      Social History   Substance Use Topics    Smoking status: Never Smoker    Smokeless tobacco: Never Used    Alcohol use Yes      Comment: social, 1 drink monthly     Review of Systems   Constitutional: Negative for chills and fever.   HENT: Negative for facial swelling and trouble swallowing.    Eyes: Negative for discharge.   Respiratory: Negative for cough, chest tightness, shortness of breath and wheezing.    Cardiovascular: Negative for chest pain, palpitations and leg swelling.   Gastrointestinal: Negative for abdominal pain, diarrhea, nausea and vomiting.   Genitourinary: Negative for dysuria and hematuria.   Musculoskeletal: Negative for arthralgias, back pain, joint swelling, myalgias, neck pain and neck stiffness.        Positive left leg pain    Skin: Negative for color change, pallor, rash and wound.   Neurological: Negative for dizziness, syncope, weakness, light-headedness, numbness and headaches.   Hematological: Does not bruise/bleed easily.   Psychiatric/Behavioral: The patient is not nervous/anxious.    All other systems reviewed and are negative.      Physical Exam     Initial Vitals [06/29/18 1049]   BP Pulse Resp Temp SpO2   119/66 85 18 98 °F (36.7 °C) 99 %      MAP       --         Physical Exam    Nursing note reviewed.  Constitutional: She appears well-developed and well-nourished. She is not diaphoretic. No distress.   HENT:   Head: Normocephalic and atraumatic.   Cardiovascular: Intact distal pulses.   Pulses:       Dorsalis pedis pulses are 2+ on the right side, and 2+ on the left side.   Palpable 2 + pedal pulses     Musculoskeletal: Normal range of motion. She exhibits tenderness. She exhibits no edema.        Left lower leg: She exhibits tenderness. She exhibits no swelling.   Tenderness to palpation of the left lower anterior leg without erythema or induration.   No increased swelling noted to bilateral lower extremities.    Neurological: She is alert and oriented to person, place, and time. She has normal strength. No sensory deficit.   Skin: Skin is warm and dry. No rash and no abscess noted. No erythema.   Psychiatric: She has a normal mood and affect.         ED Course   Procedures  Labs Reviewed - No data to display       Imaging Results    None          Medical Decision Making:   History:   Old Medical Records: I decided to obtain old medical records.  Differential Diagnosis:   Fracture  Dislocation  Sprain  Contusion  Strain  Spasm      Clinical Tests:   Radiological Study: Ordered and Reviewed       APC / Resident Notes:   No evidence for DVT on ultrasound.  No acute bony abnormalities noted on tib/fib xray.  Her symptoms are likely related to muscular etiology.  She is feeling better after a dose of Voltaren and Norco given here in the ED.  She will be discharged home with a prescription for Voltaren.  No need for narcotic prescription at home.  She voices understanding and is agreeable to the plan for discharge.  She is given specific return precautions.        Scribe Attestation:   Scribe #1: I performed the above scribed service and the documentation accurately describes the services I performed. I attest to the accuracy of the note.    Attending Attestation:     Physician Attestation Statement for NP/PA:   I discussed this assessment and plan of this patient with the NP/PA, but I did not personally examine the patient. The face to face encounter was performed by the NP/PA.    Other NP/PA Attestation Additions:    History of Present Illness: 63-year-old female presented with a chief complaint of lower leg pain.     Medical Decision Making: Initial differential diagnosis included but not limited to DVT, shin splint, and musculoskeletal spine pain.  I am in agreement with the physician assistant's  assessment, treatment, and plan of care.         I, Mckayla Jacques PA-C, personally performed the services described in this documentation. All medical record entries made by the scribe were at my direction and in my presence.  I have reviewed the chart and agree that the record reflects my personal performance and is accurate and complete. Mckayla Jacques PA-C.  3:40 PM 06/29/2018             Clinical Impression:   The encounter diagnosis was Left leg pain.  1. Left leg pain    2. Muscle strain of left lower leg, initial encounter    3. Shin splint, left, initial encounter          Disposition:   Disposition: Discharged  Condition: Stable                        Mckayla Jacques PA-C  06/29/18 1540       Jerrod Bullard MD  06/29/18 8529

## 2018-06-29 NOTE — TELEPHONE ENCOUNTER
Patient has sever throbbing pain in lower leg for 3 days which is worsening-patient advised to go to ed to rule out dvt.

## 2018-07-12 ENCOUNTER — OFFICE VISIT (OUTPATIENT)
Dept: PODIATRY | Facility: CLINIC | Age: 63
End: 2018-07-12
Payer: COMMERCIAL

## 2018-07-12 VITALS — HEIGHT: 66 IN | BODY MASS INDEX: 29.32 KG/M2 | WEIGHT: 182.44 LBS

## 2018-07-12 DIAGNOSIS — E10.42 CONTROLLED TYPE 1 DIABETES MELLITUS WITH DIABETIC POLYNEUROPATHY: Primary | ICD-10-CM

## 2018-07-12 DIAGNOSIS — R20.2 PARESTHESIA OF FOOT, BILATERAL: ICD-10-CM

## 2018-07-12 DIAGNOSIS — M76.62 ACHILLES TENDINITIS OF LEFT LOWER EXTREMITY: ICD-10-CM

## 2018-07-12 DIAGNOSIS — M62.469 GASTROCNEMIUS EQUINUS, UNSPECIFIED LATERALITY: ICD-10-CM

## 2018-07-12 DIAGNOSIS — M20.42 HAMMER TOES OF BOTH FEET: ICD-10-CM

## 2018-07-12 DIAGNOSIS — M20.41 HAMMER TOES OF BOTH FEET: ICD-10-CM

## 2018-07-12 PROCEDURE — 99499 UNLISTED E&M SERVICE: CPT | Mod: S$GLB,,, | Performed by: PODIATRIST

## 2018-07-12 PROCEDURE — 11721 DEBRIDE NAIL 6 OR MORE: CPT | Mod: Q9,S$GLB,, | Performed by: PODIATRIST

## 2018-07-12 PROCEDURE — 99999 PR PBB SHADOW E&M-EST. PATIENT-LVL V: CPT | Mod: PBBFAC,,, | Performed by: PODIATRIST

## 2018-07-12 RX ORDER — TRAMADOL HYDROCHLORIDE 50 MG/1
50 TABLET ORAL EVERY 12 HOURS PRN
Qty: 30 TABLET | Refills: 1 | Status: SHIPPED | OUTPATIENT
Start: 2018-07-12 | End: 2018-07-22

## 2018-07-12 NOTE — PROGRESS NOTES
Subjective:      Patient ID: Kateryna Desai is a 63 y.o. female.    Chief Complaint: Diabetes Mellitus (PCP:  Dr Mendoza 1/4/18; HgbA1c: 3/10/18  5.8); Follow-up (3 mo re-ck); and Foot Pain (Left)    Kateryna is a 63 y.o. female who presents to the clinic for routine evaluation and treatment of diabetic feet. Kateryna has a past medical history of Allergy; Arthritis; Asian flu type A (12/22/2017); Diabetes mellitus type I; Diabetic gastroparesis; Diabetic retinopathy of both eyes; Difficult intubation; Encounter for blood transfusion; GERD (gastroesophageal reflux disease); Headache(784.0); Hiatal hernia; Hyperlipidemia; Hypertension; Infection of the upper respiratory tract (June 2012); Lumbar spinal stenosis; Osteopenia; Rosacea; Seizures; Stroke (1985?); Tachycardia; Thyroid disease; and Venous insufficiency of leg. Patient 's chief complaint consists of Lt. Posterior heel pain.  Describes pain as sharp and currently rates as a 6/10.  Symptoms are exacerbated with pressure while laying in bed and with prolonged weight bearing activity.    She continues to prop the heels while sleeping to minimize pressure to the affected area.  States that symptoms are managed with occasionally taking tramadol.  Denies any additional pedal complaints.      PCP: Endy Mendoza MD    Date Last Seen by PCP:  1/4/18      Hemoglobin A1C   Date Value Ref Range Status   03/10/2018 5.8 (H) 4.0 - 5.6 % Final     Comment:     According to ADA guidelines, hemoglobin A1c <7.0% represents  optimal control in non-pregnant diabetic patients. Different  metrics may apply to specific patient populations.   Standards of Medical Care in Diabetes-2016.  For the purpose of screening for the presence of diabetes:  <5.7%     Consistent with the absence of diabetes  5.7-6.4%  Consistent with increasing risk for diabetes   (prediabetes)  >or=6.5%  Consistent with diabetes  Currently, no consensus exists for use of hemoglobin A1c  for  diagnosis of diabetes for children.  This Hemoglobin A1c assay has significant interference with fetal   hemoglobin   (HbF). The results are invalid for patients with abnormal amounts of   HbF,   including those with known Hereditary Persistence   of Fetal Hemoglobin. Heterozygous hemoglobin variants (HbAS, HbAC,   HbAD, HbAE, HbA2) do not significantly interfere with this assay;   however, presence of multiple variants in a sample may impact the %   interference.     12/27/2017 6.0 (H) 4.0 - 5.6 % Final     Comment:     According to ADA guidelines, hemoglobin A1c <7.0% represents  optimal control in non-pregnant diabetic patients. Different  metrics may apply to specific patient populations.   Standards of Medical Care in Diabetes-2016.  For the purpose of screening for the presence of diabetes:  <5.7%     Consistent with the absence of diabetes  5.7-6.4%  Consistent with increasing risk for diabetes   (prediabetes)  >or=6.5%  Consistent with diabetes  Currently, no consensus exists for use of hemoglobin A1c  for diagnosis of diabetes for children.  This Hemoglobin A1c assay has significant interference with fetal   hemoglobin   (HbF). The results are invalid for patients with abnormal amounts of   HbF,   including those with known Hereditary Persistence   of Fetal Hemoglobin. Heterozygous hemoglobin variants (HbAS, HbAC,   HbAD, HbAE, HbA2) do not significantly interfere with this assay;   however, presence of multiple variants in a sample may impact the %   interference.     12/27/2017 6.0 (H) 4.0 - 5.6 % Final     Comment:     According to ADA guidelines, hemoglobin A1c <7.0% represents  optimal control in non-pregnant diabetic patients. Different  metrics may apply to specific patient populations.   Standards of Medical Care in Diabetes-2016.  For the purpose of screening for the presence of diabetes:  <5.7%     Consistent with the absence of diabetes  5.7-6.4%  Consistent with increasing risk for diabetes    (prediabetes)  >or=6.5%  Consistent with diabetes  Currently, no consensus exists for use of hemoglobin A1c  for diagnosis of diabetes for children.  This Hemoglobin A1c assay has significant interference with fetal   hemoglobin   (HbF). The results are invalid for patients with abnormal amounts of   HbF,   including those with known Hereditary Persistence   of Fetal Hemoglobin. Heterozygous hemoglobin variants (HbAS, HbAC,   HbAD, HbAE, HbA2) do not significantly interfere with this assay;   however, presence of multiple variants in a sample may impact the %   interference.     12/27/2017 6.0 (H) 4.0 - 5.6 % Final     Comment:     According to ADA guidelines, hemoglobin A1c <7.0% represents  optimal control in non-pregnant diabetic patients. Different  metrics may apply to specific patient populations.   Standards of Medical Care in Diabetes-2016.  For the purpose of screening for the presence of diabetes:  <5.7%     Consistent with the absence of diabetes  5.7-6.4%  Consistent with increasing risk for diabetes   (prediabetes)  >or=6.5%  Consistent with diabetes  Currently, no consensus exists for use of hemoglobin A1c  for diagnosis of diabetes for children.  This Hemoglobin A1c assay has significant interference with fetal   hemoglobin   (HbF). The results are invalid for patients with abnormal amounts of   HbF,   including those with known Hereditary Persistence   of Fetal Hemoglobin. Heterozygous hemoglobin variants (HbAS, HbAC,   HbAD, HbAE, HbA2) do not significantly interfere with this assay;   however, presence of multiple variants in a sample may impact the %   interference.             Past Medical History:   Diagnosis Date    Allergy     Arthritis     degnerative disease low back,muscle spasms, shoulders    Asian flu type A 12/22/2017    Diabetes mellitus type I     since age 13    Diabetic gastroparesis     takes Cytotec    Diabetic retinopathy of both eyes     gets periodic laser treatments     Difficult intubation     as a child had sore throat after a procedure    Encounter for blood transfusion     GERD (gastroesophageal reflux disease)     Headache(784.0)     Hiatal hernia     with GERD    Hyperlipidemia     Hypertension     Infection of the upper respiratory tract 2012    Lumbar spinal stenosis     Osteopenia     Rosacea     Seizures     Pt states during pgy with stroke    Stroke ?    while pregnant    Tachycardia     asymptomatic with Toprol    Thyroid disease     hypothyroidism    Venous insufficiency of leg     improved since EVLT       Past Surgical History:   Procedure Laterality Date    BUNIONECTOMY Right 2012    CARDIAC CATHETERIZATION      no stents     SECTION      COLONOSCOPY  2007    Dr. Rose, 10 year recheck    EXOSTECTOMY  12    left foot, local anesthesia, in office    EYE SURGERY      has had many laser procedures for diabetic retinopathy    VASCULAR SURGERY      VEIN SURGERY  2012    EVLT right greater saphenous, IV sedation       Family History   Problem Relation Age of Onset    Cancer Maternal Aunt         breast    Breast cancer Maternal Aunt     Diabetes Maternal Grandmother     Breast cancer Maternal Grandmother     Cancer Maternal Grandfather         prostate    Diabetes Maternal Grandfather     Diabetes Cousin     Arthritis Mother     Cancer Mother     Melanoma Mother     Heart disease Father     Stroke Father     No Known Problems Sister     No Known Problems Brother     No Known Problems Daughter     Alzheimer's disease Maternal Uncle     Alcohol abuse Maternal Uncle         x2    Heart disease Maternal Uncle     No Known Problems Paternal Aunt     Heart disease Paternal Uncle        Social History     Social History    Marital status:      Spouse name: N/A    Number of children: N/A    Years of education: N/A     Social History Main Topics    Smoking status: Never Smoker     "Smokeless tobacco: Never Used    Alcohol use Yes      Comment: social, 1 drink monthly    Drug use: No    Sexual activity: Yes     Partners: Male     Other Topics Concern    None     Social History Narrative    None       Current Outpatient Prescriptions   Medication Sig Dispense Refill    ACZONE 5 % topical gel Apply topically 2 (two) times daily as needed.   0    alpha lipoic acid 600 mg Cap Take 1 capsule by mouth once daily.      ascorbic acid, vitamin C, (VITAMIN C) 100 MG tablet Take 100 mg by mouth 2 (two) times daily.      aspirin (ECOTRIN) 81 MG EC tablet Take 81 mg by mouth once daily.      BD ULTRA-FINE JCARLOS PEN NEEDLES 32 gauge x 5/32" Ndle USE ONE NEEDLE ONCE DAILY  0    BENEFIBER, GUAR GUM, ORAL Take 1 Dose by mouth 2 (two) times daily as needed.       benzonatate (TESSALON) 100 MG capsule take 1 to 2 capsules by mouth three times a day if needed for cough 60 capsule 0    calcium citrate-vitamin D3 315-200 mg (CITRACAL+D) 315-200 mg-unit per tablet Take 1 tablet by mouth 2 (two) times daily.      cholecalciferol, vitamin D3, (VITAMIN D3) 2,000 unit Cap Take 1 capsule by mouth once daily.      cinnamon bark 500 mg capsule Take 500 mg by mouth 2 (two) times daily.      CRESTOR 40 mg Tab Take 40 mg by mouth once daily.       diclofenac (VOLTAREN) 75 MG EC tablet Take 1 tablet (75 mg total) by mouth 2 (two) times daily as needed. 30 tablet 0    DOCOSAHEXANOIC ACID/EPA (FISH OIL ORAL) Take 1,200 mg by mouth 2 (two) times daily.      doxycycline (VIBRAMYCIN) 50 MG capsule Take 1 capsule by mouth 2 (two) times daily as needed (roseacea).   0    ESTRACE 0.01 % (0.1 mg/gram) vaginal cream Place vaginally twice a week. Tuesday, Saturday  0    estradiol 10 mcg Tab Take 1 tablet by mouth twice a week. Tuesday, Saturday      ezetimibe (ZETIA) 10 mg tablet Take 10 mg by mouth once daily.      fluticasone (FLONASE) 50 mcg/actuation nasal spray 1 spray by Each Nare route once daily. (Patient " taking differently: 1 spray by Each Nare route daily as needed for Rhinitis. ) 16 g 0    furosemide (LASIX) 20 MG tablet       HUMALOG 100 unit/mL injection Inject into the skin. 10-17 units tid sliding scale  1    INSULIN GLARGINE,HUM.REC.ANLOG (TOUJEO SOLOSTAR SUBQ) Inject 46 Units into the skin every morning.       L.ACID/L.CASEI/B.BIF/B.CEDRIC/FOS (PROBIOTIC BLEND ORAL) Take 1 capsule by mouth every Mon, Wed, Fri.      metoprolol succinate (TOPROL-XL) 25 MG 24 hr tablet take 1 tablet by mouth once daily 30 tablet 10    ONETOUCH ULTRA TEST Strp   0    pantoprazole (PROTONIX) 40 MG tablet take 1 tablet by mouth twice a day 180 tablet 3    potassium chloride SA (K-DUR,KLOR-CON) 20 MEQ tablet Take 2 tablets (40 mEq total) by mouth once daily. When you take a lasix pill 30 tablet 0    spironolactone (ALDACTONE) 50 MG tablet       SYNTHROID 88 mcg tablet Take 88 mcg by mouth every evening.   0    VITAMIN B COMPLEX VIT C NO.4 (SUPER B COMPLEX + C ORAL) Take 1 tablet by mouth once daily.      GLUCAGON EMERGENCY 1 mg injection 1 mg as needed.       ondansetron (ZOFRAN-ODT) 4 MG TbDL Take 1 tablet (4 mg total) by mouth every 8 (eight) hours as needed. 12 tablet 0    traMADol (ULTRAM) 50 mg tablet Take 1 tablet (50 mg total) by mouth every 12 (twelve) hours as needed for Pain. 30 tablet 1     No current facility-administered medications for this visit.        Review of patient's allergies indicates:   Allergen Reactions    Bactrim [sulfamethoxazole-trimethoprim] Rash     Patient experienced on 12/3/14 redness to face and rash to chest and arms/ with no SOB or airway obstruction    Percocet [oxycodone-acetaminophen] Nausea And Vomiting     Also caused dizziness and passed out    Iodinated contrast media - iv dye Swelling and Rash     Says topical iodine OK    Norco [hydrocodone-acetaminophen] Itching, Swelling and Rash     Can tolerate if she takes Benadryl with it    Niaspan extended-release [niacin]  Itching and Other (See Comments)     Skin flushing and redness         Review of Systems   Constitution: Negative for chills, decreased appetite, diaphoresis and fever.   Cardiovascular: Negative for claudication and leg swelling.   Skin: Positive for color change and nail changes. Negative for dry skin, flushing and itching.   Musculoskeletal: Positive for myalgias. Negative for arthritis, back pain, falls, gout, joint pain and joint swelling.   Neurological: Positive for numbness and paresthesias.   Psychiatric/Behavioral: Negative for altered mental status.           Objective:      Physical Exam   Constitutional: She is oriented to person, place, and time. She appears well-developed and well-nourished. No distress.   Cardiovascular:   Pulses:       Dorsalis pedis pulses are 2+ on the right side, and 2+ on the left side.        Posterior tibial pulses are 1+ on the right side, and 1+ on the left side.   CFT <3 seconds bilateral.  Pedal hair growth decreased bilateral.  Varicosities noted to bilateral lower extremity. Mild nonpitting edema noted to bilateral lower extremity.  Toes are cool to touch bilateral.       Musculoskeletal: Normal range of motion. She exhibits edema and tenderness.   Muscle strength 5/5 in all muscle groups bilateral.  No tenderness nor crepitation with active and passive ROM of foot/ankle joints bilateral.  Pain with palpation to the Lt. Achilles tendon 1cm superior to its insertion at the calcaneus.  Bilateral gastrocnemius equinus.  Bilateral pes planus foot type.  Bilateral hallux abducto valgus.  Bilateral semi-rigid contracture of toes 2-5.     Neurological: She is alert and oriented to person, place, and time. She has normal strength. A sensory deficit is present.   Protective sensation per [place]ail monofilament decreased bilateral.    Vibratory sensation intact bilateral.    Light touch intact bilateral.   Skin: Skin is warm, dry and intact. No abrasion, no bruising, no  burn, no ecchymosis, no laceration, no lesion, no petechiae and no rash noted. She is not diaphoretic. No cyanosis or erythema. No pallor. Nails show no clubbing.   Toenails x 10 appear thickened by 2 mm's, elongated by 2 mm's, and discolored with subungual debris. No open wounds, interdigital maceration noted bilateral.  Cicatrix of the Lt. Posterior heel is well healed with no adjacent break in skin integrity.  Site is atrophic in appearance.                     Assessment:       Encounter Diagnoses   Name Primary?    Controlled type 1 diabetes mellitus with diabetic polyneuropathy Yes    Paresthesia of foot, bilateral     Achilles tendinitis of left lower extremity     Gastrocnemius equinus, unspecified laterality     Hammer toes of both feet          Plan:       Kateryna was seen today for diabetes mellitus, follow-up and foot pain.    Diagnoses and all orders for this visit:    Controlled type 1 diabetes mellitus with diabetic polyneuropathy  -     traMADol (ULTRAM) 50 mg tablet; Take 1 tablet (50 mg total) by mouth every 12 (twelve) hours as needed for Pain.    Paresthesia of foot, bilateral  -     traMADol (ULTRAM) 50 mg tablet; Take 1 tablet (50 mg total) by mouth every 12 (twelve) hours as needed for Pain.    Achilles tendinitis of left lower extremity  -     traMADol (ULTRAM) 50 mg tablet; Take 1 tablet (50 mg total) by mouth every 12 (twelve) hours as needed for Pain.    Gastrocnemius equinus, unspecified laterality    Hammer toes of both feet      I counseled the patient on her conditions, their implications and medical management.    - Advised to apply ice to the LT. Achilles tendon up to 20 minutes daily.  May also apply biofreeze to help with pain.    - Instructed to continue performing stretching exercises to reduce bilateral equinus as well.    - Fitted and dispensed bilateral heel lifts to offload bilateral Achilles.    - Advised to continue wearing shoe gear to accommodate for digital  deformities.      - Shoe inspection. Diabetic Foot Education. Patient reminded of the importance of good nutrition and blood sugar control to help prevent podiatric complications of diabetes. Patient instructed on proper foot hygeine. We discussed wearing proper shoe gear, daily foot inspections, never walking without protective shoe gear, never putting sharp instruments to feet    - With patient's permission, nails were aggressively reduced and debrided x 10 to their soft tissue attachment mechanically and with electric , removing all offending nail and debris.  Patient relates relief following the procedure. She will continue to monitor the areas daily, inspect her feet, wear protective shoe gear when ambulatory, moisturizer to maintain skin integrity and follow in this office in approximately 2-3 months, sooner p.r.n.    Follow-up in about 3 months (around 10/12/2018).    Lei Rhoades DPM

## 2018-07-16 ENCOUNTER — LAB VISIT (OUTPATIENT)
Dept: LAB | Facility: HOSPITAL | Age: 63
End: 2018-07-16
Attending: INTERNAL MEDICINE
Payer: COMMERCIAL

## 2018-07-16 DIAGNOSIS — Z79.4 ENCOUNTER FOR LONG-TERM (CURRENT) USE OF INSULIN: ICD-10-CM

## 2018-07-16 DIAGNOSIS — E78.00 PURE HYPERCHOLESTEROLEMIA: ICD-10-CM

## 2018-07-16 DIAGNOSIS — E10.9 DIABETES MELLITUS TYPE I: Primary | ICD-10-CM

## 2018-07-16 DIAGNOSIS — I51.9 MYXEDEMA HEART DISEASE: ICD-10-CM

## 2018-07-16 DIAGNOSIS — E03.9 MYXEDEMA HEART DISEASE: ICD-10-CM

## 2018-07-16 DIAGNOSIS — E10.9 DIABETES MELLITUS TYPE I: ICD-10-CM

## 2018-07-16 LAB
25(OH)D3+25(OH)D2 SERPL-MCNC: 35 NG/ML
ALBUMIN SERPL BCP-MCNC: 3.8 G/DL
ALP SERPL-CCNC: 85 U/L
ALT SERPL W/O P-5'-P-CCNC: 20 U/L
ANION GAP SERPL CALC-SCNC: 9 MMOL/L
AST SERPL-CCNC: 25 U/L
BILIRUB SERPL-MCNC: 0.5 MG/DL
BUN SERPL-MCNC: 10 MG/DL
CALCIUM SERPL-MCNC: 10 MG/DL
CHLORIDE SERPL-SCNC: 101 MMOL/L
CHOLEST SERPL-MCNC: 181 MG/DL
CHOLEST/HDLC SERPL: 3.4 {RATIO}
CO2 SERPL-SCNC: 31 MMOL/L
CREAT SERPL-MCNC: 1.1 MG/DL
EST. GFR  (AFRICAN AMERICAN): >60 ML/MIN/1.73 M^2
EST. GFR  (NON AFRICAN AMERICAN): 54.3 ML/MIN/1.73 M^2
ESTIMATED AVG GLUCOSE: 126 MG/DL
GLUCOSE SERPL-MCNC: 75 MG/DL
HBA1C MFR BLD HPLC: 6 %
HDLC SERPL-MCNC: 53 MG/DL
HDLC SERPL: 29.3 %
LDLC SERPL CALC-MCNC: 112.8 MG/DL
NONHDLC SERPL-MCNC: 128 MG/DL
POTASSIUM SERPL-SCNC: 3.7 MMOL/L
PROT SERPL-MCNC: 7.1 G/DL
SODIUM SERPL-SCNC: 141 MMOL/L
TRIGL SERPL-MCNC: 76 MG/DL
TSH SERPL DL<=0.005 MIU/L-ACNC: 2.22 UIU/ML

## 2018-07-16 PROCEDURE — 83036 HEMOGLOBIN GLYCOSYLATED A1C: CPT

## 2018-07-16 PROCEDURE — 36415 COLL VENOUS BLD VENIPUNCTURE: CPT | Mod: PO

## 2018-07-16 PROCEDURE — 84443 ASSAY THYROID STIM HORMONE: CPT

## 2018-07-16 PROCEDURE — 80053 COMPREHEN METABOLIC PANEL: CPT

## 2018-07-16 PROCEDURE — 80061 LIPID PANEL: CPT

## 2018-07-16 PROCEDURE — 82306 VITAMIN D 25 HYDROXY: CPT

## 2018-07-26 LAB — HUMAN PAPILLOMAVIRUS (HPV): NORMAL

## 2018-08-06 RX ORDER — FUROSEMIDE 20 MG/1
TABLET ORAL
Qty: 60 TABLET | Refills: 5 | Status: SHIPPED | OUTPATIENT
Start: 2018-08-06 | End: 2018-11-20 | Stop reason: SDUPTHER

## 2018-08-06 NOTE — TELEPHONE ENCOUNTER
Patient stated she can not remember the name of the doctor that put her on it but she has been using this does bid for over 5 years with Dr Mendoza refilling it.

## 2018-08-06 NOTE — TELEPHONE ENCOUNTER
It does not appear that we have ever prescribed this medication for the patient.  It is listed in the history but not as 2 times daily.  I am not comfortable just sending this prescription without a confirmation on the prescriber and dose. Maybe the patient can check her bottle and see who usually gives this to her.   Thanks.   Shakira

## 2018-08-20 ENCOUNTER — OFFICE VISIT (OUTPATIENT)
Dept: CARDIOLOGY | Facility: CLINIC | Age: 63
End: 2018-08-20
Payer: COMMERCIAL

## 2018-08-20 ENCOUNTER — TELEPHONE (OUTPATIENT)
Dept: VASCULAR SURGERY | Facility: CLINIC | Age: 63
End: 2018-08-20

## 2018-08-20 VITALS
OXYGEN SATURATION: 97 % | HEART RATE: 92 BPM | SYSTOLIC BLOOD PRESSURE: 119 MMHG | HEIGHT: 66 IN | DIASTOLIC BLOOD PRESSURE: 67 MMHG | WEIGHT: 181.69 LBS | BODY MASS INDEX: 29.2 KG/M2

## 2018-08-20 DIAGNOSIS — I10 HYPERTENSION, UNSPECIFIED TYPE: ICD-10-CM

## 2018-08-20 DIAGNOSIS — I10 HYPERTENSION, UNSPECIFIED TYPE: Primary | ICD-10-CM

## 2018-08-20 DIAGNOSIS — R55 SYNCOPE AND COLLAPSE: ICD-10-CM

## 2018-08-20 DIAGNOSIS — R60.0 LOCALIZED EDEMA: Primary | ICD-10-CM

## 2018-08-20 PROCEDURE — 3078F DIAST BP <80 MM HG: CPT | Mod: CPTII,S$GLB,, | Performed by: INTERNAL MEDICINE

## 2018-08-20 PROCEDURE — 3074F SYST BP LT 130 MM HG: CPT | Mod: CPTII,S$GLB,, | Performed by: INTERNAL MEDICINE

## 2018-08-20 PROCEDURE — 99999 PR PBB SHADOW E&M-EST. PATIENT-LVL V: CPT | Mod: PBBFAC,,, | Performed by: INTERNAL MEDICINE

## 2018-08-20 PROCEDURE — 99213 OFFICE O/P EST LOW 20 MIN: CPT | Mod: S$GLB,,, | Performed by: INTERNAL MEDICINE

## 2018-08-20 PROCEDURE — 93000 ELECTROCARDIOGRAM COMPLETE: CPT | Mod: S$GLB,,, | Performed by: INTERNAL MEDICINE

## 2018-08-20 PROCEDURE — 3008F BODY MASS INDEX DOCD: CPT | Mod: CPTII,S$GLB,, | Performed by: INTERNAL MEDICINE

## 2018-08-20 NOTE — TELEPHONE ENCOUNTER
Recall letter received, venous US of BLE scheduled on 9/6/18, informed once Dr Whipple has reviewed the results we will call her back with his recommendations.  Voices understanding.

## 2018-08-20 NOTE — PROGRESS NOTES
[facility]    CC:  Follow-up appointment  Chief Complaint   Patient presents with    Follow-up     6 month       Patient ID: Kateryna Desai is a 63 y.o. female with a past medical history of diabetes, hypertension  HPI  Patient is doing well.  She never had any further episodes of syncope.  Her baseline cardiovascular tests are within normal range.  She is currently going through stress because of her parents.  However she remains very active.  Denies any exertional chest pain, shortness of breath, orthopnea, PND.    Past Medical History:   Diagnosis Date    Allergy     Arthritis     degnerative disease low back,muscle spasms, shoulders    Asian flu type A 2017    Diabetes mellitus type I     since age 13    Diabetic gastroparesis     takes Cytotec    Diabetic retinopathy of both eyes     gets periodic laser treatments    Difficult intubation     as a child had sore throat after a procedure    Encounter for blood transfusion     GERD (gastroesophageal reflux disease)     Headache(784.0)     Hiatal hernia     with GERD    Hyperlipidemia     Hypertension     Infection of the upper respiratory tract 2012    Lumbar spinal stenosis     Osteopenia     Rosacea     Seizures     Pt states during pgy with stroke    Stroke ?    while pregnant    Tachycardia     asymptomatic with Toprol    Thyroid disease     hypothyroidism    Venous insufficiency of leg     improved since EVLT     Past Surgical History:   Procedure Laterality Date    BUNIONECTOMY Right 2012    CARDIAC CATHETERIZATION      no stents     SECTION      COLONOSCOPY  2007    Dr. Rose, 10 year recheck    EXOSTECTOMY  12    left foot, local anesthesia, in office    EYE SURGERY      has had many laser procedures for diabetic retinopathy    VASCULAR SURGERY      VEIN SURGERY  2012    EVLT right greater saphenous, IV sedation     Social History     Socioeconomic  History    Marital status:      Spouse name: Not on file    Number of children: Not on file    Years of education: Not on file    Highest education level: Not on file   Social Needs    Financial resource strain: Not on file    Food insecurity - worry: Not on file    Food insecurity - inability: Not on file    Transportation needs - medical: Not on file    Transportation needs - non-medical: Not on file   Occupational History    Not on file   Tobacco Use    Smoking status: Never Smoker    Smokeless tobacco: Never Used   Substance and Sexual Activity    Alcohol use: Yes     Comment: social, 1 drink monthly    Drug use: No    Sexual activity: Yes     Partners: Male   Other Topics Concern    Not on file   Social History Narrative    Not on file     Family History   Problem Relation Age of Onset    Cancer Maternal Aunt         breast    Breast cancer Maternal Aunt     Diabetes Maternal Grandmother     Breast cancer Maternal Grandmother     Cancer Maternal Grandfather         prostate    Diabetes Maternal Grandfather     Diabetes Cousin     Arthritis Mother     Cancer Mother     Melanoma Mother     Heart disease Father     Stroke Father     No Known Problems Sister     No Known Problems Brother     No Known Problems Daughter     Alzheimer's disease Maternal Uncle     Alcohol abuse Maternal Uncle         x2    Heart disease Maternal Uncle     No Known Problems Paternal Aunt     Heart disease Paternal Uncle        Review of patient's allergies indicates:   Allergen Reactions    Bactrim [sulfamethoxazole-trimethoprim] Rash     Patient experienced on 12/3/14 redness to face and rash to chest and arms/ with no SOB or airway obstruction    Heparin analogues     Percocet [oxycodone-acetaminophen] Nausea And Vomiting     Also caused dizziness and passed out    Iodinated contrast- oral and iv dye Swelling and Rash     Says topical iodine OK    Niaspan extended-release [niacin]  "Itching and Other (See Comments)     Skin flushing and redness    Norco [hydrocodone-acetaminophen] Itching, Swelling and Rash     Can tolerate if she takes Benadryl with it       Medication List with Changes/Refills   Current Medications    ACZONE 5 % TOPICAL GEL    Apply topically 2 (two) times daily as needed.     ALPHA LIPOIC ACID 600 MG CAP    Take 1 capsule by mouth once daily.    ASCORBIC ACID, VITAMIN C, (VITAMIN C) 100 MG TABLET    Take 100 mg by mouth 2 (two) times daily.    ASPIRIN (ECOTRIN) 81 MG EC TABLET    Take 81 mg by mouth once daily.    BD ULTRA-FINE JCARLOS PEN NEEDLES 32 GAUGE X 5/32" NDLE    USE ONE NEEDLE ONCE DAILY    BENEFIBER, GUAR GUM, ORAL    Take 1 Dose by mouth 2 (two) times daily as needed.     BENZONATATE (TESSALON) 100 MG CAPSULE    take 1 to 2 capsules by mouth three times a day if needed for cough    CALCIUM CITRATE-VITAMIN D3 315-200 MG (CITRACAL+D) 315-200 MG-UNIT PER TABLET    Take 1 tablet by mouth 2 (two) times daily.    CHOLECALCIFEROL, VITAMIN D3, (VITAMIN D3) 2,000 UNIT CAP    Take 1 capsule by mouth once daily.    CINNAMON BARK 500 MG CAPSULE    Take 500 mg by mouth 2 (two) times daily.    CRESTOR 40 MG TAB    Take 40 mg by mouth once daily.     DICLOFENAC (VOLTAREN) 75 MG EC TABLET    Take 1 tablet (75 mg total) by mouth 2 (two) times daily as needed.    DOCOSAHEXANOIC ACID/EPA (FISH OIL ORAL)    Take 1,200 mg by mouth 2 (two) times daily.    DOXYCYCLINE (VIBRAMYCIN) 50 MG CAPSULE    Take 1 capsule by mouth 2 (two) times daily as needed (roseacea).     ESTRACE 0.01 % (0.1 MG/GRAM) VAGINAL CREAM    Place vaginally twice a week. Tuesday, Saturday    ESTRADIOL 10 MCG TAB    Take 1 tablet by mouth twice a week. Tuesday, Saturday    EZETIMIBE (ZETIA) 10 MG TABLET    Take 10 mg by mouth once daily.    FLUTICASONE (FLONASE) 50 MCG/ACTUATION NASAL SPRAY    1 spray by Each Nare route once daily.    FUROSEMIDE (LASIX) 20 MG TABLET    TAKE 1 TABLET BY MOUTH TWICE DAILY    GLUCAGON " "EMERGENCY 1 MG INJECTION    1 mg as needed.     HUMALOG 100 UNIT/ML INJECTION    Inject into the skin. 10-17 units tid sliding scale    INSULIN GLARGINE,HUM.REC.ANLOG (TOUJEO SOLOSTAR SUBQ)    Inject 46 Units into the skin every morning.     L.ACID/L.CASEI/B.BIF/B.CEDRIC/FOS (PROBIOTIC BLEND ORAL)    Take 1 capsule by mouth every Mon, Wed, Fri.    METOPROLOL SUCCINATE (TOPROL-XL) 25 MG 24 HR TABLET    take 1 tablet by mouth once daily    ONDANSETRON (ZOFRAN-ODT) 4 MG TBDL    Take 1 tablet (4 mg total) by mouth every 8 (eight) hours as needed.    ONETOUCH ULTRA TEST STRP        PANTOPRAZOLE (PROTONIX) 40 MG TABLET    take 1 tablet by mouth twice a day    POTASSIUM CHLORIDE SA (K-DUR,KLOR-CON) 20 MEQ TABLET    Take 2 tablets (40 mEq total) by mouth once daily. When you take a lasix pill    SPIRONOLACTONE (ALDACTONE) 50 MG TABLET        SYNTHROID 88 MCG TABLET    Take 88 mcg by mouth every evening.     VITAMIN B COMPLEX VIT C NO.4 (SUPER B COMPLEX + C ORAL)    Take 1 tablet by mouth once daily.       Review of Systems  Constitution: Denies chills, fever, and sweats.  HENT: Denies headaches or blurry vision.  Cardiovascular: Denies chest pain or irregular heart beat.  Respiratory: Denies cough or shortness of breath.  Gastrointestinal: Denies abdominal pain, nausea, or vomiting.  Musculoskeletal: Denies muscle cramps.  Neurological: Denies dizziness or focal weakness.  Psychiatric/Behavioral: Normal mental status.  Hematologic/Lymphatic: Denies bleeding problem or easy bruising/bleeding.  Skin: Denies rash or suspicious lesions    Physical Examination  /67 (BP Location: Right arm, Patient Position: Sitting)   Pulse 92   Ht 5' 6" (1.676 m)   Wt 82.4 kg (181 lb 10.5 oz)   SpO2 97%   BMI 29.32 kg/m²     Constitutional: No acute distress, conversant  HEENT: Sclera anicteric, Pupils equal, round and reactive to light, extraocular motions intact, Oropharynx clear  Neck: No JVD, no carotid bruits  Cardiovascular: " regular rate and rhythm, es murmur, rubs or gallops, normal S1/S2  Pulmonary: Clear to auscultation bilaterally  Abdominal: Abdomen soft, nontender, nondistended, positive bowel sounds  Extremities: No lower extremity edema,   Pulses:  Carotid pulses are 2+ on the right side, and 2+ on the left side.  Radial pulses are 2+ on the right side, and 2+ on the left side.      Skin: No ecchymosis, erythema, or ulcers  Psych: Alert and oriented x 3, appropriate affect  Neuro: CNII-XII intact, no focal deficits    Labs:  Most Recent Data  CBC:   Lab Results   Component Value Date    WBC 5.54 05/03/2018    HGB 12.4 05/03/2018    HCT 40.6 05/03/2018     05/03/2018    MCV 98 05/03/2018    RDW 14.4 05/03/2018     BMP:   Lab Results   Component Value Date     07/16/2018    K 3.7 07/16/2018     07/16/2018    CO2 31 (H) 07/16/2018    BUN 10 07/16/2018    CREATININE 1.1 07/16/2018    GLU 75 07/16/2018    CALCIUM 10.0 07/16/2018    MG 2.1 06/04/2018    PHOS 3.8 06/04/2018     LFTS;   Lab Results   Component Value Date    PROT 7.1 07/16/2018    ALBUMIN 3.8 07/16/2018    BILITOT 0.5 07/16/2018    AST 25 07/16/2018    ALKPHOS 85 07/16/2018    ALT 20 07/16/2018     COAGS: No results found for: INR, PROTIME, PTT  FLP:   Lab Results   Component Value Date    CHOL 181 07/16/2018    HDL 53 07/16/2018    LDLCALC 112.8 07/16/2018    TRIG 76 07/16/2018    CHOLHDL 29.3 07/16/2018     CARDIAC:   Lab Results   Component Value Date    TROPONINI 0.010 12/28/2017       Imaging:    EKG:  Normal sinus rhythm. No significant ST T wave changes. Baseline artifact    Echo:  CONCLUSIONS     1 - Normal left ventricular systolic function (EF 60-65%).     2 - No wall motion abnormalities.     3 - Normal left ventricular diastolic function.     4 - Right ventricular enlargement with normal systolic function.     5 - The estimated PA systolic pressure is 25 mmHg.     Stress Testing:  Impression: NORMAL MYOCARDIAL PERFUSION  1. The perfusion  scan is free of evidence for myocardial ischemia or injury.   2. Resting wall motion is physiologic.   3. Resting LV function is normal.   4. The ventricular volumes are normal at rest and stress.   5. The extracardiac distribution of radioactivity is normal.   6. There was no previous study available to compare.  I have personally reviewed these images and echo data    Assessment/Plan:  Kateryna was seen today for follow-up.    Diagnoses and all orders for this visit:    Hypertension, unspecified type  -     EKG 12-lead    Syncope and collapse        The patient is currently stable from a cardiovascular perspective.  The syncope was likely precipitated because of her underlying illness, fever and dehydration.  I would hold off on doing any further cardiac investigations on her.  If she has any further episodes we will put a loop recorder.      Follow-up in about 1 year (around 8/20/2019).          Total duration of face to face visit time 15 minutes.  Total time spent counseling greater than fifty percent of total visit time.  Counseling included discussion regarding imaging findings, diagnosis, possibilities, treatment options, risks and benefits.  The patient had many questions regarding the options and long-term effect which were all answered to my best ability.      Tonio Lambert MD,MRCP,RPVI,FACC,FSCAI.  Interventional Cardiology   Phone [phone]

## 2018-08-20 NOTE — TELEPHONE ENCOUNTER
----- Message from Mirna Mai sent at 8/20/2018  2:43 PM CDT -----  Contact: Patient  Type:  Patient Returning Call    Who Called:  Patient  Who Left Message for Patient:  Divine  Does the patient know what this is regarding?:  An appt  Best Call Back Number:   Additional Information: Call to pod. No answer.

## 2018-08-20 NOTE — TELEPHONE ENCOUNTER
----- Message from Ahmet Segura sent at 8/20/2018  7:52 AM CDT -----  Contact: pt  Pt is calling to chrisle her yearly appt, nothing for me to schedule, pls call pt back and advise  Call Back#[phone]  Thanks

## 2018-09-04 ENCOUNTER — TELEPHONE (OUTPATIENT)
Dept: FAMILY MEDICINE | Facility: CLINIC | Age: 63
End: 2018-09-04

## 2018-09-04 RX ORDER — SPIRONOLACTONE 50 MG/1
TABLET, FILM COATED ORAL
Qty: 60 TABLET | Refills: 0 | OUTPATIENT
Start: 2018-09-04

## 2018-09-04 NOTE — TELEPHONE ENCOUNTER
Received refill request for spironolactone 50mg- was dc'd per Dr. Murray in December 2017 after patient seen in er for syncope- patient says she has been back on it once a day since February 2018 per Dr. Lambert. Refill already pended per Dr. Lambert.

## 2018-09-06 ENCOUNTER — HOSPITAL ENCOUNTER (OUTPATIENT)
Dept: RADIOLOGY | Facility: HOSPITAL | Age: 63
Discharge: HOME | End: 2018-09-06
Attending: THORACIC SURGERY (CARDIOTHORACIC VASCULAR SURGERY)
Payer: COMMERCIAL

## 2018-09-06 DIAGNOSIS — R60.0 LOCALIZED EDEMA: ICD-10-CM

## 2018-09-06 PROCEDURE — 93970 EXTREMITY STUDY: CPT | Mod: 26,,, | Performed by: RADIOLOGY

## 2018-09-06 PROCEDURE — 93970 EXTREMITY STUDY: CPT | Mod: TC,PO

## 2018-09-11 RX ORDER — SPIRONOLACTONE 50 MG/1
50 TABLET, FILM COATED ORAL
Qty: 180 TABLET | Refills: 3 | OUTPATIENT
Start: 2018-09-11

## 2018-09-27 LAB
LEFT EYE DM RETINOPATHY: POSITIVE
RIGHT EYE DM RETINOPATHY: POSITIVE

## 2018-10-12 RX ORDER — SPIRONOLACTONE 50 MG/1
TABLET, FILM COATED ORAL
Qty: 60 TABLET | Refills: 0 | OUTPATIENT
Start: 2018-10-12

## 2018-10-13 NOTE — TELEPHONE ENCOUNTER
She was taken off of these in December 2017 when she was admitted to the hospital for a syncopal episode/blackout.  Her potassium and magnesium were low, she was dehydrated, and these can also cause heart rhythm disturbances.  Cardiology told her not to take these again.

## 2018-10-19 ENCOUNTER — OFFICE VISIT (OUTPATIENT)
Dept: PODIATRY | Facility: CLINIC | Age: 63
End: 2018-10-19
Payer: COMMERCIAL

## 2018-10-19 VITALS
WEIGHT: 181.69 LBS | HEART RATE: 91 BPM | SYSTOLIC BLOOD PRESSURE: 125 MMHG | HEIGHT: 66 IN | BODY MASS INDEX: 29.2 KG/M2 | DIASTOLIC BLOOD PRESSURE: 74 MMHG

## 2018-10-19 DIAGNOSIS — R20.2 PARESTHESIA OF FOOT, BILATERAL: ICD-10-CM

## 2018-10-19 DIAGNOSIS — E10.42 CONTROLLED TYPE 1 DIABETES MELLITUS WITH DIABETIC POLYNEUROPATHY: Primary | ICD-10-CM

## 2018-10-19 DIAGNOSIS — B35.1 ONYCHOMYCOSIS DUE TO DERMATOPHYTE: ICD-10-CM

## 2018-10-19 PROCEDURE — 11721 DEBRIDE NAIL 6 OR MORE: CPT | Mod: Q9,S$GLB,, | Performed by: PODIATRIST

## 2018-10-19 PROCEDURE — 99499 UNLISTED E&M SERVICE: CPT | Mod: S$GLB,,, | Performed by: PODIATRIST

## 2018-10-19 PROCEDURE — 99999 PR PBB SHADOW E&M-EST. PATIENT-LVL III: CPT | Mod: PBBFAC,,, | Performed by: PODIATRIST

## 2018-10-19 RX ORDER — BRIMONIDINE TARTRATE 1 MG/ML
1 SOLUTION/ DROPS OPHTHALMIC 3 TIMES DAILY
Refills: 6 | COMMUNITY
Start: 2018-10-17

## 2018-10-19 RX ORDER — DOXYCYCLINE 50 MG/1
50 CAPSULE ORAL 2 TIMES DAILY PRN
Refills: 4 | COMMUNITY
Start: 2018-10-03 | End: 2019-12-30 | Stop reason: ALTCHOICE

## 2018-10-19 RX ORDER — TRAMADOL HYDROCHLORIDE 50 MG/1
TABLET ORAL
Refills: 1 | COMMUNITY
Start: 2018-09-04 | End: 2019-01-28 | Stop reason: SDUPTHER

## 2018-10-20 NOTE — PROGRESS NOTES
Subjective:      Patient ID: Kateryna Desai is a 63 y.o. female.    Chief Complaint: Diabetes Mellitus (Kelly 1/4/18 A1c 6.0 7/16/18) and Diabetic Foot Exam    Kateryna is a 63 y.o. female who presents to the clinic for routine evaluation and treatment of diabetic feet. Kateryna has a past medical history of Allergy, Arthritis, Asian flu type A (12/22/2017), Diabetes mellitus type I, Diabetic gastroparesis, Diabetic retinopathy of both eyes, Difficult intubation, Encounter for blood transfusion, GERD (gastroesophageal reflux disease), Headache(784.0), Hiatal hernia, Hyperlipidemia, Hypertension, Infection of the upper respiratory tract (June 2012), Lumbar spinal stenosis, Osteopenia, Rosacea, Seizures, Stroke (1985?), Tachycardia, Thyroid disease, and Venous insufficiency of leg. Patient relates resolution of prior Lt. Sided Achilles tendonitis.  Relates continuance of daily stretching of the calf muscles.  She continues wearing heel lifts and supportive shoes, which has also helped to alleviate symptoms.  Relates occasional neuropathy pain, in the evening, that is addressed with taking tramadol.  Relates continued controlled over her blood glucose.  Denies any additional pedal complaints.      PCP: Endy Mendoza MD    Date Last Seen by PCP:  1/4/18      Hemoglobin A1C   Date Value Ref Range Status   07/16/2018 6.0 (H) 4.0 - 5.6 % Final     Comment:     ADA Screening Guidelines:  5.7-6.4%  Consistent with prediabetes  >or=6.5%  Consistent with diabetes  High levels of fetal hemoglobin interfere with the HbA1C  assay. Heterozygous hemoglobin variants (HbS, HgC, etc)do  not significantly interfere with this assay.   However, presence of multiple variants may affect accuracy.     03/10/2018 5.8 (H) 4.0 - 5.6 % Final     Comment:     According to ADA guidelines, hemoglobin A1c <7.0% represents  optimal control in non-pregnant diabetic patients. Different  metrics may apply to specific patient  populations.   Standards of Medical Care in Diabetes-2016.  For the purpose of screening for the presence of diabetes:  <5.7%     Consistent with the absence of diabetes  5.7-6.4%  Consistent with increasing risk for diabetes   (prediabetes)  >or=6.5%  Consistent with diabetes  Currently, no consensus exists for use of hemoglobin A1c  for diagnosis of diabetes for children.  This Hemoglobin A1c assay has significant interference with fetal   hemoglobin   (HbF). The results are invalid for patients with abnormal amounts of   HbF,   including those with known Hereditary Persistence   of Fetal Hemoglobin. Heterozygous hemoglobin variants (HbAS, HbAC,   HbAD, HbAE, HbA2) do not significantly interfere with this assay;   however, presence of multiple variants in a sample may impact the %   interference.     12/27/2017 6.0 (H) 4.0 - 5.6 % Final     Comment:     According to ADA guidelines, hemoglobin A1c <7.0% represents  optimal control in non-pregnant diabetic patients. Different  metrics may apply to specific patient populations.   Standards of Medical Care in Diabetes-2016.  For the purpose of screening for the presence of diabetes:  <5.7%     Consistent with the absence of diabetes  5.7-6.4%  Consistent with increasing risk for diabetes   (prediabetes)  >or=6.5%  Consistent with diabetes  Currently, no consensus exists for use of hemoglobin A1c  for diagnosis of diabetes for children.  This Hemoglobin A1c assay has significant interference with fetal   hemoglobin   (HbF). The results are invalid for patients with abnormal amounts of   HbF,   including those with known Hereditary Persistence   of Fetal Hemoglobin. Heterozygous hemoglobin variants (HbAS, HbAC,   HbAD, HbAE, HbA2) do not significantly interfere with this assay;   however, presence of multiple variants in a sample may impact the %   interference.     12/27/2017 6.0 (H) 4.0 - 5.6 % Final     Comment:     According to ADA guidelines, hemoglobin A1c <7.0%  represents  optimal control in non-pregnant diabetic patients. Different  metrics may apply to specific patient populations.   Standards of Medical Care in Diabetes-2016.  For the purpose of screening for the presence of diabetes:  <5.7%     Consistent with the absence of diabetes  5.7-6.4%  Consistent with increasing risk for diabetes   (prediabetes)  >or=6.5%  Consistent with diabetes  Currently, no consensus exists for use of hemoglobin A1c  for diagnosis of diabetes for children.  This Hemoglobin A1c assay has significant interference with fetal   hemoglobin   (HbF). The results are invalid for patients with abnormal amounts of   HbF,   including those with known Hereditary Persistence   of Fetal Hemoglobin. Heterozygous hemoglobin variants (HbAS, HbAC,   HbAD, HbAE, HbA2) do not significantly interfere with this assay;   however, presence of multiple variants in a sample may impact the %   interference.     12/27/2017 6.0 (H) 4.0 - 5.6 % Final     Comment:     According to ADA guidelines, hemoglobin A1c <7.0% represents  optimal control in non-pregnant diabetic patients. Different  metrics may apply to specific patient populations.   Standards of Medical Care in Diabetes-2016.  For the purpose of screening for the presence of diabetes:  <5.7%     Consistent with the absence of diabetes  5.7-6.4%  Consistent with increasing risk for diabetes   (prediabetes)  >or=6.5%  Consistent with diabetes  Currently, no consensus exists for use of hemoglobin A1c  for diagnosis of diabetes for children.  This Hemoglobin A1c assay has significant interference with fetal   hemoglobin   (HbF). The results are invalid for patients with abnormal amounts of   HbF,   including those with known Hereditary Persistence   of Fetal Hemoglobin. Heterozygous hemoglobin variants (HbAS, HbAC,   HbAD, HbAE, HbA2) do not significantly interfere with this assay;   however, presence of multiple variants in a sample may impact the %    interference.             Past Medical History:   Diagnosis Date    Allergy     Arthritis     degnerative disease low back,muscle spasms, shoulders    Asian flu type A 2017    Diabetes mellitus type I     since age 13    Diabetic gastroparesis     takes Cytotec    Diabetic retinopathy of both eyes     gets periodic laser treatments    Difficult intubation     as a child had sore throat after a procedure    Encounter for blood transfusion     GERD (gastroesophageal reflux disease)     Headache(784.0)     Hiatal hernia     with GERD    Hyperlipidemia     Hypertension     Infection of the upper respiratory tract 2012    Lumbar spinal stenosis     Osteopenia     Rosacea     Seizures     Pt states during pgy with stroke    Stroke ?    while pregnant    Tachycardia     asymptomatic with Toprol    Thyroid disease     hypothyroidism    Venous insufficiency of leg     improved since EVLT       Past Surgical History:   Procedure Laterality Date    BUNIONECTOMY Right     BUNIONECTOMY Right 2012    Performed by [facility] at [facility]    CARDIAC CATHETERIZATION      no stents     SECTION      COLONOSCOPY  2007    Dr. Rose, 10 year recheck    EXCISION, BUNIONETTE Right 2012    Performed by Manjeet Robert DPM at [facility] OR    EXOSTECTOMY  12    left foot, local anesthesia, in office    EYE SURGERY      has had many laser procedures for diabetic retinopathy    VASCULAR SURGERY      VEIN SURGERY  2012    EVLT right greater saphenous, IV sedation       Family History   Problem Relation Age of Onset    Cancer Maternal Aunt         breast    Breast cancer Maternal Aunt     Diabetes Maternal Grandmother     Breast cancer Maternal Grandmother     Cancer Maternal Grandfather         prostate    Diabetes Maternal Grandfather     Diabetes Cousin     Arthritis Mother     Cancer Mother     Melanoma Mother     Heart disease Father   "   Stroke Father     No Known Problems Sister     No Known Problems Brother     No Known Problems Daughter     Alzheimer's disease Maternal Uncle     Alcohol abuse Maternal Uncle         x2    Heart disease Maternal Uncle     No Known Problems Paternal Aunt     Heart disease Paternal Uncle        Social History     Socioeconomic History    Marital status:      Spouse name: None    Number of children: None    Years of education: None    Highest education level: None   Social Needs    Financial resource strain: None    Food insecurity - worry: None    Food insecurity - inability: None    Transportation needs - medical: None    Transportation needs - non-medical: None   Occupational History    None   Tobacco Use    Smoking status: Never Smoker    Smokeless tobacco: Never Used   Substance and Sexual Activity    Alcohol use: Yes     Comment: social, 1 drink monthly    Drug use: No    Sexual activity: Yes     Partners: Male   Other Topics Concern    None   Social History Narrative    None       Current Outpatient Medications   Medication Sig Dispense Refill    ACZONE 5 % topical gel Apply topically 2 (two) times daily as needed.   0    alpha lipoic acid 600 mg Cap Take 1 capsule by mouth once daily.      ALPHAGAN P 0.1 % Drop   6    ascorbic acid, vitamin C, (VITAMIN C) 100 MG tablet Take 100 mg by mouth 2 (two) times daily.      aspirin (ECOTRIN) 81 MG EC tablet Take 81 mg by mouth once daily.      BD ULTRA-FINE JCARLOS PEN NEEDLES 32 gauge x 5/32" Ndle USE ONE NEEDLE ONCE DAILY  0    BENEFIBER, GUAR GUM, ORAL Take 1 Dose by mouth 2 (two) times daily as needed.       calcium citrate-vitamin D3 315-200 mg (CITRACAL+D) 315-200 mg-unit per tablet Take 1 tablet by mouth 2 (two) times daily.      cholecalciferol, vitamin D3, (VITAMIN D3) 2,000 unit Cap Take 1 capsule by mouth once daily.      cinnamon bark 500 mg capsule Take 500 mg by mouth 2 (two) times daily.      CRESTOR 40 mg Tab " Take 40 mg by mouth once daily.       diclofenac (VOLTAREN) 75 MG EC tablet Take 1 tablet (75 mg total) by mouth 2 (two) times daily as needed. 30 tablet 0    DOCOSAHEXANOIC ACID/EPA (FISH OIL ORAL) Take 1,200 mg by mouth 2 (two) times daily.      doxycycline (MONODOX) 50 MG Cap   4    doxycycline (VIBRAMYCIN) 50 MG capsule Take 1 capsule by mouth 2 (two) times daily as needed (roseacea).   0    ESTRACE 0.01 % (0.1 mg/gram) vaginal cream Place vaginally twice a week. Tuesday, Saturday  0    estradiol 10 mcg Tab Take 1 tablet by mouth twice a week. Tuesday, Saturday      ezetimibe (ZETIA) 10 mg tablet Take 10 mg by mouth once daily.      fluticasone (FLONASE) 50 mcg/actuation nasal spray 1 spray by Each Nare route once daily. (Patient taking differently: 1 spray by Each Nare route daily as needed for Rhinitis. ) 16 g 0    FLUZONE QUAD 3646-3929 60 mcg (15 mcg x 4)/0.5 mL Susp   0    furosemide (LASIX) 20 MG tablet TAKE 1 TABLET BY MOUTH TWICE DAILY 60 tablet 5    GLUCAGON EMERGENCY 1 mg injection 1 mg as needed.       HUMALOG 100 unit/mL injection Inject into the skin. 10-17 units tid sliding scale  1    INSULIN GLARGINE,HUM.REC.ANLOG (TOUJEO SOLOSTAR SUBQ) Inject 46 Units into the skin every morning.       L.ACID/L.CASEI/B.BIF/B.CEDRIC/FOS (PROBIOTIC BLEND ORAL) Take 1 capsule by mouth every Mon, Wed, Fri.      metoprolol succinate (TOPROL-XL) 25 MG 24 hr tablet take 1 tablet by mouth once daily 30 tablet 10    ondansetron (ZOFRAN-ODT) 4 MG TbDL Take 1 tablet (4 mg total) by mouth every 8 (eight) hours as needed. 12 tablet 0    ONETOUCH ULTRA TEST Strp   0    pantoprazole (PROTONIX) 40 MG tablet take 1 tablet by mouth twice a day 180 tablet 3    potassium chloride SA (K-DUR,KLOR-CON) 20 MEQ tablet Take 2 tablets (40 mEq total) by mouth once daily. When you take a lasix pill 30 tablet 0    SYNTHROID 88 mcg tablet Take 88 mcg by mouth every evening.   0    traMADol (ULTRAM) 50 mg tablet   1     VITAMIN B COMPLEX VIT C NO.4 (SUPER B COMPLEX + C ORAL) Take 1 tablet by mouth once daily.       No current facility-administered medications for this visit.        Review of patient's allergies indicates:   Allergen Reactions    Bactrim [sulfamethoxazole-trimethoprim] Rash     Patient experienced on 12/3/14 redness to face and rash to chest and arms/ with no SOB or airway obstruction    Percocet [oxycodone-acetaminophen] Nausea And Vomiting     Also caused dizziness and passed out    Iodinated contrast media - iv dye Swelling and Rash     Says topical iodine OK    Norco [hydrocodone-acetaminophen] Itching, Swelling and Rash     Can tolerate if she takes Benadryl with it    Niaspan extended-release [niacin] Itching and Other (See Comments)     Skin flushing and redness         Review of Systems   Constitution: Negative for chills, decreased appetite, diaphoresis and fever.   Cardiovascular: Negative for claudication and leg swelling.   Skin: Positive for color change and nail changes. Negative for dry skin, flushing and itching.   Musculoskeletal: Negative for arthritis, back pain, falls, gout, joint pain, joint swelling and myalgias.   Neurological: Positive for numbness and paresthesias.   Psychiatric/Behavioral: Negative for altered mental status.           Objective:      Physical Exam   Constitutional: She is oriented to person, place, and time. She appears well-developed and well-nourished. No distress.   Cardiovascular:   Pulses:       Dorsalis pedis pulses are 2+ on the right side, and 2+ on the left side.        Posterior tibial pulses are 1+ on the right side, and 1+ on the left side.   CFT <3 seconds bilateral.  Pedal hair growth decreased bilateral.  Varicosities noted to bilateral lower extremity. Mild nonpitting edema noted to bilateral lower extremity.  Toes are cool to touch bilateral.       Musculoskeletal: Normal range of motion. She exhibits edema. She exhibits no tenderness.   Muscle  strength 5/5 in all muscle groups bilateral.  No tenderness nor crepitation with active and passive ROM of foot/ankle joints bilateral.  No pain with palpation to the Lt. Achilles tendon. Bilateral gastrocnemius equinus with improvement in dorsiflexion of the Lt. Ankle while the knee is extended.  Bilateral pes planus foot type.  Bilateral hallux abducto valgus.  Bilateral semi-rigid contracture of toes 2-5.     Neurological: She is alert and oriented to person, place, and time. She has normal strength. A sensory deficit is present.   Protective sensation per [place] monofilament decreased bilateral.    Vibratory sensation intact bilateral.    Light touch intact bilateral.   Skin: Skin is warm, dry and intact. No abrasion, no bruising, no burn, no ecchymosis, no laceration, no lesion, no petechiae and no rash noted. She is not diaphoretic. No cyanosis or erythema. No pallor. Nails show no clubbing.   Toenails x 10 appear thickened by 2 mm's, elongated by 2 mm's, and discolored with subungual debris. No open wounds, interdigital maceration noted bilateral.  Cicatrix of the Lt. Posterior heel is well healed with no adjacent break in skin integrity.  Site is atrophic in appearance.                     Assessment:       Encounter Diagnoses   Name Primary?    Controlled type 1 diabetes mellitus with diabetic polyneuropathy Yes    Paresthesia of foot, bilateral     Onychomycosis due to dermatophyte          Plan:       Kateryna was seen today for diabetes mellitus and diabetic foot exam.    Diagnoses and all orders for this visit:    Controlled type 1 diabetes mellitus with diabetic polyneuropathy    Paresthesia of foot, bilateral    Onychomycosis due to dermatophyte      I counseled the patient on her conditions, their implications and medical management.    - Patient to continue taking tramadol prn for nocturnal symptoms of neuropathy.    - Shoe inspection. Diabetic Foot Education. Patient reminded of the  importance of good nutrition and blood sugar control to help prevent podiatric complications of diabetes. Patient instructed on proper foot hygeine. We discussed wearing proper shoe gear, daily foot inspections, never walking without protective shoe gear, never putting sharp instruments to feet    - With patient's permission, nails were aggressively reduced and debrided x 10 to their soft tissue attachment mechanically and with electric , removing all offending nail and debris.  Patient relates relief following the procedure. She will continue to monitor the areas daily, inspect her feet, wear protective shoe gear when ambulatory, moisturizer to maintain skin integrity and follow in this office in approximately 2-3 months, sooner p.r.n.    No Follow-up on file.    Lei Rhoades DPM

## 2018-10-29 ENCOUNTER — TELEPHONE (OUTPATIENT)
Dept: PODIATRY | Facility: CLINIC | Age: 63
End: 2018-10-29

## 2018-10-29 ENCOUNTER — HOSPITAL ENCOUNTER (OUTPATIENT)
Dept: RADIOLOGY | Facility: CLINIC | Age: 63
Discharge: HOME | End: 2018-10-29
Attending: OBSTETRICS & GYNECOLOGY
Payer: COMMERCIAL

## 2018-10-29 DIAGNOSIS — Z12.39 ENCOUNTER FOR SPECIAL SCREENING EXAMINATION FOR NEOPLASM OF BREAST: ICD-10-CM

## 2018-10-29 PROCEDURE — 77067 SCR MAMMO BI INCL CAD: CPT | Mod: 26,,, | Performed by: RADIOLOGY

## 2018-10-29 PROCEDURE — 77063 BREAST TOMOSYNTHESIS BI: CPT | Mod: TC,PO

## 2018-10-29 PROCEDURE — 77063 BREAST TOMOSYNTHESIS BI: CPT | Mod: 26,,, | Performed by: RADIOLOGY

## 2018-10-29 PROCEDURE — 77067 SCR MAMMO BI INCL CAD: CPT | Mod: TC,PO

## 2018-10-29 NOTE — TELEPHONE ENCOUNTER
----- Message from Santiago Oquendo sent at 10/27/2018  9:51 AM CDT -----  Type: Needs Medical Advice    Who Called:  Patient  Best Call Back Number: [phone] (home)   Additional Information: Patient went to ER and have torn some ligaments in her ankle - she was told to contact office and let them know what was going on. Please call to advise.

## 2018-10-29 NOTE — TELEPHONE ENCOUNTER
Spoke with patient she ended up going to the ER. She spoke with Dr. Rhoades over the weekend. Dr. Rhoades advised her on the issue per patient. She stated she has been doing what Dr. Rhoades said to do and is feeling much better.    Will inform Dr. Rhoades.

## 2018-11-01 ENCOUNTER — LAB VISIT (OUTPATIENT)
Dept: LAB | Facility: HOSPITAL | Age: 63
End: 2018-11-01
Attending: INTERNAL MEDICINE
Payer: COMMERCIAL

## 2018-11-01 DIAGNOSIS — N18.6 TYPE 2 DIABETES MELLITUS WITH END-STAGE RENAL DISEASE: ICD-10-CM

## 2018-11-01 DIAGNOSIS — N18.30 CHRONIC RENAL DISEASE, STAGE III: ICD-10-CM

## 2018-11-01 DIAGNOSIS — E11.22 TYPE 2 DIABETES MELLITUS WITH END-STAGE RENAL DISEASE: ICD-10-CM

## 2018-11-01 DIAGNOSIS — N18.30 CHRONIC KIDNEY DISEASE, STAGE III (MODERATE): Primary | ICD-10-CM

## 2018-11-01 DIAGNOSIS — I10 ESSENTIAL HYPERTENSION, MALIGNANT: ICD-10-CM

## 2018-11-01 DIAGNOSIS — N25.81 SECONDARY HYPERPARATHYROIDISM OF RENAL ORIGIN: ICD-10-CM

## 2018-11-01 DIAGNOSIS — E87.6 HYPOPOTASSEMIA: ICD-10-CM

## 2018-11-01 LAB
ALBUMIN SERPL BCP-MCNC: 3.9 G/DL
ANION GAP SERPL CALC-SCNC: 10 MMOL/L
BASOPHILS # BLD AUTO: 0.03 K/UL
BASOPHILS NFR BLD: 0.5 %
BUN SERPL-MCNC: 16 MG/DL
CALCIUM SERPL-MCNC: 9.6 MG/DL
CHLORIDE SERPL-SCNC: 101 MMOL/L
CO2 SERPL-SCNC: 30 MMOL/L
CREAT SERPL-MCNC: 0.9 MG/DL
DIFFERENTIAL METHOD: ABNORMAL
EOSINOPHIL # BLD AUTO: 0.1 K/UL
EOSINOPHIL NFR BLD: 2.1 %
ERYTHROCYTE [DISTWIDTH] IN BLOOD BY AUTOMATED COUNT: 14.4 %
EST. GFR  (AFRICAN AMERICAN): >60 ML/MIN/1.73 M^2
EST. GFR  (NON AFRICAN AMERICAN): >60 ML/MIN/1.73 M^2
GLUCOSE SERPL-MCNC: 41 MG/DL
HCT VFR BLD AUTO: 39.9 %
HGB BLD-MCNC: 12.7 G/DL
IMM GRANULOCYTES # BLD AUTO: 0.01 K/UL
IMM GRANULOCYTES NFR BLD AUTO: 0.2 %
LYMPHOCYTES # BLD AUTO: 1.5 K/UL
LYMPHOCYTES NFR BLD: 23.3 %
MCH RBC QN AUTO: 30.9 PG
MCHC RBC AUTO-ENTMCNC: 31.8 G/DL
MCV RBC AUTO: 97 FL
MONOCYTES # BLD AUTO: 0.5 K/UL
MONOCYTES NFR BLD: 7.8 %
NEUTROPHILS # BLD AUTO: 4.3 K/UL
NEUTROPHILS NFR BLD: 66.1 %
NRBC BLD-RTO: 0 /100 WBC
PHOSPHATE SERPL-MCNC: 3.4 MG/DL
PLATELET # BLD AUTO: 207 K/UL
PMV BLD AUTO: 11.3 FL
POTASSIUM SERPL-SCNC: 3 MMOL/L
RBC # BLD AUTO: 4.11 M/UL
SODIUM SERPL-SCNC: 141 MMOL/L
WBC # BLD AUTO: 6.53 K/UL

## 2018-11-01 PROCEDURE — 85025 COMPLETE CBC W/AUTO DIFF WBC: CPT

## 2018-11-01 PROCEDURE — 36415 COLL VENOUS BLD VENIPUNCTURE: CPT | Mod: PO

## 2018-11-01 PROCEDURE — 80069 RENAL FUNCTION PANEL: CPT

## 2018-11-20 RX ORDER — FUROSEMIDE 20 MG/1
20 TABLET ORAL 2 TIMES DAILY
Qty: 60 TABLET | Refills: 0 | Status: SHIPPED | OUTPATIENT
Start: 2018-11-20 | End: 2018-12-22 | Stop reason: SDUPTHER

## 2018-11-20 NOTE — TELEPHONE ENCOUNTER
----- Message from Sonja Mckeon sent at 11/20/2018  9:27 AM CST -----  Type: Needs Medication ordered    Who Called:  Patient  Best Call Back Number: [phone]  Additional Information: Patient stated [facility] pharmacy sent request for medication: Lasix (generic)20 mg/has not received response/please contact pharmacy or if any questions call back.

## 2018-11-26 ENCOUNTER — OFFICE VISIT (OUTPATIENT)
Dept: FAMILY MEDICINE | Facility: CLINIC | Age: 63
End: 2018-11-26
Payer: COMMERCIAL

## 2018-11-26 VITALS
TEMPERATURE: 99 F | OXYGEN SATURATION: 98 % | SYSTOLIC BLOOD PRESSURE: 122 MMHG | HEIGHT: 66 IN | DIASTOLIC BLOOD PRESSURE: 78 MMHG | HEART RATE: 83 BPM | BODY MASS INDEX: 29.72 KG/M2 | WEIGHT: 184.94 LBS

## 2018-11-26 DIAGNOSIS — J06.9 VIRAL UPPER RESPIRATORY TRACT INFECTION: Primary | ICD-10-CM

## 2018-11-26 PROCEDURE — 3078F DIAST BP <80 MM HG: CPT | Mod: CPTII,S$GLB,, | Performed by: PHYSICIAN ASSISTANT

## 2018-11-26 PROCEDURE — 99213 OFFICE O/P EST LOW 20 MIN: CPT | Mod: S$GLB,,, | Performed by: PHYSICIAN ASSISTANT

## 2018-11-26 PROCEDURE — 99999 PR PBB SHADOW E&M-EST. PATIENT-LVL V: CPT | Mod: PBBFAC,,, | Performed by: PHYSICIAN ASSISTANT

## 2018-11-26 PROCEDURE — 3008F BODY MASS INDEX DOCD: CPT | Mod: CPTII,S$GLB,, | Performed by: PHYSICIAN ASSISTANT

## 2018-11-26 PROCEDURE — 3074F SYST BP LT 130 MM HG: CPT | Mod: CPTII,S$GLB,, | Performed by: PHYSICIAN ASSISTANT

## 2018-11-26 NOTE — PROGRESS NOTES
Subjective:       Patient ID: Kateryna Desai is a 63 y.o. female.    Chief Complaint: Otalgia ((left) , dry cough)    HPI   Cough and congestion x 4 or 5 days  Review of Systems   Constitutional: Negative.  Negative for activity change, appetite change, chills, diaphoresis, fatigue, fever and unexpected weight change.   HENT: Positive for congestion, ear pain, postnasal drip, sinus pressure and sore throat. Negative for sinus pain.    Eyes: Negative.    Respiratory: Positive for cough.    Cardiovascular: Negative.  Negative for chest pain and leg swelling.   Gastrointestinal: Negative.    Endocrine: Negative.    Genitourinary: Negative.    Musculoskeletal: Negative.    Skin: Negative.  Negative for rash.       Objective:      Physical Exam   Constitutional: She appears well-developed and well-nourished. No distress.   HENT:   Head: Normocephalic and atraumatic.   Right Ear: External ear normal.   Left Ear: External ear normal.   Nose: Nose normal.   Mouth/Throat: Oropharynx is clear and moist. No oropharyngeal exudate.   Sinuses mild tenderness  Mucus clear  Mild throat redness   Eyes: Conjunctivae are normal. No scleral icterus.   Neck: Normal range of motion. Neck supple. No tracheal deviation present. No thyromegaly present.   Cardiovascular: Normal rate, regular rhythm, normal heart sounds and intact distal pulses. Exam reveals no gallop and no friction rub.   No murmur heard.  Pulmonary/Chest: Effort normal and breath sounds normal. No stridor. No respiratory distress. She has no wheezes. She has no rales.   Musculoskeletal: She exhibits no edema.   Lymphadenopathy:     She has no cervical adenopathy.   Skin: Skin is warm and dry. No rash noted.   Vitals reviewed.      Assessment:       1. Viral upper respiratory tract infection        Plan:       Viral upper respiratory tract infection    discussed otc's

## 2018-11-27 ENCOUNTER — LAB VISIT (OUTPATIENT)
Dept: LAB | Facility: HOSPITAL | Age: 63
End: 2018-11-27
Attending: INTERNAL MEDICINE
Payer: COMMERCIAL

## 2018-11-27 DIAGNOSIS — Z79.4 ENCOUNTER FOR LONG-TERM (CURRENT) USE OF INSULIN: ICD-10-CM

## 2018-11-27 DIAGNOSIS — E03.9 PRIMARY HYPOTHYROIDISM: ICD-10-CM

## 2018-11-27 DIAGNOSIS — E78.00 PURE HYPERCHOLESTEROLEMIA: ICD-10-CM

## 2018-11-27 DIAGNOSIS — E10.9 DIABETES MELLITUS TYPE I: Primary | ICD-10-CM

## 2018-11-27 LAB
ANION GAP SERPL CALC-SCNC: 9 MMOL/L
BUN SERPL-MCNC: 7 MG/DL
CALCIUM SERPL-MCNC: 9 MG/DL
CHLORIDE SERPL-SCNC: 106 MMOL/L
CHOLEST SERPL-MCNC: 190 MG/DL
CHOLEST/HDLC SERPL: 3.8 {RATIO}
CO2 SERPL-SCNC: 28 MMOL/L
CREAT SERPL-MCNC: 0.9 MG/DL
EST. GFR  (AFRICAN AMERICAN): >60 ML/MIN/1.73 M^2
EST. GFR  (NON AFRICAN AMERICAN): >60 ML/MIN/1.73 M^2
ESTIMATED AVG GLUCOSE: 111 MG/DL
GLUCOSE SERPL-MCNC: 158 MG/DL
HBA1C MFR BLD HPLC: 5.5 %
HDLC SERPL-MCNC: 50 MG/DL
HDLC SERPL: 26.3 %
LDLC SERPL CALC-MCNC: 119 MG/DL
NONHDLC SERPL-MCNC: 140 MG/DL
POTASSIUM SERPL-SCNC: 3.6 MMOL/L
SODIUM SERPL-SCNC: 143 MMOL/L
TRIGL SERPL-MCNC: 105 MG/DL

## 2018-11-27 PROCEDURE — 80061 LIPID PANEL: CPT

## 2018-11-27 PROCEDURE — 36415 COLL VENOUS BLD VENIPUNCTURE: CPT | Mod: PO

## 2018-11-27 PROCEDURE — 80048 BASIC METABOLIC PNL TOTAL CA: CPT

## 2018-11-27 PROCEDURE — 83036 HEMOGLOBIN GLYCOSYLATED A1C: CPT

## 2018-12-10 ENCOUNTER — TELEPHONE (OUTPATIENT)
Dept: FAMILY MEDICINE | Facility: CLINIC | Age: 63
End: 2018-12-10

## 2018-12-10 NOTE — TELEPHONE ENCOUNTER
Informed patient Elana Mendoza doesn't have any available appointments today. Patient refused to follow up with urgent care.         ----- Message from Sonja Mckeon sent at 12/10/2018  7:33 AM CST -----  Type: Needs Medical Advice    Who Called:  Patient  Best Call Back Number: [phone]  Additional Information: Patient stated she has a persistent cough/needs advice/please call patient back to advise.

## 2018-12-24 RX ORDER — FUROSEMIDE 20 MG/1
TABLET ORAL
Qty: 60 TABLET | Refills: 2 | Status: SHIPPED | OUTPATIENT
Start: 2018-12-24 | End: 2019-02-19 | Stop reason: SDUPTHER

## 2019-01-28 ENCOUNTER — OFFICE VISIT (OUTPATIENT)
Dept: PODIATRY | Facility: CLINIC | Age: 64
End: 2019-01-28
Payer: COMMERCIAL

## 2019-01-28 VITALS — RESPIRATION RATE: 20 BRPM | WEIGHT: 184 LBS | HEIGHT: 66 IN | BODY MASS INDEX: 29.57 KG/M2

## 2019-01-28 DIAGNOSIS — M21.6X9 EQUINUS DEFORMITY OF FOOT: ICD-10-CM

## 2019-01-28 DIAGNOSIS — R20.2 PARESTHESIA OF FOOT, BILATERAL: ICD-10-CM

## 2019-01-28 DIAGNOSIS — E10.42 CONTROLLED TYPE 1 DIABETES MELLITUS WITH DIABETIC POLYNEUROPATHY: Primary | ICD-10-CM

## 2019-01-28 DIAGNOSIS — B35.1 ONYCHOMYCOSIS DUE TO DERMATOPHYTE: ICD-10-CM

## 2019-01-28 DIAGNOSIS — M72.2 PLANTAR FASCIITIS: ICD-10-CM

## 2019-01-28 PROCEDURE — 99213 PR OFFICE/OUTPT VISIT, EST, LEVL III, 20-29 MIN: ICD-10-PCS | Mod: 25,S$GLB,, | Performed by: PODIATRIST

## 2019-01-28 PROCEDURE — 99999 PR PBB SHADOW E&M-EST. PATIENT-LVL IV: ICD-10-PCS | Mod: PBBFAC,,, | Performed by: PODIATRIST

## 2019-01-28 PROCEDURE — 3008F PR BODY MASS INDEX (BMI) DOCUMENTED: ICD-10-PCS | Mod: CPTII,S$GLB,, | Performed by: PODIATRIST

## 2019-01-28 PROCEDURE — 3044F PR MOST RECENT HEMOGLOBIN A1C LEVEL <7.0%: ICD-10-PCS | Mod: CPTII,S$GLB,, | Performed by: PODIATRIST

## 2019-01-28 PROCEDURE — 3008F BODY MASS INDEX DOCD: CPT | Mod: CPTII,S$GLB,, | Performed by: PODIATRIST

## 2019-01-28 PROCEDURE — 99213 OFFICE O/P EST LOW 20 MIN: CPT | Mod: 25,S$GLB,, | Performed by: PODIATRIST

## 2019-01-28 PROCEDURE — 99999 PR PBB SHADOW E&M-EST. PATIENT-LVL IV: CPT | Mod: PBBFAC,,, | Performed by: PODIATRIST

## 2019-01-28 PROCEDURE — 3044F HG A1C LEVEL LT 7.0%: CPT | Mod: CPTII,S$GLB,, | Performed by: PODIATRIST

## 2019-01-28 PROCEDURE — 11721 PR DEBRIDEMENT OF NAILS, 6 OR MORE: ICD-10-PCS | Mod: Q9,S$GLB,, | Performed by: PODIATRIST

## 2019-01-28 PROCEDURE — 11721 DEBRIDE NAIL 6 OR MORE: CPT | Mod: Q9,S$GLB,, | Performed by: PODIATRIST

## 2019-01-28 RX ORDER — TRAMADOL HYDROCHLORIDE 50 MG/1
50 TABLET ORAL
Qty: 30 TABLET | Refills: 1 | Status: SHIPPED | OUTPATIENT
Start: 2019-01-28 | End: 2019-04-22 | Stop reason: ALTCHOICE

## 2019-01-28 RX ORDER — INSULIN DEGLUDEC 100 U/ML
46 INJECTION, SOLUTION SUBCUTANEOUS EVERY MORNING
Refills: 6 | COMMUNITY
Start: 2019-01-17 | End: 2022-11-29

## 2019-01-29 NOTE — PROGRESS NOTES
Subjective:      Patient ID: Kateryna Desai is a 64 y.o. female.    Chief Complaint: Diabetes Mellitus (PCP: Kelly 11/22/18 with A1C: 11/22/18) and Diabetic Foot Exam (3 month follow )    Kateryna is a 64 y.o. female who presents to the clinic for routine evaluation and treatment of diabetic feet. Kateryna has a past medical history of Allergy, Arthritis, Asian flu type A (12/22/2017), Diabetes mellitus type I, Diabetic gastroparesis, Diabetic retinopathy of both eyes, Difficult intubation, Encounter for blood transfusion, GERD (gastroesophageal reflux disease), Headache(784.0), Hiatal hernia, Hyperlipidemia, Hypertension, Infection of the upper respiratory tract (June 2012), Lumbar spinal stenosis, Osteopenia, Rosacea, Seizures, Stroke (1985?), Tachycardia, Thyroid disease, and Venous insufficiency of leg. Patient's chief complaint consists of pain in the Lt. Plantar heel.  Has been wearing heel lifts and stretching with mild improvement in symptoms.  Currently rates pain as a 5/10.  Symptoms are exacerbated with prolonged walking and alleviated with rest.  Also, relates continued sporadic nocturnal paresthesias, however, states this continues to be well managed with taking several tramadol each week.  Relates continued controlled over her blood glucose.  Denies any additional pedal complaints.      PCP: Endy Mendoza MD    Date Last Seen by PCP:  11/22/18      Hemoglobin A1C   Date Value Ref Range Status   11/27/2018 5.5 4.0 - 5.6 % Final     Comment:     ADA Screening Guidelines:  5.7-6.4%  Consistent with prediabetes  >or=6.5%  Consistent with diabetes  High levels of fetal hemoglobin interfere with the HbA1C  assay. Heterozygous hemoglobin variants (HbS, HgC, etc)do  not significantly interfere with this assay.   However, presence of multiple variants may affect accuracy.     07/16/2018 6.0 (H) 4.0 - 5.6 % Final     Comment:     ADA Screening Guidelines:  5.7-6.4%  Consistent with  prediabetes  >or=6.5%  Consistent with diabetes  High levels of fetal hemoglobin interfere with the HbA1C  assay. Heterozygous hemoglobin variants (HbS, HgC, etc)do  not significantly interfere with this assay.   However, presence of multiple variants may affect accuracy.     03/10/2018 5.8 (H) 4.0 - 5.6 % Final     Comment:     According to ADA guidelines, hemoglobin A1c <7.0% represents  optimal control in non-pregnant diabetic patients. Different  metrics may apply to specific patient populations.   Standards of Medical Care in Diabetes-2016.  For the purpose of screening for the presence of diabetes:  <5.7%     Consistent with the absence of diabetes  5.7-6.4%  Consistent with increasing risk for diabetes   (prediabetes)  >or=6.5%  Consistent with diabetes  Currently, no consensus exists for use of hemoglobin A1c  for diagnosis of diabetes for children.  This Hemoglobin A1c assay has significant interference with fetal   hemoglobin   (HbF). The results are invalid for patients with abnormal amounts of   HbF,   including those with known Hereditary Persistence   of Fetal Hemoglobin. Heterozygous hemoglobin variants (HbAS, HbAC,   HbAD, HbAE, HbA2) do not significantly interfere with this assay;   however, presence of multiple variants in a sample may impact the %   interference.             Past Medical History:   Diagnosis Date    Allergy     Arthritis     degnerative disease low back,muscle spasms, shoulders    Asian flu type A 12/22/2017    Diabetes mellitus type I     since age 13    Diabetic gastroparesis     takes Cytotec    Diabetic retinopathy of both eyes     gets periodic laser treatments    Difficult intubation     as a child had sore throat after a procedure    Encounter for blood transfusion     GERD (gastroesophageal reflux disease)     Headache(784.0)     Hiatal hernia     with GERD    Hyperlipidemia     Hypertension     Infection of the upper respiratory tract June 2012    Lumbar  spinal stenosis     Osteopenia     Rosacea     Seizures     Pt states during pgy with stroke    Stroke ?    while pregnant    Tachycardia     asymptomatic with Toprol    Thyroid disease     hypothyroidism    Venous insufficiency of leg     improved since EVLT       Past Surgical History:   Procedure Laterality Date    BUNIONECTOMY Right     BUNIONECTOMY Right 2012    Performed by [facility] Robert [facility] at [facility]    CARDIAC CATHETERIZATION      no stents     SECTION      COLONOSCOPY  2007    Dr. Rose, 10 year recheck    EXCISION, BUNIONETTE Right 2012    Performed by [facility]jeet Robert D[facility] at [facility] OR    EXOSTECTOMY  12    left foot, local anesthesia, in office    EYE SURGERY      has had many laser procedures for diabetic retinopathy    VASCULAR SURGERY      VEIN SURGERY  2012    EVLT right greater saphenous, IV sedation       Family History   Problem Relation Age of Onset    Cancer Maternal Aunt         breast    Breast cancer Maternal Aunt     Diabetes Maternal Grandmother     Breast cancer Maternal Grandmother     Cancer Maternal Grandfather         prostate    Diabetes Maternal Grandfather     Diabetes Cousin     Arthritis Mother     Cancer Mother     Melanoma Mother     Heart disease Father     Stroke Father     No Known Problems Sister     No Known Problems Brother     No Known Problems Daughter     Alzheimer's disease Maternal Uncle     Alcohol abuse Maternal Uncle         x2    Heart disease Maternal Uncle     No Known Problems Paternal Aunt     Heart disease Paternal Uncle        Social History     Socioeconomic History    Marital status:      Spouse name: None    Number of children: None    Years of education: None    Highest education level: None   Social Needs    Financial resource strain: None    Food insecurity - worry: None    Food insecurity - inability: None    Transportation needs - medical: None     "Transportation needs - non-medical: None   Occupational History    None   Tobacco Use    Smoking status: Never Smoker    Smokeless tobacco: Never Used   Substance and Sexual Activity    Alcohol use: Yes     Comment: social, 1 drink monthly    Drug use: No    Sexual activity: Yes     Partners: Male   Other Topics Concern    None   Social History Narrative    None       Current Outpatient Medications   Medication Sig Dispense Refill    ACZONE 5 % topical gel Apply topically 2 (two) times daily as needed.   0    alpha lipoic acid 600 mg Cap Take 1 capsule by mouth once daily.      ALPHAGAN P 0.1 % Drop   6    ascorbic acid, vitamin C, (VITAMIN C) 100 MG tablet Take 100 mg by mouth 2 (two) times daily.      aspirin (ECOTRIN) 81 MG EC tablet Take 81 mg by mouth once daily.      BD ULTRA-FINE JCARLOS PEN NEEDLES 32 gauge x 5/32" Ndle USE ONE NEEDLE ONCE DAILY  0    BENEFIBER, GUAR GUM, ORAL Take 1 Dose by mouth 2 (two) times daily as needed.       calcium citrate-vitamin D3 315-200 mg (CITRACAL+D) 315-200 mg-unit per tablet Take 1 tablet by mouth 2 (two) times daily.      cholecalciferol, vitamin D3, (VITAMIN D3) 2,000 unit Cap Take 1 capsule by mouth once daily.      cinnamon bark 500 mg capsule Take 500 mg by mouth 2 (two) times daily.      CRESTOR 40 mg Tab Take 40 mg by mouth once daily.       diclofenac (VOLTAREN) 75 MG EC tablet Take 1 tablet (75 mg total) by mouth 2 (two) times daily as needed. 30 tablet 0    DOCOSAHEXANOIC ACID/EPA (FISH OIL ORAL) Take 1,200 mg by mouth 2 (two) times daily.      doxycycline (VIBRAMYCIN) 50 MG capsule Take 1 capsule by mouth 2 (two) times daily as needed (roseacea).   0    ESTRACE 0.01 % (0.1 mg/gram) vaginal cream Place vaginally twice a week. Tuesday, Saturday  0    estradiol 10 mcg Tab Take 1 tablet by mouth twice a week. Tuesday, Saturday      ezetimibe (ZETIA) 10 mg tablet Take 10 mg by mouth once daily.      fluticasone (FLONASE) 50 mcg/actuation nasal " spray 1 spray by Each Nare route once daily. (Patient taking differently: 1 spray by Each Nare route daily as needed for Rhinitis. ) 16 g 0    FLUZONE QUAD 1322-9998 60 mcg (15 mcg x 4)/0.5 mL Susp   0    furosemide (LASIX) 20 MG tablet TAKE ONE TABLET BY MOUTH TWICE A DAY 60 tablet 2    GLUCAGON EMERGENCY 1 mg injection 1 mg as needed.       HUMALOG 100 unit/mL injection Inject into the skin. 10-17 units tid sliding scale  1    L.ACID/L.CASEI/B.BIF/B.CEDRIC/FOS (PROBIOTIC BLEND ORAL) Take 1 capsule by mouth every Mon, Wed, Fri.      metoprolol succinate (TOPROL-XL) 25 MG 24 hr tablet take 1 tablet by mouth once daily 30 tablet 10    ondansetron (ZOFRAN-ODT) 4 MG TbDL Take 1 tablet (4 mg total) by mouth every 8 (eight) hours as needed. 12 tablet 0    ONETOUCH ULTRA TEST Strp   0    pantoprazole (PROTONIX) 40 MG tablet take 1 tablet by mouth twice a day 180 tablet 3    potassium chloride SA (K-DUR,KLOR-CON) 20 MEQ tablet Take 2 tablets (40 mEq total) by mouth once daily. When you take a lasix pill 30 tablet 0    SYNTHROID 88 mcg tablet Take 88 mcg by mouth every evening.   0    traMADol (ULTRAM) 50 mg tablet Take 1 tablet (50 mg total) by mouth every 24 hours as needed for Pain. 30 tablet 1    TRESIBA FLEXTOUCH U-100 100 unit/mL (3 mL) InPn   6    VITAMIN B COMPLEX VIT C NO.4 (SUPER B COMPLEX + C ORAL) Take 1 tablet by mouth once daily.      doxycycline (MONODOX) 50 MG Cap   4     No current facility-administered medications for this visit.        Review of patient's allergies indicates:   Allergen Reactions    Bactrim [sulfamethoxazole-trimethoprim] Rash     Patient experienced on 12/3/14 redness to face and rash to chest and arms/ with no SOB or airway obstruction    Percocet [oxycodone-acetaminophen] Nausea And Vomiting     Also caused dizziness and passed out    Iodinated contrast media - iv dye Swelling and Rash     Says topical iodine OK    Norco [hydrocodone-acetaminophen] Itching, Swelling and  Rash     Can tolerate if she takes Benadryl with it    Niaspan extended-release [niacin] Itching and Other (See Comments)     Skin flushing and redness         Review of Systems   Constitution: Negative for chills, decreased appetite, diaphoresis and fever.   Cardiovascular: Negative for claudication and leg swelling.   Skin: Positive for color change and nail changes. Negative for dry skin, flushing and itching.   Musculoskeletal: Positive for myalgias. Negative for arthritis, back pain, falls, gout, joint pain and joint swelling.   Neurological: Positive for numbness and paresthesias.   Psychiatric/Behavioral: Negative for altered mental status.           Objective:      Physical Exam   Constitutional: She is oriented to person, place, and time. She appears well-developed and well-nourished. No distress.   Cardiovascular:   Pulses:       Dorsalis pedis pulses are 2+ on the right side, and 2+ on the left side.        Posterior tibial pulses are 1+ on the right side, and 1+ on the left side.   CFT <3 seconds bilateral.  Pedal hair growth decreased bilateral.  Varicosities noted to bilateral lower extremity. Mild nonpitting edema noted to bilateral lower extremity.  Toes are cool to touch bilateral.       Musculoskeletal: Normal range of motion. She exhibits edema. She exhibits no tenderness.   Muscle strength 5/5 in all muscle groups bilateral.  No tenderness nor crepitation with active and passive ROM of foot/ankle joints bilateral.  Mild pain with palpation of the Lt. Plantar central heel.   Bilateral gastrocnemius equinus with improvement in dorsiflexion of the Lt. Ankle while the knee is extended.  Bilateral pes planus foot type.  Bilateral hallux abducto valgus.  Bilateral semi-rigid contracture of toes 2-5.     Neurological: She is alert and oriented to person, place, and time. She has normal strength. A sensory deficit is present.   Protective sensation per [place] monofilament decreased  bilateral.    Vibratory sensation intact bilateral.    Light touch intact bilateral.   Skin: Skin is warm, dry and intact. No abrasion, no bruising, no burn, no ecchymosis, no laceration, no lesion, no petechiae and no rash noted. She is not diaphoretic. No cyanosis or erythema. No pallor. Nails show no clubbing.   Toenails x 10 appear thickened by 2 mm's, elongated by 2 mm's, and discolored with subungual debris. No open wounds, interdigital maceration noted bilateral.  Cicatrix of the Lt. Posterior heel is well healed with no adjacent break in skin integrity.  Site is atrophic in appearance.  Slight abrasion, superficial in depth, noted to the  Lt. Posterior lower leg.  No localized sign of infection noted.                   Assessment:       Encounter Diagnoses   Name Primary?    Paresthesia of foot, bilateral     Controlled type 1 diabetes mellitus with diabetic polyneuropathy Yes    Onychomycosis due to dermatophyte     Plantar fasciitis     Equinus deformity of foot          Plan:       Kateryna was seen today for diabetes mellitus and diabetic foot exam.    Diagnoses and all orders for this visit:    Controlled type 1 diabetes mellitus with diabetic polyneuropathy    Paresthesia of foot, bilateral    Onychomycosis due to dermatophyte    Plantar fasciitis    Equinus deformity of foot    Other orders  -     traMADol (ULTRAM) 50 mg tablet; Take 1 tablet (50 mg total) by mouth every 24 hours as needed for Pain.      I counseled the patient on her conditions, their implications and medical management.    - Patient to continue taking tramadol prn for nocturnal symptoms of neuropathy.  New prescription written.    - Fitted and dispensed a new pair of heel lifts, as this has helped to minimize pain symptoms.  May consider cork based heel lifts.    - Recommend continued stretching of the Achilles tendon.    - Shoe inspection. Diabetic Foot Education. Patient reminded of the importance of good nutrition and blood  sugar control to help prevent podiatric complications of diabetes. Patient instructed on proper foot hygeine. We discussed wearing proper shoe gear, daily foot inspections, never walking without protective shoe gear, never putting sharp instruments to feet    - With patient's permission, nails were aggressively reduced and debrided x 10 to their soft tissue attachment mechanically and with electric , removing all offending nail and debris.  Patient relates relief following the procedure. She will continue to monitor the areas daily, inspect her feet, wear protective shoe gear when ambulatory, moisturizer to maintain skin integrity and follow in this office in approximately 2-3 months, sooner p.r.n.    Follow-up in about 3 months (around 4/28/2019).    Lei Rhoades DPM

## 2019-02-18 ENCOUNTER — HOSPITAL ENCOUNTER (EMERGENCY)
Facility: HOSPITAL | Age: 64
Discharge: HOME | End: 2019-02-18
Attending: EMERGENCY MEDICINE
Payer: COMMERCIAL

## 2019-02-18 VITALS
HEART RATE: 86 BPM | DIASTOLIC BLOOD PRESSURE: 66 MMHG | OXYGEN SATURATION: 99 % | BODY MASS INDEX: 29.57 KG/M2 | WEIGHT: 184 LBS | TEMPERATURE: 98 F | SYSTOLIC BLOOD PRESSURE: 109 MMHG | RESPIRATION RATE: 19 BRPM | HEIGHT: 66 IN

## 2019-02-18 DIAGNOSIS — K52.9 GASTROENTERITIS: Primary | ICD-10-CM

## 2019-02-18 LAB
ALBUMIN SERPL BCP-MCNC: 4.1 G/DL
ALP SERPL-CCNC: 100 U/L
ALT SERPL W/O P-5'-P-CCNC: 20 U/L
ANION GAP SERPL CALC-SCNC: 7 MMOL/L
AST SERPL-CCNC: 21 U/L
BASOPHILS # BLD AUTO: 0 K/UL
BASOPHILS NFR BLD: 0.2 %
BILIRUB SERPL-MCNC: 0.5 MG/DL
BILIRUB UR QL STRIP: NEGATIVE
BUN SERPL-MCNC: 11 MG/DL
CALCIUM SERPL-MCNC: 9.9 MG/DL
CHLORIDE SERPL-SCNC: 97 MMOL/L
CLARITY UR: CLEAR
CO2 SERPL-SCNC: 34 MMOL/L
COLOR UR: YELLOW
CREAT SERPL-MCNC: 1 MG/DL
DIFFERENTIAL METHOD: ABNORMAL
EOSINOPHIL # BLD AUTO: 0.1 K/UL
EOSINOPHIL NFR BLD: 0.8 %
ERYTHROCYTE [DISTWIDTH] IN BLOOD BY AUTOMATED COUNT: 14.6 %
EST. GFR  (AFRICAN AMERICAN): >60 ML/MIN/1.73 M^2
EST. GFR  (NON AFRICAN AMERICAN): >60 ML/MIN/1.73 M^2
GLUCOSE SERPL-MCNC: 226 MG/DL
GLUCOSE UR QL STRIP: NEGATIVE
HCT VFR BLD AUTO: 39.9 %
HGB BLD-MCNC: 12.8 G/DL
HGB UR QL STRIP: NEGATIVE
INFLUENZA A, MOLECULAR: NEGATIVE
INFLUENZA B, MOLECULAR: NEGATIVE
KETONES UR QL STRIP: NEGATIVE
LEUKOCYTE ESTERASE UR QL STRIP: NEGATIVE
LYMPHOCYTES # BLD AUTO: 0.9 K/UL
LYMPHOCYTES NFR BLD: 10.4 %
MCH RBC QN AUTO: 30.2 PG
MCHC RBC AUTO-ENTMCNC: 32 G/DL
MCV RBC AUTO: 94 FL
MONOCYTES # BLD AUTO: 0.5 K/UL
MONOCYTES NFR BLD: 5.8 %
NEUTROPHILS # BLD AUTO: 7.4 K/UL
NEUTROPHILS NFR BLD: 82.8 %
NITRITE UR QL STRIP: NEGATIVE
PH UR STRIP: 6 [PH] (ref 5–8)
PLATELET # BLD AUTO: 193 K/UL
PMV BLD AUTO: 8.6 FL
POTASSIUM SERPL-SCNC: 3.9 MMOL/L
PROT SERPL-MCNC: 7.1 G/DL
PROT UR QL STRIP: NEGATIVE
RBC # BLD AUTO: 4.23 M/UL
SODIUM SERPL-SCNC: 138 MMOL/L
SP GR UR STRIP: >=1.03 (ref 1–1.03)
SPECIMEN SOURCE: NORMAL
URN SPEC COLLECT METH UR: ABNORMAL
UROBILINOGEN UR STRIP-ACNC: NEGATIVE EU/DL
WBC # BLD AUTO: 8.9 K/UL

## 2019-02-18 PROCEDURE — 81003 URINALYSIS AUTO W/O SCOPE: CPT

## 2019-02-18 PROCEDURE — 99284 EMERGENCY DEPT VISIT MOD MDM: CPT | Mod: 25

## 2019-02-18 PROCEDURE — 25000003 PHARM REV CODE 250: Performed by: NURSE PRACTITIONER

## 2019-02-18 PROCEDURE — 96361 HYDRATE IV INFUSION ADD-ON: CPT

## 2019-02-18 PROCEDURE — 63600175 PHARM REV CODE 636 W HCPCS: Performed by: NURSE PRACTITIONER

## 2019-02-18 PROCEDURE — 85025 COMPLETE CBC W/AUTO DIFF WBC: CPT

## 2019-02-18 PROCEDURE — 96374 THER/PROPH/DIAG INJ IV PUSH: CPT

## 2019-02-18 PROCEDURE — 80053 COMPREHEN METABOLIC PANEL: CPT

## 2019-02-18 PROCEDURE — 87502 INFLUENZA DNA AMP PROBE: CPT

## 2019-02-18 PROCEDURE — 36415 COLL VENOUS BLD VENIPUNCTURE: CPT

## 2019-02-18 RX ORDER — ONDANSETRON 4 MG/1
4 TABLET, ORALLY DISINTEGRATING ORAL EVERY 6 HOURS PRN
Qty: 20 TABLET | Refills: 0 | Status: SHIPPED | OUTPATIENT
Start: 2019-02-18 | End: 2019-04-29 | Stop reason: SDUPTHER

## 2019-02-18 RX ORDER — METOCLOPRAMIDE HYDROCHLORIDE 5 MG/ML
10 INJECTION INTRAMUSCULAR; INTRAVENOUS
Status: COMPLETED | OUTPATIENT
Start: 2019-02-18 | End: 2019-02-18

## 2019-02-18 RX ORDER — PROMETHAZINE HYDROCHLORIDE 25 MG/1
25 TABLET ORAL EVERY 6 HOURS PRN
Qty: 15 TABLET | Refills: 0 | Status: SHIPPED | OUTPATIENT
Start: 2019-02-18 | End: 2019-12-31

## 2019-02-18 RX ADMIN — METOCLOPRAMIDE 10 MG: 5 INJECTION, SOLUTION INTRAMUSCULAR; INTRAVENOUS at 06:02

## 2019-02-18 RX ADMIN — SODIUM CHLORIDE 1000 ML: 0.9 INJECTION, SOLUTION INTRAVENOUS at 06:02

## 2019-02-18 NOTE — PROGRESS NOTES
I have evaluated and performed a medical screening assessment on this patient while awaiting a thorough evaluation and treatment. All of the emergency department beds are occupied at this time. When appropriate, laboratory studies will be ordered from triage. The patient has been advised of this process and care plan. Patient complains of chills, nausea and back pain.    Orders pending: cbc, cmp, ua and flu  Disposition: ed bed

## 2019-02-19 ENCOUNTER — LAB VISIT (OUTPATIENT)
Dept: LAB | Facility: HOSPITAL | Age: 64
End: 2019-02-19
Attending: INTERNAL MEDICINE
Payer: COMMERCIAL

## 2019-02-19 DIAGNOSIS — N18.6 TYPE 2 DIABETES MELLITUS WITH END-STAGE RENAL DISEASE: ICD-10-CM

## 2019-02-19 DIAGNOSIS — I10 ESSENTIAL HYPERTENSION, MALIGNANT: Primary | ICD-10-CM

## 2019-02-19 DIAGNOSIS — E87.6 HYPOPOTASSEMIA: ICD-10-CM

## 2019-02-19 DIAGNOSIS — E11.22 TYPE 2 DIABETES MELLITUS WITH END-STAGE RENAL DISEASE: ICD-10-CM

## 2019-02-19 LAB
ALBUMIN SERPL BCP-MCNC: 3.8 G/DL
ANION GAP SERPL CALC-SCNC: 9 MMOL/L
BUN SERPL-MCNC: 12 MG/DL
CALCIUM SERPL-MCNC: 9.5 MG/DL
CHLORIDE SERPL-SCNC: 100 MMOL/L
CO2 SERPL-SCNC: 30 MMOL/L
CREAT SERPL-MCNC: 1.1 MG/DL
EST. GFR  (AFRICAN AMERICAN): >60 ML/MIN/1.73 M^2
EST. GFR  (NON AFRICAN AMERICAN): 53.9 ML/MIN/1.73 M^2
GLUCOSE SERPL-MCNC: 234 MG/DL
MAGNESIUM SERPL-MCNC: 1.9 MG/DL
PHOSPHATE SERPL-MCNC: 3.2 MG/DL
POTASSIUM SERPL-SCNC: 4 MMOL/L
SODIUM SERPL-SCNC: 139 MMOL/L

## 2019-02-19 PROCEDURE — 80069 RENAL FUNCTION PANEL: CPT

## 2019-02-19 PROCEDURE — 36415 COLL VENOUS BLD VENIPUNCTURE: CPT | Mod: PO

## 2019-02-19 PROCEDURE — 83735 ASSAY OF MAGNESIUM: CPT

## 2019-02-19 RX ORDER — FUROSEMIDE 20 MG/1
TABLET ORAL
Qty: 60 TABLET | Refills: 2 | Status: SHIPPED | OUTPATIENT
Start: 2019-02-19 | End: 2019-05-06 | Stop reason: SDUPTHER

## 2019-02-19 RX ORDER — PANTOPRAZOLE SODIUM 40 MG/1
TABLET, DELAYED RELEASE ORAL
Qty: 180 TABLET | Refills: 2 | Status: SHIPPED | OUTPATIENT
Start: 2019-02-19 | End: 2019-05-06 | Stop reason: SDUPTHER

## 2019-02-19 NOTE — ED PROVIDER NOTES
Encounter Date: 2/18/2019    SCRIBE #1 NOTE: I, Janell Juan, am scribing for, and in the presence of, Marlys Madsen NP.       History     Chief Complaint   Patient presents with    Chills     incontinence of urine with emesis     Nausea    Back Pain       Time seen by provider: 6:19 PM on 02/18/2019    Kateryna Desai is a 64 y.o. female with a PMHx of ERD, thyroid disease, seizures, stroke, HTN, HLD, DM type I, osteopenia, diabetic gastroparesis, tachycardia, and diabetic retinopathy of both eyes who presents to the ED with a sudden onset of nausea and vomiting that began ~3-4 hours ago. She has had 6 episodes of vomiting. Pt has taken Zofran, but vomited shortly after. Pt endorses a cough, sore throat, runny nose, and a post nasal drip. She had taken Mucinex and Robitussin. The patient denies fever, chills, diarrhea, shortness of breath, burning urination, or any other symptoms at this time. No pertinent PSHx noted. No pertinent SHx noted. Pt is allergic to Bactrim, Heparin Analogues, Percocet, Iodinated contrast- oral and IV dye, Niaspan Extended-release, and Norco.      The history is provided by the patient.     Review of patient's allergies indicates:   Allergen Reactions    Bactrim [sulfamethoxazole-trimethoprim] Rash     Patient experienced on 12/3/14 redness to face and rash to chest and arms/ with no SOB or airway obstruction    Heparin analogues     Percocet [oxycodone-acetaminophen] Nausea And Vomiting     Also caused dizziness and passed out    Iodinated contrast- oral and iv dye Swelling and Rash     Says topical iodine OK    Niaspan extended-release [niacin] Itching and Other (See Comments)     Skin flushing and redness    Norco [hydrocodone-acetaminophen] Itching, Swelling and Rash     Can tolerate if she takes Benadryl with it     Past Medical History:   Diagnosis Date    Allergy     Arthritis     degnerative disease low back,muscle spasms, shoulders    Asian flu  type A 2017    Diabetes mellitus type I     since age 13    Diabetic gastroparesis     takes Cytotec    Diabetic retinopathy of both eyes     gets periodic laser treatments    Difficult intubation     as a child had sore throat after a procedure    Encounter for blood transfusion     GERD (gastroesophageal reflux disease)     Headache(784.0)     Hiatal hernia     with GERD    Hyperlipidemia     Hypertension     Infection of the upper respiratory tract 2012    Lumbar spinal stenosis     Osteopenia     Rosacea     Seizures     Pt states during pgy with stroke    Stroke ?    while pregnant    Tachycardia     asymptomatic with Toprol    Thyroid disease     hypothyroidism    Venous insufficiency of leg     improved since EVLT     Past Surgical History:   Procedure Laterality Date    BUNIONECTOMY Right     BUNIONECTOMY Right 2012    [facility] by [facility]    CARDIAC CATHETERIZATION      no stents     SECTION      COLONOSCOPY  2007    Dr. Rose, 10 year recheck    EXCISION, BUNIONETTE Right 2012    Performed [facility] at [facility]    EXOSTECTOMY  12    left foot, local anesthesia, in office    EYE SURGERY      has had many laser procedures for diabetic retinopathy    VASCULAR SURGERY      VEIN SURGERY  2012    EVLT right greater saphenous, IV sedation     Family History   Problem Relation Age of Onset    Cancer Maternal Aunt         breast    Breast cancer Maternal Aunt     Diabetes Maternal Grandmother     Breast cancer Maternal Grandmother     Cancer Maternal Grandfather         prostate    Diabetes Maternal Grandfather     Diabetes Cousin     Arthritis Mother     Cancer Mother     Melanoma Mother     Heart disease Father     Stroke Father     No Known Problems Sister     No Known Problems Brother     No Known Problems Daughter     Alzheimer's disease Maternal Uncle     Alcohol abuse  Maternal Uncle         x2    Heart disease Maternal Uncle     No Known Problems Paternal Aunt     Heart disease Paternal Uncle      Social History     Tobacco Use    Smoking status: Never Smoker    Smokeless tobacco: Never Used   Substance Use Topics    Alcohol use: Yes     Comment: social, 1 drink monthly    Drug use: No     Review of Systems   Constitutional: Negative for activity change, appetite change, chills and fever.   HENT: Positive for postnasal drip, rhinorrhea and sore throat. Negative for congestion, ear pain, sinus pressure, sinus pain, sneezing and voice change.    Eyes: Negative for pain, discharge and redness.   Respiratory: Positive for cough. Negative for shortness of breath, wheezing and stridor.    Cardiovascular: Negative for chest pain, palpitations and leg swelling.   Gastrointestinal: Positive for nausea and vomiting. Negative for abdominal distention, abdominal pain, blood in stool, constipation and diarrhea.   Genitourinary: Negative for decreased urine volume, difficulty urinating, dysuria, flank pain, frequency, hematuria and urgency.   Musculoskeletal: Negative for arthralgias, gait problem, joint swelling, myalgias, neck pain and neck stiffness.   Skin: Negative for rash and wound.   Neurological: Negative for dizziness, syncope, facial asymmetry, speech difficulty, weakness, light-headedness, numbness and headaches.   Hematological: Negative for adenopathy.   Psychiatric/Behavioral: Negative.  Negative for confusion.       Physical Exam     Initial Vitals [02/18/19 1729]   BP Pulse Resp Temp SpO2   (!) 144/74 77 20 97.6 °F (36.4 °C) 100 %      MAP       --         Physical Exam    Nursing note and vitals reviewed.  Constitutional: She appears well-developed and well-nourished. She is not diaphoretic. She is active. No distress.   HENT:   Head: Normocephalic and atraumatic.   Right Ear: External ear normal.   Left Ear: External ear normal.   Nose: Nose normal.   Mouth/Throat:  Oropharynx is clear and moist. No oropharyngeal exudate.   Eyes: Conjunctivae, EOM and lids are normal. Pupils are equal, round, and reactive to light. Right eye exhibits no chemosis and no discharge. Left eye exhibits no chemosis and no discharge. Right conjunctiva is not injected. Left conjunctiva is not injected.   Neck: Trachea normal and normal range of motion. Neck supple. No stridor present. No tracheal deviation present. No neck rigidity.   Cardiovascular: Normal rate, regular rhythm, normal heart sounds and normal pulses. Exam reveals no distant heart sounds and no friction rub.    No murmur heard.  Pulmonary/Chest: Breath sounds normal. No stridor. She has no wheezes. She has no rhonchi. She has no rales.   Abdominal: Soft. Bowel sounds are normal. She exhibits no distension. There is no tenderness. There is no rebound and no guarding.   Musculoskeletal: Normal range of motion.   Lymphadenopathy:     She has no cervical adenopathy.   Neurological: She is alert and oriented to person, place, and time. She has normal strength. GCS score is 15. GCS eye subscore is 4. GCS verbal subscore is 5. GCS motor subscore is 6.   Skin: Skin is warm, dry and intact. Capillary refill takes less than 2 seconds. No rash noted.   Psychiatric: She has a normal mood and affect. Her speech is normal and behavior is normal. Thought content normal.         ED Course   Procedures  Labs Reviewed   CBC W/ AUTO DIFFERENTIAL - Abnormal; Notable for the following components:       Result Value    RDW 14.6 (*)     MPV 8.6 (*)     Lymph # 0.9 (*)     Gran% 82.8 (*)     Lymph% 10.4 (*)     All other components within normal limits   COMPREHENSIVE METABOLIC PANEL - Abnormal; Notable for the following components:    CO2 34 (*)     Glucose 226 (*)     Anion Gap 7 (*)     All other components within normal limits   URINALYSIS, REFLEX TO URINE CULTURE - Abnormal; Notable for the following components:    Specific Gravity, UA >=1.030 (*)     All  other components within normal limits    Narrative:     Preferred Collection Type->Urine, Clean Catch   INFLUENZA A & B BY MOLECULAR          Imaging Results    None          Medical Decision Making:   History:   Old Medical Records: I decided to obtain old medical records.  Differential Diagnosis:   Influenza  Pneumonia  Strep pharyngitis  Meningitis  Viral syndrome  Gastroenteritis  Appendicitis  Bowel obstruction  Diverticulitis   Clinical Tests:   Lab Tests: Ordered and Reviewed       APC / Resident Notes:   Patient appears to have viral gastroenteritis.  The patient does not appear dehydrated, septic, toxic and I doubt this is bacterial in nature given that the patient has no bloody stools, recent antibiotics, foreign travel, raw foods.  I do not think this is appendicitis, cholecystitis, obstruction, diverticulitis. Nausea relieved in ED with reglan and IV fluids given. CBC, CMP, U/A and flu test all wnl.  The patient appears very well, hydrated, and is stable for discharge. I have discussed pt with Dr Multani who agrees with POC. Pt voices understanding and is agreeable to the plan.  She is given specific return precautions.        Scribe Attestation:   Scribe #1: I performed the above scribed service and the documentation accurately describes the services I performed. I attest to the accuracy of the note.    I, MICHELLE Bella, personally performed the services described in this documentation. All medical record entries made by the scribe were at my direction and in my presence.  I have reviewed the chart and agree that the record reflects my personal performance and is accurate and complete. MICHELLE Bella.  8:38 PM 02/18/2019           Clinical Impression:   The encounter diagnosis was Gastroenteritis.      Disposition:   Disposition: Discharged  Condition: Stable                        Marlys Madsen NP  02/18/19 2049

## 2019-04-01 ENCOUNTER — LAB VISIT (OUTPATIENT)
Dept: LAB | Facility: HOSPITAL | Age: 64
End: 2019-04-01
Attending: INTERNAL MEDICINE
Payer: COMMERCIAL

## 2019-04-01 DIAGNOSIS — I51.9 MYXEDEMA HEART DISEASE: ICD-10-CM

## 2019-04-01 DIAGNOSIS — Z79.899 NEED FOR PROPHYLACTIC CHEMOTHERAPY: ICD-10-CM

## 2019-04-01 DIAGNOSIS — E78.00 PURE HYPERCHOLESTEROLEMIA: ICD-10-CM

## 2019-04-01 DIAGNOSIS — E10.9 DIABETES MELLITUS TYPE I: Primary | ICD-10-CM

## 2019-04-01 DIAGNOSIS — E03.9 MYXEDEMA HEART DISEASE: ICD-10-CM

## 2019-04-01 LAB
ALBUMIN SERPL BCP-MCNC: 3.7 G/DL (ref 3.5–5.2)
ALP SERPL-CCNC: 79 U/L (ref 55–135)
ALT SERPL W/O P-5'-P-CCNC: 18 U/L (ref 10–44)
ANION GAP SERPL CALC-SCNC: 8 MMOL/L (ref 8–16)
AST SERPL-CCNC: 19 U/L (ref 10–40)
BILIRUB SERPL-MCNC: 0.5 MG/DL (ref 0.1–1)
BUN SERPL-MCNC: 9 MG/DL (ref 8–23)
CALCIUM SERPL-MCNC: 9.5 MG/DL (ref 8.7–10.5)
CHLORIDE SERPL-SCNC: 102 MMOL/L (ref 95–110)
CO2 SERPL-SCNC: 32 MMOL/L (ref 23–29)
CREAT SERPL-MCNC: 1 MG/DL (ref 0.5–1.4)
EST. GFR  (AFRICAN AMERICAN): >60 ML/MIN/1.73 M^2
EST. GFR  (NON AFRICAN AMERICAN): >60 ML/MIN/1.73 M^2
ESTIMATED AVG GLUCOSE: 117 MG/DL (ref 68–131)
GLUCOSE SERPL-MCNC: 146 MG/DL (ref 70–110)
HBA1C MFR BLD HPLC: 5.7 % (ref 4–5.6)
POTASSIUM SERPL-SCNC: 3.6 MMOL/L (ref 3.5–5.1)
PROT SERPL-MCNC: 6.9 G/DL (ref 6–8.4)
SODIUM SERPL-SCNC: 142 MMOL/L (ref 136–145)
T4 FREE SERPL-MCNC: 0.92 NG/DL (ref 0.71–1.51)
TSH SERPL DL<=0.005 MIU/L-ACNC: 4.07 UIU/ML (ref 0.4–4)

## 2019-04-01 PROCEDURE — 36415 COLL VENOUS BLD VENIPUNCTURE: CPT | Mod: PO

## 2019-04-01 PROCEDURE — 84439 ASSAY OF FREE THYROXINE: CPT

## 2019-04-01 PROCEDURE — 80053 COMPREHEN METABOLIC PANEL: CPT

## 2019-04-01 PROCEDURE — 83036 HEMOGLOBIN GLYCOSYLATED A1C: CPT

## 2019-04-01 PROCEDURE — 84443 ASSAY THYROID STIM HORMONE: CPT

## 2019-04-15 RX ORDER — METOPROLOL SUCCINATE 25 MG/1
TABLET, EXTENDED RELEASE ORAL
Qty: 30 TABLET | Refills: 2 | Status: SHIPPED | OUTPATIENT
Start: 2019-04-15 | End: 2019-05-06 | Stop reason: SDUPTHER

## 2019-04-15 NOTE — TELEPHONE ENCOUNTER
Patient was called / booked apt with Ms Monzon. Patient stated if she has to bring in her medications she will get another doctor.

## 2019-04-22 ENCOUNTER — HOSPITAL ENCOUNTER (OUTPATIENT)
Dept: RADIOLOGY | Facility: HOSPITAL | Age: 64
Discharge: HOME | End: 2019-04-22
Attending: PHYSICAL MEDICINE & REHABILITATION
Payer: COMMERCIAL

## 2019-04-22 ENCOUNTER — TELEPHONE (OUTPATIENT)
Dept: PAIN MEDICINE | Facility: CLINIC | Age: 64
End: 2019-04-22

## 2019-04-22 ENCOUNTER — OFFICE VISIT (OUTPATIENT)
Dept: SPINE | Facility: CLINIC | Age: 64
End: 2019-04-22
Payer: COMMERCIAL

## 2019-04-22 VITALS
DIASTOLIC BLOOD PRESSURE: 73 MMHG | BODY MASS INDEX: 29.58 KG/M2 | SYSTOLIC BLOOD PRESSURE: 122 MMHG | HEIGHT: 66 IN | WEIGHT: 184.06 LBS | HEART RATE: 85 BPM

## 2019-04-22 DIAGNOSIS — M54.5 ACUTE MIDLINE LOW BACK PAIN, WITH SCIATICA PRESENCE UNSPECIFIED: Primary | ICD-10-CM

## 2019-04-22 DIAGNOSIS — M54.5 ACUTE MIDLINE LOW BACK PAIN, WITH SCIATICA PRESENCE UNSPECIFIED: ICD-10-CM

## 2019-04-22 PROCEDURE — 3074F PR MOST RECENT SYSTOLIC BLOOD PRESSURE < 130 MM HG: ICD-10-PCS | Mod: ,,, | Performed by: PHYSICAL MEDICINE & REHABILITATION

## 2019-04-22 PROCEDURE — 99204 PR OFFICE/OUTPT VISIT, NEW, LEVL IV, 45-59 MIN: ICD-10-PCS | Mod: ,,, | Performed by: PHYSICAL MEDICINE & REHABILITATION

## 2019-04-22 PROCEDURE — 3008F BODY MASS INDEX DOCD: CPT | Mod: ,,, | Performed by: PHYSICAL MEDICINE & REHABILITATION

## 2019-04-22 PROCEDURE — 3078F DIAST BP <80 MM HG: CPT | Mod: ,,, | Performed by: PHYSICAL MEDICINE & REHABILITATION

## 2019-04-22 PROCEDURE — 72110 X-RAY EXAM L-2 SPINE 4/>VWS: CPT | Mod: TC,FY

## 2019-04-22 PROCEDURE — 3008F PR BODY MASS INDEX (BMI) DOCUMENTED: ICD-10-PCS | Mod: ,,, | Performed by: PHYSICAL MEDICINE & REHABILITATION

## 2019-04-22 PROCEDURE — 3074F SYST BP LT 130 MM HG: CPT | Mod: ,,, | Performed by: PHYSICAL MEDICINE & REHABILITATION

## 2019-04-22 PROCEDURE — 99204 OFFICE O/P NEW MOD 45 MIN: CPT | Mod: ,,, | Performed by: PHYSICAL MEDICINE & REHABILITATION

## 2019-04-22 PROCEDURE — 3078F PR MOST RECENT DIASTOLIC BLOOD PRESSURE < 80 MM HG: ICD-10-PCS | Mod: ,,, | Performed by: PHYSICAL MEDICINE & REHABILITATION

## 2019-04-22 PROCEDURE — 72110 X-RAY EXAM L-2 SPINE 4/>VWS: CPT | Mod: 26,,, | Performed by: RADIOLOGY

## 2019-04-22 PROCEDURE — 72110 XR LUMBAR SPINE 5 VIEW WITH FLEX AND EXT: ICD-10-PCS | Mod: 26,,, | Performed by: RADIOLOGY

## 2019-04-22 RX ORDER — HYDROCODONE BITARTRATE AND ACETAMINOPHEN 7.5; 325 MG/1; MG/1
1 TABLET ORAL EVERY 6 HOURS PRN
Qty: 21 TABLET | Refills: 0 | Status: SHIPPED | OUTPATIENT
Start: 2019-04-22 | End: 2019-07-29

## 2019-04-22 RX ORDER — BACLOFEN 10 MG/1
10 TABLET ORAL 3 TIMES DAILY
Qty: 90 TABLET | Refills: 11 | Status: SHIPPED | OUTPATIENT
Start: 2019-04-22 | End: 2019-09-09

## 2019-04-22 NOTE — PROGRESS NOTES
SUBJECTIVE:    Patient ID: Kateryna Desai is a 64 y.o. female.    Chief Complaint: Injury (fall 1.5ek ago with lower back pain radiating down bilateral  leg )    This is a 64-year-old woman who sees Dr. Mendoza for her primary care.  She has a type 1 diabetic also has a history of hypertension hyperlipidemia chronic kidney disease.  She has a history of intermittent low back pain and has known degenerative disc disease primarily around L4-5 and L5-S1.  In the past she has had physical therapy and still does a home exercise program.  She has never had epidural steroid injections.  She presents to me with a 1-1/2 week history of centralized low back pain at the lumbosacral junction following a fall.  She slipped on something in her kitchen and fell on her rear and and since then she has been having this pain.  Occasionally associated with posterolateral discomfort in the thighs down to the level of the knees but not beyond that.  No graham weakness bowel or bladder dysfunction fever chills sweats or unexpected weight loss.  Current pain level is 10/10 but is intermittent in nature.  She has been having difficulty sleeping.  She took some of her 's baclofen which helped her get some sleep.  The also been taking tramadol which has not been particularly effective.        Past Medical History:   Diagnosis Date    Allergy     Arthritis     degnerative disease low back,muscle spasms, shoulders    Asian flu type A 12/22/2017    Diabetes mellitus type I     since age 13    Diabetic gastroparesis     takes Cytotec    Diabetic retinopathy of both eyes     gets periodic laser treatments    Difficult intubation     as a child had sore throat after a procedure    Encounter for blood transfusion     GERD (gastroesophageal reflux disease)     Headache(784.0)     Hiatal hernia     with GERD    Hyperlipidemia     Hypertension     Infection of the upper respiratory tract June 2012    Lumbar  spinal stenosis     Osteopenia     Rosacea     Seizures     Pt states during pgy with stroke    Stroke ?    while pregnant    Tachycardia     asymptomatic with Toprol    Thyroid disease     hypothyroidism    Venous insufficiency of leg     improved since EVLT     Social History     Socioeconomic History    Marital status:      Spouse name: Not on file    Number of children: Not on file    Years of education: Not on file    Highest education level: Not on file   Occupational History    Not on file   Social Needs    Financial resource strain: Not on file    Food insecurity:     Worry: Not on file     Inability: Not on file    Transportation needs:     Medical: Not on file     Non-medical: Not on file   Tobacco Use    Smoking status: Never Smoker    Smokeless tobacco: Never Used   Substance and Sexual Activity    Alcohol use: Yes     Comment: social, 1 drink monthly    Drug use: No    Sexual activity: Yes     Partners: Male   Lifestyle    Physical activity:     Days per week: Not on file     Minutes per session: Not on file    Stress: Not on file   Relationships    Social connections:     Talks on phone: Not on file     Gets together: Not on file     Attends [employer] service: Not on file     Active member of club or organization: Not on file     Attends meetings of clubs or organizations: Not on file     Relationship status: Not on file   Other Topics Concern    Not on file   Social History Narrative    Not on file     Past Surgical History:   Procedure Laterality Date    BUNIONECTOMY Right 2012    BUNIONECTOMY Right 2012    [facility]    CARDIAC CATHETERIZATION      no stents     SECTION      COLONOSCOPY  2007    Dr. Rose, 10 year recheck    EXCISION, BUNIONETTE Right 2012    Performed by [facility] at [facility]    EXOSTECTOMY  12    left foot, local anesthesia, in office    EYE SURGERY      has had  "many laser procedures for diabetic retinopathy    VASCULAR SURGERY      VEIN SURGERY  April 2012    EVLT right greater saphenous, IV sedation     Family History   Problem Relation Age of Onset    Cancer Maternal Aunt         breast    Breast cancer Maternal Aunt     Diabetes Maternal Grandmother     Breast cancer Maternal Grandmother     Cancer Maternal Grandfather         prostate    Diabetes Maternal Grandfather     Diabetes Cousin     Arthritis Mother     Cancer Mother     Melanoma Mother     Heart disease Father     Stroke Father     No Known Problems Sister     No Known Problems Brother     No Known Problems Daughter     Alzheimer's disease Maternal Uncle     Alcohol abuse Maternal Uncle         x2    Heart disease Maternal Uncle     No Known Problems Paternal Aunt     Heart disease Paternal Uncle      Vitals:    04/22/19 1346   BP: 122/73   Pulse: 85   Weight: 83.5 kg (184 lb 1.4 oz)   Height: 5' 6" (1.676 m)       Review of Systems   Constitutional: Negative for chills, diaphoresis, fatigue, fever and unexpected weight change.   HENT: Negative for trouble swallowing.    Eyes: Negative for visual disturbance.   Respiratory: Negative for shortness of breath.    Cardiovascular: Negative for chest pain.   Gastrointestinal: Negative for abdominal pain, constipation, nausea and vomiting.   Genitourinary: Negative for difficulty urinating.   Musculoskeletal: Negative for arthralgias, back pain, gait problem, joint swelling, myalgias, neck pain and neck stiffness.   Neurological: Negative for dizziness, speech difficulty, weakness, light-headedness, numbness and headaches.          Objective:      Physical Exam   Constitutional: She is oriented to person, place, and time. She appears well-developed and well-nourished.   Neurological: She is alert and oriented to person, place, and time.   She is awake and in no acute distress  She has moderate tenderness to palpation and percussion over the " lumbar spine  Forward flexion is to about 60° with no pain.  Extension causes mild pain at the lumbosacral junction  She can heel and toe walk normally  Deep tendon reflexes are +1 at both knees and trace at both ankles  Strength is normal in both lower extremities  Straight leg raising is negative bilaterally the  KATHERINE testing negative bilaterally           Assessment:       1. Acute midline low back pain, with sciatica presence unspecified           Plan:     she has a nonfocal examination from a neurological standpoint and no historical red flags.  With her history of falls he needs to have x-ray on the lumbar spine.  She can continue a home exercise program.  The she is reluctant to start physical therapy due to expenses.  We will get her scheduled for an interlaminar epidural steroid injection at L5-S1.  Baclofen 10 mg 3 times a day as needed.  Short term Norco 7.5 mg.  Follow-up in 1 week.  Consider adding gabapentin      Acute midline low back pain, with sciatica presence unspecified  -     X-Ray Lumbar Complete With Flex And Ext; Future; Expected date: 04/22/2019    Other orders  -     HYDROcodone-acetaminophen (NORCO) 7.5-325 mg per tablet; Take 1 tablet by mouth every 6 (six) hours as needed for Pain.  Dispense: 21 tablet; Refill: 0  -     baclofen (LIORESAL) 10 MG tablet; Take 1 tablet (10 mg total) by mouth 3 (three) times daily.  Dispense: 90 tablet; Refill: 11

## 2019-04-22 NOTE — TELEPHONE ENCOUNTER
----- Message from Doug Mcgehe MD sent at 4/22/2019  2:23 PM CDT -----  Please schedule for interlaminar epidural steroid injection at L5-S1

## 2019-04-23 ENCOUNTER — TELEPHONE (OUTPATIENT)
Dept: PAIN MEDICINE | Facility: CLINIC | Age: 64
End: 2019-04-23

## 2019-04-23 NOTE — TELEPHONE ENCOUNTER
----- Message from Carito Iraheta sent at 4/23/2019  7:59 AM CDT -----  Contact: pt  ...Type:  Patient Returning Call    Who Called: pt  Who Left Message for Patient:  Yolette  Does the patient know what this is regarding?: schedule procedure  Best Call Back Number:    Additional Information:  Pt returned a missed call  Please advise  Thanks

## 2019-04-25 DIAGNOSIS — M54.16 LUMBAR RADICULOPATHY: Primary | ICD-10-CM

## 2019-04-26 ENCOUNTER — TELEPHONE (OUTPATIENT)
Dept: SPINE | Facility: CLINIC | Age: 64
End: 2019-04-26

## 2019-04-29 ENCOUNTER — OFFICE VISIT (OUTPATIENT)
Dept: SPINE | Facility: CLINIC | Age: 64
End: 2019-04-29
Payer: COMMERCIAL

## 2019-04-29 ENCOUNTER — OFFICE VISIT (OUTPATIENT)
Dept: PODIATRY | Facility: CLINIC | Age: 64
End: 2019-04-29
Payer: COMMERCIAL

## 2019-04-29 VITALS — BODY MASS INDEX: 29.58 KG/M2 | HEIGHT: 66 IN | WEIGHT: 184.06 LBS

## 2019-04-29 VITALS — WEIGHT: 184.06 LBS | BODY MASS INDEX: 29.58 KG/M2 | HEIGHT: 66 IN

## 2019-04-29 DIAGNOSIS — R20.2 PARESTHESIA OF FOOT, BILATERAL: ICD-10-CM

## 2019-04-29 DIAGNOSIS — B35.1 ONYCHOMYCOSIS DUE TO DERMATOPHYTE: ICD-10-CM

## 2019-04-29 DIAGNOSIS — E10.42 CONTROLLED TYPE 1 DIABETES MELLITUS WITH DIABETIC POLYNEUROPATHY: Primary | ICD-10-CM

## 2019-04-29 DIAGNOSIS — M20.42 HAMMER TOES OF BOTH FEET: ICD-10-CM

## 2019-04-29 DIAGNOSIS — M20.41 HAMMER TOES OF BOTH FEET: ICD-10-CM

## 2019-04-29 DIAGNOSIS — M54.5 ACUTE MIDLINE LOW BACK PAIN, WITH SCIATICA PRESENCE UNSPECIFIED: Primary | ICD-10-CM

## 2019-04-29 DIAGNOSIS — M72.2 PLANTAR FASCIITIS: ICD-10-CM

## 2019-04-29 DIAGNOSIS — L84 CORN OR CALLUS: ICD-10-CM

## 2019-04-29 PROCEDURE — 99213 PR OFFICE/OUTPT VISIT, EST, LEVL III, 20-29 MIN: ICD-10-PCS | Mod: ,,, | Performed by: PHYSICAL MEDICINE & REHABILITATION

## 2019-04-29 PROCEDURE — 11056 PARNG/CUTG B9 HYPRKR LES 2-4: CPT | Mod: S$GLB,,, | Performed by: PODIATRIST

## 2019-04-29 PROCEDURE — 3008F BODY MASS INDEX DOCD: CPT | Mod: ,,, | Performed by: PHYSICAL MEDICINE & REHABILITATION

## 2019-04-29 PROCEDURE — 99999 PR PBB SHADOW E&M-EST. PATIENT-LVL II: CPT | Mod: PBBFAC,,, | Performed by: PODIATRIST

## 2019-04-29 PROCEDURE — 11721 DEBRIDE NAIL 6 OR MORE: CPT | Mod: 59,S$GLB,, | Performed by: PODIATRIST

## 2019-04-29 PROCEDURE — 99999 PR PBB SHADOW E&M-EST. PATIENT-LVL II: ICD-10-PCS | Mod: PBBFAC,,, | Performed by: PODIATRIST

## 2019-04-29 PROCEDURE — 11056 PR TRIM BENIGN HYPERKERATOTIC SKIN LESION,2-4: ICD-10-PCS | Mod: S$GLB,,, | Performed by: PODIATRIST

## 2019-04-29 PROCEDURE — 11721 PR DEBRIDEMENT OF NAILS, 6 OR MORE: ICD-10-PCS | Mod: 59,S$GLB,, | Performed by: PODIATRIST

## 2019-04-29 PROCEDURE — 99499 NO LOS: ICD-10-PCS | Mod: S$GLB,,, | Performed by: PODIATRIST

## 2019-04-29 PROCEDURE — 3008F PR BODY MASS INDEX (BMI) DOCUMENTED: ICD-10-PCS | Mod: ,,, | Performed by: PHYSICAL MEDICINE & REHABILITATION

## 2019-04-29 PROCEDURE — 99213 OFFICE O/P EST LOW 20 MIN: CPT | Mod: ,,, | Performed by: PHYSICAL MEDICINE & REHABILITATION

## 2019-04-29 PROCEDURE — 99499 UNLISTED E&M SERVICE: CPT | Mod: S$GLB,,, | Performed by: PODIATRIST

## 2019-04-29 NOTE — PROGRESS NOTES
Subjective:      Patient ID: Kateryna Desai is a 64 y.o. female.    Chief Complaint: Diabetes Mellitus (5.74/1/119 Kelly 5/6/19) and Diabetic Foot Exam    Kateryna is a 64 y.o. female who presents to the clinic for routine evaluation and treatment of diabetic feet. Kateryna has a past medical history of Allergy, Arthritis, Asian flu type A (12/22/2017), Diabetes mellitus type I, Diabetic gastroparesis, Diabetic retinopathy of both eyes, Difficult intubation, Encounter for blood transfusion, GERD (gastroesophageal reflux disease), Headache(784.0), Hiatal hernia, Hyperlipidemia, Hypertension, Infection of the upper respiratory tract (June 2012), Lumbar spinal stenosis, Osteopenia, Rosacea, Seizures, Stroke (1985?), Tachycardia, Thyroid disease, and Venous insufficiency of leg. Patient's chief complaint consists of toenails in need of trimming.  Denies being painful with wearing shoe gear.  Has not attempted to self treat.  Also, she continues performing regular stretching exercises and is wearing heel lifts to continue addressing plantar fasciitis pain of the LT.foot.  States that symptoms have improved but not fully resolved.   Relates discontinuing use of tramadol, for the time being, as she has been prescribed pain medication for lower back related symptoms.  Notes this resolves her neuropathy pain, and currently does not rely on daily use of the medication for symptomatic relief.  Continues with excellent control over her blood glucose.  Denies any additional pedal complaints.      PCP: Endy Mendoza MD    Date Last Seen by PCP:  5/6/19      Hemoglobin A1C   Date Value Ref Range Status   04/01/2019 5.7 (H) 4.0 - 5.6 % Final     Comment:     ADA Screening Guidelines:  5.7-6.4%  Consistent with prediabetes  >or=6.5%  Consistent with diabetes  High levels of fetal hemoglobin interfere with the HbA1C  assay. Heterozygous hemoglobin variants (HbS, HgC, etc)do  not significantly interfere with this  assay.   However, presence of multiple variants may affect accuracy.     11/27/2018 5.5 4.0 - 5.6 % Final     Comment:     ADA Screening Guidelines:  5.7-6.4%  Consistent with prediabetes  >or=6.5%  Consistent with diabetes  High levels of fetal hemoglobin interfere with the HbA1C  assay. Heterozygous hemoglobin variants (HbS, HgC, etc)do  not significantly interfere with this assay.   However, presence of multiple variants may affect accuracy.     07/16/2018 6.0 (H) 4.0 - 5.6 % Final     Comment:     ADA Screening Guidelines:  5.7-6.4%  Consistent with prediabetes  >or=6.5%  Consistent with diabetes  High levels of fetal hemoglobin interfere with the HbA1C  assay. Heterozygous hemoglobin variants (HbS, HgC, etc)do  not significantly interfere with this assay.   However, presence of multiple variants may affect accuracy.             Past Medical History:   Diagnosis Date    Allergy     Arthritis     degnerative disease low back,muscle spasms, shoulders    Asian flu type A 12/22/2017    Diabetes mellitus type I     since age 13    Diabetic gastroparesis     takes Cytotec    Diabetic retinopathy of both eyes     gets periodic laser treatments    Difficult intubation     as a child had sore throat after a procedure    Encounter for blood transfusion     GERD (gastroesophageal reflux disease)     Headache(784.0)     Hiatal hernia     with GERD    Hyperlipidemia     Hypertension     Infection of the upper respiratory tract June 2012    Lumbar spinal stenosis     Osteopenia     Rosacea     Seizures     Pt states during pgy with stroke    Stroke 1985?    while pregnant    Tachycardia     asymptomatic with Toprol    Thyroid disease     hypothyroidism    Venous insufficiency of leg     improved since EVLT       Past Surgical History:   Procedure Laterality Date    BUNIONECTOMY Right 2012    BUNIONECTOMY Right 8/24/2012    [facility]    CARDIAC CATHETERIZATION  2008     no stents     SECTION      COLONOSCOPY  2007    Dr. Rose, 10 year recheck    EXCISION, BUNIONETTE Right 2012    [facility]    EXOSTECTOMY  12    left foot, local anesthesia, in office    EYE SURGERY      has had many laser procedures for diabetic retinopathy    VASCULAR SURGERY      VEIN SURGERY  2012    EVLT right greater saphenous, IV sedation       Family History   Problem Relation Age of Onset    Cancer Maternal Aunt         breast    Breast cancer Maternal Aunt     Diabetes Maternal Grandmother     Breast cancer Maternal Grandmother     Cancer Maternal Grandfather         prostate    Diabetes Maternal Grandfather     Diabetes Cousin     Arthritis Mother     Cancer Mother     Melanoma Mother     Heart disease Father     Stroke Father     No Known Problems Sister     No Known Problems Brother     No Known Problems Daughter     Alzheimer's disease Maternal Uncle     Alcohol abuse Maternal Uncle         x2    Heart disease Maternal Uncle     No Known Problems Paternal Aunt     Heart disease Paternal Uncle        Social History     Socioeconomic History    Marital status:      Spouse name: Not on file    Number of children: Not on file    Years of education: Not on file    Highest education level: Not on file   Occupational History    Not on file   Social Needs    Financial resource strain: Not on file    Food insecurity:     Worry: Not on file     Inability: Not on file    Transportation needs:     Medical: Not on file     Non-medical: Not on file   Tobacco Use    Smoking status: Never Smoker    Smokeless tobacco: Never Used   Substance and Sexual Activity    Alcohol use: Yes     Comment: social, 1 drink monthly    Drug use: No    Sexual activity: Yes     Partners: Male   Lifestyle    Physical activity:     Days per week: Not on file     Minutes per session: Not on file    Stress: Not on file    Relationships    Social connections:     Talks on phone: Not on file     Gets together: Not on file     Attends Taoist service: Not on file     Active member of club or organization: Not on file     Attends meetings of clubs or organizations: Not on file     Relationship status: Not on file   Other Topics Concern    Not on file   Social History Narrative    Not on file       Current Outpatient Medications   Medication Sig Dispense Refill    ACZONE 5 % topical gel Apply topically 2 (two) times daily as needed (roseacea).   0    alpha lipoic acid 600 mg Cap Take 1 capsule by mouth once daily.      ALPHAGAN P 0.1 % Drop Place 1 drop into both eyes 3 (three) times daily.   6    ascorbic acid, vitamin C, (VITAMIN C) 100 MG tablet Take 100 mg by mouth 2 (two) times daily.      aspirin (ECOTRIN) 81 MG EC tablet Take 81 mg by mouth once daily.      baclofen (LIORESAL) 10 MG tablet Take 1 tablet (10 mg total) by mouth 3 (three) times daily. 90 tablet 11    BENEFIBER, GUAR GUM, ORAL Take 1 Dose by mouth 2 (two) times daily as needed.       calcium citrate-vitamin D3 315-200 mg (CITRACAL+D) 315-200 mg-unit per tablet Take 1 tablet by mouth 2 (two) times daily.      cholecalciferol, vitamin D3, (VITAMIN D3) 2,000 unit Cap Take 1 capsule by mouth once daily.      cinnamon bark 500 mg capsule Take 500 mg by mouth 2 (two) times daily.      CRESTOR 40 mg Tab Take 40 mg by mouth once daily.       DOCOSAHEXANOIC ACID/EPA (FISH OIL ORAL) Take 1,200 mg by mouth 2 (two) times daily.      doxycycline (MONODOX) 50 MG Cap Take 50 mg by mouth 2 (two) times daily as needed (roseacea).   4    ESTRACE 0.01 % (0.1 mg/gram) vaginal cream Place vaginally twice a week. Tuesday, Saturday  0    estradiol 10 mcg Tab Place 1 tablet vaginally twice a week. Tuesday, Saturday      ezetimibe (ZETIA) 10 mg tablet Take 10 mg by mouth once daily.      fluticasone (FLONASE) 50 mcg/actuation nasal spray 1 spray by Each Nare route once  daily. (Patient taking differently: 1 spray by Each Nare route daily as needed for Rhinitis. ) 16 g 0    furosemide (LASIX) 20 MG tablet TAKE ONE TABLET BY MOUTH TWO TIMES A DAY 60 tablet 2    GLUCAGON EMERGENCY 1 mg injection 1 mg as needed.       HUMALOG 100 unit/mL injection Inject into the skin 3 (three) times daily before meals. 10-17 units tid sliding scale  1    HYDROcodone-acetaminophen (NORCO) 7.5-325 mg per tablet Take 1 tablet by mouth every 6 (six) hours as needed for Pain. 21 tablet 0    L.ACID/L.CASEI/B.BIF/B.CEDRIC/FOS (PROBIOTIC BLEND ORAL) Take 1 capsule by mouth every Mon, Wed, Fri.      metoprolol succinate (TOPROL-XL) 25 MG 24 hr tablet TAKE ONE TABLET BY MOUTH ONCE A DAY 30 tablet 2    ondansetron (ZOFRAN-ODT) 4 MG TbDL Take 1 tablet (4 mg total) by mouth every 8 (eight) hours as needed. 12 tablet 0    ondansetron (ZOFRAN-ODT) 4 MG TbDL Take 1 tablet (4 mg total) by mouth every 6 (six) hours as needed. 20 tablet 0    pantoprazole (PROTONIX) 40 MG tablet TAKE ONE TABLET BY MOUTH TWICE A  tablet 2    promethazine (PHENERGAN) 25 MG tablet Take 1 tablet (25 mg total) by mouth every 6 (six) hours as needed for Nausea. 15 tablet 0    SYNTHROID 88 mcg tablet Take 88 mcg by mouth every evening.   0    TRESIBA FLEXTOUCH U-100 100 unit/mL (3 mL) InPn Inject 46 Units into the skin every morning.   6    VITAMIN B COMPLEX VIT C NO.4 (SUPER B COMPLEX + C ORAL) Take 1 tablet by mouth once daily.       No current facility-administered medications for this visit.        Review of patient's allergies indicates:   Allergen Reactions    Bactrim [sulfamethoxazole-trimethoprim] Rash     Patient experienced on 12/3/14 redness to face and rash to chest and arms/ with no SOB or airway obstruction    Percocet [oxycodone-acetaminophen] Nausea And Vomiting     Also caused dizziness and passed out    Iodinated contrast media - iv dye Swelling and Rash     Says topical iodine OK    Norco  [hydrocodone-acetaminophen] Itching, Swelling and Rash     Can tolerate if she takes Benadryl with it    Niaspan extended-release [niacin] Itching and Other (See Comments)     Skin flushing and redness         Review of Systems   Constitution: Negative for chills, decreased appetite, diaphoresis and fever.   Cardiovascular: Negative for claudication and leg swelling.   Skin: Positive for color change and nail changes. Negative for dry skin, flushing and itching.   Musculoskeletal: Positive for myalgias. Negative for arthritis, back pain, falls, gout, joint pain and joint swelling.   Neurological: Positive for numbness and paresthesias.   Psychiatric/Behavioral: Negative for altered mental status.           Objective:      Physical Exam   Constitutional: She is oriented to person, place, and time. She appears well-developed and well-nourished. No distress.   Cardiovascular:   Pulses:       Dorsalis pedis pulses are 2+ on the right side, and 2+ on the left side.        Posterior tibial pulses are 1+ on the right side, and 1+ on the left side.   CFT <3 seconds bilateral.  Pedal hair growth decreased bilateral.  Varicosities noted to bilateral lower extremity. Mild nonpitting edema noted to bilateral lower extremity.  Toes are cool to touch bilateral.       Musculoskeletal: Normal range of motion. She exhibits edema and tenderness.   Muscle strength 5/5 in all muscle groups bilateral.  No tenderness nor crepitation with active and passive ROM of foot/ankle joints bilateral.  Mild pain with palpation of the Lt. Plantar central heel.   Bilateral gastrocnemius equinus.  Bilateral pes planus foot type.  Bilateral hallux abducto valgus.  Bilateral semi-rigid contracture of toes 2-5.     Neurological: She is alert and oriented to person, place, and time. She has normal strength. A sensory deficit is present.   Protective sensation per [place] monofilament decreased bilateral.    Vibratory sensation intact  bilateral.    Light touch intact bilateral.   Skin: Skin is warm, dry and intact. Lesion noted. No abrasion, no bruising, no burn, no ecchymosis, no laceration, no petechiae and no rash noted. She is not diaphoretic. No cyanosis or erythema. No pallor. Nails show no clubbing.   Toenails x 10 appear thickened by 2 mm's, elongated by 2 mm's, and discolored with subungual debris. No open wounds, interdigital maceration noted bilateral.  Cicatrix of the Lt. Posterior heel is well healed with no adjacent break in skin integrity.  Site is atrophic in appearance.  Hyperkeratotic lesion noted to the tips of toes 2-3 bilateral.                   Assessment:       Encounter Diagnoses   Name Primary?    Controlled type 1 diabetes mellitus with diabetic polyneuropathy Yes    Paresthesia of foot, bilateral     Onychomycosis due to dermatophyte     Plantar fasciitis     Hammer toes of both feet     Corn or callus          Plan:       Kateryna was seen today for diabetes mellitus and diabetic foot exam.    Diagnoses and all orders for this visit:    Controlled type 1 diabetes mellitus with diabetic polyneuropathy    Paresthesia of foot, bilateral    Onychomycosis due to dermatophyte    Plantar fasciitis    Hammer toes of both feet    Corn or callus      I counseled the patient on her conditions, their implications and medical management.    - Recommend continued stretching and use of heel lifts to offset Lt. Sided equinus.  Recommend trying a cork 1/4 inch heel lift vs the foam heel lift, as this may further negate compensation.    - Advised to continue wearing shoe gear that accommodates for digital deformities.    - Shoe inspection. Diabetic Foot Education. Patient reminded of the importance of good nutrition and blood sugar control to help prevent podiatric complications of diabetes. Patient instructed on proper foot hygeine. We discussed wearing proper shoe gear, daily foot inspections, never walking without protective  shoe gear, never putting sharp instruments to feet    - With patient's permission, nails were aggressively reduced and debrided x 10 to their soft tissue attachment mechanically and with electric , removing all offending nail and debris.  Also, a sterile #15 scalpel was used to trim lesions x 4 down to smooth appearing skin without incident.  Patient relates relief following the procedure. She will continue to monitor the areas daily, inspect her feet, wear protective shoe gear when ambulatory, moisturizer to maintain skin integrity and follow in this office in approximately 2-3 months, sooner p.r.n.    Follow up in about 3 months (around 7/29/2019).    Lei Rhoades DPM

## 2019-04-29 NOTE — PROGRESS NOTES
SUBJECTIVE:    Patient ID: Kateryna Desai is a 64 y.o. female.    Chief Complaint: Results (xray )    She is here for follow-up.  She feels significantly better.  She is not having any pain now.  We reviewed her lumbar x-rays which show moderate degenerative changes at L3-4 and L4-5.  She also has the finding of what Radiology believes to be an old T11 compression fracture.  I agree based on the MRI findings in 2013.  She has no new or progressive problems        Past Medical History:   Diagnosis Date    Allergy     Arthritis     degnerative disease low back,muscle spasms, shoulders    Asian flu type A 12/22/2017    Diabetes mellitus type I     since age 13    Diabetic gastroparesis     takes Cytotec    Diabetic retinopathy of both eyes     gets periodic laser treatments    Difficult intubation     as a child had sore throat after a procedure    Encounter for blood transfusion     GERD (gastroesophageal reflux disease)     Headache(784.0)     Hiatal hernia     with GERD    Hyperlipidemia     Hypertension     Infection of the upper respiratory tract June 2012    Lumbar spinal stenosis     Osteopenia     Rosacea     Seizures     Pt states during pgy with stroke    Stroke 1985?    while pregnant    Tachycardia     asymptomatic with Toprol    Thyroid disease     hypothyroidism    Venous insufficiency of leg     improved since EVLT     Social History     Socioeconomic History    Marital status:      Spouse name: Not on file    Number of children: Not on file    Years of education: Not on file    Highest education level: Not on file   Occupational History    Not on file   Social Needs    Financial resource strain: Not on file    Food insecurity:     Worry: Not on file     Inability: Not on file    Transportation needs:     Medical: Not on file     Non-medical: Not on file   Tobacco Use    Smoking status: Never Smoker    Smokeless tobacco: Never Used   Substance  and Sexual Activity    Alcohol use: Yes     Comment: social, 1 drink monthly    Drug use: No    Sexual activity: Yes     Partners: Male   Lifestyle    Physical activity:     Days per week: Not on file     Minutes per session: Not on file    Stress: Not on file   Relationships    Social connections:     Talks on phone: Not on file     Gets together: Not on file     Attends Yazidi service: Not on file     Active member of club or organization: Not on file     Attends meetings of clubs or organizations: Not on file     Relationship status: Not on file   Other Topics Concern    Not on file   Social History Narrative    Not on file     Past Surgical History:   Procedure Laterality Date    BUNIONECTOMY Right 2012    BUNIONECTOMY Right 2012    [facility]    CARDIAC CATHETERIZATION      no stents     SECTION      COLONOSCOPY  2007    Dr. Rose, 10 year recheck    EXCISION, BUNIONETTE Right 2012    Performed by [facility] at [facility]    EXOSTECTOMY  12    left foot, local anesthesia, in office    EYE SURGERY      has had many laser procedures for diabetic retinopathy    VASCULAR SURGERY      VEIN SURGERY  2012    EVLT right greater saphenous, IV sedation     Family History   Problem Relation Age of Onset    Cancer Maternal Aunt         breast    Breast cancer Maternal Aunt     Diabetes Maternal Grandmother     Breast cancer Maternal Grandmother     Cancer Maternal Grandfather         prostate    Diabetes Maternal Grandfather     Diabetes Cousin     Arthritis Mother     Cancer Mother     Melanoma Mother     Heart disease Father     Stroke Father     No Known Problems Sister     No Known Problems Brother     No Known Problems Daughter     Alzheimer's disease Maternal Uncle     Alcohol abuse Maternal Uncle         x2    Heart disease Maternal Uncle     No Known Problems Paternal Aunt     Heart disease  "Paternal Uncle      Vitals:    04/29/19 1346   Weight: 83.5 kg (184 lb 1.4 oz)   Height: 5' 6" (1.676 m)       Review of Systems   Constitutional: Negative for chills, diaphoresis, fatigue, fever and unexpected weight change.   HENT: Negative for trouble swallowing.    Eyes: Negative for visual disturbance.   Respiratory: Negative for shortness of breath.    Cardiovascular: Negative for chest pain.   Gastrointestinal: Negative for abdominal pain, constipation, nausea and vomiting.   Genitourinary: Negative for difficulty urinating.   Musculoskeletal: Negative for arthralgias, back pain, gait problem, joint swelling, myalgias, neck pain and neck stiffness.   Neurological: Negative for dizziness, speech difficulty, weakness, light-headedness, numbness and headaches.          Objective:      Physical Exam   Constitutional: She appears well-developed and well-nourished.   Not examined           Assessment:       1. Acute midline low back pain, with sciatica presence unspecified           Plan:     since she is feeling better she can cancel the planned procedure at her discretion.  She can follow up with me on an as-needed basis.       Acute midline low back pain, with sciatica presence unspecified        "

## 2019-05-03 ENCOUNTER — LAB VISIT (OUTPATIENT)
Dept: LAB | Facility: HOSPITAL | Age: 64
End: 2019-05-03
Attending: INTERNAL MEDICINE
Payer: COMMERCIAL

## 2019-05-03 DIAGNOSIS — E11.22 TYPE 2 DIABETES MELLITUS WITH END-STAGE RENAL DISEASE: ICD-10-CM

## 2019-05-03 DIAGNOSIS — N18.6 TYPE 2 DIABETES MELLITUS WITH END-STAGE RENAL DISEASE: ICD-10-CM

## 2019-05-03 DIAGNOSIS — I10 ESSENTIAL HYPERTENSION, MALIGNANT: ICD-10-CM

## 2019-05-03 DIAGNOSIS — E87.6 HYPOPOTASSEMIA: ICD-10-CM

## 2019-05-03 DIAGNOSIS — N18.30 CHRONIC KIDNEY DISEASE, STAGE III (MODERATE): Primary | ICD-10-CM

## 2019-05-03 DIAGNOSIS — N25.81 SECONDARY HYPERPARATHYROIDISM OF RENAL ORIGIN: ICD-10-CM

## 2019-05-03 LAB
ALBUMIN SERPL BCP-MCNC: 3.9 G/DL (ref 3.5–5.2)
ANION GAP SERPL CALC-SCNC: 10 MMOL/L (ref 8–16)
BASOPHILS # BLD AUTO: 0.02 K/UL (ref 0–0.2)
BASOPHILS NFR BLD: 0.3 % (ref 0–1.9)
BUN SERPL-MCNC: 11 MG/DL (ref 8–23)
CALCIUM SERPL-MCNC: 10.5 MG/DL (ref 8.7–10.5)
CHLORIDE SERPL-SCNC: 99 MMOL/L (ref 95–110)
CO2 SERPL-SCNC: 32 MMOL/L (ref 23–29)
CREAT SERPL-MCNC: 1 MG/DL (ref 0.5–1.4)
DIFFERENTIAL METHOD: ABNORMAL
EOSINOPHIL # BLD AUTO: 0.1 K/UL (ref 0–0.5)
EOSINOPHIL NFR BLD: 0.6 % (ref 0–8)
ERYTHROCYTE [DISTWIDTH] IN BLOOD BY AUTOMATED COUNT: 14 % (ref 11.5–14.5)
EST. GFR  (AFRICAN AMERICAN): >60 ML/MIN/1.73 M^2
EST. GFR  (NON AFRICAN AMERICAN): >60 ML/MIN/1.73 M^2
GLUCOSE SERPL-MCNC: 62 MG/DL (ref 70–110)
HCT VFR BLD AUTO: 43 % (ref 37–48.5)
HGB BLD-MCNC: 13.3 G/DL (ref 12–16)
IMM GRANULOCYTES # BLD AUTO: 0.02 K/UL (ref 0–0.04)
IMM GRANULOCYTES NFR BLD AUTO: 0.3 % (ref 0–0.5)
LYMPHOCYTES # BLD AUTO: 1 K/UL (ref 1–4.8)
LYMPHOCYTES NFR BLD: 12.7 % (ref 18–48)
MAGNESIUM SERPL-MCNC: 2 MG/DL (ref 1.6–2.6)
MCH RBC QN AUTO: 30.9 PG (ref 27–31)
MCHC RBC AUTO-ENTMCNC: 30.9 G/DL (ref 32–36)
MCV RBC AUTO: 100 FL (ref 82–98)
MONOCYTES # BLD AUTO: 0.6 K/UL (ref 0.3–1)
MONOCYTES NFR BLD: 7.6 % (ref 4–15)
NEUTROPHILS # BLD AUTO: 6.1 K/UL (ref 1.8–7.7)
NEUTROPHILS NFR BLD: 78.5 % (ref 38–73)
NRBC BLD-RTO: 0 /100 WBC
PHOSPHATE SERPL-MCNC: 2.6 MG/DL (ref 2.7–4.5)
PLATELET # BLD AUTO: 218 K/UL (ref 150–350)
PMV BLD AUTO: 11 FL (ref 9.2–12.9)
POTASSIUM SERPL-SCNC: 3.1 MMOL/L (ref 3.5–5.1)
RBC # BLD AUTO: 4.3 M/UL (ref 4–5.4)
SODIUM SERPL-SCNC: 141 MMOL/L (ref 136–145)
WBC # BLD AUTO: 7.81 K/UL (ref 3.9–12.7)

## 2019-05-03 PROCEDURE — 83735 ASSAY OF MAGNESIUM: CPT

## 2019-05-03 PROCEDURE — 36415 COLL VENOUS BLD VENIPUNCTURE: CPT | Mod: PO

## 2019-05-03 PROCEDURE — 80069 RENAL FUNCTION PANEL: CPT

## 2019-05-03 PROCEDURE — 85025 COMPLETE CBC W/AUTO DIFF WBC: CPT

## 2019-05-06 ENCOUNTER — OFFICE VISIT (OUTPATIENT)
Dept: FAMILY MEDICINE | Facility: CLINIC | Age: 64
End: 2019-05-06
Payer: COMMERCIAL

## 2019-05-06 ENCOUNTER — LAB VISIT (OUTPATIENT)
Dept: LAB | Facility: HOSPITAL | Age: 64
End: 2019-05-06
Attending: FAMILY MEDICINE
Payer: COMMERCIAL

## 2019-05-06 VITALS
BODY MASS INDEX: 28.98 KG/M2 | OXYGEN SATURATION: 98 % | WEIGHT: 180.31 LBS | TEMPERATURE: 98 F | SYSTOLIC BLOOD PRESSURE: 112 MMHG | HEART RATE: 86 BPM | DIASTOLIC BLOOD PRESSURE: 65 MMHG | HEIGHT: 66 IN

## 2019-05-06 DIAGNOSIS — E10.42 CONTROLLED TYPE 1 DIABETES MELLITUS WITH DIABETIC POLYNEUROPATHY, WITH LONG-TERM CURRENT USE OF INSULIN: ICD-10-CM

## 2019-05-06 DIAGNOSIS — E03.9 ACQUIRED HYPOTHYROIDISM: ICD-10-CM

## 2019-05-06 DIAGNOSIS — M54.41 CHRONIC BILATERAL LOW BACK PAIN WITH BILATERAL SCIATICA: ICD-10-CM

## 2019-05-06 DIAGNOSIS — E87.6 HYPOKALEMIA: Primary | ICD-10-CM

## 2019-05-06 DIAGNOSIS — G89.29 CHRONIC BILATERAL LOW BACK PAIN WITH BILATERAL SCIATICA: ICD-10-CM

## 2019-05-06 DIAGNOSIS — M54.42 CHRONIC BILATERAL LOW BACK PAIN WITH BILATERAL SCIATICA: ICD-10-CM

## 2019-05-06 DIAGNOSIS — E10.22 STAGE 3 CHRONIC KIDNEY DISEASE DUE TO TYPE 1 DIABETES MELLITUS: ICD-10-CM

## 2019-05-06 DIAGNOSIS — M48.061 SPINAL STENOSIS OF LUMBAR REGION, UNSPECIFIED WHETHER NEUROGENIC CLAUDICATION PRESENT: ICD-10-CM

## 2019-05-06 DIAGNOSIS — E11.43 DIABETIC GASTROPARESIS: ICD-10-CM

## 2019-05-06 DIAGNOSIS — M19.90 ARTHRITIS: ICD-10-CM

## 2019-05-06 DIAGNOSIS — I10 ESSENTIAL HYPERTENSION: ICD-10-CM

## 2019-05-06 DIAGNOSIS — I87.2 VENOUS INSUFFICIENCY OF BOTH LOWER EXTREMITIES: ICD-10-CM

## 2019-05-06 DIAGNOSIS — I83.893 VARICOSE VEINS OF LOWER EXTREMITY WITH EDEMA, BILATERAL: ICD-10-CM

## 2019-05-06 DIAGNOSIS — K21.9 GASTROESOPHAGEAL REFLUX DISEASE WITHOUT ESOPHAGITIS: ICD-10-CM

## 2019-05-06 DIAGNOSIS — N18.30 STAGE 3 CHRONIC KIDNEY DISEASE DUE TO TYPE 1 DIABETES MELLITUS: ICD-10-CM

## 2019-05-06 DIAGNOSIS — K44.9 HIATAL HERNIA: ICD-10-CM

## 2019-05-06 DIAGNOSIS — Z86.73 HISTORY OF STROKE: ICD-10-CM

## 2019-05-06 DIAGNOSIS — E10.39: ICD-10-CM

## 2019-05-06 DIAGNOSIS — E10.319 DIABETIC RETINOPATHY OF BOTH EYES ASSOCIATED WITH TYPE 1 DIABETES MELLITUS, MACULAR EDEMA PRESENCE UNSPECIFIED, UNSPECIFIED RETINOPATHY SEVERITY: ICD-10-CM

## 2019-05-06 DIAGNOSIS — Z23 NEED FOR SHINGLES VACCINE: ICD-10-CM

## 2019-05-06 DIAGNOSIS — J30.2 CHRONIC SEASONAL ALLERGIC RHINITIS: ICD-10-CM

## 2019-05-06 DIAGNOSIS — E66.3 OVERWEIGHT (BMI 25.0-29.9): ICD-10-CM

## 2019-05-06 DIAGNOSIS — E87.6 HYPOKALEMIA: ICD-10-CM

## 2019-05-06 DIAGNOSIS — K31.84 DIABETIC GASTROPARESIS: ICD-10-CM

## 2019-05-06 DIAGNOSIS — I25.10 CORONARY ARTERY DISEASE INVOLVING NATIVE CORONARY ARTERY OF NATIVE HEART WITHOUT ANGINA PECTORIS: ICD-10-CM

## 2019-05-06 DIAGNOSIS — E78.5 HYPERLIPIDEMIA, UNSPECIFIED HYPERLIPIDEMIA TYPE: ICD-10-CM

## 2019-05-06 DIAGNOSIS — H42: ICD-10-CM

## 2019-05-06 LAB — POTASSIUM SERPL-SCNC: 3.1 MMOL/L (ref 3.5–5.1)

## 2019-05-06 PROCEDURE — 3044F PR MOST RECENT HEMOGLOBIN A1C LEVEL <7.0%: ICD-10-PCS | Mod: CPTII,S$GLB,, | Performed by: FAMILY MEDICINE

## 2019-05-06 PROCEDURE — 90471 IMMUNIZATION ADMIN: CPT | Mod: S$GLB,,, | Performed by: FAMILY MEDICINE

## 2019-05-06 PROCEDURE — 90750 ZOSTER RECOMBINANT VACCINE: ICD-10-PCS | Mod: S$GLB,,, | Performed by: FAMILY MEDICINE

## 2019-05-06 PROCEDURE — 3078F PR MOST RECENT DIASTOLIC BLOOD PRESSURE < 80 MM HG: ICD-10-PCS | Mod: CPTII,S$GLB,, | Performed by: FAMILY MEDICINE

## 2019-05-06 PROCEDURE — 3044F HG A1C LEVEL LT 7.0%: CPT | Mod: CPTII,S$GLB,, | Performed by: FAMILY MEDICINE

## 2019-05-06 PROCEDURE — 99214 OFFICE O/P EST MOD 30 MIN: CPT | Mod: 25,S$GLB,, | Performed by: FAMILY MEDICINE

## 2019-05-06 PROCEDURE — 99214 PR OFFICE/OUTPT VISIT, EST, LEVL IV, 30-39 MIN: ICD-10-PCS | Mod: 25,S$GLB,, | Performed by: FAMILY MEDICINE

## 2019-05-06 PROCEDURE — 3074F PR MOST RECENT SYSTOLIC BLOOD PRESSURE < 130 MM HG: ICD-10-PCS | Mod: CPTII,S$GLB,, | Performed by: FAMILY MEDICINE

## 2019-05-06 PROCEDURE — 99999 PR PBB SHADOW E&M-EST. PATIENT-LVL IV: ICD-10-PCS | Mod: PBBFAC,,, | Performed by: FAMILY MEDICINE

## 2019-05-06 PROCEDURE — 90471 ZOSTER RECOMBINANT VACCINE: ICD-10-PCS | Mod: S$GLB,,, | Performed by: FAMILY MEDICINE

## 2019-05-06 PROCEDURE — 3008F BODY MASS INDEX DOCD: CPT | Mod: CPTII,S$GLB,, | Performed by: FAMILY MEDICINE

## 2019-05-06 PROCEDURE — 84132 ASSAY OF SERUM POTASSIUM: CPT

## 2019-05-06 PROCEDURE — 3074F SYST BP LT 130 MM HG: CPT | Mod: CPTII,S$GLB,, | Performed by: FAMILY MEDICINE

## 2019-05-06 PROCEDURE — 3008F PR BODY MASS INDEX (BMI) DOCUMENTED: ICD-10-PCS | Mod: CPTII,S$GLB,, | Performed by: FAMILY MEDICINE

## 2019-05-06 PROCEDURE — 3078F DIAST BP <80 MM HG: CPT | Mod: CPTII,S$GLB,, | Performed by: FAMILY MEDICINE

## 2019-05-06 PROCEDURE — 90750 HZV VACC RECOMBINANT IM: CPT | Mod: S$GLB,,, | Performed by: FAMILY MEDICINE

## 2019-05-06 PROCEDURE — 99999 PR PBB SHADOW E&M-EST. PATIENT-LVL IV: CPT | Mod: PBBFAC,,, | Performed by: FAMILY MEDICINE

## 2019-05-06 PROCEDURE — 36415 COLL VENOUS BLD VENIPUNCTURE: CPT | Mod: PO

## 2019-05-06 RX ORDER — FUROSEMIDE 20 MG/1
20 TABLET ORAL 2 TIMES DAILY
Qty: 60 TABLET | Refills: 2 | Status: SHIPPED | OUTPATIENT
Start: 2019-05-06 | End: 2019-09-30 | Stop reason: SDUPTHER

## 2019-05-06 RX ORDER — METOPROLOL SUCCINATE 25 MG/1
25 TABLET, EXTENDED RELEASE ORAL DAILY
Qty: 30 TABLET | Refills: 2 | Status: SHIPPED | OUTPATIENT
Start: 2019-05-06 | End: 2019-11-13 | Stop reason: SDUPTHER

## 2019-05-06 RX ORDER — PANTOPRAZOLE SODIUM 40 MG/1
40 TABLET, DELAYED RELEASE ORAL 2 TIMES DAILY
Qty: 180 TABLET | Refills: 2 | Status: SHIPPED | OUTPATIENT
Start: 2019-05-06 | End: 2020-05-27 | Stop reason: SDUPTHER

## 2019-05-06 NOTE — PROGRESS NOTES
Identified pt using name and . Explained procedure to pt. Understanding was verbalized. Administered Shingles vaccine in left deltoid. Sterile technique was used. Pt tolerated procedure well, no residual bleeding noted at injection site.

## 2019-05-07 PROBLEM — K21.9 GASTROESOPHAGEAL REFLUX DISEASE WITHOUT ESOPHAGITIS: Status: ACTIVE | Noted: 2019-05-07

## 2019-05-07 PROBLEM — I83.893 VARICOSE VEINS OF LOWER EXTREMITY WITH EDEMA, BILATERAL: Status: ACTIVE | Noted: 2019-05-07

## 2019-05-07 PROBLEM — E66.3 OVERWEIGHT (BMI 25.0-29.9): Status: ACTIVE | Noted: 2019-05-07

## 2019-05-07 PROBLEM — E10.39: Status: ACTIVE | Noted: 2019-05-07

## 2019-05-07 PROBLEM — J30.2 CHRONIC SEASONAL ALLERGIC RHINITIS: Status: ACTIVE | Noted: 2019-05-07

## 2019-05-07 PROBLEM — E10.319 DIABETIC RETINOPATHY ASSOCIATED WITH TYPE 1 DIABETES MELLITUS: Status: ACTIVE | Noted: 2019-05-07

## 2019-05-07 PROBLEM — H42: Status: ACTIVE | Noted: 2019-05-07

## 2019-05-07 PROBLEM — Z86.73 HISTORY OF STROKE: Status: ACTIVE | Noted: 2019-05-07

## 2019-05-07 PROBLEM — E10.22 STAGE 3 CHRONIC KIDNEY DISEASE DUE TO TYPE 1 DIABETES MELLITUS: Status: ACTIVE | Noted: 2019-05-07

## 2019-05-07 PROBLEM — R55 SYNCOPE AND COLLAPSE: Status: RESOLVED | Noted: 2018-08-20 | Resolved: 2019-05-07

## 2019-05-07 PROBLEM — N18.30 STAGE 3 CHRONIC KIDNEY DISEASE DUE TO TYPE 1 DIABETES MELLITUS: Status: ACTIVE | Noted: 2019-05-07

## 2019-05-07 PROBLEM — R55 SYNCOPE: Status: RESOLVED | Noted: 2017-12-27 | Resolved: 2019-05-07

## 2019-05-07 RX ORDER — FLUTICASONE PROPIONATE 50 MCG
1 SPRAY, SUSPENSION (ML) NASAL DAILY
Qty: 16 G | Refills: 1 | Status: SHIPPED | OUTPATIENT
Start: 2019-05-07 | End: 2020-03-12 | Stop reason: SDUPTHER

## 2019-05-07 NOTE — PROGRESS NOTES
Subjective:       Patient ID: Kateryna Desai is a 64 y.o. female.    Chief Complaint: Establish Care    HPI   Patient presents to establish care with a new family doctor and for her yearly physical.  She was previously followed by Dr. Mendoza within our system and tells me she continues to follow with her gynecologist Dr. Johansen, her spine specialist Dr. Mcghee, her pain management specialist Dr. Beard, her nephrologist Dr. Rodriguez, her endocrinologist Dr. Benjamin, her podiatrist Dr. Rhoades, her ophthalmologist/retinal specialist at [facility] Dr. Juan Moreland and her cardiologist Dr. Wilhelm.  She is also followed by an unknown gastroenterologist outside of our system.  She has a past medical history of well-controlled type 1 diabetes complicated by polyneuropathy, gastroparesis, retinopathy, glaucoma and stage 3 chronic kidney disease, remote stroke history, coronary artery disease, bilateral lower extremity venous insufficiency with varicosities and edema, hyperlipidemia, hypertension, GERD, hypothyroidism, seasonal allergies, chronic pains secondary to generalized arthritis and pain is worst in the low back with sciatica symptoms secondary to spinal stenosis.  She tells me all of these conditions are managed by her specialists and stable with exception of her seasonal allergies, GERD and lower extremity edema which has been managed by family Medicine.  She tells me she has not had any significant problems with seasonal allergy symptoms even with recent high pollen levels using her Flonase daily and at times infrequent use of an over-the-counter medication she believes may be Singulair or Zyrtec.  Lower extremity edema has been well controlled with her current use of Lasix.  She does tell me she is taking potassium supplementation please see is that this is not on her medication list and she is unsure of her current dosing and does not have the bottle with her today.  She believes it is an  over-the-counter supplement and not a prescription.  She has been falling lifestyle modifications for GERD closely chronically and has not had any breakthrough symptoms on her Protonix in quite some time although she notes only taking 40 mg daily in the past her symptoms could not be controlled.  She has no other concerns or complaints today but notes that she has had problems getting her metoprolol refilled and request this today.  Her blood pressures have been well controlled at home chronically running very similar to today in the 110s/60s.    Review of Systems   Constitutional: Negative for activity change, appetite change, chills, diaphoresis, fatigue, fever and unexpected weight change.   HENT: Negative for congestion, postnasal drip, rhinorrhea, sinus pressure, sinus pain, sneezing, sore throat and voice change.    Eyes: Negative for pain and visual disturbance.   Respiratory: Negative for cough, chest tightness, shortness of breath and wheezing.    Cardiovascular: Negative for chest pain, palpitations and leg swelling.   Gastrointestinal: Negative for abdominal pain, blood in stool, constipation, diarrhea, nausea and vomiting.   Endocrine: Negative for cold intolerance, heat intolerance, polydipsia, polyphagia and polyuria.   Genitourinary: Negative for decreased urine volume, difficulty urinating, dysuria, flank pain, frequency, pelvic pain and urgency.   Musculoskeletal: Positive for back pain and myalgias. Negative for arthralgias, gait problem, joint swelling, neck pain and neck stiffness.   Skin: Negative for rash and wound.   Neurological: Positive for numbness. Negative for dizziness, tremors, syncope, weakness, light-headedness and headaches.   Hematological: Negative for adenopathy. Does not bruise/bleed easily.   Psychiatric/Behavioral: Negative for dysphoric mood and sleep disturbance. The patient is not nervous/anxious.        Objective:      Physical Exam   Constitutional: She is oriented to  person, place, and time. She appears well-developed and well-nourished. No distress.   HENT:   Head: Normocephalic and atraumatic.   Right Ear: Hearing, tympanic membrane, external ear and ear canal normal.   Left Ear: Hearing, tympanic membrane and external ear normal.   Nose: Nose normal. Right sinus exhibits no maxillary sinus tenderness and no frontal sinus tenderness. Left sinus exhibits no maxillary sinus tenderness and no frontal sinus tenderness.   Mouth/Throat: Uvula is midline, oropharynx is clear and moist and mucous membranes are normal.   Eyes: Pupils are equal, round, and reactive to light. Conjunctivae and EOM are normal.   Neck: Normal range of motion. Neck supple. No JVD present. No tracheal deviation present. No thyromegaly present.   Cardiovascular: Normal rate, regular rhythm and normal heart sounds. Exam reveals no gallop and no friction rub.   No murmur heard.  Pulses:       Dorsalis pedis pulses are 2+ on the right side, and 2+ on the left side.        Posterior tibial pulses are 2+ on the right side, and 2+ on the left side.   Pulmonary/Chest: Effort normal and breath sounds normal. No respiratory distress. She has no wheezes. She exhibits no tenderness.   Abdominal: Soft. Bowel sounds are normal. She exhibits no distension and no mass. There is no tenderness. There is no rebound and no guarding.   Musculoskeletal: Normal range of motion. She exhibits edema (Trace edema bilateral.). She exhibits no tenderness or deformity.        Right foot: There is normal range of motion and no deformity (hammer toes ).        Left foot: There is normal range of motion and no deformity (hammer toes).   Feet:   Right Foot:   Protective Sensation: 10 sites tested. 10 sites sensed.   Skin Integrity: Negative for ulcer, blister, skin breakdown, erythema, warmth, callus or dry skin.   Left Foot:   Protective Sensation: 10 sites tested. 10 sites sensed.   Skin Integrity: Negative for ulcer, blister, skin  breakdown, erythema, warmth, callus or dry skin.   Lymphadenopathy:     She has no cervical adenopathy.   Neurological: She is alert and oriented to person, place, and time.   Skin: Skin is warm and dry. She is not diaphoretic.   Psychiatric: She has a normal mood and affect. Her behavior is normal. Judgment and thought content normal.   Nursing note and vitals reviewed.      Assessment:       1. Hypokalemia    2. Need for shingles vaccine    3. History of stroke    4. Spinal stenosis of lumbar region, unspecified whether neurogenic claudication present    5. Controlled type 1 diabetes mellitus with diabetic polyneuropathy, with long-term current use of insulin    6. Coronary artery disease involving native coronary artery of native heart without angina pectoris    7. Venous insufficiency of both lower extremities    8. Hyperlipidemia, unspecified hyperlipidemia type    9. Essential hypertension    10. Diabetic gastroparesis    11. Acquired hypothyroidism    12. Overweight (BMI 25.0-29.9)    13. Hiatal hernia    14. Gastroesophageal reflux disease without esophagitis    15. Chronic bilateral low back pain with bilateral sciatica    16. Arthritis    17. Diabetic retinopathy of both eyes associated with type 1 diabetes mellitus, macular edema presence unspecified, unspecified retinopathy severity    18. Glaucoma due to type 1 diabetes mellitus    19. Stage 3 chronic kidney disease due to type 1 diabetes mellitus    20. Chronic seasonal allergic rhinitis    21. Varicose veins of lower extremity with edema, bilateral        Plan:   Kateryna was seen today for establish care.    Diagnoses and all orders for this visit:    Hypokalemia  -     Potassium; Future    Need for shingles vaccine  -     (In Office Administered) Zoster Recombinant Vaccine    History of stroke    Spinal stenosis of lumbar region, unspecified whether neurogenic claudication present    Controlled type 1 diabetes mellitus with diabetic polyneuropathy,  with long-term current use of insulin    Coronary artery disease involving native coronary artery of native heart without angina pectoris    Venous insufficiency of both lower extremities    Hyperlipidemia, unspecified hyperlipidemia type    Essential hypertension  -     metoprolol succinate (TOPROL-XL) 25 MG 24 hr tablet; Take 1 tablet (25 mg total) by mouth once daily.    Diabetic gastroparesis    Acquired hypothyroidism    Overweight (BMI 25.0-29.9)    Hiatal hernia    Gastroesophageal reflux disease without esophagitis  -     pantoprazole (PROTONIX) 40 MG tablet; Take 1 tablet (40 mg total) by mouth 2 (two) times daily.    Chronic bilateral low back pain with bilateral sciatica    Arthritis    Diabetic retinopathy of both eyes associated with type 1 diabetes mellitus, macular edema presence unspecified, unspecified retinopathy severity    Glaucoma due to type 1 diabetes mellitus    Stage 3 chronic kidney disease due to type 1 diabetes mellitus    Chronic seasonal allergic rhinitis  -     fluticasone propionate (FLONASE) 50 mcg/actuation nasal spray; 1 spray (50 mcg total) by Each Nare route once daily.    Varicose veins of lower extremity with edema, bilateral  -     furosemide (LASIX) 20 MG tablet; Take 1 tablet (20 mg total) by mouth 2 (two) times daily.    Kateryna appears to be stable and doing well today on her current medications.  I reviewed all of her above problems with her and I agree with her current treatment and recommended no change.  She will continue to follow with all of her current specialists.  I will provide her with refills of her Protonix, Lasix, Flonase and metoprolol as she requests and reviewed the risks and benefits of each of these medications with her.  My only concern with her Lasix use currently is her potassium supplementation which is unknown.  Advised her to call our office later today with the exact potassium product and dosing she is taking.  Reviewing her past labs she has  recently had a low potassium level 3.1 and I recommended rechecking this.  She is followed by Nephrology and was concerned about making any changes to her vitamins.  I advised her we will work with Dr. Rodriguez if changes to her potassium supplementation are necessary and she was in agreement with rechecking a potassium level today.  I reviewed health maintenance issues with her today and she is up-to-date with all of these with exception of shingles vaccination as we were able to find documentation of a Tdap vaccine in the vaccination registry and we completed a diabetic foot exam today.  I discussed the Shingrix vaccine in detail with her including risks and benefits and she was interested in this today.  As long she continues to do well, her recheck potassium level returns normal, she continues to follow with all of her specialists and has no other problems I will see her back in 6 month interval/request.  Sooner if any problems.

## 2019-05-10 ENCOUNTER — LAB VISIT (OUTPATIENT)
Dept: LAB | Facility: HOSPITAL | Age: 64
End: 2019-05-10
Attending: INTERNAL MEDICINE
Payer: COMMERCIAL

## 2019-05-10 DIAGNOSIS — N39.0 URINARY TRACT INFECTION, SITE NOT SPECIFIED: Primary | ICD-10-CM

## 2019-05-10 LAB
BACTERIA #/AREA URNS AUTO: ABNORMAL /HPF
BILIRUB UR QL STRIP: NEGATIVE
CLARITY UR REFRACT.AUTO: ABNORMAL
COLOR UR AUTO: YELLOW
GLUCOSE UR QL STRIP: NEGATIVE
HGB UR QL STRIP: NEGATIVE
KETONES UR QL STRIP: NEGATIVE
LEUKOCYTE ESTERASE UR QL STRIP: ABNORMAL
MICROSCOPIC COMMENT: ABNORMAL
NITRITE UR QL STRIP: POSITIVE
PH UR STRIP: 7 [PH] (ref 5–8)
PROT UR QL STRIP: NEGATIVE
RBC #/AREA URNS AUTO: 2 /HPF (ref 0–4)
SP GR UR STRIP: 1.01 (ref 1–1.03)
SQUAMOUS #/AREA URNS AUTO: 3 /HPF
URN SPEC COLLECT METH UR: ABNORMAL
WBC #/AREA URNS AUTO: 70 /HPF (ref 0–5)
WBC CLUMPS UR QL AUTO: ABNORMAL

## 2019-05-10 PROCEDURE — 87077 CULTURE AEROBIC IDENTIFY: CPT

## 2019-05-10 PROCEDURE — 87086 URINE CULTURE/COLONY COUNT: CPT

## 2019-05-10 PROCEDURE — 81001 URINALYSIS AUTO W/SCOPE: CPT

## 2019-05-10 PROCEDURE — 87088 URINE BACTERIA CULTURE: CPT

## 2019-05-10 PROCEDURE — 87186 SC STD MICRODIL/AGAR DIL: CPT

## 2019-05-13 LAB — BACTERIA UR CULT: NORMAL

## 2019-05-14 ENCOUNTER — TELEPHONE (OUTPATIENT)
Dept: FAMILY MEDICINE | Facility: CLINIC | Age: 64
End: 2019-05-14

## 2019-05-14 NOTE — TELEPHONE ENCOUNTER
Informed patient of 's future absence in office.  Advised patient to reschedule appointment with     Pt appointment was rescheduled for a later date and time.

## 2019-05-30 ENCOUNTER — OFFICE VISIT (OUTPATIENT)
Dept: CARDIOLOGY | Facility: CLINIC | Age: 64
End: 2019-05-30
Payer: COMMERCIAL

## 2019-05-30 VITALS
BODY MASS INDEX: 29.34 KG/M2 | WEIGHT: 182.56 LBS | SYSTOLIC BLOOD PRESSURE: 106 MMHG | HEIGHT: 66 IN | HEART RATE: 96 BPM | DIASTOLIC BLOOD PRESSURE: 64 MMHG

## 2019-05-30 DIAGNOSIS — R01.1 HEART MURMUR: ICD-10-CM

## 2019-05-30 DIAGNOSIS — I10 ESSENTIAL HYPERTENSION: ICD-10-CM

## 2019-05-30 DIAGNOSIS — E78.5 HYPERLIPIDEMIA, UNSPECIFIED HYPERLIPIDEMIA TYPE: Primary | ICD-10-CM

## 2019-05-30 PROBLEM — R94.31 LONG QT INTERVAL: Status: RESOLVED | Noted: 2017-12-27 | Resolved: 2019-05-30

## 2019-05-30 PROCEDURE — 3008F BODY MASS INDEX DOCD: CPT | Mod: CPTII,S$GLB,, | Performed by: INTERNAL MEDICINE

## 2019-05-30 PROCEDURE — 3074F SYST BP LT 130 MM HG: CPT | Mod: CPTII,S$GLB,, | Performed by: INTERNAL MEDICINE

## 2019-05-30 PROCEDURE — 3008F PR BODY MASS INDEX (BMI) DOCUMENTED: ICD-10-PCS | Mod: CPTII,S$GLB,, | Performed by: INTERNAL MEDICINE

## 2019-05-30 PROCEDURE — 3074F PR MOST RECENT SYSTOLIC BLOOD PRESSURE < 130 MM HG: ICD-10-PCS | Mod: CPTII,S$GLB,, | Performed by: INTERNAL MEDICINE

## 2019-05-30 PROCEDURE — 99999 PR PBB SHADOW E&M-EST. PATIENT-LVL III: CPT | Mod: PBBFAC,,, | Performed by: INTERNAL MEDICINE

## 2019-05-30 PROCEDURE — 99999 PR PBB SHADOW E&M-EST. PATIENT-LVL III: ICD-10-PCS | Mod: PBBFAC,,, | Performed by: INTERNAL MEDICINE

## 2019-05-30 PROCEDURE — 3078F PR MOST RECENT DIASTOLIC BLOOD PRESSURE < 80 MM HG: ICD-10-PCS | Mod: CPTII,S$GLB,, | Performed by: INTERNAL MEDICINE

## 2019-05-30 PROCEDURE — 99214 PR OFFICE/OUTPT VISIT, EST, LEVL IV, 30-39 MIN: ICD-10-PCS | Mod: S$GLB,,, | Performed by: INTERNAL MEDICINE

## 2019-05-30 PROCEDURE — 99214 OFFICE O/P EST MOD 30 MIN: CPT | Mod: S$GLB,,, | Performed by: INTERNAL MEDICINE

## 2019-05-30 PROCEDURE — 3078F DIAST BP <80 MM HG: CPT | Mod: CPTII,S$GLB,, | Performed by: INTERNAL MEDICINE

## 2019-05-30 NOTE — PROGRESS NOTES
Subjective:    Patient ID:  Kateryna Desai is a 64 y.o. female who presents for evaluation of new pt (yearly follow up/ seen Petra in the past )      HPI64 yo WF with DM, HLD, and hx of heart murmur. Denies chest pain, SOB, or edema Denies palpitations, weak spells, and syncope. Had nuclear stress test that was normal, event recorder with no arrhythmias, and echo that showed mild aortic sclerosis.    Review of Systems   Constitution: Negative for decreased appetite, fever, malaise/fatigue, weight gain and weight loss.   HENT: Negative for hearing loss and nosebleeds.    Eyes: Negative for visual disturbance.   Cardiovascular: Negative for chest pain, claudication, cyanosis, dyspnea on exertion, irregular heartbeat, leg swelling, near-syncope, orthopnea, palpitations, paroxysmal nocturnal dyspnea and syncope.   Respiratory: Negative for cough, hemoptysis, shortness of breath, sleep disturbances due to breathing, snoring and wheezing.    Endocrine: Negative for cold intolerance, heat intolerance, polydipsia and polyuria.   Hematologic/Lymphatic: Negative for adenopathy and bleeding problem. Does not bruise/bleed easily.   Skin: Negative for color change, itching, poor wound healing, rash and suspicious lesions.   Musculoskeletal: Negative for arthritis, back pain, falls, joint pain, joint swelling, muscle cramps, muscle weakness and myalgias.   Gastrointestinal: Negative for bloating, abdominal pain, change in bowel habit, constipation, flatus, heartburn, hematemesis, hematochezia, hemorrhoids, jaundice, melena, nausea and vomiting.   Genitourinary: Negative for bladder incontinence, decreased libido, frequency, hematuria, hesitancy and urgency.   Neurological: Negative for brief paralysis, difficulty with concentration, excessive daytime sleepiness, dizziness, focal weakness, headaches, light-headedness, loss of balance, numbness, vertigo and weakness.   Psychiatric/Behavioral: Negative for altered  "mental status, depression and memory loss. The patient does not have insomnia and is not nervous/anxious.    Allergic/Immunologic: Negative for environmental allergies, hives and persistent infections.        Objective:    Physical Exam   Constitutional: She is oriented to person, place, and time. She appears well-developed and well-nourished.   /64   Pulse 96   Ht 5' 6" (1.676 m)   Wt 82.8 kg (182 lb 8.7 oz)   BMI 29.46 kg/m²      HENT:   Head: Normocephalic and atraumatic.   Right Ear: External ear normal.   Left Ear: External ear normal.   Nose: Nose normal.   Mouth/Throat: Oropharynx is clear and moist.   Eyes: Pupils are equal, round, and reactive to light. Conjunctivae, EOM and lids are normal. Right eye exhibits no discharge. Left eye exhibits no discharge. Right conjunctiva has no hemorrhage. No scleral icterus.   Neck: Normal range of motion. Neck supple. No JVD present. No tracheal deviation present. No thyromegaly present.   Cardiovascular: Normal rate, regular rhythm and intact distal pulses. Exam reveals no gallop and no friction rub.   Murmur heard.   Systolic murmur is present with a grade of 2/6.  Pulmonary/Chest: Effort normal and breath sounds normal. No respiratory distress. She has no wheezes. She has no rales. She exhibits no tenderness. Breasts are symmetrical.   Abdominal: Soft. Bowel sounds are normal. She exhibits no distension and no mass. There is no hepatosplenomegaly or hepatomegaly. There is no tenderness. There is no rebound and no guarding.   Musculoskeletal: Normal range of motion. She exhibits no edema or tenderness.   Lymphadenopathy:     She has no cervical adenopathy.   Neurological: She is alert and oriented to person, place, and time. She displays normal reflexes. No cranial nerve deficit. Coordination normal.   Skin: Skin is warm and dry. No rash noted. No erythema. No pallor.   Psychiatric: She has a normal mood and affect. Her behavior is normal. Judgment and " thought content normal.   Nursing note and vitals reviewed.        Assessment:       1. Hyperlipidemia, unspecified hyperlipidemia type    2. Essential hypertension    3. Heart murmur         Plan:    Asymptomatic   Patient advised to modify risk factors such as weight, exercise, diet,  tobacco and alcohol exposure    The current medical regimen is effective;  continue present plan and medications.      Orders Placed This Encounter   Procedures    Transthoracic echo (TTE) 2D with Color Flow     Follow up in about 1 year (around 5/30/2020).

## 2019-06-03 ENCOUNTER — PATIENT OUTREACH (OUTPATIENT)
Dept: ADMINISTRATIVE | Facility: HOSPITAL | Age: 64
End: 2019-06-03

## 2019-06-07 ENCOUNTER — PATIENT OUTREACH (OUTPATIENT)
Dept: ADMINISTRATIVE | Facility: HOSPITAL | Age: 64
End: 2019-06-07

## 2019-06-07 NOTE — LETTER
June 7, 2019    DR MICHELLE TENORIO             [facility]  [address]  [facility] [address]  Phone: [phone] June 7, 2019     Patient: Kateryna Desai    YOB: 1955   Date of Visit: 6/7/2019       To Whom It May Concern:    We are seeing Kateryna Jacobozaannika Desai in the clinic today at [facility].  Endy Mendoza MD is their PCP.  She has an outstanding lab/procedure at this time when reviewing their chart.  To help with our Health Maintenance records will you please supply the following:      []  Mammogram                                                []  Colonoscopy   [x]  Pap Smear                                                    []  Outside Lab Results   []  Dexa scans                                                   []  Eye Exam   []  Foot Exam                                                     [] Other___________   []  Outside Immunizations                                               Please Fax to [facility] at [phone]         Sincerely      [facility]  [address] [facility] [address]  Phone [phone]  Fax [phone]

## 2019-06-11 ENCOUNTER — PATIENT OUTREACH (OUTPATIENT)
Dept: ADMINISTRATIVE | Facility: HOSPITAL | Age: 64
End: 2019-06-11

## 2019-06-13 RX ORDER — METOPROLOL SUCCINATE 25 MG/1
TABLET, EXTENDED RELEASE ORAL
Qty: 30 TABLET | Refills: 2 | OUTPATIENT
Start: 2019-06-13

## 2019-06-14 ENCOUNTER — CLINICAL SUPPORT (OUTPATIENT)
Dept: CARDIOLOGY | Facility: CLINIC | Age: 64
End: 2019-06-14
Attending: INTERNAL MEDICINE
Payer: COMMERCIAL

## 2019-06-14 ENCOUNTER — TELEPHONE (OUTPATIENT)
Dept: CARDIOLOGY | Facility: CLINIC | Age: 64
End: 2019-06-14

## 2019-06-14 VITALS
HEART RATE: 79 BPM | HEIGHT: 66 IN | BODY MASS INDEX: 29.25 KG/M2 | WEIGHT: 182 LBS | SYSTOLIC BLOOD PRESSURE: 120 MMHG | DIASTOLIC BLOOD PRESSURE: 66 MMHG

## 2019-06-14 DIAGNOSIS — R01.1 HEART MURMUR: ICD-10-CM

## 2019-06-14 DIAGNOSIS — I10 ESSENTIAL HYPERTENSION: ICD-10-CM

## 2019-06-14 DIAGNOSIS — E78.5 HYPERLIPIDEMIA, UNSPECIFIED HYPERLIPIDEMIA TYPE: ICD-10-CM

## 2019-06-14 LAB
ASCENDING AORTA: 2.2 CM
AV INDEX (PROSTH): 0.55
AV MEAN GRADIENT: 8.03 MMHG
AV PEAK GRADIENT: 16.16 MMHG
AV VALVE AREA: 1.7 CM2
AV VELOCITY RATIO: 0.54
BSA FOR ECHO PROCEDURE: 1.96 M2
CV ECHO LV RWT: 0.47 CM
DOP CALC AO PEAK VEL: 2.01 M/S
DOP CALC AO VTI: 40.57 CM
DOP CALC LVOT AREA: 3.11 CM2
DOP CALC LVOT DIAMETER: 1.99 CM
DOP CALC LVOT PEAK VEL: 1.09 M/S
DOP CALC LVOT STROKE VOLUME: 68.8 CM3
DOP CALCLVOT PEAK VEL VTI: 22.13 CM
E WAVE DECELERATION TIME: 226.01 MSEC
E/A RATIO: 0.86
E/E' RATIO: 14.31
ECHO LV POSTERIOR WALL: 1.07 CM (ref 0.6–1.1)
FRACTIONAL SHORTENING: 33 % (ref 28–44)
INTERVENTRICULAR SEPTUM: 0.98 CM (ref 0.6–1.1)
IVRT: 0.12 MSEC
LA MAJOR: 4.53 CM
LA MINOR: 4.54 CM
LA WIDTH: 3.47 CM
LEFT ATRIUM SIZE: 3.12 CM
LEFT ATRIUM VOLUME INDEX: 21.7 ML/M2
LEFT ATRIUM VOLUME: 41.73 CM3
LEFT INTERNAL DIMENSION IN SYSTOLE: 3.06 CM (ref 2.1–4)
LEFT VENTRICLE DIASTOLIC VOLUME INDEX: 49.51 ML/M2
LEFT VENTRICLE DIASTOLIC VOLUME: 95.14 ML
LEFT VENTRICLE MASS INDEX: 84.3 G/M2
LEFT VENTRICLE SYSTOLIC VOLUME INDEX: 19.2 ML/M2
LEFT VENTRICLE SYSTOLIC VOLUME: 36.87 ML
LEFT VENTRICULAR INTERNAL DIMENSION IN DIASTOLE: 4.56 CM (ref 3.5–6)
LEFT VENTRICULAR MASS: 162 G
LV LATERAL E/E' RATIO: 11.63
LV SEPTAL E/E' RATIO: 18.6
MV PEAK A VEL: 1.08 M/S
MV PEAK E VEL: 0.93 M/S
PISA TR MAX VEL: 2.35 M/S
PULM VEIN S/D RATIO: 1.53
PV PEAK D VEL: 0.36 M/S
PV PEAK S VEL: 0.55 M/S
RA MAJOR: 4.54 CM
RA PRESSURE: 3 MMHG
RA WIDTH: 3.32 CM
RIGHT VENTRICULAR END-DIASTOLIC DIMENSION: 3.49 CM
RV TISSUE DOPPLER FREE WALL SYSTOLIC VELOCITY 1 (APICAL 4 CHAMBER VIEW): 9.98 M/S
SINUS: 2.6 CM
STJ: 2.21 CM
TDI LATERAL: 0.08
TDI SEPTAL: 0.05
TDI: 0.07
TR MAX PG: 22.09 MMHG
TRICUSPID ANNULAR PLANE SYSTOLIC EXCURSION: 2.15 CM
TV REST PULMONARY ARTERY PRESSURE: 25 MMHG

## 2019-06-14 PROCEDURE — 99999 PR PBB SHADOW E&M-EST. PATIENT-LVL II: ICD-10-PCS | Mod: PBBFAC,,,

## 2019-06-14 PROCEDURE — 99999 PR PBB SHADOW E&M-EST. PATIENT-LVL II: CPT | Mod: PBBFAC,,,

## 2019-06-14 PROCEDURE — 93306 TTE W/DOPPLER COMPLETE: CPT | Mod: S$GLB,,, | Performed by: INTERNAL MEDICINE

## 2019-06-14 PROCEDURE — 93306 TRANSTHORACIC ECHO (TTE) COMPLETE (CUPID ONLY): ICD-10-PCS | Mod: S$GLB,,, | Performed by: INTERNAL MEDICINE

## 2019-07-11 LAB
LEFT EYE DM RETINOPATHY: POSITIVE
RIGHT EYE DM RETINOPATHY: POSITIVE

## 2019-07-15 ENCOUNTER — PATIENT OUTREACH (OUTPATIENT)
Dept: ADMINISTRATIVE | Facility: HOSPITAL | Age: 64
End: 2019-07-15

## 2019-07-29 ENCOUNTER — OFFICE VISIT (OUTPATIENT)
Dept: PODIATRY | Facility: CLINIC | Age: 64
End: 2019-07-29
Payer: COMMERCIAL

## 2019-07-29 VITALS
SYSTOLIC BLOOD PRESSURE: 117 MMHG | DIASTOLIC BLOOD PRESSURE: 70 MMHG | WEIGHT: 182.56 LBS | HEART RATE: 88 BPM | HEIGHT: 66 IN | BODY MASS INDEX: 29.34 KG/M2 | RESPIRATION RATE: 13 BRPM

## 2019-07-29 DIAGNOSIS — M20.42 HAMMER TOES OF BOTH FEET: ICD-10-CM

## 2019-07-29 DIAGNOSIS — R20.2 PARESTHESIA OF FOOT, BILATERAL: ICD-10-CM

## 2019-07-29 DIAGNOSIS — M72.2 PLANTAR FASCIITIS: ICD-10-CM

## 2019-07-29 DIAGNOSIS — M62.469 GASTROCNEMIUS EQUINUS, UNSPECIFIED LATERALITY: ICD-10-CM

## 2019-07-29 DIAGNOSIS — L84 CORN OR CALLUS: ICD-10-CM

## 2019-07-29 DIAGNOSIS — E10.42 CONTROLLED TYPE 1 DIABETES MELLITUS WITH DIABETIC POLYNEUROPATHY: Primary | ICD-10-CM

## 2019-07-29 DIAGNOSIS — M20.41 HAMMER TOES OF BOTH FEET: ICD-10-CM

## 2019-07-29 PROCEDURE — 99499 NO LOS: ICD-10-PCS | Mod: S$GLB,,, | Performed by: PODIATRIST

## 2019-07-29 PROCEDURE — 99999 PR PBB SHADOW E&M-EST. PATIENT-LVL III: CPT | Mod: PBBFAC,,, | Performed by: PODIATRIST

## 2019-07-29 PROCEDURE — 99499 UNLISTED E&M SERVICE: CPT | Mod: S$GLB,,, | Performed by: PODIATRIST

## 2019-07-29 PROCEDURE — 11056 PARNG/CUTG B9 HYPRKR LES 2-4: CPT | Mod: S$GLB,,, | Performed by: PODIATRIST

## 2019-07-29 PROCEDURE — 99999 PR PBB SHADOW E&M-EST. PATIENT-LVL III: ICD-10-PCS | Mod: PBBFAC,,, | Performed by: PODIATRIST

## 2019-07-29 PROCEDURE — 11056 PR TRIM BENIGN HYPERKERATOTIC SKIN LESION,2-4: ICD-10-PCS | Mod: S$GLB,,, | Performed by: PODIATRIST

## 2019-07-29 RX ORDER — TRAMADOL HYDROCHLORIDE 50 MG/1
50 TABLET ORAL
Qty: 30 TABLET | Refills: 1 | Status: SHIPPED | OUTPATIENT
Start: 2019-07-29 | End: 2020-08-22

## 2019-07-29 RX ORDER — TRAMADOL HYDROCHLORIDE 50 MG/1
TABLET ORAL
Refills: 1 | COMMUNITY
Start: 2019-07-02 | End: 2019-07-29 | Stop reason: SDUPTHER

## 2019-07-30 NOTE — PROGRESS NOTES
Subjective:      Patient ID: Kateryna Desai is a 64 y.o. female.    Chief Complaint: Diabetes Mellitus    Kateryna is a 64 y.o. female who presents to the clinic for routine evaluation and treatment of diabetic feet. Kateryna has a past medical history of Allergy, Arthritis, Asian flu type A (12/22/2017), Diabetes mellitus type I, Diabetic gastroparesis, Diabetic retinopathy of both eyes, Difficult intubation, Encounter for blood transfusion, GERD (gastroesophageal reflux disease), Headache(784.0), Hiatal hernia, Hyperlipidemia, Hypertension, Infection of the upper respiratory tract (June 2012), Lumbar spinal stenosis, Osteopenia, Rosacea, Seizures, Stroke (1985?), Tachycardia, Thyroid disease, and Venous insufficiency of leg. Patient's chief complaint consists of calluses in need of trimming.  Denies being painful with wearing shoe gear.  Has not attempted to self treat.  She continues performing  stretching exercises and is wearing heel lifts to offset her equinus.  States symptoms are currently manageable.  Has resumed use of tramadol to address paresthesias, as these tend to be more prevalent at bedtime.  Notes this resolves her neuropathy pain, and is used three to five time weekly depending upon her symptoms.  Continues tight control over her blood glucose.  Denies any additional pedal complaints.      PCP: Jani Cortes,     Date Last Seen by PCP:  5/6/19      Hemoglobin A1C   Date Value Ref Range Status   04/01/2019 5.7 (H) 4.0 - 5.6 % Final     Comment:     ADA Screening Guidelines:  5.7-6.4%  Consistent with prediabetes  >or=6.5%  Consistent with diabetes  High levels of fetal hemoglobin interfere with the HbA1C  assay. Heterozygous hemoglobin variants (HbS, HgC, etc)do  not significantly interfere with this assay.   However, presence of multiple variants may affect accuracy.     11/27/2018 5.5 4.0 - 5.6 % Final     Comment:     ADA Screening Guidelines:  5.7-6.4%  Consistent with  prediabetes  >or=6.5%  Consistent with diabetes  High levels of fetal hemoglobin interfere with the HbA1C  assay. Heterozygous hemoglobin variants (HbS, HgC, etc)do  not significantly interfere with this assay.   However, presence of multiple variants may affect accuracy.     2018 6.0 (H) 4.0 - 5.6 % Final     Comment:     ADA Screening Guidelines:  5.7-6.4%  Consistent with prediabetes  >or=6.5%  Consistent with diabetes  High levels of fetal hemoglobin interfere with the HbA1C  assay. Heterozygous hemoglobin variants (HbS, HgC, etc)do  not significantly interfere with this assay.   However, presence of multiple variants may affect accuracy.             Past Medical History:   Diagnosis Date    Allergy     Arthritis     degnerative disease low back,muscle spasms, shoulders    Asian flu type A 2017    Diabetes mellitus type I     since age 13    Diabetic gastroparesis     takes Cytotec    Diabetic retinopathy of both eyes     gets periodic laser treatments    Difficult intubation     as a child had sore throat after a procedure    Encounter for blood transfusion     GERD (gastroesophageal reflux disease)     Headache(784.0)     Hiatal hernia     with GERD    Hyperlipidemia     Hypertension     Infection of the upper respiratory tract 2012    Lumbar spinal stenosis     Osteopenia     Rosacea     Seizures     Pt states during pgy with stroke    Stroke ?    while pregnant    Tachycardia     asymptomatic with Toprol    Thyroid disease     hypothyroidism    Venous insufficiency of leg     improved since EVLT       Past Surgical History:   Procedure Laterality Date    BUNIONECTOMY Right     BUNIONECTOMY Right 2012    [facility]    CARDIAC CATHETERIZATION      no stents     SECTION      COLONOSCOPY  2007    Dr. Rose, 10 year recheck    EXCISION, BUNIONETTE Right 2012    Performed by Manjeet Robert DPM at  [facility] OR    EXOSTECTOMY  8/16/12    left foot, local anesthesia, in office    EYE SURGERY      has had many laser procedures for diabetic retinopathy    VASCULAR SURGERY      VEIN SURGERY  April 2012    EVLT right greater saphenous, IV sedation       Family History   Problem Relation Age of Onset    Cancer Maternal Aunt         breast    Breast cancer Maternal Aunt     Diabetes Maternal Grandmother     Breast cancer Maternal Grandmother     Cancer Maternal Grandfather         prostate    Diabetes Maternal Grandfather     Diabetes Cousin     Arthritis Mother     Cancer Mother     Melanoma Mother     Heart disease Father     Stroke Father     No Known Problems Sister     No Known Problems Brother     No Known Problems Daughter     Alzheimer's disease Maternal Uncle     Alcohol abuse Maternal Uncle         x2    Heart disease Maternal Uncle     No Known Problems Paternal Aunt     Heart disease Paternal Uncle        Social History     Socioeconomic History    Marital status:      Spouse name: Not on file    Number of children: Not on file    Years of education: Not on file    Highest education level: Not on file   Occupational History    Not on file   Social Needs    Financial resource strain: Not on file    Food insecurity:     Worry: Not on file     Inability: Not on file    Transportation needs:     Medical: Not on file     Non-medical: Not on file   Tobacco Use    Smoking status: Never Smoker    Smokeless tobacco: Never Used   Substance and Sexual Activity    Alcohol use: Yes     Comment: social, 1 drink monthly    Drug use: No    Sexual activity: Yes     Partners: Male   Lifestyle    Physical activity:     Days per week: Not on file     Minutes per session: Not on file    Stress: Not on file   Relationships    Social connections:     Talks on phone: Not on file     Gets together: Not on file     Attends Mosque service: Not on file     Active member of club or  organization: Not on file     Attends meetings of clubs or organizations: Not on file     Relationship status: Not on file   Other Topics Concern    Not on file   Social History Narrative    Not on file       Current Outpatient Medications   Medication Sig Dispense Refill    ALPHAGAN P 0.1 % Drop Place 1 drop into both eyes 3 (three) times daily.   6    ascorbic acid, vitamin C, (VITAMIN C) 100 MG tablet Take 100 mg by mouth 2 (two) times daily.      aspirin (ECOTRIN) 81 MG EC tablet Take 81 mg by mouth once daily.      baclofen (LIORESAL) 10 MG tablet Take 1 tablet (10 mg total) by mouth 3 (three) times daily. 90 tablet 11    BENEFIBER, GUAR GUM, ORAL Take 1 Dose by mouth 2 (two) times daily as needed.       calcium citrate-vitamin D3 315-200 mg (CITRACAL+D) 315-200 mg-unit per tablet Take 1 tablet by mouth 2 (two) times daily.      cholecalciferol, vitamin D3, (VITAMIN D3) 2,000 unit Cap Take 1 capsule by mouth once daily.      cinnamon bark 500 mg capsule Take 500 mg by mouth 2 (two) times daily.      CRESTOR 40 mg Tab Take 40 mg by mouth once daily.       DOCOSAHEXANOIC ACID/EPA (FISH OIL ORAL) Take 1,200 mg by mouth 2 (two) times daily.      doxycycline (MONODOX) 50 MG Cap Take 50 mg by mouth 2 (two) times daily as needed (roseacea).   4    ESTRACE 0.01 % (0.1 mg/gram) vaginal cream Place vaginally twice a week. Tuesday, Saturday  0    estradiol 10 mcg Tab Place 1 tablet vaginally twice a week. Tuesday, Saturday      ezetimibe (ZETIA) 10 mg tablet Take 10 mg by mouth once daily.      fluticasone propionate (FLONASE) 50 mcg/actuation nasal spray 1 spray (50 mcg total) by Each Nare route once daily. 16 g 1    furosemide (LASIX) 20 MG tablet Take 1 tablet (20 mg total) by mouth 2 (two) times daily. 60 tablet 2    GLUCAGON EMERGENCY 1 mg injection 1 mg as needed.       HUMALOG 100 unit/mL injection Inject into the skin 3 (three) times daily before meals. 10-17 units tid sliding scale  1     L.ACID/L.CASEI/B.BIF/B.CEDRIC/FOS (PROBIOTIC BLEND ORAL) Take 1 capsule by mouth every Mon, Wed, Fri.      metoprolol succinate (TOPROL-XL) 25 MG 24 hr tablet Take 1 tablet (25 mg total) by mouth once daily. 30 tablet 2    ONETOUCH ULTRA BLUE TEST STRIP Strp   11    pantoprazole (PROTONIX) 40 MG tablet Take 1 tablet (40 mg total) by mouth 2 (two) times daily. 180 tablet 2    promethazine (PHENERGAN) 25 MG tablet Take 1 tablet (25 mg total) by mouth every 6 (six) hours as needed for Nausea. 15 tablet 0    SYNTHROID 88 mcg tablet Take 88 mcg by mouth every evening.   0    traMADol (ULTRAM) 50 mg tablet Take 1 tablet (50 mg total) by mouth every 24 hours as needed for Pain. 30 tablet 1    TRESIBA FLEXTOUCH U-100 100 unit/mL (3 mL) InPn Inject 46 Units into the skin every morning.   6    VITAMIN B COMPLEX VIT C NO.4 (SUPER B COMPLEX + C ORAL) Take 1 tablet by mouth once daily.      ACZONE 5 % topical gel Apply topically 2 (two) times daily as needed (roseacea).   0    alpha lipoic acid 600 mg Cap Take 1 capsule by mouth once daily.       No current facility-administered medications for this visit.        Review of patient's allergies indicates:   Allergen Reactions    Bactrim [sulfamethoxazole-trimethoprim] Rash     Patient experienced on 12/3/14 redness to face and rash to chest and arms/ with no SOB or airway obstruction    Percocet [oxycodone-acetaminophen] Nausea And Vomiting     Also caused dizziness and passed out    Iodinated contrast media - iv dye Swelling and Rash     Says topical iodine OK    Norco [hydrocodone-acetaminophen] Itching, Swelling and Rash     Can tolerate if she takes Benadryl with it    Niaspan extended-release [niacin] Itching and Other (See Comments)     Skin flushing and redness         Review of Systems   Constitution: Negative for chills, decreased appetite, diaphoresis and fever.   Cardiovascular: Negative for claudication and leg swelling.   Skin: Positive for color change  and nail changes. Negative for dry skin, flushing and itching.   Musculoskeletal: Positive for myalgias. Negative for arthritis, back pain, falls, gout, joint pain and joint swelling.   Neurological: Positive for numbness and paresthesias.   Psychiatric/Behavioral: Negative for altered mental status.           Objective:      Physical Exam   Constitutional: She is oriented to person, place, and time. She appears well-developed and well-nourished. No distress.   Cardiovascular:   Pulses:       Dorsalis pedis pulses are 2+ on the right side, and 2+ on the left side.        Posterior tibial pulses are 1+ on the right side, and 1+ on the left side.   CFT <3 seconds bilateral.  Pedal hair growth decreased bilateral.  Varicosities noted to bilateral lower extremity. Mild nonpitting edema noted to bilateral lower extremity.  Toes are cool to touch bilateral.       Musculoskeletal: Normal range of motion. She exhibits edema and tenderness.   Muscle strength 5/5 in all muscle groups bilateral.  No tenderness nor crepitation with active and passive ROM of foot/ankle joints bilateral.  Moderate pain with palpation of the Lt. Plantar central heel.   Bilateral gastrocnemius equinus with slight improvement noted in dorsiflexion.  Bilateral pes planus foot type.  Bilateral hallux abducto valgus.  Bilateral semi-rigid contracture of toes 2-5.     Neurological: She is alert and oriented to person, place, and time. She has normal strength. A sensory deficit is present.   Protective sensation per Gregory-Abigail monofilament decreased bilateral.    Vibratory sensation intact bilateral.    Light touch intact bilateral.   Skin: Skin is warm, dry and intact. Lesion noted. No abrasion, no bruising, no burn, no ecchymosis, no laceration, no petechiae and no rash noted. She is not diaphoretic. No cyanosis or erythema. No pallor. Nails show no clubbing.   Toenails x 10 appear mycotic but well maintained. No open wounds, interdigital  maceration noted bilateral.  Cicatrix of the Lt. Posterior heel is well healed with no adjacent break in skin integrity.  Site is atrophic in appearance.  Hyperkeratotic lesion noted to the tips of toes 2-3 bilateral.                   Assessment:       Encounter Diagnoses   Name Primary?    Controlled type 1 diabetes mellitus with diabetic polyneuropathy Yes    Paresthesia of foot, bilateral     Corn or callus     Gastrocnemius equinus, unspecified laterality     Plantar fasciitis          Plan:       Kateryna was seen today for diabetes mellitus.    Diagnoses and all orders for this visit:    Controlled type 1 diabetes mellitus with diabetic polyneuropathy  -     traMADol (ULTRAM) 50 mg tablet; Take 1 tablet (50 mg total) by mouth every 24 hours as needed for Pain.    Paresthesia of foot, bilateral  -     traMADol (ULTRAM) 50 mg tablet; Take 1 tablet (50 mg total) by mouth every 24 hours as needed for Pain.    Corn or callus    Gastrocnemius equinus, unspecified laterality    Plantar fasciitis      I counseled the patient on her conditions, their implications and medical management.    - Recommend continued stretching and use of heel lifts to offset Lt. Sided equinus.  Briefly discussed a steroid injection of the heel, as she likely has calcaneal bursitis as well.    - Advised to continue wearing shoe gear that accommodates for digital deformities.    - Shoe inspection. Diabetic Foot Education. Patient reminded of the importance of good nutrition and blood sugar control to help prevent podiatric complications of diabetes. Patient instructed on proper foot hygeine. We discussed wearing proper shoe gear, daily foot inspections, never walking without protective shoe gear, never putting sharp instruments to feet    - With patient's permission, a sterile #15 scalpel was used to trim lesions x 4 down to smooth appearing skin without incident.  Patient relates relief following the procedure. She will continue to  monitor the areas daily, inspect her feet, wear protective shoe gear when ambulatory, moisturizer to maintain skin integrity and follow in this office in approximately 2-3 months, sooner p.r.n.    Follow up in about 3 months (around 10/29/2019).    Lei Rhoades DPM

## 2019-07-31 ENCOUNTER — LAB VISIT (OUTPATIENT)
Dept: LAB | Facility: HOSPITAL | Age: 64
End: 2019-07-31
Attending: INTERNAL MEDICINE
Payer: COMMERCIAL

## 2019-07-31 DIAGNOSIS — Z79.899 NEED FOR PROPHYLACTIC CHEMOTHERAPY: ICD-10-CM

## 2019-07-31 DIAGNOSIS — E03.9 MYXEDEMA HEART DISEASE: Primary | ICD-10-CM

## 2019-07-31 DIAGNOSIS — E10.9 DIABETES MELLITUS TYPE I: ICD-10-CM

## 2019-07-31 DIAGNOSIS — I51.9 MYXEDEMA HEART DISEASE: Primary | ICD-10-CM

## 2019-07-31 DIAGNOSIS — E78.00 PURE HYPERCHOLESTEROLEMIA: ICD-10-CM

## 2019-07-31 LAB
ANION GAP SERPL CALC-SCNC: 11 MMOL/L (ref 8–16)
BUN SERPL-MCNC: 8 MG/DL (ref 8–23)
CALCIUM SERPL-MCNC: 9.4 MG/DL (ref 8.7–10.5)
CHLORIDE SERPL-SCNC: 99 MMOL/L (ref 95–110)
CHOLEST SERPL-MCNC: 159 MG/DL (ref 120–199)
CHOLEST/HDLC SERPL: 3.4 {RATIO} (ref 2–5)
CO2 SERPL-SCNC: 33 MMOL/L (ref 23–29)
CREAT SERPL-MCNC: 0.9 MG/DL (ref 0.5–1.4)
EST. GFR  (AFRICAN AMERICAN): >60 ML/MIN/1.73 M^2
EST. GFR  (NON AFRICAN AMERICAN): >60 ML/MIN/1.73 M^2
ESTIMATED AVG GLUCOSE: 111 MG/DL (ref 68–131)
GLUCOSE SERPL-MCNC: 112 MG/DL (ref 70–110)
HBA1C MFR BLD HPLC: 5.5 % (ref 4–5.6)
HDLC SERPL-MCNC: 47 MG/DL (ref 40–75)
HDLC SERPL: 29.6 % (ref 20–50)
LDLC SERPL CALC-MCNC: 94.6 MG/DL (ref 63–159)
NONHDLC SERPL-MCNC: 112 MG/DL
POTASSIUM SERPL-SCNC: 3.7 MMOL/L (ref 3.5–5.1)
SODIUM SERPL-SCNC: 143 MMOL/L (ref 136–145)
T4 FREE SERPL-MCNC: 1.12 NG/DL (ref 0.71–1.51)
TRIGL SERPL-MCNC: 87 MG/DL (ref 30–150)
TSH SERPL DL<=0.005 MIU/L-ACNC: 1.02 UIU/ML (ref 0.4–4)

## 2019-07-31 PROCEDURE — 84443 ASSAY THYROID STIM HORMONE: CPT

## 2019-07-31 PROCEDURE — 84439 ASSAY OF FREE THYROXINE: CPT

## 2019-07-31 PROCEDURE — 80061 LIPID PANEL: CPT

## 2019-07-31 PROCEDURE — 80048 BASIC METABOLIC PNL TOTAL CA: CPT

## 2019-07-31 PROCEDURE — 36415 COLL VENOUS BLD VENIPUNCTURE: CPT | Mod: PO

## 2019-07-31 PROCEDURE — 83036 HEMOGLOBIN GLYCOSYLATED A1C: CPT

## 2019-09-03 DIAGNOSIS — R20.2 PARESTHESIA OF FOOT, BILATERAL: ICD-10-CM

## 2019-09-03 DIAGNOSIS — E10.42 CONTROLLED TYPE 1 DIABETES MELLITUS WITH DIABETIC POLYNEUROPATHY: ICD-10-CM

## 2019-09-03 RX ORDER — TRAMADOL HYDROCHLORIDE 50 MG/1
TABLET ORAL
Qty: 30 TABLET | Refills: 1 | OUTPATIENT
Start: 2019-09-03

## 2019-09-09 ENCOUNTER — OFFICE VISIT (OUTPATIENT)
Dept: FAMILY MEDICINE | Facility: CLINIC | Age: 64
End: 2019-09-09
Payer: COMMERCIAL

## 2019-09-09 VITALS
BODY MASS INDEX: 28.84 KG/M2 | HEART RATE: 94 BPM | SYSTOLIC BLOOD PRESSURE: 134 MMHG | TEMPERATURE: 98 F | OXYGEN SATURATION: 92 % | DIASTOLIC BLOOD PRESSURE: 72 MMHG | HEIGHT: 66 IN | WEIGHT: 179.44 LBS

## 2019-09-09 DIAGNOSIS — J01.90 ACUTE RHINOSINUSITIS: Primary | ICD-10-CM

## 2019-09-09 DIAGNOSIS — E10.42 CONTROLLED TYPE 1 DIABETES MELLITUS WITH DIABETIC POLYNEUROPATHY, WITH LONG-TERM CURRENT USE OF INSULIN: ICD-10-CM

## 2019-09-09 PROCEDURE — 3008F PR BODY MASS INDEX (BMI) DOCUMENTED: ICD-10-PCS | Mod: CPTII,S$GLB,, | Performed by: PHYSICIAN ASSISTANT

## 2019-09-09 PROCEDURE — 3008F BODY MASS INDEX DOCD: CPT | Mod: CPTII,S$GLB,, | Performed by: PHYSICIAN ASSISTANT

## 2019-09-09 PROCEDURE — 99999 PR PBB SHADOW E&M-EST. PATIENT-LVL III: CPT | Mod: PBBFAC,,, | Performed by: PHYSICIAN ASSISTANT

## 2019-09-09 PROCEDURE — 99213 OFFICE O/P EST LOW 20 MIN: CPT | Mod: S$GLB,,, | Performed by: PHYSICIAN ASSISTANT

## 2019-09-09 PROCEDURE — 3078F DIAST BP <80 MM HG: CPT | Mod: CPTII,S$GLB,, | Performed by: PHYSICIAN ASSISTANT

## 2019-09-09 PROCEDURE — 99999 PR PBB SHADOW E&M-EST. PATIENT-LVL III: ICD-10-PCS | Mod: PBBFAC,,, | Performed by: PHYSICIAN ASSISTANT

## 2019-09-09 PROCEDURE — 3044F HG A1C LEVEL LT 7.0%: CPT | Mod: CPTII,S$GLB,, | Performed by: PHYSICIAN ASSISTANT

## 2019-09-09 PROCEDURE — 99213 PR OFFICE/OUTPT VISIT, EST, LEVL III, 20-29 MIN: ICD-10-PCS | Mod: S$GLB,,, | Performed by: PHYSICIAN ASSISTANT

## 2019-09-09 PROCEDURE — 3075F SYST BP GE 130 - 139MM HG: CPT | Mod: CPTII,S$GLB,, | Performed by: PHYSICIAN ASSISTANT

## 2019-09-09 PROCEDURE — 3078F PR MOST RECENT DIASTOLIC BLOOD PRESSURE < 80 MM HG: ICD-10-PCS | Mod: CPTII,S$GLB,, | Performed by: PHYSICIAN ASSISTANT

## 2019-09-09 PROCEDURE — 3075F PR MOST RECENT SYSTOLIC BLOOD PRESS GE 130-139MM HG: ICD-10-PCS | Mod: CPTII,S$GLB,, | Performed by: PHYSICIAN ASSISTANT

## 2019-09-09 PROCEDURE — 3044F PR MOST RECENT HEMOGLOBIN A1C LEVEL <7.0%: ICD-10-PCS | Mod: CPTII,S$GLB,, | Performed by: PHYSICIAN ASSISTANT

## 2019-09-09 RX ORDER — SYRING-NEEDL,DISP,INSUL,0.3 ML 31GX15/64"
SYRINGE, EMPTY DISPOSABLE MISCELLANEOUS
Refills: 4 | COMMUNITY
Start: 2019-08-12

## 2019-09-09 RX ORDER — LEVOCETIRIZINE DIHYDROCHLORIDE 5 MG/1
5 TABLET, FILM COATED ORAL NIGHTLY
Qty: 30 TABLET | Refills: 2 | Status: SHIPPED | OUTPATIENT
Start: 2019-09-09 | End: 2019-12-06 | Stop reason: SDUPTHER

## 2019-09-09 RX ORDER — PREDNISONE 20 MG/1
20 TABLET ORAL 2 TIMES DAILY
Qty: 10 TABLET | Refills: 0 | Status: SHIPPED | OUTPATIENT
Start: 2019-09-09 | End: 2019-12-30 | Stop reason: ALTCHOICE

## 2019-09-09 RX ORDER — BENZONATATE 200 MG/1
200 CAPSULE ORAL 3 TIMES DAILY PRN
Qty: 30 CAPSULE | Refills: 0 | Status: SHIPPED | OUTPATIENT
Start: 2019-09-09 | End: 2019-09-16

## 2019-09-09 NOTE — PROGRESS NOTES
Subjective:       Patient ID: Kateryna Desai is a 64 y.o. female.    Chief Complaint: Cough (4 days, with flem, sore throat, head ache, and ear pressure)    Sinus Problem   This is a new problem. The current episode started in the past 7 days. The problem has been gradually worsening since onset. There has been no fever. Associated symptoms include congestion, coughing, ear pain, headaches, sinus pressure, sneezing and a sore throat. Pertinent negatives include no chills or shortness of breath. Treatments tried: antihistamine.     Review of Systems   Constitutional: Positive for fatigue. Negative for activity change, appetite change, chills and fever.   HENT: Positive for congestion, ear pain, postnasal drip, rhinorrhea, sinus pressure, sneezing and sore throat. Negative for trouble swallowing and voice change.    Eyes: Positive for discharge and itching.   Respiratory: Positive for cough. Negative for shortness of breath and wheezing.    Cardiovascular: Negative for chest pain.   Gastrointestinal: Negative for nausea and vomiting.   Neurological: Positive for headaches. Negative for dizziness and light-headedness.       Objective:      Physical Exam   Constitutional: She appears well-developed and well-nourished. She is cooperative. No distress.   HENT:   Head: Normocephalic and atraumatic.   Right Ear: External ear and ear canal normal. Tympanic membrane is erythematous.   Left Ear: External ear and ear canal normal. Tympanic membrane is erythematous.   Nose: Mucosal edema and rhinorrhea present. Right sinus exhibits maxillary sinus tenderness and frontal sinus tenderness. Left sinus exhibits maxillary sinus tenderness and frontal sinus tenderness.   Mouth/Throat: Mucous membranes are not dry. Posterior oropharyngeal erythema present. No oropharyngeal exudate or posterior oropharyngeal edema.   Cardiovascular: Normal rate and regular rhythm.   No murmur heard.  Pulmonary/Chest: Effort normal and  breath sounds normal. She has no wheezes. She has no rales.   Lymphadenopathy:     She has no cervical adenopathy.   Neurological: She is alert.   Skin: Skin is warm and dry.   Nursing note and vitals reviewed.      Assessment:       1. Acute rhinosinusitis    2. Controlled type 1 diabetes mellitus with diabetic polyneuropathy, with long-term current use of insulin        Plan:       Kateryna was seen today for cough.    Diagnoses and all orders for this visit:    Acute rhinosinusitis  -     predniSONE (DELTASONE) 20 MG tablet; Take 1 tablet (20 mg total) by mouth 2 (two) times daily.  -     levocetirizine (XYZAL) 5 MG tablet; Take 1 tablet (5 mg total) by mouth every evening.  -     benzonatate (TESSALON) 200 MG capsule; Take 1 capsule (200 mg total) by mouth 3 (three) times daily as needed for Cough.        Controlled type 1 diabetes mellitus with diabetic polyneuropathy, with long-term current use of insulin  Continue current management   SS insulin   Monitor glucose every 4-6  hours while on prednisone   Other orders

## 2019-09-30 DIAGNOSIS — I83.893 VARICOSE VEINS OF LOWER EXTREMITY WITH EDEMA, BILATERAL: ICD-10-CM

## 2019-10-01 RX ORDER — FUROSEMIDE 20 MG/1
20 TABLET ORAL 2 TIMES DAILY
Qty: 180 TABLET | Refills: 3 | Status: SHIPPED | OUTPATIENT
Start: 2019-10-01 | End: 2021-01-27

## 2019-10-08 ENCOUNTER — TELEPHONE (OUTPATIENT)
Dept: FAMILY MEDICINE | Facility: CLINIC | Age: 64
End: 2019-10-08

## 2019-10-08 NOTE — TELEPHONE ENCOUNTER
Pt informed that clinic is currently out of shingles vaccine. Pt informed it can be received at her local pharmacy.            ----- Message from Kami Fountain sent at 10/8/2019  2:38 PM CDT -----  Contact: Pt  Patient called in regards to the second part of her shingle's injection. Patient stated no one has contacted her.      Patient can be reached at [phone]

## 2019-11-04 ENCOUNTER — HOSPITAL ENCOUNTER (OUTPATIENT)
Dept: RADIOLOGY | Facility: CLINIC | Age: 64
Discharge: HOME | End: 2019-11-04
Attending: OBSTETRICS & GYNECOLOGY
Payer: COMMERCIAL

## 2019-11-04 DIAGNOSIS — Z12.39 ENCOUNTER FOR SPECIAL SCREENING EXAMINATION FOR NEOPLASM OF BREAST: ICD-10-CM

## 2019-11-04 PROCEDURE — 77063 BREAST TOMOSYNTHESIS BI: CPT | Mod: TC,PO

## 2019-11-04 PROCEDURE — 77067 MAMMO DIGITAL SCREENING BILAT WITH TOMOSYNTHESIS_CAD: ICD-10-PCS | Mod: 26,,, | Performed by: RADIOLOGY

## 2019-11-04 PROCEDURE — 77063 MAMMO DIGITAL SCREENING BILAT WITH TOMOSYNTHESIS_CAD: ICD-10-PCS | Mod: 26,,, | Performed by: RADIOLOGY

## 2019-11-04 PROCEDURE — 77067 SCR MAMMO BI INCL CAD: CPT | Mod: 26,,, | Performed by: RADIOLOGY

## 2019-11-04 PROCEDURE — 77063 BREAST TOMOSYNTHESIS BI: CPT | Mod: 26,,, | Performed by: RADIOLOGY

## 2019-11-13 ENCOUNTER — TELEPHONE (OUTPATIENT)
Dept: FAMILY MEDICINE | Facility: CLINIC | Age: 64
End: 2019-11-13

## 2019-11-13 ENCOUNTER — LAB VISIT (OUTPATIENT)
Dept: LAB | Facility: HOSPITAL | Age: 64
End: 2019-11-13
Attending: INTERNAL MEDICINE
Payer: COMMERCIAL

## 2019-11-13 DIAGNOSIS — N18.6 TYPE 2 DIABETES MELLITUS WITH END-STAGE RENAL DISEASE: ICD-10-CM

## 2019-11-13 DIAGNOSIS — N18.30 CHRONIC KIDNEY DISEASE, STAGE III (MODERATE): ICD-10-CM

## 2019-11-13 DIAGNOSIS — E11.22 TYPE 2 DIABETES MELLITUS WITH END-STAGE RENAL DISEASE: ICD-10-CM

## 2019-11-13 DIAGNOSIS — E87.6 HYPOPOTASSEMIA: ICD-10-CM

## 2019-11-13 DIAGNOSIS — I10 ESSENTIAL HYPERTENSION: ICD-10-CM

## 2019-11-13 DIAGNOSIS — I10 ESSENTIAL HYPERTENSION, MALIGNANT: ICD-10-CM

## 2019-11-13 DIAGNOSIS — N25.81 SECONDARY HYPERPARATHYROIDISM OF RENAL ORIGIN: ICD-10-CM

## 2019-11-13 DIAGNOSIS — N39.0 URINARY TRACT INFECTION, SITE NOT SPECIFIED: Primary | ICD-10-CM

## 2019-11-13 LAB
ALBUMIN SERPL BCP-MCNC: 3.7 G/DL (ref 3.5–5.2)
ANION GAP SERPL CALC-SCNC: 12 MMOL/L (ref 8–16)
BASOPHILS # BLD AUTO: 0.02 K/UL (ref 0–0.2)
BASOPHILS NFR BLD: 0.4 % (ref 0–1.9)
BUN SERPL-MCNC: 7 MG/DL (ref 8–23)
CALCIUM SERPL-MCNC: 9.8 MG/DL (ref 8.7–10.5)
CHLORIDE SERPL-SCNC: 101 MMOL/L (ref 95–110)
CO2 SERPL-SCNC: 30 MMOL/L (ref 23–29)
CREAT SERPL-MCNC: 0.8 MG/DL (ref 0.5–1.4)
DIFFERENTIAL METHOD: ABNORMAL
EOSINOPHIL # BLD AUTO: 0.1 K/UL (ref 0–0.5)
EOSINOPHIL NFR BLD: 2.3 % (ref 0–8)
ERYTHROCYTE [DISTWIDTH] IN BLOOD BY AUTOMATED COUNT: 14.6 % (ref 11.5–14.5)
EST. GFR  (AFRICAN AMERICAN): >60 ML/MIN/1.73 M^2
EST. GFR  (NON AFRICAN AMERICAN): >60 ML/MIN/1.73 M^2
GLUCOSE SERPL-MCNC: 51 MG/DL (ref 70–110)
HCT VFR BLD AUTO: 41.1 % (ref 37–48.5)
HGB BLD-MCNC: 12.5 G/DL (ref 12–16)
IMM GRANULOCYTES # BLD AUTO: 0.01 K/UL (ref 0–0.04)
IMM GRANULOCYTES NFR BLD AUTO: 0.2 % (ref 0–0.5)
LYMPHOCYTES # BLD AUTO: 1.2 K/UL (ref 1–4.8)
LYMPHOCYTES NFR BLD: 23.2 % (ref 18–48)
MAGNESIUM SERPL-MCNC: 1.9 MG/DL (ref 1.6–2.6)
MCH RBC QN AUTO: 29.5 PG (ref 27–31)
MCHC RBC AUTO-ENTMCNC: 30.4 G/DL (ref 32–36)
MCV RBC AUTO: 97 FL (ref 82–98)
MONOCYTES # BLD AUTO: 0.5 K/UL (ref 0.3–1)
MONOCYTES NFR BLD: 8.9 % (ref 4–15)
NEUTROPHILS # BLD AUTO: 3.5 K/UL (ref 1.8–7.7)
NEUTROPHILS NFR BLD: 65 % (ref 38–73)
NRBC BLD-RTO: 0 /100 WBC
PHOSPHATE SERPL-MCNC: 3.4 MG/DL (ref 2.7–4.5)
PLATELET # BLD AUTO: 218 K/UL (ref 150–350)
PMV BLD AUTO: 11.2 FL (ref 9.2–12.9)
POTASSIUM SERPL-SCNC: 2.8 MMOL/L (ref 3.5–5.1)
RBC # BLD AUTO: 4.24 M/UL (ref 4–5.4)
SODIUM SERPL-SCNC: 143 MMOL/L (ref 136–145)
WBC # BLD AUTO: 5.31 K/UL (ref 3.9–12.7)

## 2019-11-13 PROCEDURE — 36415 COLL VENOUS BLD VENIPUNCTURE: CPT | Mod: PO

## 2019-11-13 PROCEDURE — 85025 COMPLETE CBC W/AUTO DIFF WBC: CPT

## 2019-11-13 PROCEDURE — 83735 ASSAY OF MAGNESIUM: CPT

## 2019-11-13 PROCEDURE — 80069 RENAL FUNCTION PANEL: CPT

## 2019-11-13 RX ORDER — METOPROLOL SUCCINATE 25 MG/1
25 TABLET, EXTENDED RELEASE ORAL DAILY
Qty: 90 TABLET | Refills: 3 | Status: SHIPPED | OUTPATIENT
Start: 2019-11-13 | End: 2020-10-12 | Stop reason: SDUPTHER

## 2019-11-13 NOTE — TELEPHONE ENCOUNTER
----- Message from Nora Sampson sent at 11/13/2019  3:35 PM CST -----  Contact: self  Patient requesting to speak to nurse regarding she need refill on metoprolol succinate (TOPROL-XL) 25 MG 24 hr tablet       Patient states she been out of medication for 5 days and pharmacy has sent request over to office 3 times per patient       Please call to advice [phone] (home)               [facility] [place] - [address]  [address] [place] [address]  Phone: [phone] Fax: [phone]

## 2019-11-21 ENCOUNTER — LAB VISIT (OUTPATIENT)
Dept: LAB | Facility: HOSPITAL | Age: 64
End: 2019-11-21
Attending: INTERNAL MEDICINE
Payer: COMMERCIAL

## 2019-11-21 DIAGNOSIS — E03.9 MYXEDEMA HEART DISEASE: ICD-10-CM

## 2019-11-21 DIAGNOSIS — Z79.4 ENCOUNTER FOR LONG-TERM (CURRENT) USE OF INSULIN: ICD-10-CM

## 2019-11-21 DIAGNOSIS — E10.9 DIABETES MELLITUS TYPE I: Primary | ICD-10-CM

## 2019-11-21 DIAGNOSIS — I51.9 MYXEDEMA HEART DISEASE: ICD-10-CM

## 2019-11-21 DIAGNOSIS — E78.00 PURE HYPERCHOLESTEROLEMIA: ICD-10-CM

## 2019-11-21 LAB
ALBUMIN SERPL BCP-MCNC: 3.6 G/DL (ref 3.5–5.2)
ALBUMIN/CREAT UR: 2.5 UG/MG (ref 0–30)
ALP SERPL-CCNC: 68 U/L (ref 55–135)
ALT SERPL W/O P-5'-P-CCNC: 26 U/L (ref 10–44)
ANION GAP SERPL CALC-SCNC: 11 MMOL/L (ref 8–16)
AST SERPL-CCNC: 32 U/L (ref 10–40)
BILIRUB SERPL-MCNC: 0.6 MG/DL (ref 0.1–1)
BUN SERPL-MCNC: 7 MG/DL (ref 8–23)
CALCIUM SERPL-MCNC: 9.4 MG/DL (ref 8.7–10.5)
CHLORIDE SERPL-SCNC: 101 MMOL/L (ref 95–110)
CHOLEST SERPL-MCNC: 154 MG/DL (ref 120–199)
CHOLEST/HDLC SERPL: 3.2 {RATIO} (ref 2–5)
CO2 SERPL-SCNC: 27 MMOL/L (ref 23–29)
CREAT SERPL-MCNC: 1 MG/DL (ref 0.5–1.4)
CREAT UR-MCNC: 201 MG/DL (ref 15–325)
EST. GFR  (AFRICAN AMERICAN): >60 ML/MIN/1.73 M^2
EST. GFR  (NON AFRICAN AMERICAN): >60 ML/MIN/1.73 M^2
ESTIMATED AVG GLUCOSE: 120 MG/DL (ref 68–131)
GLUCOSE SERPL-MCNC: 196 MG/DL (ref 70–110)
HBA1C MFR BLD HPLC: 5.8 % (ref 4–5.6)
HDLC SERPL-MCNC: 48 MG/DL (ref 40–75)
HDLC SERPL: 31.2 % (ref 20–50)
LDLC SERPL CALC-MCNC: 90.8 MG/DL (ref 63–159)
MICROALBUMIN UR DL<=1MG/L-MCNC: 5 UG/ML
NONHDLC SERPL-MCNC: 106 MG/DL
POTASSIUM SERPL-SCNC: 4 MMOL/L (ref 3.5–5.1)
PROT SERPL-MCNC: 6.5 G/DL (ref 6–8.4)
SODIUM SERPL-SCNC: 139 MMOL/L (ref 136–145)
TRIGL SERPL-MCNC: 76 MG/DL (ref 30–150)
TSH SERPL DL<=0.005 MIU/L-ACNC: 0.59 UIU/ML (ref 0.4–4)

## 2019-11-21 PROCEDURE — 80053 COMPREHEN METABOLIC PANEL: CPT

## 2019-11-21 PROCEDURE — 83036 HEMOGLOBIN GLYCOSYLATED A1C: CPT

## 2019-11-21 PROCEDURE — 84443 ASSAY THYROID STIM HORMONE: CPT

## 2019-11-21 PROCEDURE — 80061 LIPID PANEL: CPT

## 2019-11-21 PROCEDURE — 82043 UR ALBUMIN QUANTITATIVE: CPT

## 2019-11-22 ENCOUNTER — OFFICE VISIT (OUTPATIENT)
Dept: PODIATRY | Facility: CLINIC | Age: 64
End: 2019-11-22
Payer: COMMERCIAL

## 2019-11-22 VITALS
BODY MASS INDEX: 30.2 KG/M2 | WEIGHT: 187.94 LBS | HEIGHT: 66 IN | SYSTOLIC BLOOD PRESSURE: 125 MMHG | DIASTOLIC BLOOD PRESSURE: 70 MMHG | HEART RATE: 84 BPM

## 2019-11-22 DIAGNOSIS — E10.42 CONTROLLED TYPE 1 DIABETES MELLITUS WITH DIABETIC POLYNEUROPATHY: Primary | ICD-10-CM

## 2019-11-22 DIAGNOSIS — M20.41 HAMMER TOES OF BOTH FEET: ICD-10-CM

## 2019-11-22 DIAGNOSIS — L84 CORN OR CALLUS: ICD-10-CM

## 2019-11-22 DIAGNOSIS — M20.42 HAMMER TOES OF BOTH FEET: ICD-10-CM

## 2019-11-22 DIAGNOSIS — B35.1 ONYCHOMYCOSIS DUE TO DERMATOPHYTE: ICD-10-CM

## 2019-11-22 DIAGNOSIS — R20.2 PARESTHESIA OF FOOT, BILATERAL: ICD-10-CM

## 2019-11-22 PROCEDURE — 99999 PR PBB SHADOW E&M-EST. PATIENT-LVL III: ICD-10-PCS | Mod: PBBFAC,,, | Performed by: PODIATRIST

## 2019-11-22 PROCEDURE — 99499 NO LOS: ICD-10-PCS | Mod: S$GLB,,, | Performed by: PODIATRIST

## 2019-11-22 PROCEDURE — 11056 PR TRIM BENIGN HYPERKERATOTIC SKIN LESION,2-4: ICD-10-PCS | Mod: Q9,S$GLB,, | Performed by: PODIATRIST

## 2019-11-22 PROCEDURE — 11056 PARNG/CUTG B9 HYPRKR LES 2-4: CPT | Mod: Q9,S$GLB,, | Performed by: PODIATRIST

## 2019-11-22 PROCEDURE — 99499 UNLISTED E&M SERVICE: CPT | Mod: S$GLB,,, | Performed by: PODIATRIST

## 2019-11-22 PROCEDURE — 99999 PR PBB SHADOW E&M-EST. PATIENT-LVL III: CPT | Mod: PBBFAC,,, | Performed by: PODIATRIST

## 2019-11-22 RX ORDER — POTASSIUM CHLORIDE 1500 MG/1
40 TABLET, EXTENDED RELEASE ORAL 2 TIMES DAILY
Refills: 6 | COMMUNITY
Start: 2019-11-19

## 2019-11-22 NOTE — PROGRESS NOTES
Subjective:      Patient ID: Kateryna Desai is a 64 y.o. female.    Chief Complaint: Diabetes Mellitus (Long 5.5 7/31/19) and Diabetic Foot Exam    Kateryna is a 64 y.o. female who presents to the clinic for routine evaluation and treatment of diabetic feet. Kateryna has a past medical history of Allergy, Arthritis, Asian flu type A (12/22/2017), Diabetes mellitus type I, Diabetic gastroparesis, Diabetic retinopathy of both eyes, Difficult intubation, Encounter for blood transfusion, GERD (gastroesophageal reflux disease), Headache(784.0), Hiatal hernia, Hyperlipidemia, Hypertension, Infection of the upper respiratory tract (June 2012), Lumbar spinal stenosis, Osteopenia, Rosacea, Seizures, Stroke (1985?), Tachycardia, Thyroid disease, and Venous insufficiency of leg. Patient's chief complaint consists of a painful callus along the former wound site of the Lt. Posterior heel.  States the lesion is tender only with direct pressure to the site.  Symptoms are alleviated with rest.  Has attempted to treat with moisturizer with minimal improvement in delaying callus build up.  She continues to note relief from nocturnal paresthesias by occasionally taking tramadol.  Relates excellent control over her blood glucose.  Denies any additional pedal complaints.      PCP: Jani Cortes, DO    Date Last Seen by PCP:  7/31/19      Hemoglobin A1C   Date Value Ref Range Status   11/21/2019 5.8 (H) 4.0 - 5.6 % Final     Comment:     ADA Screening Guidelines:  5.7-6.4%  Consistent with prediabetes  >or=6.5%  Consistent with diabetes  High levels of fetal hemoglobin interfere with the HbA1C  assay. Heterozygous hemoglobin variants (HbS, HgC, etc)do  not significantly interfere with this assay.   However, presence of multiple variants may affect accuracy.     07/31/2019 5.5 4.0 - 5.6 % Final     Comment:     ADA Screening Guidelines:  5.7-6.4%  Consistent with prediabetes  >or=6.5%  Consistent with diabetes  High  levels of fetal hemoglobin interfere with the HbA1C  assay. Heterozygous hemoglobin variants (HbS, HgC, etc)do  not significantly interfere with this assay.   However, presence of multiple variants may affect accuracy.     2019 5.7 (H) 4.0 - 5.6 % Final     Comment:     ADA Screening Guidelines:  5.7-6.4%  Consistent with prediabetes  >or=6.5%  Consistent with diabetes  High levels of fetal hemoglobin interfere with the HbA1C  assay. Heterozygous hemoglobin variants (HbS, HgC, etc)do  not significantly interfere with this assay.   However, presence of multiple variants may affect accuracy.             Past Medical History:   Diagnosis Date    Allergy     Arthritis     degnerative disease low back,muscle spasms, shoulders    Asian flu type A 2017    Diabetes mellitus type I     since age 13    Diabetic gastroparesis     takes Cytotec    Diabetic retinopathy of both eyes     gets periodic laser treatments    Difficult intubation     as a child had sore throat after a procedure    Encounter for blood transfusion     GERD (gastroesophageal reflux disease)     Headache(784.0)     Hiatal hernia     with GERD    Hyperlipidemia     Hypertension     Infection of the upper respiratory tract 2012    Lumbar spinal stenosis     Osteopenia     Rosacea     Seizures     Pt states during pgy with stroke    Stroke ?    while pregnant    Tachycardia     asymptomatic with Toprol    Thyroid disease     hypothyroidism    Venous insufficiency of leg     improved since EVLT       Past Surgical History:   Procedure Laterality Date    BUNIONECTOMY Right 2012    CARDIAC CATHETERIZATION      no stents     SECTION      COLONOSCOPY  2007    Dr. Rose, 10 year recheck    EXOSTECTOMY  12    left foot, local anesthesia, in office    EYE SURGERY      has had many laser procedures for diabetic retinopathy    VASCULAR SURGERY      VEIN SURGERY  2012    EVLT right greater  saphenous, IV sedation       Family History   Problem Relation Age of Onset    Cancer Maternal Aunt         breast    Breast cancer Maternal Aunt     Diabetes Maternal Grandmother     Breast cancer Maternal Grandmother 38    Cancer Maternal Grandfather         prostate    Diabetes Maternal Grandfather     Diabetes Cousin     Arthritis Mother     Cancer Mother     Melanoma Mother     Heart disease Father     Stroke Father     No Known Problems Sister     No Known Problems Brother     No Known Problems Daughter     Alzheimer's disease Maternal Uncle     Alcohol abuse Maternal Uncle         x2    Heart disease Maternal Uncle     No Known Problems Paternal Aunt     Heart disease Paternal Uncle        Social History     Socioeconomic History    Marital status:      Spouse name: Not on file    Number of children: Not on file    Years of education: Not on file    Highest education level: Not on file   Occupational History    Not on file   Social Needs    Financial resource strain: Not on file    Food insecurity:     Worry: Not on file     Inability: Not on file    Transportation needs:     Medical: Not on file     Non-medical: Not on file   Tobacco Use    Smoking status: Never Smoker    Smokeless tobacco: Never Used   Substance and Sexual Activity    Alcohol use: Yes     Comment: social, 1 drink monthly    Drug use: No    Sexual activity: Yes     Partners: Male   Lifestyle    Physical activity:     Days per week: Not on file     Minutes per session: Not on file    Stress: Not on file   Relationships    Social connections:     Talks on phone: Not on file     Gets together: Not on file     Attends Orthodox service: Not on file     Active member of club or organization: Not on file     Attends meetings of clubs or organizations: Not on file     Relationship status: Not on file   Other Topics Concern    Not on file   Social History Narrative    Not on file       Current Outpatient  "Medications   Medication Sig Dispense Refill    ACZONE 5 % topical gel Apply topically 2 (two) times daily as needed (roseacea).   0    alpha lipoic acid 600 mg Cap Take 1 capsule by mouth once daily.      ALPHAGAN P 0.1 % Drop Place 1 drop into both eyes 3 (three) times daily.   6    ascorbic acid, vitamin C, (VITAMIN C) 100 MG tablet Take 100 mg by mouth 2 (two) times daily.      aspirin (ECOTRIN) 81 MG EC tablet Take 81 mg by mouth once daily.      BD VEO INSULIN SYRINGE UF 1/2 mL 31 gauge x 15/64" Syrg   4    BENEFIBER, GUAR GUM, ORAL Take 1 Dose by mouth 2 (two) times daily as needed.       calcium citrate-vitamin D3 315-200 mg (CITRACAL+D) 315-200 mg-unit per tablet Take 1 tablet by mouth 2 (two) times daily.      cholecalciferol, vitamin D3, (VITAMIN D3) 2,000 unit Cap Take 1 capsule by mouth once daily.      cinnamon bark 500 mg capsule Take 500 mg by mouth 2 (two) times daily.      CRESTOR 40 mg Tab Take 40 mg by mouth once daily.       DOCOSAHEXANOIC ACID/EPA (FISH OIL ORAL) Take 1,200 mg by mouth 2 (two) times daily.      doxycycline (MONODOX) 50 MG Cap Take 50 mg by mouth 2 (two) times daily as needed (roseacea).   4    ESTRACE 0.01 % (0.1 mg/gram) vaginal cream Place vaginally twice a week. Tuesday, Saturday  0    estradiol 10 mcg Tab Place 1 tablet vaginally twice a week. Tuesday, Saturday      ezetimibe (ZETIA) 10 mg tablet Take 10 mg by mouth once daily.      fluticasone propionate (FLONASE) 50 mcg/actuation nasal spray 1 spray (50 mcg total) by Each Nare route once daily. 16 g 1    furosemide (LASIX) 20 MG tablet Take 1 tablet (20 mg total) by mouth 2 (two) times daily. 180 tablet 3    GLUCAGON EMERGENCY 1 mg injection 1 mg as needed.       HUMALOG 100 unit/mL injection Inject into the skin 3 (three) times daily before meals. 10-17 units tid sliding scale  1    L.ACID/L.CASEI/B.BIF/B.CEDRIC/FOS (PROBIOTIC BLEND ORAL) Take 1 capsule by mouth every Mon, Wed, Fri.      levocetirizine " (XYZAL) 5 MG tablet Take 1 tablet (5 mg total) by mouth every evening. 30 tablet 2    metoprolol succinate (TOPROL-XL) 25 MG 24 hr tablet Take 1 tablet (25 mg total) by mouth once daily. 90 tablet 3    ONETOUCH ULTRA BLUE TEST STRIP Strp   11    pantoprazole (PROTONIX) 40 MG tablet Take 1 tablet (40 mg total) by mouth 2 (two) times daily. 180 tablet 2    predniSONE (DELTASONE) 20 MG tablet Take 1 tablet (20 mg total) by mouth 2 (two) times daily. 10 tablet 0    promethazine (PHENERGAN) 25 MG tablet Take 1 tablet (25 mg total) by mouth every 6 (six) hours as needed for Nausea. 15 tablet 0    SYNTHROID 88 mcg tablet Take 88 mcg by mouth every evening.   0    traMADol (ULTRAM) 50 mg tablet Take 1 tablet (50 mg total) by mouth every 24 hours as needed for Pain. 30 tablet 1    TRESIBA FLEXTOUCH U-100 100 unit/mL (3 mL) InPn Inject 46 Units into the skin every morning.   6    VITAMIN B COMPLEX VIT C NO.4 (SUPER B COMPLEX + C ORAL) Take 1 tablet by mouth once daily.      potassium chloride (K-TAB) 20 mEq   6    urea (CARMOL) 40 % Crea Apply topically 2 (two) times daily. 1 Bottle 1     No current facility-administered medications for this visit.        Review of patient's allergies indicates:   Allergen Reactions    Bactrim [sulfamethoxazole-trimethoprim] Rash     Patient experienced on 12/3/14 redness to face and rash to chest and arms/ with no SOB or airway obstruction    Percocet [oxycodone-acetaminophen] Nausea And Vomiting     Also caused dizziness and passed out    Iodinated contrast media - iv dye Swelling and Rash     Says topical iodine OK    Norco [hydrocodone-acetaminophen] Itching, Swelling and Rash     Can tolerate if she takes Benadryl with it    Niaspan extended-release [niacin] Itching and Other (See Comments)     Skin flushing and redness         Review of Systems   Constitution: Negative for chills, decreased appetite, diaphoresis and fever.   Cardiovascular: Negative for claudication and  leg swelling.   Skin: Positive for color change and nail changes. Negative for dry skin, flushing and itching.   Musculoskeletal: Positive for myalgias. Negative for arthritis, back pain, falls, gout, joint pain and joint swelling.   Neurological: Positive for numbness and paresthesias.   Psychiatric/Behavioral: Negative for altered mental status.           Objective:      Physical Exam   Constitutional: She is oriented to person, place, and time. She appears well-developed and well-nourished. No distress.   Cardiovascular:   Pulses:       Dorsalis pedis pulses are 2+ on the right side, and 2+ on the left side.        Posterior tibial pulses are 1+ on the right side, and 1+ on the left side.   CFT < 3 seconds bilateral.  Pedal hair growth decreased bilateral.  Varicosities noted to bilateral lower extremity. Mild nonpitting edema noted to bilateral lower extremity.  Toes are cool to touch bilateral.       Musculoskeletal: Normal range of motion. She exhibits edema and tenderness.   Muscle strength 5/5 in all muscle groups bilateral.  No tenderness nor crepitation with active and passive ROM of foot/ankle joints bilateral.  Continued pain with palpation to the lesion of the Lt. Posterior heel.  Bilateral pes planus foot type.  Bilateral hallux abducto valgus.  Bilateral semi-rigid contracture of toes 2-5.     Neurological: She is alert and oriented to person, place, and time. She has normal strength. A sensory deficit is present.   Protective sensation per [place] monofilament decreased bilateral.    Light touch intact bilateral.   Skin: Skin is warm, dry and intact. Lesion noted. No abrasion, no bruising, no burn, no ecchymosis, no laceration, no petechiae and no rash noted. She is not diaphoretic. No cyanosis or erythema. No pallor. Nails show no clubbing.   Toenails x 10 appear mycotic but well maintained. No open wounds, interdigital maceration noted bilateral.  Cicatrix of the Lt. Posterior heel is well  healed with no adjacent break in skin integrity.  Site is atrophic in appearance.  Hyperkeratotic lesion noted to the tips of toes 2-3 on the Lt, the 2nd toe on the Rt., and to the Lt. Posterior heel                   Assessment:       Encounter Diagnoses   Name Primary?    Controlled type 1 diabetes mellitus with diabetic polyneuropathy Yes    Paresthesia of foot, bilateral     Corn or callus     Onychomycosis due to dermatophyte     Hammer toes of both feet          Plan:       Kateryna was seen today for diabetes mellitus and diabetic foot exam.    Diagnoses and all orders for this visit:    Controlled type 1 diabetes mellitus with diabetic polyneuropathy    Paresthesia of foot, bilateral    Corn or callus  -     urea (CARMOL) 40 % Crea; Apply topically 2 (two) times daily.    Onychomycosis due to dermatophyte    Hammer toes of both feet      I counseled the patient on her conditions, their implications and medical management.    - Patient to continue taking tramadol prn for nocturnal neuropathy pain.    - Rx written for urea 40% cream to apply to areas of callus build up.    - Advised to continue wearing shoe gear that accommodates for digital deformities.    - Shoe inspection. Diabetic Foot Education. Patient reminded of the importance of good nutrition and blood sugar control to help prevent podiatric complications of diabetes. Patient instructed on proper foot hygeine. We discussed wearing proper shoe gear, daily foot inspections, never walking without protective shoe gear, never putting sharp instruments to feet    - With patient's permission, a sterile #15 scalpel was used to trim lesions x 4 down to smooth appearing skin without incident.  Patient relates relief following the procedure. She will continue to monitor the areas daily, inspect her feet, wear protective shoe gear when ambulatory, moisturizer to maintain skin integrity and follow in this office in approximately 2-3 months, sooner  p.r.n.    Follow up in about 3 months (around 2/22/2020).    Lei Rhoades DPM

## 2019-11-24 RX ORDER — UREA 40 %
CREAM (GRAM) TOPICAL 2 TIMES DAILY
Qty: 1 BOTTLE | Refills: 1 | Status: SHIPPED | OUTPATIENT
Start: 2019-11-24 | End: 2022-02-23

## 2019-12-06 RX ORDER — LEVOCETIRIZINE DIHYDROCHLORIDE 5 MG/1
5 TABLET, FILM COATED ORAL NIGHTLY
Qty: 90 TABLET | Refills: 3 | Status: SHIPPED | OUTPATIENT
Start: 2019-12-06 | End: 2022-02-23

## 2019-12-18 ENCOUNTER — LAB VISIT (OUTPATIENT)
Dept: LAB | Facility: HOSPITAL | Age: 64
End: 2019-12-18
Attending: INTERNAL MEDICINE
Payer: COMMERCIAL

## 2019-12-18 DIAGNOSIS — I10 ESSENTIAL HYPERTENSION, MALIGNANT: Primary | ICD-10-CM

## 2019-12-18 DIAGNOSIS — E87.6 HYPOPOTASSEMIA: ICD-10-CM

## 2019-12-18 LAB
ALBUMIN SERPL BCP-MCNC: 3.7 G/DL (ref 3.5–5.2)
ANION GAP SERPL CALC-SCNC: 9 MMOL/L (ref 8–16)
BUN SERPL-MCNC: 11 MG/DL (ref 8–23)
CALCIUM SERPL-MCNC: 9.3 MG/DL (ref 8.7–10.5)
CHLORIDE SERPL-SCNC: 103 MMOL/L (ref 95–110)
CO2 SERPL-SCNC: 28 MMOL/L (ref 23–29)
CREAT SERPL-MCNC: 0.9 MG/DL (ref 0.5–1.4)
EST. GFR  (AFRICAN AMERICAN): >60 ML/MIN/1.73 M^2
EST. GFR  (NON AFRICAN AMERICAN): >60 ML/MIN/1.73 M^2
GLUCOSE SERPL-MCNC: 99 MG/DL (ref 70–110)
PHOSPHATE SERPL-MCNC: 3.1 MG/DL (ref 2.7–4.5)
POTASSIUM SERPL-SCNC: 3.7 MMOL/L (ref 3.5–5.1)
SODIUM SERPL-SCNC: 140 MMOL/L (ref 136–145)

## 2019-12-18 PROCEDURE — 80069 RENAL FUNCTION PANEL: CPT

## 2019-12-18 PROCEDURE — 36415 COLL VENOUS BLD VENIPUNCTURE: CPT | Mod: PO

## 2019-12-30 ENCOUNTER — OFFICE VISIT (OUTPATIENT)
Dept: FAMILY MEDICINE | Facility: CLINIC | Age: 64
End: 2019-12-30
Payer: COMMERCIAL

## 2019-12-30 VITALS
OXYGEN SATURATION: 99 % | SYSTOLIC BLOOD PRESSURE: 132 MMHG | DIASTOLIC BLOOD PRESSURE: 72 MMHG | BODY MASS INDEX: 29.48 KG/M2 | TEMPERATURE: 99 F | HEART RATE: 96 BPM | WEIGHT: 183.44 LBS | HEIGHT: 66 IN

## 2019-12-30 DIAGNOSIS — R52 BODY ACHES: ICD-10-CM

## 2019-12-30 DIAGNOSIS — R05.9 COUGH: Primary | ICD-10-CM

## 2019-12-30 DIAGNOSIS — J01.00 ACUTE NON-RECURRENT MAXILLARY SINUSITIS: ICD-10-CM

## 2019-12-30 LAB
INFLUENZA A, MOLECULAR: NEGATIVE
INFLUENZA B, MOLECULAR: NEGATIVE
SPECIMEN SOURCE: NORMAL

## 2019-12-30 PROCEDURE — 99999 PR PBB SHADOW E&M-EST. PATIENT-LVL III: CPT | Mod: PBBFAC,,, | Performed by: NURSE PRACTITIONER

## 2019-12-30 PROCEDURE — 99999 PR PBB SHADOW E&M-EST. PATIENT-LVL III: ICD-10-PCS | Mod: PBBFAC,,, | Performed by: NURSE PRACTITIONER

## 2019-12-30 PROCEDURE — 3078F PR MOST RECENT DIASTOLIC BLOOD PRESSURE < 80 MM HG: ICD-10-PCS | Mod: CPTII,S$GLB,, | Performed by: NURSE PRACTITIONER

## 2019-12-30 PROCEDURE — 3075F SYST BP GE 130 - 139MM HG: CPT | Mod: CPTII,S$GLB,, | Performed by: NURSE PRACTITIONER

## 2019-12-30 PROCEDURE — 3008F PR BODY MASS INDEX (BMI) DOCUMENTED: ICD-10-PCS | Mod: CPTII,S$GLB,, | Performed by: NURSE PRACTITIONER

## 2019-12-30 PROCEDURE — 3008F BODY MASS INDEX DOCD: CPT | Mod: CPTII,S$GLB,, | Performed by: NURSE PRACTITIONER

## 2019-12-30 PROCEDURE — 3075F PR MOST RECENT SYSTOLIC BLOOD PRESS GE 130-139MM HG: ICD-10-PCS | Mod: CPTII,S$GLB,, | Performed by: NURSE PRACTITIONER

## 2019-12-30 PROCEDURE — 99213 PR OFFICE/OUTPT VISIT, EST, LEVL III, 20-29 MIN: ICD-10-PCS | Mod: S$GLB,,, | Performed by: NURSE PRACTITIONER

## 2019-12-30 PROCEDURE — 87502 INFLUENZA DNA AMP PROBE: CPT | Mod: PO

## 2019-12-30 PROCEDURE — 3078F DIAST BP <80 MM HG: CPT | Mod: CPTII,S$GLB,, | Performed by: NURSE PRACTITIONER

## 2019-12-30 PROCEDURE — 99213 OFFICE O/P EST LOW 20 MIN: CPT | Mod: S$GLB,,, | Performed by: NURSE PRACTITIONER

## 2019-12-30 RX ORDER — PROMETHAZINE HYDROCHLORIDE AND DEXTROMETHORPHAN HYDROBROMIDE 6.25; 15 MG/5ML; MG/5ML
5 SYRUP ORAL EVERY 4 HOURS PRN
Qty: 180 ML | Refills: 0 | Status: SHIPPED | OUTPATIENT
Start: 2019-12-30 | End: 2020-01-09

## 2019-12-30 RX ORDER — BENZONATATE 100 MG/1
100 CAPSULE ORAL 3 TIMES DAILY PRN
Qty: 30 CAPSULE | Refills: 1 | Status: SHIPPED | OUTPATIENT
Start: 2019-12-30 | End: 2020-01-09

## 2019-12-30 NOTE — PROGRESS NOTES
"Subjective:       Patient ID: Kateryna Desai is a 64 y.o. female.    Chief Complaint: Cough and Sinus Problem    HPI   Ms. Ulloa is a 63 yo female who presents today with c/o cough and congestion.  This has been going on for about one week.  She states that she was feeling slightly better and than started feeling "much worse" after [place].  The cough is occasionally productive of clear sputum.  There is some sinus tenderness/pressure.  There is no fever, chills.  There are some body aches.  She has been treating with flonase.  She was at a party with a person who has since been confirmed to have the flu.  She did get her flu vaccine.   Vitals:    12/30/19 1615   BP: 132/72   Pulse: 96   Temp: 98.6 °F (37 °C)     Past Medical History:   Diagnosis Date    Allergy     Arthritis     degnerative disease low back,muscle spasms, shoulders    Asian flu type A 12/22/2017    Diabetes mellitus type I     since age 13    Diabetic gastroparesis     takes Cytotec    Diabetic retinopathy of both eyes     gets periodic laser treatments    Difficult intubation     as a child had sore throat after a procedure    Encounter for blood transfusion     GERD (gastroesophageal reflux disease)     Headache(784.0)     Hiatal hernia     with GERD    Hyperlipidemia     Hypertension     Infection of the upper respiratory tract June 2012    Lumbar spinal stenosis     Osteopenia     Rosacea     Seizures     Pt states during pgy with stroke    Stroke 1985?    while pregnant    Tachycardia     asymptomatic with Toprol    Thyroid disease     hypothyroidism    Venous insufficiency of leg     improved since EVLT     Review of Systems   Constitutional: Positive for chills and fatigue. Negative for fever.   HENT: Positive for congestion, sinus pressure and sinus pain. Negative for sore throat.    Eyes: Negative for pain, discharge and itching.   Respiratory: Positive for cough. Negative for shortness of breath " and wheezing.    Cardiovascular: Negative for chest pain and palpitations.   Gastrointestinal: Negative for diarrhea and nausea.   Endocrine: Negative for cold intolerance, heat intolerance and polydipsia.   Genitourinary: Negative for dysuria.   Musculoskeletal: Positive for myalgias.   Skin: Negative for color change, pallor and rash.   Neurological: Negative for dizziness, light-headedness and headaches.   Psychiatric/Behavioral: Negative for dysphoric mood.       Objective:      Physical Exam   Constitutional: She is oriented to person, place, and time. She appears ill.   HENT:   Head: Normocephalic and atraumatic.   Right Ear: No middle ear effusion.   Left Ear:  No middle ear effusion.   Nose: Mucosal edema present. Right sinus exhibits maxillary sinus tenderness. Left sinus exhibits maxillary sinus tenderness.   Mouth/Throat: Uvula is midline, oropharynx is clear and moist and mucous membranes are normal.   Eyes: Conjunctivae, EOM and lids are normal.   Neck: Full passive range of motion without pain.   Cardiovascular: Normal rate, regular rhythm, S1 normal and S2 normal.   Pulmonary/Chest: Effort normal. She has no decreased breath sounds. She has no wheezes.   Neurological: She is alert and oriented to person, place, and time.   Skin: Skin is warm, dry and intact.   Psychiatric: She has a normal mood and affect. Her speech is normal and behavior is normal.       Assessment & Plan:       Cough  -     Influenza A & B by Molecular  -     benzonatate (TESSALON) 100 MG capsule; Take 1 capsule (100 mg total) by mouth 3 (three) times daily as needed for Cough.  Dispense: 30 capsule; Refill: 1  -     promethazine-dextromethorphan (PROMETHAZINE-DM) 6.25-15 mg/5 mL Syrp; Take 5 mLs by mouth every 4 (four) hours as needed.  Dispense: 180 mL; Refill: 0    Body aches  -     Influenza A & B by Molecular- negative     Acute Maxillary Sinusitis  -Antibiotics as directed  -Increase fluids, rest, vitamin c  -Nasal saline  rinses  -If no improvement, notify office       Follow up if symptoms worsen or fail to improve.

## 2019-12-31 ENCOUNTER — TELEPHONE (OUTPATIENT)
Dept: FAMILY MEDICINE | Facility: CLINIC | Age: 64
End: 2019-12-31

## 2019-12-31 DIAGNOSIS — R05.9 COUGH: Primary | ICD-10-CM

## 2019-12-31 DIAGNOSIS — R05.9 COUGH: ICD-10-CM

## 2019-12-31 DIAGNOSIS — J01.00 ACUTE NON-RECURRENT MAXILLARY SINUSITIS: Primary | ICD-10-CM

## 2019-12-31 RX ORDER — AMOXICILLIN AND CLAVULANATE POTASSIUM 875; 125 MG/1; MG/1
1 TABLET, FILM COATED ORAL EVERY 12 HOURS
Qty: 14 TABLET | Refills: 0 | Status: SHIPPED | OUTPATIENT
Start: 2019-12-31 | End: 2020-01-07

## 2019-12-31 RX ORDER — PROMETHAZINE HYDROCHLORIDE 6.25 MG/5ML
6.25 SYRUP ORAL NIGHTLY PRN
Qty: 118 ML | Refills: 0 | OUTPATIENT
Start: 2019-12-31 | End: 2023-01-26

## 2019-12-31 NOTE — TELEPHONE ENCOUNTER
Message sent to wrong provider on 12/30/19. Retract message and sent to Shelly on 12/31/2019          ----- Message from Bella Davila NP sent at 12/31/2019  7:57 AM CST -----  Contact: Lakshmi from [facility]   I don't know this patient.     ----- Message -----  From: Shaye Fernandez LPN  Sent: 12/30/2019   5:23 PM CST  To: Bella Davila NP    Attempt to call pt no answer, left message provider is not in at this time will forward request to provider and she has 72 hr to respond. If pt have any question please call the office  ----- Message -----  From: Maricel Romero  Sent: 12/30/2019   4:44 PM CST  To: Giovanni Blanco Staff    Type:  Pharmacy Calling to Clarify an RX    Name of Caller: Lakshmi from [facility]   Pharmacy Name:  [facility]   Prescription Name:  promethazine-dextromethorphan (PROMETHAZINE-DM) 6.25-15 mg/5 mL Syrp  What do they need to clarify?: Lakshmi states the promethazine-dextromethorphan (PROMETHAZINE-DM) 6.25-15 mg/5 mL Syrp is no longer made and needed a new Rx of a different medication  Best Call Back Number: [phone]  Additional Information: Lakshmi states the promethazine-dextromethorphan (PROMETHAZINE-DM) 6.25-15 mg/5 mL Syrp is no longer made and needed a new Rx of a different medication. Please call Lakshmi [phone] and advise.

## 2019-12-31 NOTE — TELEPHONE ENCOUNTER
----- Message from Shaye Fernandez LPN sent at 12/30/2019  5:23 PM CST -----  Contact: Lakshmi from [facility]   Attempt to call pt no answer, left message provider is not in at this time will forward request to provider and she has 72 hr to respond. If pt have any question please call the office  ----- Message -----  From: Maricel Romero  Sent: 12/30/2019   4:44 PM CST  To: Giovanni Blanco Staff    Type:  Pharmacy Calling to Clarify an RX    Name of Caller: Lakshmi from [facility]   Pharmacy Name:  [facility]   Prescription Name:  promethazine-dextromethorphan (PROMETHAZINE-DM) 6.25-15 mg/5 mL Syrp  What do they need to clarify?: Lakshmi states the promethazine-dextromethorphan (PROMETHAZINE-DM) 6.25-15 mg/5 mL Syrp is no longer made and needed a new Rx of a different medication  Best Call Back Number: [phone]  Additional Information: Lakshmi states the promethazine-dextromethorphan (PROMETHAZINE-DM) 6.25-15 mg/5 mL Syrp is no longer made and needed a new Rx of a different medication. Please call Lakshmi [phone] and advise.

## 2019-12-31 NOTE — TELEPHONE ENCOUNTER
Patient pharmacy doesn't have cough syrup that was prescribed during office visit yesterday the have promethazine with out dm.           ----- Message from Edgardo Soto sent at 12/31/2019  8:59 AM CST -----  Contact: pt   Type: Needs Medical Advice    Who Called:  Pt   Symptoms (please be specific):    How long has patient had these symptoms:    Pharmacy name and phone #:    Best Call Back Number:    Additional Information: pt was seen yesterday 12/30/19 and she was supposed to get results back this morning but never received them. Also had a problem with rx. It wasnt available. Pt says it was some type of cough medicine for night time and the pharmacy says that they dont have that, is there an alternative

## 2020-01-02 NOTE — TELEPHONE ENCOUNTER
Patient was able to  cough medication, states medication isn't working she is coughing through out the night in which she is unable to sleep. Patient wants to know if any alternate medication can be prescribe. Also advise patient coughs can be viral any may linger for long periods of time.     Please advise.

## 2020-01-02 NOTE — TELEPHONE ENCOUNTER
Please let patient know that the next best option is over the counter medication like coricidin HBP

## 2020-01-02 NOTE — TELEPHONE ENCOUNTER
I sent over a different cough syrup prescription, can we verify with the patient that she received.

## 2020-01-16 LAB
HUMAN PAPILLOMAVIRUS (HPV): NORMAL
PAP SMEAR: NORMAL

## 2020-02-26 ENCOUNTER — HOSPITAL ENCOUNTER (EMERGENCY)
Facility: HOSPITAL | Age: 65
Discharge: HOME | End: 2020-02-26
Attending: EMERGENCY MEDICINE
Payer: COMMERCIAL

## 2020-02-26 VITALS
OXYGEN SATURATION: 99 % | HEART RATE: 95 BPM | SYSTOLIC BLOOD PRESSURE: 113 MMHG | TEMPERATURE: 99 F | WEIGHT: 182 LBS | RESPIRATION RATE: 29 BRPM | DIASTOLIC BLOOD PRESSURE: 56 MMHG | HEIGHT: 66 IN | BODY MASS INDEX: 29.25 KG/M2

## 2020-02-26 DIAGNOSIS — N39.0 URINARY TRACT INFECTION WITHOUT HEMATURIA, SITE UNSPECIFIED: ICD-10-CM

## 2020-02-26 DIAGNOSIS — R11.10 VOMITING: ICD-10-CM

## 2020-02-26 DIAGNOSIS — R11.2 NAUSEA AND VOMITING, INTRACTABILITY OF VOMITING NOT SPECIFIED, UNSPECIFIED VOMITING TYPE: Primary | ICD-10-CM

## 2020-02-26 LAB
ALBUMIN SERPL BCP-MCNC: 4.1 G/DL (ref 3.5–5.2)
ALP SERPL-CCNC: 59 U/L (ref 55–135)
ALT SERPL W/O P-5'-P-CCNC: 23 U/L (ref 10–44)
AMYLASE SERPL-CCNC: 33 U/L (ref 20–110)
ANION GAP SERPL CALC-SCNC: 10 MMOL/L (ref 8–16)
APTT PPP: 24.5 SEC (ref 23.6–33.3)
AST SERPL-CCNC: 25 U/L (ref 10–40)
BACTERIA #/AREA URNS HPF: ABNORMAL /HPF
BASOPHILS # BLD AUTO: 0.02 K/UL (ref 0–0.2)
BASOPHILS NFR BLD: 0.3 % (ref 0–1.9)
BILIRUB SERPL-MCNC: 1.1 MG/DL (ref 0.1–1)
BILIRUB UR QL STRIP: NEGATIVE
BUN SERPL-MCNC: 13 MG/DL (ref 8–23)
CALCIUM SERPL-MCNC: 9.8 MG/DL (ref 8.7–10.5)
CAOX CRY URNS QL MICRO: ABNORMAL
CHLORIDE SERPL-SCNC: 104 MMOL/L (ref 95–110)
CLARITY UR: ABNORMAL
CO2 SERPL-SCNC: 28 MMOL/L (ref 23–29)
COLOR UR: YELLOW
CREAT SERPL-MCNC: 0.9 MG/DL (ref 0.5–1.4)
DIFFERENTIAL METHOD: ABNORMAL
EOSINOPHIL # BLD AUTO: 0 K/UL (ref 0–0.5)
EOSINOPHIL NFR BLD: 0.1 % (ref 0–8)
ERYTHROCYTE [DISTWIDTH] IN BLOOD BY AUTOMATED COUNT: 14.6 % (ref 11.5–14.5)
EST. GFR  (AFRICAN AMERICAN): >60 ML/MIN/1.73 M^2
EST. GFR  (NON AFRICAN AMERICAN): >60 ML/MIN/1.73 M^2
GLUCOSE SERPL-MCNC: 81 MG/DL (ref 70–110)
GLUCOSE UR QL STRIP: NEGATIVE
HCT VFR BLD AUTO: 42.7 % (ref 37–48.5)
HGB BLD-MCNC: 13.6 G/DL (ref 12–16)
HGB UR QL STRIP: NEGATIVE
HYALINE CASTS #/AREA URNS LPF: 34 /LPF
IMM GRANULOCYTES # BLD AUTO: 0.02 K/UL (ref 0–0.04)
IMM GRANULOCYTES NFR BLD AUTO: 0.3 % (ref 0–0.5)
INFLUENZA A, MOLECULAR: NEGATIVE
INFLUENZA B, MOLECULAR: NEGATIVE
INR PPP: 1.1
KETONES UR QL STRIP: NEGATIVE
LEUKOCYTE ESTERASE UR QL STRIP: ABNORMAL
LIPASE SERPL-CCNC: 21 U/L (ref 4–60)
LYMPHOCYTES # BLD AUTO: 0.3 K/UL (ref 1–4.8)
LYMPHOCYTES NFR BLD: 4.3 % (ref 18–48)
MAGNESIUM SERPL-MCNC: 1.9 MG/DL (ref 1.6–2.6)
MCH RBC QN AUTO: 29.5 PG (ref 27–31)
MCHC RBC AUTO-ENTMCNC: 31.9 G/DL (ref 32–36)
MCV RBC AUTO: 93 FL (ref 82–98)
MICROSCOPIC COMMENT: ABNORMAL
MONOCYTES # BLD AUTO: 0.5 K/UL (ref 0.3–1)
MONOCYTES NFR BLD: 6.3 % (ref 4–15)
NEUTROPHILS # BLD AUTO: 6.8 K/UL (ref 1.8–7.7)
NEUTROPHILS NFR BLD: 88.7 % (ref 38–73)
NITRITE UR QL STRIP: NEGATIVE
NRBC BLD-RTO: 0 /100 WBC
PH UR STRIP: 6 [PH] (ref 5–8)
PLATELET # BLD AUTO: 148 K/UL (ref 150–350)
PMV BLD AUTO: 11 FL (ref 9.2–12.9)
POTASSIUM SERPL-SCNC: 3.8 MMOL/L (ref 3.5–5.1)
PROT SERPL-MCNC: 7.4 G/DL (ref 6–8.4)
PROT UR QL STRIP: ABNORMAL
PROTHROMBIN TIME: 13.6 SEC (ref 10.6–14.8)
RBC # BLD AUTO: 4.61 M/UL (ref 4–5.4)
RBC #/AREA URNS HPF: 2 /HPF (ref 0–4)
SODIUM SERPL-SCNC: 142 MMOL/L (ref 136–145)
SP GR UR STRIP: 1.03 (ref 1–1.03)
SPECIMEN SOURCE: NORMAL
SQUAMOUS #/AREA URNS HPF: 4 /HPF
URN SPEC COLLECT METH UR: ABNORMAL
UROBILINOGEN UR STRIP-ACNC: NEGATIVE EU/DL
WBC # BLD AUTO: 7.62 K/UL (ref 3.9–12.7)
WBC #/AREA URNS HPF: >100 /HPF (ref 0–5)

## 2020-02-26 PROCEDURE — 87502 INFLUENZA DNA AMP PROBE: CPT

## 2020-02-26 PROCEDURE — 85730 THROMBOPLASTIN TIME PARTIAL: CPT

## 2020-02-26 PROCEDURE — 96375 TX/PRO/DX INJ NEW DRUG ADDON: CPT

## 2020-02-26 PROCEDURE — 83735 ASSAY OF MAGNESIUM: CPT

## 2020-02-26 PROCEDURE — 80053 COMPREHEN METABOLIC PANEL: CPT

## 2020-02-26 PROCEDURE — 93005 ELECTROCARDIOGRAM TRACING: CPT

## 2020-02-26 PROCEDURE — 87086 URINE CULTURE/COLONY COUNT: CPT

## 2020-02-26 PROCEDURE — 99284 EMERGENCY DEPT VISIT MOD MDM: CPT | Mod: 25

## 2020-02-26 PROCEDURE — 85025 COMPLETE CBC W/AUTO DIFF WBC: CPT

## 2020-02-26 PROCEDURE — 96361 HYDRATE IV INFUSION ADD-ON: CPT

## 2020-02-26 PROCEDURE — 81001 URINALYSIS AUTO W/SCOPE: CPT

## 2020-02-26 PROCEDURE — 96366 THER/PROPH/DIAG IV INF ADDON: CPT

## 2020-02-26 PROCEDURE — 85610 PROTHROMBIN TIME: CPT

## 2020-02-26 PROCEDURE — 83690 ASSAY OF LIPASE: CPT

## 2020-02-26 PROCEDURE — 82150 ASSAY OF AMYLASE: CPT

## 2020-02-26 PROCEDURE — C9113 INJ PANTOPRAZOLE SODIUM, VIA: HCPCS | Performed by: EMERGENCY MEDICINE

## 2020-02-26 PROCEDURE — 96365 THER/PROPH/DIAG IV INF INIT: CPT

## 2020-02-26 PROCEDURE — 63600175 PHARM REV CODE 636 W HCPCS: Performed by: EMERGENCY MEDICINE

## 2020-02-26 RX ORDER — PANTOPRAZOLE SODIUM 40 MG/10ML
40 INJECTION, POWDER, LYOPHILIZED, FOR SOLUTION INTRAVENOUS
Status: COMPLETED | OUTPATIENT
Start: 2020-02-26 | End: 2020-02-26

## 2020-02-26 RX ORDER — AMOXICILLIN AND CLAVULANATE POTASSIUM 875; 125 MG/1; MG/1
1 TABLET, FILM COATED ORAL 2 TIMES DAILY
Qty: 14 TABLET | Refills: 0 | Status: SHIPPED | OUTPATIENT
Start: 2020-02-26 | End: 2020-05-21 | Stop reason: ALTCHOICE

## 2020-02-26 RX ORDER — ONDANSETRON 2 MG/ML
4 INJECTION INTRAMUSCULAR; INTRAVENOUS
Status: COMPLETED | OUTPATIENT
Start: 2020-02-26 | End: 2020-02-26

## 2020-02-26 RX ORDER — ONDANSETRON 4 MG/1
4 TABLET, ORALLY DISINTEGRATING ORAL EVERY 8 HOURS PRN
Qty: 10 TABLET | Refills: 0 | Status: SHIPPED | OUTPATIENT
Start: 2020-02-26 | End: 2021-04-14 | Stop reason: SDUPTHER

## 2020-02-26 RX ADMIN — ONDANSETRON 4 MG: 2 INJECTION INTRAMUSCULAR; INTRAVENOUS at 12:02

## 2020-02-26 RX ADMIN — PANTOPRAZOLE SODIUM 40 MG: 40 INJECTION, POWDER, LYOPHILIZED, FOR SOLUTION INTRAVENOUS at 12:02

## 2020-02-26 RX ADMIN — SODIUM CHLORIDE 1000 ML: 0.9 INJECTION, SOLUTION INTRAVENOUS at 12:02

## 2020-02-26 RX ADMIN — CEFTRIAXONE 1 G: 1 INJECTION, SOLUTION INTRAVENOUS at 02:02

## 2020-02-26 NOTE — ED PROVIDER NOTES
Encounter Date: 2/26/2020       History     Chief Complaint   Patient presents with    Vomiting     c/o n/v after shingles vacination yesterday     Patient here with reported nausea vomiting onset this morning states that she had a shingles vaccine yesterday is having some arm pain following the vaccine as well he denies any abdominal pain she denies any diarrhea she denies any dysuria urgency or frequency no fever chills reported  had the vaccine as well yesterday without reaction she states that she received shingles vaccine once in the past this was her 2nd immunization she did not having difficulty the 1st has been no other concurrent illness in the household cannot recall eating anything out of the ordinary prior to onset of symptoms patient does reported history of diabetes did take her insulin this morning        Review of patient's allergies indicates:   Allergen Reactions    Sulfamethoxazole-trimethoprim Rash and Hives     Patient experienced on 12/3/14 redness to face and rash to chest and arms/ with no SOB or airway obstruction    Heparin analogues     Percocet [oxycodone-acetaminophen] Nausea And Vomiting     Also caused dizziness and passed out    Iodinated contrast media Swelling and Rash     Says topical iodine OK    Niaspan extended-release [niacin] Itching and Other (See Comments)     Skin flushing and redness     Past Medical History:   Diagnosis Date    Allergy     Arthritis     degnerative disease low back,muscle spasms, shoulders    Asian flu type A 12/22/2017    Diabetes mellitus type I     since age 13    Diabetic gastroparesis     takes Cytotec    Diabetic retinopathy of both eyes     gets periodic laser treatments    Difficult intubation     as a child had sore throat after a procedure    Encounter for blood transfusion     GERD (gastroesophageal reflux disease)     Headache(784.0)     Hiatal hernia     with GERD    Hyperlipidemia     Hypertension     Infection of  the upper respiratory tract 2012    Lumbar spinal stenosis     Osteopenia     Rosacea     Seizures     Pt states during pgy with stroke    Stroke ?    while pregnant    Tachycardia     asymptomatic with Toprol    Thyroid disease     hypothyroidism    Venous insufficiency of leg     improved since EVLT     Past Surgical History:   Procedure Laterality Date    BUNIONECTOMY Right 2012    CARDIAC CATHETERIZATION      no stents     SECTION      COLONOSCOPY  2007    Dr. Rose, 10 year recheck    EXOSTECTOMY  12    left foot, local anesthesia, in office    EYE SURGERY      has had many laser procedures for diabetic retinopathy    VASCULAR SURGERY      VEIN SURGERY  2012    EVLT right greater saphenous, IV sedation     Family History   Problem Relation Age of Onset    Cancer Maternal Aunt         breast    Breast cancer Maternal Aunt     Diabetes Maternal Grandmother     Breast cancer Maternal Grandmother 38    Cancer Maternal Grandfather         prostate    Diabetes Maternal Grandfather     Diabetes Cousin     Arthritis Mother     Cancer Mother     Melanoma Mother     Heart disease Father     Stroke Father     No Known Problems Sister     No Known Problems Brother     No Known Problems Daughter     Alzheimer's disease Maternal Uncle     Alcohol abuse Maternal Uncle         x2    Heart disease Maternal Uncle     No Known Problems Paternal Aunt     Heart disease Paternal Uncle      Social History     Tobacco Use    Smoking status: Never Smoker    Smokeless tobacco: Never Used   Substance Use Topics    Alcohol use: Yes     Comment: social, 1 drink monthly    Drug use: No     Review of Systems   Constitutional: Positive for fatigue. Negative for chills, diaphoresis and fever.   HENT: Negative.    Eyes: Negative.    Respiratory: Negative for cough, chest tightness, shortness of breath and wheezing.    Cardiovascular: Negative.    Gastrointestinal:  Positive for nausea and vomiting. Negative for abdominal distention, abdominal pain, constipation and diarrhea.   Endocrine: Negative.    Genitourinary: Negative.    Musculoskeletal: Negative.    Skin: Negative.    Allergic/Immunologic: Negative.    Neurological: Negative.    Hematological: Negative.    Psychiatric/Behavioral: Negative.        Physical Exam     Initial Vitals [02/26/20 1122]   BP Pulse Resp Temp SpO2   (!) 149/74 100 17 98.8 °F (37.1 °C) 98 %      MAP       --         Physical Exam    Constitutional: She appears well-developed and well-nourished. No distress.   HENT:   Head: Normocephalic and atraumatic.   Right Ear: External ear normal.   Left Ear: External ear normal.   Mouth/Throat: Oropharynx is clear and moist.   Eyes: Conjunctivae and EOM are normal. Pupils are equal, round, and reactive to light.   Neck: Normal range of motion. Neck supple.   Cardiovascular: Normal rate, regular rhythm, normal heart sounds and intact distal pulses.   Pulmonary/Chest: Breath sounds normal.   Abdominal: Soft. Bowel sounds are decreased. There is tenderness in the epigastric area. There is no rigidity, no rebound and no guarding.   Lymphadenopathy:     She has no cervical adenopathy.         ED Course   Procedures  Labs Reviewed   APTT   AMYLASE   CBC W/ AUTO DIFFERENTIAL   COMPREHENSIVE METABOLIC PANEL   LIPASE   MAGNESIUM   PROTIME-INR   INFLUENZA A AND B ANTIGEN   URINALYSIS, REFLEX TO URINE CULTURE          Imaging Results    None                                          Clinical Impression:       ICD-10-CM ICD-9-CM   1. Nausea and vomiting, intractability of vomiting not specified, unspecified vomiting type R11.2 787.01   2. Vomiting R11.10 787.03   3. Urinary tract infection without hematuria, site unspecified N39.0 599.0                                Anselmo Ruff MD  02/26/20 8998

## 2020-02-26 NOTE — ED NOTES
Pt c/o R arm pain and n/v after receiving the 2nd dose of Shingrix yesterday. Pt states her symptoms started about 2 hrs after receiving the vaccine. Pt is resting on stretcher, NAD, aaax3 and  is at bedside.

## 2020-02-29 LAB — BACTERIA UR CULT: NO GROWTH

## 2020-03-09 ENCOUNTER — LAB VISIT (OUTPATIENT)
Dept: LAB | Facility: HOSPITAL | Age: 65
End: 2020-03-09
Attending: INTERNAL MEDICINE
Payer: COMMERCIAL

## 2020-03-09 DIAGNOSIS — E87.6 HYPOPOTASSEMIA: ICD-10-CM

## 2020-03-09 DIAGNOSIS — N39.0 URINARY TRACT INFECTION, SITE NOT SPECIFIED: Primary | ICD-10-CM

## 2020-03-09 DIAGNOSIS — N25.81 SECONDARY HYPERPARATHYROIDISM OF RENAL ORIGIN: ICD-10-CM

## 2020-03-09 DIAGNOSIS — N18.6 TYPE 2 DIABETES MELLITUS WITH END-STAGE RENAL DISEASE: ICD-10-CM

## 2020-03-09 DIAGNOSIS — N18.30 CHRONIC KIDNEY DISEASE, STAGE III (MODERATE): ICD-10-CM

## 2020-03-09 DIAGNOSIS — E11.22 TYPE 2 DIABETES MELLITUS WITH END-STAGE RENAL DISEASE: ICD-10-CM

## 2020-03-09 DIAGNOSIS — I10 ESSENTIAL HYPERTENSION, MALIGNANT: ICD-10-CM

## 2020-03-09 LAB
ALBUMIN SERPL BCP-MCNC: 3.6 G/DL (ref 3.5–5.2)
ANION GAP SERPL CALC-SCNC: 8 MMOL/L (ref 8–16)
BACTERIA #/AREA URNS AUTO: ABNORMAL /HPF
BASOPHILS # BLD AUTO: 0.03 K/UL (ref 0–0.2)
BASOPHILS NFR BLD: 0.5 % (ref 0–1.9)
BILIRUB UR QL STRIP: NEGATIVE
BUN SERPL-MCNC: 9 MG/DL (ref 8–23)
CALCIUM SERPL-MCNC: 9 MG/DL (ref 8.7–10.5)
CHLORIDE SERPL-SCNC: 102 MMOL/L (ref 95–110)
CLARITY UR REFRACT.AUTO: ABNORMAL
CO2 SERPL-SCNC: 32 MMOL/L (ref 23–29)
COLOR UR AUTO: YELLOW
CREAT SERPL-MCNC: 0.9 MG/DL (ref 0.5–1.4)
DIFFERENTIAL METHOD: ABNORMAL
EOSINOPHIL # BLD AUTO: 0.1 K/UL (ref 0–0.5)
EOSINOPHIL NFR BLD: 2.3 % (ref 0–8)
ERYTHROCYTE [DISTWIDTH] IN BLOOD BY AUTOMATED COUNT: 14.6 % (ref 11.5–14.5)
EST. GFR  (AFRICAN AMERICAN): >60 ML/MIN/1.73 M^2
EST. GFR  (NON AFRICAN AMERICAN): >60 ML/MIN/1.73 M^2
GLUCOSE SERPL-MCNC: 75 MG/DL (ref 70–110)
GLUCOSE UR QL STRIP: NEGATIVE
HCT VFR BLD AUTO: 42.5 % (ref 37–48.5)
HGB BLD-MCNC: 12.8 G/DL (ref 12–16)
HGB UR QL STRIP: NEGATIVE
IMM GRANULOCYTES # BLD AUTO: 0.01 K/UL (ref 0–0.04)
IMM GRANULOCYTES NFR BLD AUTO: 0.2 % (ref 0–0.5)
KETONES UR QL STRIP: NEGATIVE
LEUKOCYTE ESTERASE UR QL STRIP: ABNORMAL
LYMPHOCYTES # BLD AUTO: 1.5 K/UL (ref 1–4.8)
LYMPHOCYTES NFR BLD: 26 % (ref 18–48)
MAGNESIUM SERPL-MCNC: 1.9 MG/DL (ref 1.6–2.6)
MCH RBC QN AUTO: 29.6 PG (ref 27–31)
MCHC RBC AUTO-ENTMCNC: 30.1 G/DL (ref 32–36)
MCV RBC AUTO: 98 FL (ref 82–98)
MICROSCOPIC COMMENT: ABNORMAL
MONOCYTES # BLD AUTO: 0.5 K/UL (ref 0.3–1)
MONOCYTES NFR BLD: 8.4 % (ref 4–15)
NEUTROPHILS # BLD AUTO: 3.5 K/UL (ref 1.8–7.7)
NEUTROPHILS NFR BLD: 62.6 % (ref 38–73)
NITRITE UR QL STRIP: NEGATIVE
NRBC BLD-RTO: 0 /100 WBC
PH UR STRIP: 5 [PH] (ref 5–8)
PHOSPHATE SERPL-MCNC: 3.6 MG/DL (ref 2.7–4.5)
PLATELET # BLD AUTO: 211 K/UL (ref 150–350)
PMV BLD AUTO: 11.2 FL (ref 9.2–12.9)
POTASSIUM SERPL-SCNC: 3.6 MMOL/L (ref 3.5–5.1)
PROT UR QL STRIP: NEGATIVE
RBC # BLD AUTO: 4.32 M/UL (ref 4–5.4)
RBC #/AREA URNS AUTO: 3 /HPF (ref 0–4)
SODIUM SERPL-SCNC: 142 MMOL/L (ref 136–145)
SP GR UR STRIP: 1.01 (ref 1–1.03)
SQUAMOUS #/AREA URNS AUTO: 4 /HPF
URN SPEC COLLECT METH UR: ABNORMAL
WBC # BLD AUTO: 5.62 K/UL (ref 3.9–12.7)
WBC #/AREA URNS AUTO: 21 /HPF (ref 0–5)

## 2020-03-09 PROCEDURE — 82570 ASSAY OF URINE CREATININE: CPT

## 2020-03-09 PROCEDURE — 85025 COMPLETE CBC W/AUTO DIFF WBC: CPT

## 2020-03-09 PROCEDURE — 36415 COLL VENOUS BLD VENIPUNCTURE: CPT | Mod: PO

## 2020-03-09 PROCEDURE — 80069 RENAL FUNCTION PANEL: CPT

## 2020-03-09 PROCEDURE — 81001 URINALYSIS AUTO W/SCOPE: CPT

## 2020-03-09 PROCEDURE — 83735 ASSAY OF MAGNESIUM: CPT

## 2020-03-10 LAB
CREAT UR-MCNC: 39 MG/DL (ref 15–325)
PROT UR-MCNC: <7 MG/DL (ref 0–15)
PROT/CREAT UR: NORMAL MG/G{CREAT} (ref 0–0.2)

## 2020-03-12 ENCOUNTER — OFFICE VISIT (OUTPATIENT)
Dept: FAMILY MEDICINE | Facility: CLINIC | Age: 65
End: 2020-03-12
Payer: COMMERCIAL

## 2020-03-12 ENCOUNTER — DOCUMENTATION ONLY (OUTPATIENT)
Dept: FAMILY MEDICINE | Facility: CLINIC | Age: 65
End: 2020-03-12

## 2020-03-12 VITALS
OXYGEN SATURATION: 96 % | HEART RATE: 94 BPM | BODY MASS INDEX: 29.41 KG/M2 | DIASTOLIC BLOOD PRESSURE: 62 MMHG | HEIGHT: 66 IN | WEIGHT: 183 LBS | SYSTOLIC BLOOD PRESSURE: 120 MMHG | TEMPERATURE: 98 F

## 2020-03-12 DIAGNOSIS — J30.1 SEASONAL ALLERGIC RHINITIS DUE TO POLLEN: Primary | ICD-10-CM

## 2020-03-12 DIAGNOSIS — J30.2 CHRONIC SEASONAL ALLERGIC RHINITIS: ICD-10-CM

## 2020-03-12 PROCEDURE — 3078F DIAST BP <80 MM HG: CPT | Mod: CPTII,S$GLB,, | Performed by: PHYSICIAN ASSISTANT

## 2020-03-12 PROCEDURE — 99999 PR PBB SHADOW E&M-EST. PATIENT-LVL V: ICD-10-PCS | Mod: PBBFAC,,, | Performed by: PHYSICIAN ASSISTANT

## 2020-03-12 PROCEDURE — 99999 PR PBB SHADOW E&M-EST. PATIENT-LVL V: CPT | Mod: PBBFAC,,, | Performed by: PHYSICIAN ASSISTANT

## 2020-03-12 PROCEDURE — 99213 PR OFFICE/OUTPT VISIT, EST, LEVL III, 20-29 MIN: ICD-10-PCS | Mod: S$GLB,,, | Performed by: PHYSICIAN ASSISTANT

## 2020-03-12 PROCEDURE — 3074F PR MOST RECENT SYSTOLIC BLOOD PRESSURE < 130 MM HG: ICD-10-PCS | Mod: CPTII,S$GLB,, | Performed by: PHYSICIAN ASSISTANT

## 2020-03-12 PROCEDURE — 99213 OFFICE O/P EST LOW 20 MIN: CPT | Mod: S$GLB,,, | Performed by: PHYSICIAN ASSISTANT

## 2020-03-12 PROCEDURE — 3008F BODY MASS INDEX DOCD: CPT | Mod: CPTII,S$GLB,, | Performed by: PHYSICIAN ASSISTANT

## 2020-03-12 PROCEDURE — 3074F SYST BP LT 130 MM HG: CPT | Mod: CPTII,S$GLB,, | Performed by: PHYSICIAN ASSISTANT

## 2020-03-12 PROCEDURE — 3008F PR BODY MASS INDEX (BMI) DOCUMENTED: ICD-10-PCS | Mod: CPTII,S$GLB,, | Performed by: PHYSICIAN ASSISTANT

## 2020-03-12 PROCEDURE — 3078F PR MOST RECENT DIASTOLIC BLOOD PRESSURE < 80 MM HG: ICD-10-PCS | Mod: CPTII,S$GLB,, | Performed by: PHYSICIAN ASSISTANT

## 2020-03-12 RX ORDER — PEN NEEDLE, DIABETIC 32 GX 1/4"
NEEDLE, DISPOSABLE MISCELLANEOUS
COMMUNITY
Start: 2020-03-04

## 2020-03-12 RX ORDER — IVERMECTIN 10 MG/G
1 CREAM TOPICAL DAILY
COMMUNITY
Start: 2019-12-16

## 2020-03-12 RX ORDER — MONTELUKAST SODIUM 10 MG/1
10 TABLET ORAL NIGHTLY
Qty: 30 TABLET | Refills: 5 | Status: SHIPPED | OUTPATIENT
Start: 2020-03-12 | End: 2020-04-11

## 2020-03-12 RX ORDER — CLOBETASOL PROPIONATE 0.46 MG/ML
1 SOLUTION TOPICAL 2 TIMES DAILY
COMMUNITY
Start: 2019-12-16 | End: 2023-07-13 | Stop reason: ALTCHOICE

## 2020-03-12 RX ORDER — BACLOFEN 10 MG/1
TABLET ORAL
COMMUNITY
Start: 2020-02-19 | End: 2021-02-01

## 2020-03-12 RX ORDER — DOXYCYCLINE HYCLATE 50 MG/1
50 CAPSULE ORAL 2 TIMES DAILY
COMMUNITY
Start: 2020-02-19

## 2020-03-12 RX ORDER — FLUCONAZOLE 150 MG/1
TABLET ORAL
COMMUNITY
Start: 2020-01-27 | End: 2022-11-29

## 2020-03-12 RX ORDER — FLUTICASONE PROPIONATE 50 MCG
1 SPRAY, SUSPENSION (ML) NASAL DAILY
Qty: 16 G | Refills: 1 | Status: SHIPPED | OUTPATIENT
Start: 2020-03-12 | End: 2020-06-16

## 2020-03-12 NOTE — PROGRESS NOTES
Pre-Visit Chart Review  For Appointment Scheduled on (3/12/20)    Health Maintenance Due   Topic Date Due    Foot Exam  05/06/2020

## 2020-03-12 NOTE — PROGRESS NOTES
Subjective:       Patient ID: Kateryna Desai is a 65 y.o. female.    Chief Complaint: Fluid In Ears ((5 days)) and Nasal Congestion (Cough (6 days))    Patient with history of chronic rhinitis and allergies presents for evaluation of pressure in the ears, nasal congestion, postnasal drip, rhinorrhea, in slight cough.  Patient states that her symptoms began in the past 3-4 days.  She is using Flonase and Xyzal with some temporary improvement of symptoms.  She denies having a fever.  Patients patient medical/surgical, social and family histories have been reviewed       Review of Systems   Constitutional: Negative for activity change, appetite change, chills, fatigue and fever.   HENT: Positive for congestion, postnasal drip, rhinorrhea, sinus pressure, sneezing and sore throat. Negative for ear pain, sinus pain and voice change.    Respiratory: Positive for cough. Negative for chest tightness, shortness of breath and wheezing.    Cardiovascular: Negative for chest pain.   Gastrointestinal: Negative for nausea and vomiting.       Objective:      Physical Exam   Constitutional: She appears well-developed and well-nourished. She is cooperative. She does not have a sickly appearance. She does not appear ill. No distress.   HENT:   Head: Normocephalic and atraumatic.   Right Ear: Tympanic membrane, external ear and ear canal normal.   Left Ear: Tympanic membrane, external ear and ear canal normal.   Nose: Mucosal edema (Pale boggy) and rhinorrhea ( clear) present. Right sinus exhibits no maxillary sinus tenderness and no frontal sinus tenderness. Left sinus exhibits no maxillary sinus tenderness and no frontal sinus tenderness.   Mouth/Throat: Mucous membranes are not dry. Posterior oropharyngeal erythema (Cobblestoning) present. No oropharyngeal exudate or posterior oropharyngeal edema.   Cardiovascular: Normal rate and regular rhythm.   No murmur heard.  Pulmonary/Chest: Effort normal and breath sounds  normal. She has no wheezes. She has no rales.   Lymphadenopathy:        Head (right side): No tonsillar adenopathy present.        Head (left side): No tonsillar adenopathy present.     She has no cervical adenopathy.   Neurological: She is alert.   Skin: Skin is warm and dry.   Nursing note and vitals reviewed.      Assessment:       1. Seasonal allergic rhinitis due to pollen    2. Chronic seasonal allergic rhinitis        Plan:       Kateryna was seen today for fluid in ears and nasal congestion.    Diagnoses and all orders for this visit:    Seasonal allergic rhinitis due to pollen    Chronic seasonal allergic rhinitis  -     fluticasone propionate (FLONASE) 50 mcg/actuation nasal spray; 1 spray (50 mcg total) by Each Nostril route once daily.    Continue Xyzal 5 mg daily  -     montelukast (SINGULAIR) 10 mg tablet; Take 1 tablet (10 mg total) by mouth every evening.     Okay to continue Robitussin DM nightly for cough      No follow-ups on file.   DISCLAIMER: This note was prepared with [employer] voice recognition transcription software. Garbled syntax, mangled pronouns, and other bizarre constructions may be attributed to that software system

## 2020-04-08 ENCOUNTER — LAB VISIT (OUTPATIENT)
Dept: LAB | Facility: HOSPITAL | Age: 65
End: 2020-04-08
Attending: INTERNAL MEDICINE
Payer: COMMERCIAL

## 2020-04-08 DIAGNOSIS — I51.9 MYXEDEMA HEART DISEASE: ICD-10-CM

## 2020-04-08 DIAGNOSIS — E78.9 DISORDER OF LIPOPROTEIN AND LIPID METABOLISM: ICD-10-CM

## 2020-04-08 DIAGNOSIS — E10.9 DIABETES MELLITUS TYPE I: Primary | ICD-10-CM

## 2020-04-08 DIAGNOSIS — E03.9 MYXEDEMA HEART DISEASE: ICD-10-CM

## 2020-04-08 LAB
ANION GAP SERPL CALC-SCNC: 6 MMOL/L (ref 8–16)
BUN SERPL-MCNC: 6 MG/DL (ref 8–23)
CALCIUM SERPL-MCNC: 9.2 MG/DL (ref 8.7–10.5)
CHLORIDE SERPL-SCNC: 107 MMOL/L (ref 95–110)
CHOLEST SERPL-MCNC: 159 MG/DL (ref 120–199)
CHOLEST/HDLC SERPL: 3.5 {RATIO} (ref 2–5)
CO2 SERPL-SCNC: 29 MMOL/L (ref 23–29)
CREAT SERPL-MCNC: 0.9 MG/DL (ref 0.5–1.4)
EST. GFR  (AFRICAN AMERICAN): >60 ML/MIN/1.73 M^2
EST. GFR  (NON AFRICAN AMERICAN): >60 ML/MIN/1.73 M^2
ESTIMATED AVG GLUCOSE: 123 MG/DL (ref 68–131)
GLUCOSE SERPL-MCNC: 95 MG/DL (ref 70–110)
HBA1C MFR BLD HPLC: 5.9 % (ref 4–5.6)
HDLC SERPL-MCNC: 45 MG/DL (ref 40–75)
HDLC SERPL: 28.3 % (ref 20–50)
LDLC SERPL CALC-MCNC: 96.4 MG/DL (ref 63–159)
NONHDLC SERPL-MCNC: 114 MG/DL
POTASSIUM SERPL-SCNC: 4.4 MMOL/L (ref 3.5–5.1)
SODIUM SERPL-SCNC: 142 MMOL/L (ref 136–145)
T4 FREE SERPL-MCNC: 1.12 NG/DL (ref 0.71–1.51)
TRIGL SERPL-MCNC: 88 MG/DL (ref 30–150)
TSH SERPL DL<=0.005 MIU/L-ACNC: 0.38 UIU/ML (ref 0.4–4)

## 2020-04-08 PROCEDURE — 84439 ASSAY OF FREE THYROXINE: CPT

## 2020-04-08 PROCEDURE — 80048 BASIC METABOLIC PNL TOTAL CA: CPT

## 2020-04-08 PROCEDURE — 83036 HEMOGLOBIN GLYCOSYLATED A1C: CPT

## 2020-04-08 PROCEDURE — 84443 ASSAY THYROID STIM HORMONE: CPT

## 2020-04-08 PROCEDURE — 80061 LIPID PANEL: CPT

## 2020-04-08 PROCEDURE — 36415 COLL VENOUS BLD VENIPUNCTURE: CPT | Mod: PO

## 2020-05-21 ENCOUNTER — OFFICE VISIT (OUTPATIENT)
Dept: FAMILY MEDICINE | Facility: CLINIC | Age: 65
End: 2020-05-21
Payer: COMMERCIAL

## 2020-05-21 VITALS
RESPIRATION RATE: 16 BRPM | BODY MASS INDEX: 29.69 KG/M2 | OXYGEN SATURATION: 98 % | HEART RATE: 85 BPM | WEIGHT: 184.75 LBS | DIASTOLIC BLOOD PRESSURE: 70 MMHG | SYSTOLIC BLOOD PRESSURE: 132 MMHG | HEIGHT: 66 IN | TEMPERATURE: 97 F

## 2020-05-21 DIAGNOSIS — J30.2 CHRONIC SEASONAL ALLERGIC RHINITIS: Primary | ICD-10-CM

## 2020-05-21 DIAGNOSIS — R51.9 NONINTRACTABLE EPISODIC HEADACHE, UNSPECIFIED HEADACHE TYPE: ICD-10-CM

## 2020-05-21 PROCEDURE — 99999 PR PBB SHADOW E&M-EST. PATIENT-LVL III: CPT | Mod: PBBFAC,,, | Performed by: FAMILY MEDICINE

## 2020-05-21 PROCEDURE — 3008F PR BODY MASS INDEX (BMI) DOCUMENTED: ICD-10-PCS | Mod: CPTII,S$GLB,, | Performed by: FAMILY MEDICINE

## 2020-05-21 PROCEDURE — 99213 PR OFFICE/OUTPT VISIT, EST, LEVL III, 20-29 MIN: ICD-10-PCS | Mod: S$GLB,,, | Performed by: FAMILY MEDICINE

## 2020-05-21 PROCEDURE — 3008F BODY MASS INDEX DOCD: CPT | Mod: CPTII,S$GLB,, | Performed by: FAMILY MEDICINE

## 2020-05-21 PROCEDURE — 3078F DIAST BP <80 MM HG: CPT | Mod: CPTII,S$GLB,, | Performed by: FAMILY MEDICINE

## 2020-05-21 PROCEDURE — 99999 PR PBB SHADOW E&M-EST. PATIENT-LVL III: ICD-10-PCS | Mod: PBBFAC,,, | Performed by: FAMILY MEDICINE

## 2020-05-21 PROCEDURE — 3078F PR MOST RECENT DIASTOLIC BLOOD PRESSURE < 80 MM HG: ICD-10-PCS | Mod: CPTII,S$GLB,, | Performed by: FAMILY MEDICINE

## 2020-05-21 PROCEDURE — 3075F PR MOST RECENT SYSTOLIC BLOOD PRESS GE 130-139MM HG: ICD-10-PCS | Mod: CPTII,S$GLB,, | Performed by: FAMILY MEDICINE

## 2020-05-21 PROCEDURE — 99213 OFFICE O/P EST LOW 20 MIN: CPT | Mod: S$GLB,,, | Performed by: FAMILY MEDICINE

## 2020-05-21 PROCEDURE — 3075F SYST BP GE 130 - 139MM HG: CPT | Mod: CPTII,S$GLB,, | Performed by: FAMILY MEDICINE

## 2020-05-21 RX ORDER — AZELASTINE 1 MG/ML
1 SPRAY, METERED NASAL 2 TIMES DAILY
Qty: 30 ML | Refills: 3 | Status: SHIPPED | OUTPATIENT
Start: 2020-05-21 | End: 2021-01-04

## 2020-05-21 NOTE — PROGRESS NOTES
Subjective:       Patient ID: Kateryna Desai is a 65 y.o. female.    Chief Complaint: sinus complications. pt doing flonase and 2 other meds for relief. pt is having pt is also having pain in right side of head (it comes and goes)    HPI   Patient presents for evaluation allergy/sinus problems and left sided headaches.  She tells me she is okay today but notes she continues to struggle with allergy symptoms including nasal congestion, postnasal drip, itchy/watery eyes, sneezing and dry cough.  She has been using Xyzal, Flonase, Singulair and nasal saline washes all of which definitely helped symptoms as they are much worse if she forgets to use any of these.  Symptoms are better with avoiding outdoor exposure and she has only had on and off sinus pressure and no sinus pain for several months.  Symptoms have been worse for the past 2 weeks after the pandemic stay at home orders were lessened and she has had more outdoor exposure.  She also notes right-sided sporadic headaches started with her symptoms.  These generally are mild to moderate, dull, constant and last a variable period of time but are generally quite short and resolve on their own.  She has not noticed anything else that makes symptoms better or worse although she tells me with these coming on she gets generally achy.  She tells me she has a remote history of migraines and had the same symptoms with them previously.  She followed with a neurologist many decades ago who completed ahead CT and other testing and no significant abnormalities were found.  She is not certain but believe she may have been on Topamax and Imitrex but notes her  has both of these medications for his migraines and she is not certain these were what she took previously.  Headaches resolved on their own and her neurologist weaned her off of the medications and eventually discharged her without further issues with migraine.  She has not taken anything for  her headaches as she tells me they are not severe enough and denies any other associated symptoms including any aura, neurologic deficits, lymphadenopathy, rash, ear pain, drainage or hearing deficit or any difficulty with chewing or swallowing.  She has been checking her blood sugars and these have no correlation with headaches.  She has not monitored blood pressures with her headaches but these have been chronically well controlled.  She has no other concerns or complaints today and reports no current headaches or body aches.    Review of Systems   Constitutional: Negative for activity change, appetite change, chills, fatigue, fever and unexpected weight change.   HENT: Positive for congestion, postnasal drip, rhinorrhea and sneezing. Negative for ear discharge, ear pain, facial swelling, hearing loss, mouth sores, sinus pressure, sinus pain, sore throat, tinnitus, trouble swallowing and voice change.    Eyes: Negative for visual disturbance.   Respiratory: Negative for cough, chest tightness, shortness of breath and wheezing.    Cardiovascular: Negative for chest pain, palpitations and leg swelling.   Gastrointestinal: Negative for abdominal pain, blood in stool, constipation, diarrhea, nausea and vomiting.   Genitourinary: Negative for difficulty urinating, dysuria, flank pain and frequency.   Musculoskeletal: Negative for arthralgias, back pain, gait problem, joint swelling, myalgias, neck pain and neck stiffness.   Skin: Negative for rash and wound.   Neurological: Positive for headaches. Negative for dizziness, tremors, syncope, weakness, light-headedness and numbness.   Hematological: Negative for adenopathy. Does not bruise/bleed easily.   Psychiatric/Behavioral: Negative for dysphoric mood and sleep disturbance. The patient is not nervous/anxious.          Objective:      Vitals:    05/21/20 1339   BP: 132/70   Pulse: 85   Resp: 16   Temp: 96.7 °F (35.9 °C)   TempSrc: Oral   SpO2: 98%   Weight: 83.8 kg  "(184 lb 11.9 oz)   Height: 5' 6" (1.676 m)     Physical Exam   Constitutional: She is oriented to person, place, and time. She appears well-developed and well-nourished. No distress.   HENT:   Head: Normocephalic and atraumatic.   Right Ear: Hearing, tympanic membrane, external ear and ear canal normal.   Left Ear: Hearing, tympanic membrane, external ear and ear canal normal.   Nose: Mucosal edema and rhinorrhea present. Right sinus exhibits no maxillary sinus tenderness and no frontal sinus tenderness. Left sinus exhibits no maxillary sinus tenderness and no frontal sinus tenderness.   Mouth/Throat: Uvula is midline and mucous membranes are normal. No oral lesions. No trismus in the jaw. No uvula swelling. Oropharyngeal exudate (Postnasal drip present.) present. No posterior oropharyngeal edema, posterior oropharyngeal erythema or tonsillar abscesses.   Eyes: Conjunctivae are normal. Right eye exhibits no discharge. Left eye exhibits no discharge. No scleral icterus.   Neck: Trachea normal, normal range of motion and phonation normal. Neck supple. No thyroid mass and no thyromegaly present.   Cardiovascular: Normal rate, regular rhythm and normal heart sounds. Exam reveals no gallop and no friction rub.   No murmur heard.  Pulmonary/Chest: Effort normal and breath sounds normal. No respiratory distress. She has no wheezes. She exhibits no tenderness.   Musculoskeletal: Normal range of motion. She exhibits no edema, tenderness or deformity.   Lymphadenopathy:     She has no cervical adenopathy.   Neurological: She is alert and oriented to person, place, and time.   Skin: Skin is warm and dry. She is not diaphoretic.   Psychiatric: She has a normal mood and affect. Her behavior is normal. Judgment and thought content normal.   Nursing note and vitals reviewed.        Assessment:       1. Chronic seasonal allergic rhinitis    2. Nonintractable episodic headache, unspecified headache type          Plan:   Chronic " seasonal allergic rhinitis  -     azelastine (ASTELIN) 137 mcg (0.1 %) nasal spray; 1 spray (137 mcg total) by Nasal route 2 (two) times daily.  Dispense: 30 mL; Refill: 3    Nonintractable episodic headache, unspecified headache type      Follow up in about 6 months (around 11/21/2020).    Kateryna appears to be stable and doing well today with exception of some uncontrolled chronic seasonal allergic rhinitis symptoms have been worse with more recent outdoor exposure.  I discussed the risks and benefits of adding Astelin and she was very interested in this today.  I advised her if she continues to have symptoms after this we may have to consider referral to allergy/immunology or ENT for further evaluation as I have little left to reduce her symptoms outside use of allergy masks and filters.  I discussed one-month follow-up with her to see how Astelin works for her but she was resistant to this and would like to be seen back at 6 month interval if she continues to do well.  As her blood pressures remained well controlled and she is following with specialist for all of her other conditions I advised her I am fine with this and happy to see her back sooner if any problems.  I did discuss possible treatment options for migraine headaches but she believes if these are her classic migraines they are set off by her uncontrolled allergy symptoms and if controlled she will have no further problems with headaches.  This may be correct and I will see her back in the near future if she continues to have issues with headaches and allergy symptoms.

## 2020-05-22 ENCOUNTER — OFFICE VISIT (OUTPATIENT)
Dept: CARDIOLOGY | Facility: CLINIC | Age: 65
End: 2020-05-22
Payer: COMMERCIAL

## 2020-05-22 VITALS
WEIGHT: 186.75 LBS | HEART RATE: 83 BPM | SYSTOLIC BLOOD PRESSURE: 110 MMHG | BODY MASS INDEX: 30.01 KG/M2 | HEIGHT: 66 IN | DIASTOLIC BLOOD PRESSURE: 67 MMHG

## 2020-05-22 DIAGNOSIS — E78.5 HYPERLIPIDEMIA, UNSPECIFIED HYPERLIPIDEMIA TYPE: Primary | ICD-10-CM

## 2020-05-22 DIAGNOSIS — I35.0 NONRHEUMATIC AORTIC VALVE STENOSIS: ICD-10-CM

## 2020-05-22 DIAGNOSIS — I10 ESSENTIAL HYPERTENSION: ICD-10-CM

## 2020-05-22 PROCEDURE — 3008F BODY MASS INDEX DOCD: CPT | Mod: CPTII,S$GLB,, | Performed by: INTERNAL MEDICINE

## 2020-05-22 PROCEDURE — 3078F DIAST BP <80 MM HG: CPT | Mod: CPTII,S$GLB,, | Performed by: INTERNAL MEDICINE

## 2020-05-22 PROCEDURE — 99214 OFFICE O/P EST MOD 30 MIN: CPT | Mod: S$GLB,,, | Performed by: INTERNAL MEDICINE

## 2020-05-22 PROCEDURE — 99999 PR PBB SHADOW E&M-EST. PATIENT-LVL III: ICD-10-PCS | Mod: PBBFAC,,, | Performed by: INTERNAL MEDICINE

## 2020-05-22 PROCEDURE — 3074F SYST BP LT 130 MM HG: CPT | Mod: CPTII,S$GLB,, | Performed by: INTERNAL MEDICINE

## 2020-05-22 PROCEDURE — 99214 PR OFFICE/OUTPT VISIT, EST, LEVL IV, 30-39 MIN: ICD-10-PCS | Mod: S$GLB,,, | Performed by: INTERNAL MEDICINE

## 2020-05-22 PROCEDURE — 3074F PR MOST RECENT SYSTOLIC BLOOD PRESSURE < 130 MM HG: ICD-10-PCS | Mod: CPTII,S$GLB,, | Performed by: INTERNAL MEDICINE

## 2020-05-22 PROCEDURE — 3008F PR BODY MASS INDEX (BMI) DOCUMENTED: ICD-10-PCS | Mod: CPTII,S$GLB,, | Performed by: INTERNAL MEDICINE

## 2020-05-22 PROCEDURE — 3078F PR MOST RECENT DIASTOLIC BLOOD PRESSURE < 80 MM HG: ICD-10-PCS | Mod: CPTII,S$GLB,, | Performed by: INTERNAL MEDICINE

## 2020-05-22 PROCEDURE — 99999 PR PBB SHADOW E&M-EST. PATIENT-LVL III: CPT | Mod: PBBFAC,,, | Performed by: INTERNAL MEDICINE

## 2020-05-22 NOTE — PROGRESS NOTES
Subjective:    Patient ID:  Kateryna Desai is a 65 y.o. female who presents for follow-up of Hyperlipidemia      HPI 66 yo WF with DM, HLD, and hx of heart murmur. Had nuclear stress test that was normal, event recorder with no arrhythmias, and echo that showed mild aortic sclerosis. Denies chest pain, SOB, or edema. Denies palpitations, weak spells, and syncope    Review of Systems   Constitution: Negative for decreased appetite, fever, malaise/fatigue, weight gain and weight loss.   HENT: Negative for hearing loss and nosebleeds.    Eyes: Negative for visual disturbance.   Cardiovascular: Negative for chest pain, claudication, cyanosis, dyspnea on exertion, irregular heartbeat, leg swelling, near-syncope, orthopnea, palpitations, paroxysmal nocturnal dyspnea and syncope.   Respiratory: Negative for cough, hemoptysis, shortness of breath, sleep disturbances due to breathing, snoring and wheezing.    Endocrine: Negative for cold intolerance, heat intolerance, polydipsia and polyuria.   Hematologic/Lymphatic: Negative for adenopathy and bleeding problem. Does not bruise/bleed easily.   Skin: Negative for color change, itching, poor wound healing, rash and suspicious lesions.   Musculoskeletal: Negative for arthritis, back pain, falls, joint pain, joint swelling, muscle cramps, muscle weakness and myalgias.   Gastrointestinal: Negative for bloating, abdominal pain, change in bowel habit, constipation, flatus, heartburn, hematemesis, hematochezia, hemorrhoids, jaundice, melena, nausea and vomiting.   Genitourinary: Negative for bladder incontinence, decreased libido, frequency, hematuria, hesitancy and urgency.   Neurological: Negative for brief paralysis, difficulty with concentration, excessive daytime sleepiness, dizziness, focal weakness, headaches, light-headedness, loss of balance, numbness, vertigo and weakness.   Psychiatric/Behavioral: Negative for altered mental status, depression and memory  "loss. The patient does not have insomnia and is not nervous/anxious.    Allergic/Immunologic: Negative for environmental allergies, hives and persistent infections.        Objective:    Physical Exam   Constitutional: She is oriented to person, place, and time. She appears well-developed and well-nourished. No distress.   /67 (BP Location: Right arm)   Pulse 83   Ht 5' 6" (1.676 m)   Wt 84.7 kg (186 lb 11.7 oz)   BMI 30.14 kg/m²      HENT:   Head: Normocephalic and atraumatic.   Right Ear: External ear normal.   Left Ear: External ear normal.   Nose: Nose normal.   Mouth/Throat: Oropharynx is clear and moist.   Eyes: Pupils are equal, round, and reactive to light. Conjunctivae, EOM and lids are normal. Right eye exhibits no discharge. Left eye exhibits no discharge. Right conjunctiva has no hemorrhage. No scleral icterus.   Neck: Normal range of motion. Neck supple. No JVD present. No tracheal deviation present. No thyromegaly present.   Cardiovascular: Normal rate, regular rhythm, S1 normal, S2 normal and intact distal pulses. Exam reveals no gallop and no friction rub.   Murmur heard.   Harsh midsystolic murmur is present with a grade of 2/6 at the upper right sternal border radiating to the neck.  Pulses:       Carotid pulses are 2+ on the right side, and 2+ on the left side.       Radial pulses are 2+ on the right side, and 2+ on the left side.        Femoral pulses are 2+ on the right side, and 2+ on the left side.       Popliteal pulses are 2+ on the right side, and 2+ on the left side.        Dorsalis pedis pulses are 2+ on the right side, and 2+ on the left side.        Posterior tibial pulses are 2+ on the right side, and 2+ on the left side.   Pulmonary/Chest: Effort normal and breath sounds normal. No respiratory distress. She has no wheezes. She has no rales. She exhibits no tenderness. Breasts are symmetrical.   Abdominal: Soft. Bowel sounds are normal. She exhibits no distension and no mass. " There is no hepatosplenomegaly or hepatomegaly. There is no tenderness. There is no rebound and no guarding.   Musculoskeletal: Normal range of motion. She exhibits no edema or tenderness.   Lymphadenopathy:     She has no cervical adenopathy.   Neurological: She is alert and oriented to person, place, and time. She has normal reflexes. No cranial nerve deficit. Coordination normal.   Skin: Skin is warm and dry. No rash noted. She is not diaphoretic. No erythema. No pallor.   Psychiatric: She has a normal mood and affect. Her speech is normal and behavior is normal. Judgment and thought content normal. Cognition and memory are normal.   Nursing note and vitals reviewed.        Assessment:       1. Hyperlipidemia, unspecified hyperlipidemia type    2. Essential hypertension    3. Nonrheumatic aortic valve stenosis         Plan:     The current medical regimen is effective;  continue present plan and medications.     Patient advised to modify risk factors such as weight, exercise, diet,  tobacco and alcohol exposure      Orders Placed This Encounter   Procedures    Echo Color Flow Doppler? Yes     Follow up in about 1 year (around 5/22/2021).

## 2020-05-25 RX ORDER — MONTELUKAST SODIUM 10 MG/1
10 TABLET ORAL DAILY
COMMUNITY
Start: 2020-05-11 | End: 2020-08-06

## 2020-05-26 ENCOUNTER — HOSPITAL ENCOUNTER (OUTPATIENT)
Dept: RADIOLOGY | Facility: HOSPITAL | Age: 65
Discharge: HOME | End: 2020-05-26
Attending: PODIATRIST
Payer: COMMERCIAL

## 2020-05-26 ENCOUNTER — OFFICE VISIT (OUTPATIENT)
Dept: PODIATRY | Facility: CLINIC | Age: 65
End: 2020-05-26
Payer: COMMERCIAL

## 2020-05-26 VITALS — HEIGHT: 66 IN | BODY MASS INDEX: 30.01 KG/M2 | WEIGHT: 186.75 LBS

## 2020-05-26 DIAGNOSIS — L84 CORN OR CALLUS: ICD-10-CM

## 2020-05-26 DIAGNOSIS — I73.9 PVD (PERIPHERAL VASCULAR DISEASE): ICD-10-CM

## 2020-05-26 DIAGNOSIS — M25.572 ACUTE LEFT ANKLE PAIN: ICD-10-CM

## 2020-05-26 DIAGNOSIS — E10.42 CONTROLLED TYPE 1 DIABETES MELLITUS WITH DIABETIC POLYNEUROPATHY: Primary | ICD-10-CM

## 2020-05-26 DIAGNOSIS — M79.662 PAIN OF LEFT LOWER LEG: ICD-10-CM

## 2020-05-26 PROCEDURE — 73610 X-RAY EXAM OF ANKLE: CPT | Mod: 26,LT,, | Performed by: RADIOLOGY

## 2020-05-26 PROCEDURE — 99999 PR PBB SHADOW E&M-EST. PATIENT-LVL III: ICD-10-PCS | Mod: PBBFAC,,, | Performed by: PODIATRIST

## 2020-05-26 PROCEDURE — 73610 XR ANKLE COMPLETE 3 VIEW LEFT: ICD-10-PCS | Mod: 26,LT,, | Performed by: RADIOLOGY

## 2020-05-26 PROCEDURE — 11055 PARING/CUTG B9 HYPRKER LES 1: CPT | Mod: Q9,S$GLB,, | Performed by: PODIATRIST

## 2020-05-26 PROCEDURE — 99499 UNLISTED E&M SERVICE: CPT | Mod: S$GLB,,, | Performed by: PODIATRIST

## 2020-05-26 PROCEDURE — 73610 X-RAY EXAM OF ANKLE: CPT | Mod: TC,FY,PO,LT

## 2020-05-26 PROCEDURE — 11055 PR TRIM HYPERKERATOTIC SKIN LESION, ONE: ICD-10-PCS | Mod: Q9,S$GLB,, | Performed by: PODIATRIST

## 2020-05-26 PROCEDURE — 99999 PR PBB SHADOW E&M-EST. PATIENT-LVL III: CPT | Mod: PBBFAC,,, | Performed by: PODIATRIST

## 2020-05-26 PROCEDURE — 99499 NO LOS: ICD-10-PCS | Mod: S$GLB,,, | Performed by: PODIATRIST

## 2020-05-27 DIAGNOSIS — K21.9 GASTROESOPHAGEAL REFLUX DISEASE WITHOUT ESOPHAGITIS: ICD-10-CM

## 2020-05-28 ENCOUNTER — TELEPHONE (OUTPATIENT)
Dept: PODIATRY | Facility: CLINIC | Age: 65
End: 2020-05-28

## 2020-05-28 NOTE — TELEPHONE ENCOUNTER
----- Message from Bella Nolan sent at 5/28/2020 11:12 AM CDT -----  Contact: pt [phone]  Patient called back she has Gout in her left ankle  And Leg  she is asking what is the plan of   Care.   Call back [phone]

## 2020-05-28 NOTE — TELEPHONE ENCOUNTER
----- Message from Lei Rhoades DPM sent at 5/27/2020  8:45 PM CDT -----  Hey Ladies,    Please call the patient and inform that lab work shows her to be at the upper limit of uric acid.  The highest acceptable number within the normal range for females is 5.7 and she is at just that.  One of her inflammatory markers is also elevated.  Once she has the vascular exam, if normal, I think it would be safe to say this is an acute gout attack.  After I read the vascular study, we'll give her another call.  There is medication that will help, however, I don't want to mask her symptoms until a vascular issue has been ruled out. Thanks!    Dr. Rhoades

## 2020-05-28 NOTE — TELEPHONE ENCOUNTER
I called patient to notify her that he will have the plan of care after the US results, as explained to her earlier. She said the she is pain, and that icing is not helping.  I told her not to ice because that makes it worst. She can use warm compresses on the area.

## 2020-05-29 RX ORDER — PANTOPRAZOLE SODIUM 40 MG/1
40 TABLET, DELAYED RELEASE ORAL 2 TIMES DAILY
Qty: 180 TABLET | Refills: 2 | Status: SHIPPED | OUTPATIENT
Start: 2020-05-29 | End: 2021-01-04

## 2020-05-29 NOTE — TELEPHONE ENCOUNTER
Called pt to inform that medication has been sent to preferred pharmacy. Pt verbalized understanding.

## 2020-06-01 ENCOUNTER — TELEPHONE (OUTPATIENT)
Dept: PRIMARY CARE CLINIC | Facility: CLINIC | Age: 65
End: 2020-06-01

## 2020-06-01 ENCOUNTER — HOSPITAL ENCOUNTER (OUTPATIENT)
Dept: RADIOLOGY | Facility: HOSPITAL | Age: 65
Discharge: HOME | End: 2020-06-01
Attending: PODIATRIST
Payer: COMMERCIAL

## 2020-06-01 DIAGNOSIS — M10.072 ACUTE IDIOPATHIC GOUT OF LEFT FOOT: Primary | ICD-10-CM

## 2020-06-01 DIAGNOSIS — M79.662 PAIN OF LEFT LOWER LEG: ICD-10-CM

## 2020-06-01 DIAGNOSIS — I73.9 PVD (PERIPHERAL VASCULAR DISEASE): ICD-10-CM

## 2020-06-01 PROCEDURE — 93922 US ARTERIAL LOWER EXTREMITY BILAT WITH ABI (XPD): ICD-10-PCS | Mod: 26,,, | Performed by: RADIOLOGY

## 2020-06-01 PROCEDURE — 93922 UPR/L XTREMITY ART 2 LEVELS: CPT | Mod: TC,PO

## 2020-06-01 PROCEDURE — 93925 US ARTERIAL LOWER EXTREMITY BILAT WITH ABI (XPD): ICD-10-PCS | Mod: 26,,, | Performed by: RADIOLOGY

## 2020-06-01 PROCEDURE — 93925 LOWER EXTREMITY STUDY: CPT | Mod: 26,,, | Performed by: RADIOLOGY

## 2020-06-01 PROCEDURE — 93922 UPR/L XTREMITY ART 2 LEVELS: CPT | Mod: 26,,, | Performed by: RADIOLOGY

## 2020-06-01 RX ORDER — METHYLPREDNISOLONE 4 MG/1
TABLET ORAL
Qty: 1 PACKAGE | Refills: 0 | Status: SHIPPED | OUTPATIENT
Start: 2020-06-01 | End: 2021-02-01 | Stop reason: ALTCHOICE

## 2020-06-01 NOTE — TELEPHONE ENCOUNTER
Spoke to patient, patient stated that she was told by her Podiatrist that she may have gout, her uric acid level is 5.7. She would like to know what to do for it to get better. She has modified her diet and the foot doctor put her on a steroid for the swelling. Patient would like to know if there is anything else we can prescribe for her. Please advise.

## 2020-06-01 NOTE — TELEPHONE ENCOUNTER
----- Message from Ela Garner sent at 6/1/2020  4:28 PM CDT -----  Contact: Pt  Type: Needs Medical Advice    Who Called:  Pt  Best Call Back Number: [phone]  Additional Information: Requesting a call back regarding pt wanted to let doctor know that the podiatry doctor stated pt has gout in left foot and wanted to speak with nurse about it   Please Advise ---Thank you

## 2020-06-02 NOTE — TELEPHONE ENCOUNTER
Continue with treatment given per podiatry. It looks like the steroid was just sent in yesterday.  It will take some time for pain and swelling to improve   Ice and elevation recommended   Follow up with Dr Cortes in a few weeks to discuss

## 2020-07-27 ENCOUNTER — LAB VISIT (OUTPATIENT)
Dept: LAB | Facility: HOSPITAL | Age: 65
End: 2020-07-27
Attending: INTERNAL MEDICINE
Payer: COMMERCIAL

## 2020-07-27 DIAGNOSIS — I51.9 MYXEDEMA HEART DISEASE: ICD-10-CM

## 2020-07-27 DIAGNOSIS — E78.00 PURE HYPERCHOLESTEROLEMIA: ICD-10-CM

## 2020-07-27 DIAGNOSIS — E03.9 MYXEDEMA HEART DISEASE: ICD-10-CM

## 2020-07-27 DIAGNOSIS — Z79.899 ENCOUNTER FOR LONG-TERM (CURRENT) USE OF OTHER MEDICATIONS: ICD-10-CM

## 2020-07-27 DIAGNOSIS — E10.9 DIABETES MELLITUS TYPE I: Primary | ICD-10-CM

## 2020-07-27 LAB
ALBUMIN SERPL BCP-MCNC: 3.7 G/DL (ref 3.5–5.2)
ALP SERPL-CCNC: 71 U/L (ref 55–135)
ALT SERPL W/O P-5'-P-CCNC: 24 U/L (ref 10–44)
ANION GAP SERPL CALC-SCNC: 7 MMOL/L (ref 8–16)
AST SERPL-CCNC: 26 U/L (ref 10–40)
BILIRUB SERPL-MCNC: 0.5 MG/DL (ref 0.1–1)
BUN SERPL-MCNC: 9 MG/DL (ref 8–23)
CALCIUM SERPL-MCNC: 9.6 MG/DL (ref 8.7–10.5)
CHLORIDE SERPL-SCNC: 104 MMOL/L (ref 95–110)
CHOLEST SERPL-MCNC: 155 MG/DL (ref 120–199)
CHOLEST/HDLC SERPL: 3.5 {RATIO} (ref 2–5)
CO2 SERPL-SCNC: 30 MMOL/L (ref 23–29)
CREAT SERPL-MCNC: 1 MG/DL (ref 0.5–1.4)
EST. GFR  (AFRICAN AMERICAN): >60 ML/MIN/1.73 M^2
EST. GFR  (NON AFRICAN AMERICAN): >60 ML/MIN/1.73 M^2
ESTIMATED AVG GLUCOSE: 120 MG/DL (ref 68–131)
GLUCOSE SERPL-MCNC: 145 MG/DL (ref 70–110)
HBA1C MFR BLD HPLC: 5.8 % (ref 4–5.6)
HDLC SERPL-MCNC: 44 MG/DL (ref 40–75)
HDLC SERPL: 28.4 % (ref 20–50)
LDLC SERPL CALC-MCNC: 94.4 MG/DL (ref 63–159)
NONHDLC SERPL-MCNC: 111 MG/DL
POTASSIUM SERPL-SCNC: 4.2 MMOL/L (ref 3.5–5.1)
PROT SERPL-MCNC: 6.7 G/DL (ref 6–8.4)
SODIUM SERPL-SCNC: 141 MMOL/L (ref 136–145)
TRIGL SERPL-MCNC: 83 MG/DL (ref 30–150)
TSH SERPL DL<=0.005 MIU/L-ACNC: 1.1 UIU/ML (ref 0.4–4)

## 2020-07-27 PROCEDURE — 83036 HEMOGLOBIN GLYCOSYLATED A1C: CPT

## 2020-07-27 PROCEDURE — 80061 LIPID PANEL: CPT

## 2020-07-27 PROCEDURE — 36415 COLL VENOUS BLD VENIPUNCTURE: CPT | Mod: PO

## 2020-07-27 PROCEDURE — 80053 COMPREHEN METABOLIC PANEL: CPT

## 2020-07-27 PROCEDURE — 84443 ASSAY THYROID STIM HORMONE: CPT

## 2020-08-24 ENCOUNTER — TELEPHONE (OUTPATIENT)
Dept: PODIATRY | Facility: CLINIC | Age: 65
End: 2020-08-24

## 2020-08-24 NOTE — TELEPHONE ENCOUNTER
Called pt  And advised that written script would have to be picked up at office. Pt verbalized understanding and stated could not get out due to weather. Pt will call back when she can p/u script       ----- Message from Lei Rhoades DPM sent at 8/22/2020 10:17 AM CDT -----  Hey Ladshalini,    Please inform the patient I'm more than happy to write a Rx for tramadol, however, pain medication I do not call in to the pharmacy.  Written prescriptions are given.  If she is in [place] next week, I will definitely have the Rx available.  Thanks!  Dr. Rhoades

## 2020-08-24 NOTE — TELEPHONE ENCOUNTER
Advised that script would have to be written. Pt to call back when can      ----- Message from Lei Rhoades DPM sent at 8/22/2020 10:17 AM CDT -----  Hey Ladies,    Please inform the patient I'm more than happy to write a Rx for tramadol, however, pain medication I do not call in to the pharmacy.  Written prescriptions are given.  If she is in [place] next week, I will definitely have the Rx available.  Thanks!  Dr. Rhoades

## 2020-09-09 ENCOUNTER — LAB VISIT (OUTPATIENT)
Dept: LAB | Facility: HOSPITAL | Age: 65
End: 2020-09-09
Attending: INTERNAL MEDICINE
Payer: COMMERCIAL

## 2020-09-09 DIAGNOSIS — N25.81 SECONDARY HYPERPARATHYROIDISM OF RENAL ORIGIN: Primary | ICD-10-CM

## 2020-09-09 DIAGNOSIS — N18.30 CHRONIC KIDNEY DISEASE, STAGE III (MODERATE): ICD-10-CM

## 2020-09-09 LAB
BASOPHILS # BLD AUTO: 0.03 K/UL (ref 0–0.2)
BASOPHILS NFR BLD: 0.5 % (ref 0–1.9)
BILIRUB UR QL STRIP: NEGATIVE
CLARITY UR REFRACT.AUTO: ABNORMAL
COLOR UR AUTO: YELLOW
DIFFERENTIAL METHOD: ABNORMAL
EOSINOPHIL # BLD AUTO: 0.1 K/UL (ref 0–0.5)
EOSINOPHIL NFR BLD: 1.7 % (ref 0–8)
ERYTHROCYTE [DISTWIDTH] IN BLOOD BY AUTOMATED COUNT: 14.3 % (ref 11.5–14.5)
GLUCOSE UR QL STRIP: NEGATIVE
HCT VFR BLD AUTO: 40.8 % (ref 37–48.5)
HGB BLD-MCNC: 12.7 G/DL (ref 12–16)
HGB UR QL STRIP: NEGATIVE
HYALINE CASTS UR QL AUTO: 3 /LPF
IMM GRANULOCYTES # BLD AUTO: 0.01 K/UL (ref 0–0.04)
IMM GRANULOCYTES NFR BLD AUTO: 0.2 % (ref 0–0.5)
KETONES UR QL STRIP: NEGATIVE
LEUKOCYTE ESTERASE UR QL STRIP: ABNORMAL
LYMPHOCYTES # BLD AUTO: 1.2 K/UL (ref 1–4.8)
LYMPHOCYTES NFR BLD: 19.1 % (ref 18–48)
MCH RBC QN AUTO: 30.8 PG (ref 27–31)
MCHC RBC AUTO-ENTMCNC: 31.1 G/DL (ref 32–36)
MCV RBC AUTO: 99 FL (ref 82–98)
MICROSCOPIC COMMENT: ABNORMAL
MONOCYTES # BLD AUTO: 0.5 K/UL (ref 0.3–1)
MONOCYTES NFR BLD: 7.2 % (ref 4–15)
NEUTROPHILS # BLD AUTO: 4.6 K/UL (ref 1.8–7.7)
NEUTROPHILS NFR BLD: 71.3 % (ref 38–73)
NITRITE UR QL STRIP: NEGATIVE
NRBC BLD-RTO: 0 /100 WBC
PH UR STRIP: 5 [PH] (ref 5–8)
PLATELET # BLD AUTO: 197 K/UL (ref 150–350)
PMV BLD AUTO: 11.9 FL (ref 9.2–12.9)
PROT UR QL STRIP: NEGATIVE
RBC # BLD AUTO: 4.13 M/UL (ref 4–5.4)
RBC #/AREA URNS AUTO: 1 /HPF (ref 0–4)
SP GR UR STRIP: 1.01 (ref 1–1.03)
SQUAMOUS #/AREA URNS AUTO: 6 /HPF
URN SPEC COLLECT METH UR: ABNORMAL
WBC # BLD AUTO: 6.49 K/UL (ref 3.9–12.7)
WBC #/AREA URNS AUTO: 4 /HPF (ref 0–5)

## 2020-09-09 PROCEDURE — 83970 ASSAY OF PARATHORMONE: CPT

## 2020-09-09 PROCEDURE — 81001 URINALYSIS AUTO W/SCOPE: CPT

## 2020-09-09 PROCEDURE — 80069 RENAL FUNCTION PANEL: CPT

## 2020-09-09 PROCEDURE — 85025 COMPLETE CBC W/AUTO DIFF WBC: CPT

## 2020-09-09 PROCEDURE — 82043 UR ALBUMIN QUANTITATIVE: CPT

## 2020-09-09 PROCEDURE — 82306 VITAMIN D 25 HYDROXY: CPT

## 2020-09-09 PROCEDURE — 36415 COLL VENOUS BLD VENIPUNCTURE: CPT | Mod: PO

## 2020-09-10 LAB
25(OH)D3+25(OH)D2 SERPL-MCNC: 69 NG/ML (ref 30–96)
ALBUMIN SERPL BCP-MCNC: 3.8 G/DL (ref 3.5–5.2)
ALBUMIN/CREAT UR: 4.8 UG/MG (ref 0–30)
ANION GAP SERPL CALC-SCNC: 10 MMOL/L (ref 8–16)
BUN SERPL-MCNC: 10 MG/DL (ref 8–23)
CALCIUM SERPL-MCNC: 9 MG/DL (ref 8.7–10.5)
CHLORIDE SERPL-SCNC: 102 MMOL/L (ref 95–110)
CO2 SERPL-SCNC: 28 MMOL/L (ref 23–29)
CREAT SERPL-MCNC: 1 MG/DL (ref 0.5–1.4)
CREAT UR-MCNC: 62 MG/DL (ref 15–325)
EST. GFR  (AFRICAN AMERICAN): >60 ML/MIN/1.73 M^2
EST. GFR  (NON AFRICAN AMERICAN): >60 ML/MIN/1.73 M^2
GLUCOSE SERPL-MCNC: 88 MG/DL (ref 70–110)
MICROALBUMIN UR DL<=1MG/L-MCNC: 3 UG/ML
PHOSPHATE SERPL-MCNC: 3.8 MG/DL (ref 2.7–4.5)
POTASSIUM SERPL-SCNC: 3.8 MMOL/L (ref 3.5–5.1)
PTH-INTACT SERPL-MCNC: 130 PG/ML (ref 9–77)
SODIUM SERPL-SCNC: 140 MMOL/L (ref 136–145)

## 2020-09-16 DIAGNOSIS — M85.88 OTHER SPECIFIED DISORDERS OF BONE DENSITY AND STRUCTURE, OTHER SITE: Primary | ICD-10-CM

## 2020-09-22 ENCOUNTER — HOSPITAL ENCOUNTER (OUTPATIENT)
Dept: RADIOLOGY | Facility: HOSPITAL | Age: 65
Discharge: HOME | End: 2020-09-22
Attending: INTERNAL MEDICINE

## 2020-09-22 DIAGNOSIS — M85.88 OTHER SPECIFIED DISORDERS OF BONE DENSITY AND STRUCTURE, OTHER SITE: ICD-10-CM

## 2020-09-22 PROCEDURE — 77080 DXA BONE DENSITY AXIAL: CPT | Mod: TC,PO

## 2020-10-12 DIAGNOSIS — I10 ESSENTIAL HYPERTENSION: ICD-10-CM

## 2020-10-13 RX ORDER — METOPROLOL SUCCINATE 25 MG/1
25 TABLET, EXTENDED RELEASE ORAL DAILY
Qty: 90 TABLET | Refills: 1 | Status: SHIPPED | OUTPATIENT
Start: 2020-10-13 | End: 2021-03-18

## 2020-10-13 NOTE — TELEPHONE ENCOUNTER
Called to schedule pt appt to establish with new pcp.  Pt verbalized appt date, time, and location with no further questions

## 2020-10-14 ENCOUNTER — OFFICE VISIT (OUTPATIENT)
Dept: FAMILY MEDICINE | Facility: CLINIC | Age: 65
End: 2020-10-14
Payer: COMMERCIAL

## 2020-10-14 VITALS
TEMPERATURE: 98 F | DIASTOLIC BLOOD PRESSURE: 74 MMHG | HEIGHT: 66 IN | WEIGHT: 188.5 LBS | SYSTOLIC BLOOD PRESSURE: 124 MMHG | BODY MASS INDEX: 30.29 KG/M2 | RESPIRATION RATE: 18 BRPM | HEART RATE: 91 BPM | OXYGEN SATURATION: 97 %

## 2020-10-14 DIAGNOSIS — K31.84 DIABETIC GASTROPARESIS: ICD-10-CM

## 2020-10-14 DIAGNOSIS — E10.42 CONTROLLED TYPE 1 DIABETES MELLITUS WITH DIABETIC POLYNEUROPATHY, WITH LONG-TERM CURRENT USE OF INSULIN: ICD-10-CM

## 2020-10-14 DIAGNOSIS — Z00.00 ROUTINE GENERAL MEDICAL EXAMINATION AT HEALTH CARE FACILITY: Primary | ICD-10-CM

## 2020-10-14 DIAGNOSIS — H42: ICD-10-CM

## 2020-10-14 DIAGNOSIS — E10.39: ICD-10-CM

## 2020-10-14 DIAGNOSIS — E11.43 DIABETIC GASTROPARESIS: ICD-10-CM

## 2020-10-14 DIAGNOSIS — E10.319 DIABETIC RETINOPATHY OF BOTH EYES ASSOCIATED WITH TYPE 1 DIABETES MELLITUS, MACULAR EDEMA PRESENCE UNSPECIFIED, UNSPECIFIED RETINOPATHY SEVERITY: ICD-10-CM

## 2020-10-14 PROCEDURE — 3044F HG A1C LEVEL LT 7.0%: CPT | Mod: CPTII,S$GLB,, | Performed by: STUDENT IN AN ORGANIZED HEALTH CARE EDUCATION/TRAINING PROGRAM

## 2020-10-14 PROCEDURE — 3008F BODY MASS INDEX DOCD: CPT | Mod: CPTII,S$GLB,, | Performed by: STUDENT IN AN ORGANIZED HEALTH CARE EDUCATION/TRAINING PROGRAM

## 2020-10-14 PROCEDURE — 3008F PR BODY MASS INDEX (BMI) DOCUMENTED: ICD-10-PCS | Mod: CPTII,S$GLB,, | Performed by: STUDENT IN AN ORGANIZED HEALTH CARE EDUCATION/TRAINING PROGRAM

## 2020-10-14 PROCEDURE — 99214 PR OFFICE/OUTPT VISIT, EST, LEVL IV, 30-39 MIN: ICD-10-PCS | Mod: S$GLB,,, | Performed by: STUDENT IN AN ORGANIZED HEALTH CARE EDUCATION/TRAINING PROGRAM

## 2020-10-14 PROCEDURE — 99999 PR PBB SHADOW E&M-EST. PATIENT-LVL V: ICD-10-PCS | Mod: PBBFAC,,, | Performed by: STUDENT IN AN ORGANIZED HEALTH CARE EDUCATION/TRAINING PROGRAM

## 2020-10-14 PROCEDURE — 99999 PR PBB SHADOW E&M-EST. PATIENT-LVL V: CPT | Mod: PBBFAC,,, | Performed by: STUDENT IN AN ORGANIZED HEALTH CARE EDUCATION/TRAINING PROGRAM

## 2020-10-14 PROCEDURE — 99214 OFFICE O/P EST MOD 30 MIN: CPT | Mod: S$GLB,,, | Performed by: STUDENT IN AN ORGANIZED HEALTH CARE EDUCATION/TRAINING PROGRAM

## 2020-10-14 PROCEDURE — 3044F PR MOST RECENT HEMOGLOBIN A1C LEVEL <7.0%: ICD-10-PCS | Mod: CPTII,S$GLB,, | Performed by: STUDENT IN AN ORGANIZED HEALTH CARE EDUCATION/TRAINING PROGRAM

## 2020-10-14 PROCEDURE — 3078F PR MOST RECENT DIASTOLIC BLOOD PRESSURE < 80 MM HG: ICD-10-PCS | Mod: CPTII,S$GLB,, | Performed by: STUDENT IN AN ORGANIZED HEALTH CARE EDUCATION/TRAINING PROGRAM

## 2020-10-14 PROCEDURE — 3074F SYST BP LT 130 MM HG: CPT | Mod: CPTII,S$GLB,, | Performed by: STUDENT IN AN ORGANIZED HEALTH CARE EDUCATION/TRAINING PROGRAM

## 2020-10-14 PROCEDURE — 3078F DIAST BP <80 MM HG: CPT | Mod: CPTII,S$GLB,, | Performed by: STUDENT IN AN ORGANIZED HEALTH CARE EDUCATION/TRAINING PROGRAM

## 2020-10-14 PROCEDURE — 3074F PR MOST RECENT SYSTOLIC BLOOD PRESSURE < 130 MM HG: ICD-10-PCS | Mod: CPTII,S$GLB,, | Performed by: STUDENT IN AN ORGANIZED HEALTH CARE EDUCATION/TRAINING PROGRAM

## 2020-10-14 RX ORDER — LEVOTHYROXINE SODIUM 75 UG/1
75 TABLET ORAL
COMMUNITY
Start: 2020-08-26

## 2020-10-14 NOTE — PROGRESS NOTES
[facility] MEDICINE CLINIC NOTE    Patient Name: Kateryna Desai  YOB: 1955    PRESENTING HISTORY   Chief Complaint:   Chief Complaint   Patient presents with    Establish Care      New patient to me.   History of Present Illness:  Ms. Kateryna Desai is a 65 y.o. female here to establish care with me.     Sinuses drain occasionally.       Chronic GERD with gastroparesis. On BID PPI. Hasn't tolerated dose reduction.     Venous insufficiency- on Lasix    Dr Valadez Cardiology      T1DM- Endocrinolgoist Opal Hahn    Follows with cardiologist, nephrolgist, dermatologist, OBGYN, podiatrist    Do a lot of walking in house, mows lawn.         Review of Systems   Eyes: Positive for blurred vision (retinopathy, glaucoma at [facility]). Negative for double vision and photophobia.   Respiratory: Positive for cough (occ with sinus irritation). Negative for shortness of breath.    Cardiovascular: Negative for chest pain and palpitations.   Gastrointestinal: Positive for heartburn. Negative for abdominal pain.   Neurological: Positive for headaches (sinus, occ). Negative for dizziness.         PAST HISTORY:     Past Medical History:   Diagnosis Date    Allergy     Arthritis     degnerative disease low back,muscle spasms, shoulders    Asian flu type A 12/22/2017    Diabetes mellitus type I     since age 13    Diabetic gastroparesis     takes Cytotec    Diabetic retinopathy of both eyes     gets periodic laser treatments    Difficult intubation     as a child had sore throat after a procedure    Encounter for blood transfusion     GERD (gastroesophageal reflux disease)     Headache(784.0)     Hiatal hernia     with GERD    Hyperlipidemia     Hypertension     Infection of the upper respiratory tract June 2012    Lumbar spinal stenosis     Osteopenia     Rosacea     Seizures     Pt states during pgy with stroke    Stroke 1985?    while pregnant    Tachycardia      asymptomatic with Toprol    Thyroid disease     hypothyroidism    Venous insufficiency of leg     improved since EVLT       Past Surgical History:   Procedure Laterality Date    BUNIONECTOMY Right 2012    CARDIAC CATHETERIZATION      no stents     SECTION      COLONOSCOPY  2007    Dr. Rose, 10 year recheck    EXOSTECTOMY  12    left foot, local anesthesia, in office    EYE SURGERY      has had many laser procedures for diabetic retinopathy    VASCULAR SURGERY      VEIN SURGERY  2012    EVLT right greater saphenous, IV sedation       Family History   Problem Relation Age of Onset    Cancer Maternal Aunt         breast    Breast cancer Maternal Aunt     Diabetes Maternal Grandmother     Breast cancer Maternal Grandmother 38    Cancer Maternal Grandfather         prostate    Diabetes Maternal Grandfather     Diabetes Cousin     Arthritis Mother     Cancer Mother     Melanoma Mother     Heart disease Father     Stroke Father     No Known Problems Sister     No Known Problems Brother     No Known Problems Daughter     Alzheimer's disease Maternal Uncle     Alcohol abuse Maternal Uncle         x2    Heart disease Maternal Uncle     No Known Problems Paternal Aunt     Heart disease Paternal Uncle        Social History     Socioeconomic History    Marital status:      Spouse name: Not on file    Number of children: Not on file    Years of education: Not on file    Highest education level: Not on file   Occupational History    Not on file   Social Needs    Financial resource strain: Not on file    Food insecurity     Worry: Not on file     Inability: Not on file    Transportation needs     Medical: Not on file     Non-medical: Not on file   Tobacco Use    Smoking status: Never Smoker    Smokeless tobacco: Never Used   Substance and Sexual Activity    Alcohol use: Yes     Comment: social, 1 drink monthly    Drug use: No    Sexual activity:  "Yes     Partners: Male   Lifestyle    Physical activity     Days per week: Not on file     Minutes per session: Not on file    Stress: Not on file   Relationships    Social connections     Talks on phone: Not on file     Gets together: Not on file     Attends Restorationism service: Not on file     Active member of club or organization: Not on file     Attends meetings of clubs or organizations: Not on file     Relationship status: Not on file   Other Topics Concern    Not on file   Social History Narrative    Not on file       MEDICATIONS & ALLERGIES:     Current Outpatient Medications on File Prior to Visit   Medication Sig    ACZONE 5 % topical gel Apply topically 2 (two) times daily as needed (roseacea).     alpha lipoic acid 600 mg Cap Take 1 capsule by mouth once daily.    ALPHAGAN P 0.1 % Drop Place 1 drop into both eyes 3 (three) times daily.     ascorbic acid, vitamin C, (VITAMIN C) 100 MG tablet Take 100 mg by mouth 2 (two) times daily.    aspirin (ECOTRIN) 81 MG EC tablet Take 81 mg by mouth once daily.    azelastine (ASTELIN) 137 mcg (0.1 %) nasal spray 1 spray (137 mcg total) by Nasal route 2 (two) times daily.    baclofen (LIORESAL) 10 MG tablet     BD VEO INSULIN SYRINGE UF 1/2 mL 31 gauge x 15/64" Syrg     BENEFIBER, GUAR GUM, ORAL Take 1 Dose by mouth 2 (two) times daily as needed.     calcium citrate-vitamin D3 315-200 mg (CITRACAL+D) 315-200 mg-unit per tablet Take 1 tablet by mouth 2 (two) times daily.    cholecalciferol, vitamin D3, (VITAMIN D3) 2,000 unit Cap Take 1 capsule by mouth once daily.    cinnamon bark 500 mg capsule Take 500 mg by mouth 2 (two) times daily.    clobetasoL (TEMOVATE) 0.05 % external solution     CRESTOR 40 mg Tab Take 40 mg by mouth once daily.     DOCOSAHEXANOIC ACID/EPA (FISH OIL ORAL) Take 1,200 mg by mouth 2 (two) times daily.    doxycycline (VIBRAMYCIN) 50 MG capsule     ESTRACE 0.01 % (0.1 mg/gram) vaginal cream Place vaginally twice a week. " "Tuesday, Saturday    estradiol 10 mcg Tab Place 1 tablet vaginally twice a week. Tuesday, Saturday    ezetimibe (ZETIA) 10 mg tablet Take 10 mg by mouth once daily.    fluticasone propionate (FLONASE) 50 mcg/actuation nasal spray SPRAY 1 SPRAY INTO EACH NOSTRIL ONCE DAY    furosemide (LASIX) 20 MG tablet Take 1 tablet (20 mg total) by mouth 2 (two) times daily.    GLUCAGON EMERGENCY 1 mg injection 1 mg as needed.     HUMALOG 100 unit/mL injection Inject into the skin 3 (three) times daily before meals. 10-17 units tid sliding scale    L.ACID/L.CASEI/B.BIF/B.CEDRIC/FOS (PROBIOTIC BLEND ORAL) Take 1 capsule by mouth every Mon, Wed, Fri.    levocetirizine (XYZAL) 5 MG tablet Take 1 tablet (5 mg total) by mouth every evening.    methylPREDNISolone (MEDROL DOSEPACK) 4 mg tablet use as directed    metoprolol succinate (TOPROL-XL) 25 MG 24 hr tablet Take 1 tablet (25 mg total) by mouth once daily.    montelukast (SINGULAIR) 10 mg tablet TAKE ONE TABLET BY MOUTH EVERY EVENING    NOVOFINE 32 32 gauge x 1/4" Ndle     ondansetron (ZOFRAN-ODT) 4 MG TbDL Take 1 tablet (4 mg total) by mouth every 8 (eight) hours as needed.    ONETOUCH ULTRA BLUE TEST STRIP Strp     pantoprazole (PROTONIX) 40 MG tablet Take 1 tablet (40 mg total) by mouth 2 (two) times daily.    potassium chloride (K-TAB) 20 mEq     promethazine (PHENERGAN) 6.25 mg/5 mL syrup Take 5 mLs (6.25 mg total) by mouth nightly as needed for Nausea.    SOOLANTRA 1 % Crea     SYNTHROID 75 mcg tablet     traMADoL (ULTRAM) 50 mg tablet TAKE 1 TABLET BY MOUTH EVERY 24 HOURS AS NEEDED FOR PAIN    TRESIBA FLEXTOUCH U-100 100 unit/mL (3 mL) InPn Inject 46 Units into the skin every morning.     urea (CARMOL) 40 % Crea Apply topically 2 (two) times daily.    VITAMIN B COMPLEX VIT C NO.4 (SUPER B COMPLEX + C ORAL) Take 1 tablet by mouth once daily.    fluconazole (DIFLUCAN) 150 MG Tab     SYNTHROID 88 mcg tablet Take 88 mcg by mouth every evening.      No " "current facility-administered medications on file prior to visit.        Review of patient's allergies indicates:   Allergen Reactions    Sulfamethoxazole-trimethoprim Rash and Hives     Patient experienced on 12/3/14 redness to face and rash to chest and arms/ with no SOB or airway obstruction    Heparin analogues     Percocet [oxycodone-acetaminophen] Nausea And Vomiting     Also caused dizziness and passed out    Iodinated contrast media Swelling and Rash     Says topical iodine OK    Niaspan extended-release [niacin] Itching and Other (See Comments)     Skin flushing and redness       OBJECTIVE:   Vital Signs:  Vitals:    10/14/20 1322   BP: 124/74   Pulse: 91   Resp: 18   Temp: 97.7 °F (36.5 °C)   TempSrc: Temporal   SpO2: 97%   Weight: 85.5 kg (188 lb 7.9 oz)   Height: 5' 6" (1.676 m)       No results found for this or any previous visit (from the past 24 hour(s)).      Physical Exam   Constitutional: She is oriented to person, place, and time and well-developed, well-nourished, and in no distress.   HENT:   Head: Normocephalic and atraumatic.   Right Ear: External ear normal.   Left Ear: External ear normal.   Eyes: Pupils are equal, round, and reactive to light. Conjunctivae are normal. No scleral icterus.   Neck: Normal range of motion. Neck supple. No thyromegaly present.   Cardiovascular: Normal rate and regular rhythm.   Murmur (systolic ejection murmur 3/6 with radiation to carotids) heard.  Pulmonary/Chest: Effort normal and breath sounds normal. No respiratory distress. She has no wheezes. She has no rales.   Abdominal: Soft. She exhibits no distension.   Musculoskeletal:         General: No deformity or edema.   Lymphadenopathy:     She has no cervical adenopathy.   Neurological: She is alert and oriented to person, place, and time. Gait normal. GCS score is 15.   Skin: Skin is warm and dry. No rash noted. She is not diaphoretic.   Small area of old ulceration with callus overling achilles on " left foot.   Nail removed on great toe   Psychiatric: Mood, memory, affect and judgment normal.   Nursing note and vitals reviewed.      ASSESSMENT & PLAN:     65 F here to establish care.     Already seeing numerous care providers for other specialties.     T1DM managed by endocrinology, well controlled.   Needs to see podiatry again  Follows with Cardiology     Routine general medical examination at health care facility  -     Vitamin B12; Future; Expected date: 10/14/2020  Doing well overall. 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 There are risks but she does not tolerate being off.        Guido Chow MD

## 2020-11-23 ENCOUNTER — HOSPITAL ENCOUNTER (OUTPATIENT)
Dept: RADIOLOGY | Facility: CLINIC | Age: 65
Discharge: HOME | End: 2020-11-23
Attending: OBSTETRICS & GYNECOLOGY
Payer: COMMERCIAL

## 2020-11-23 DIAGNOSIS — Z12.31 ENCOUNTER FOR SCREENING MAMMOGRAM FOR MALIGNANT NEOPLASM OF BREAST: ICD-10-CM

## 2020-11-23 PROCEDURE — 77067 SCR MAMMO BI INCL CAD: CPT | Mod: TC,PO

## 2020-11-23 PROCEDURE — 77067 MAMMO DIGITAL SCREENING BILAT WITH TOMOSYNTHESIS_CAD: ICD-10-PCS | Mod: 26,,, | Performed by: RADIOLOGY

## 2020-11-23 PROCEDURE — 77063 BREAST TOMOSYNTHESIS BI: CPT | Mod: 26,,, | Performed by: RADIOLOGY

## 2020-11-23 PROCEDURE — 77067 SCR MAMMO BI INCL CAD: CPT | Mod: 26,,, | Performed by: RADIOLOGY

## 2020-11-23 PROCEDURE — 77063 MAMMO DIGITAL SCREENING BILAT WITH TOMOSYNTHESIS_CAD: ICD-10-PCS | Mod: 26,,, | Performed by: RADIOLOGY

## 2020-12-03 ENCOUNTER — LAB VISIT (OUTPATIENT)
Dept: LAB | Facility: HOSPITAL | Age: 65
End: 2020-12-03
Attending: INTERNAL MEDICINE
Payer: COMMERCIAL

## 2020-12-03 DIAGNOSIS — E10.9 DIABETES MELLITUS TYPE I: Primary | ICD-10-CM

## 2020-12-03 DIAGNOSIS — E78.00 PURE HYPERCHOLESTEROLEMIA: ICD-10-CM

## 2020-12-03 DIAGNOSIS — E03.9 MYXEDEMA HEART DISEASE: ICD-10-CM

## 2020-12-03 DIAGNOSIS — I51.9 MYXEDEMA HEART DISEASE: ICD-10-CM

## 2020-12-03 DIAGNOSIS — Z79.899 ENCOUNTER FOR LONG-TERM (CURRENT) USE OF OTHER MEDICATIONS: ICD-10-CM

## 2020-12-03 LAB
ALBUMIN/CREAT UR: NORMAL UG/MG (ref 0–30)
ANION GAP SERPL CALC-SCNC: 9 MMOL/L (ref 8–16)
BUN SERPL-MCNC: 9 MG/DL (ref 8–23)
CALCIUM SERPL-MCNC: 9.2 MG/DL (ref 8.7–10.5)
CHLORIDE SERPL-SCNC: 103 MMOL/L (ref 95–110)
CHOLEST SERPL-MCNC: 144 MG/DL (ref 120–199)
CHOLEST/HDLC SERPL: 3 {RATIO} (ref 2–5)
CO2 SERPL-SCNC: 30 MMOL/L (ref 23–29)
CREAT SERPL-MCNC: 0.9 MG/DL (ref 0.5–1.4)
CREAT UR-MCNC: 251 MG/DL (ref 15–325)
EST. GFR  (AFRICAN AMERICAN): >60 ML/MIN/1.73 M^2
EST. GFR  (NON AFRICAN AMERICAN): >60 ML/MIN/1.73 M^2
ESTIMATED AVG GLUCOSE: 137 MG/DL (ref 68–131)
GLUCOSE SERPL-MCNC: 92 MG/DL (ref 70–110)
HBA1C MFR BLD HPLC: 6.4 % (ref 4.5–6.2)
HDLC SERPL-MCNC: 48 MG/DL (ref 40–75)
HDLC SERPL: 33.3 % (ref 20–50)
LDLC SERPL CALC-MCNC: 82.6 MG/DL (ref 63–159)
MICROALBUMIN UR DL<=1MG/L-MCNC: <2 UG/ML
NONHDLC SERPL-MCNC: 96 MG/DL
POTASSIUM SERPL-SCNC: 3.8 MMOL/L (ref 3.5–5.1)
SODIUM SERPL-SCNC: 142 MMOL/L (ref 136–145)
TRIGL SERPL-MCNC: 67 MG/DL (ref 30–150)
TSH SERPL DL<=0.005 MIU/L-ACNC: 0.68 UIU/ML (ref 0.34–5.6)

## 2020-12-03 PROCEDURE — 36415 COLL VENOUS BLD VENIPUNCTURE: CPT

## 2020-12-03 PROCEDURE — 83036 HEMOGLOBIN GLYCOSYLATED A1C: CPT

## 2020-12-03 PROCEDURE — 84443 ASSAY THYROID STIM HORMONE: CPT

## 2020-12-03 PROCEDURE — 80061 LIPID PANEL: CPT

## 2020-12-03 PROCEDURE — 80048 BASIC METABOLIC PNL TOTAL CA: CPT

## 2020-12-03 PROCEDURE — 82043 UR ALBUMIN QUANTITATIVE: CPT

## 2021-01-07 LAB
LEFT EYE DM RETINOPATHY: POSITIVE
RIGHT EYE DM RETINOPATHY: POSITIVE

## 2021-01-13 ENCOUNTER — LAB VISIT (OUTPATIENT)
Dept: PRIMARY CARE CLINIC | Facility: OTHER | Age: 66
End: 2021-01-13
Attending: INTERNAL MEDICINE
Payer: COMMERCIAL

## 2021-01-13 DIAGNOSIS — R51.9 HEAD ACHE: ICD-10-CM

## 2021-01-13 DIAGNOSIS — Z20.822 ENCOUNTER FOR LABORATORY TESTING FOR COVID-19 VIRUS: ICD-10-CM

## 2021-01-13 PROCEDURE — U0003 INFECTIOUS AGENT DETECTION BY NUCLEIC ACID (DNA OR RNA); SEVERE ACUTE RESPIRATORY SYNDROME CORONAVIRUS 2 (SARS-COV-2) (CORONAVIRUS DISEASE [COVID-19]), AMPLIFIED PROBE TECHNIQUE, MAKING USE OF HIGH THROUGHPUT TECHNOLOGIES AS DESCRIBED BY CMS-2020-01-R: HCPCS

## 2021-01-14 LAB — SARS-COV-2 RNA RESP QL NAA+PROBE: NOT DETECTED

## 2021-02-01 ENCOUNTER — OFFICE VISIT (OUTPATIENT)
Dept: SPINE | Facility: CLINIC | Age: 66
End: 2021-02-01
Payer: COMMERCIAL

## 2021-02-01 VITALS — HEIGHT: 66 IN | BODY MASS INDEX: 30.29 KG/M2 | WEIGHT: 188.5 LBS

## 2021-02-01 DIAGNOSIS — M54.42 ACUTE LEFT-SIDED LOW BACK PAIN WITH LEFT-SIDED SCIATICA: Primary | ICD-10-CM

## 2021-02-01 PROCEDURE — 1125F AMNT PAIN NOTED PAIN PRSNT: CPT | Mod: S$GLB,,, | Performed by: PHYSICAL MEDICINE & REHABILITATION

## 2021-02-01 PROCEDURE — 3008F BODY MASS INDEX DOCD: CPT | Mod: CPTII,S$GLB,, | Performed by: PHYSICAL MEDICINE & REHABILITATION

## 2021-02-01 PROCEDURE — 3008F PR BODY MASS INDEX (BMI) DOCUMENTED: ICD-10-PCS | Mod: CPTII,S$GLB,, | Performed by: PHYSICAL MEDICINE & REHABILITATION

## 2021-02-01 PROCEDURE — 99203 OFFICE O/P NEW LOW 30 MIN: CPT | Mod: S$GLB,,, | Performed by: PHYSICAL MEDICINE & REHABILITATION

## 2021-02-01 PROCEDURE — 1125F PR PAIN SEVERITY QUANTIFIED, PAIN PRESENT: ICD-10-PCS | Mod: S$GLB,,, | Performed by: PHYSICAL MEDICINE & REHABILITATION

## 2021-02-01 PROCEDURE — 99203 PR OFFICE/OUTPT VISIT, NEW, LEVL III, 30-44 MIN: ICD-10-PCS | Mod: S$GLB,,, | Performed by: PHYSICAL MEDICINE & REHABILITATION

## 2021-02-01 RX ORDER — BACLOFEN 20 MG/1
20 TABLET ORAL 3 TIMES DAILY
Qty: 30 TABLET | Refills: 0 | Status: SHIPPED | OUTPATIENT
Start: 2021-02-01 | End: 2021-05-04 | Stop reason: SDUPTHER

## 2021-02-01 RX ORDER — HYDROCODONE BITARTRATE AND ACETAMINOPHEN 7.5; 325 MG/1; MG/1
1 TABLET ORAL EVERY 6 HOURS PRN
Qty: 28 TABLET | Refills: 0 | Status: SHIPPED | OUTPATIENT
Start: 2021-02-01 | End: 2022-02-23

## 2021-02-01 RX ORDER — METHYLPREDNISOLONE 4 MG/1
TABLET ORAL
Qty: 1 PACKAGE | Refills: 0 | Status: SHIPPED | OUTPATIENT
Start: 2021-02-01 | End: 2021-02-22

## 2021-02-01 RX ORDER — GABAPENTIN 300 MG/1
300 CAPSULE ORAL NIGHTLY
Qty: 30 CAPSULE | Refills: 0 | Status: SHIPPED | OUTPATIENT
Start: 2021-02-01 | End: 2022-11-29

## 2021-02-01 RX ORDER — METHYLPREDNISOLONE ACETATE 40 MG/ML
40 INJECTION, SUSPENSION INTRA-ARTICULAR; INTRALESIONAL; INTRAMUSCULAR; SOFT TISSUE ONCE
Status: DISCONTINUED | OUTPATIENT
Start: 2021-02-01 | End: 2022-11-29

## 2021-02-08 ENCOUNTER — OFFICE VISIT (OUTPATIENT)
Dept: SPINE | Facility: CLINIC | Age: 66
End: 2021-02-08
Payer: COMMERCIAL

## 2021-02-08 DIAGNOSIS — M54.42 ACUTE LEFT-SIDED LOW BACK PAIN WITH LEFT-SIDED SCIATICA: Primary | ICD-10-CM

## 2021-02-08 PROCEDURE — 99213 PR OFFICE/OUTPT VISIT, EST, LEVL III, 20-29 MIN: ICD-10-PCS | Mod: S$GLB,,, | Performed by: PHYSICAL MEDICINE & REHABILITATION

## 2021-02-08 PROCEDURE — 99213 OFFICE O/P EST LOW 20 MIN: CPT | Mod: S$GLB,,, | Performed by: PHYSICAL MEDICINE & REHABILITATION

## 2021-02-09 ENCOUNTER — TELEPHONE (OUTPATIENT)
Dept: SPINE | Facility: CLINIC | Age: 66
End: 2021-02-09

## 2021-03-11 ENCOUNTER — LAB VISIT (OUTPATIENT)
Dept: LAB | Facility: HOSPITAL | Age: 66
End: 2021-03-11
Attending: INTERNAL MEDICINE
Payer: COMMERCIAL

## 2021-03-11 DIAGNOSIS — N18.2 CHRONIC KIDNEY DISEASE, STAGE II (MILD): Primary | ICD-10-CM

## 2021-03-11 LAB
BACTERIA #/AREA URNS AUTO: ABNORMAL /HPF
BASOPHILS # BLD AUTO: 0.02 K/UL (ref 0–0.2)
BASOPHILS NFR BLD: 0.3 % (ref 0–1.9)
BILIRUB UR QL STRIP: NEGATIVE
CLARITY UR REFRACT.AUTO: ABNORMAL
COLOR UR AUTO: YELLOW
DIFFERENTIAL METHOD: ABNORMAL
EOSINOPHIL # BLD AUTO: 0.1 K/UL (ref 0–0.5)
EOSINOPHIL NFR BLD: 2.1 % (ref 0–8)
ERYTHROCYTE [DISTWIDTH] IN BLOOD BY AUTOMATED COUNT: 14.8 % (ref 11.5–14.5)
GLUCOSE UR QL STRIP: ABNORMAL
HCT VFR BLD AUTO: 40.1 % (ref 37–48.5)
HGB BLD-MCNC: 12.5 G/DL (ref 12–16)
HGB UR QL STRIP: NEGATIVE
HYALINE CASTS UR QL AUTO: 3 /LPF
IMM GRANULOCYTES # BLD AUTO: 0.01 K/UL (ref 0–0.04)
IMM GRANULOCYTES NFR BLD AUTO: 0.2 % (ref 0–0.5)
KETONES UR QL STRIP: NEGATIVE
LEUKOCYTE ESTERASE UR QL STRIP: ABNORMAL
LYMPHOCYTES # BLD AUTO: 1.1 K/UL (ref 1–4.8)
LYMPHOCYTES NFR BLD: 18.4 % (ref 18–48)
MCH RBC QN AUTO: 30.3 PG (ref 27–31)
MCHC RBC AUTO-ENTMCNC: 31.2 G/DL (ref 32–36)
MCV RBC AUTO: 97 FL (ref 82–98)
MICROSCOPIC COMMENT: ABNORMAL
MONOCYTES # BLD AUTO: 0.4 K/UL (ref 0.3–1)
MONOCYTES NFR BLD: 7.7 % (ref 4–15)
NEUTROPHILS # BLD AUTO: 4.1 K/UL (ref 1.8–7.7)
NEUTROPHILS NFR BLD: 71.3 % (ref 38–73)
NITRITE UR QL STRIP: NEGATIVE
NRBC BLD-RTO: 0 /100 WBC
PH UR STRIP: 6 [PH] (ref 5–8)
PLATELET # BLD AUTO: 203 K/UL (ref 150–350)
PMV BLD AUTO: 11.5 FL (ref 9.2–12.9)
PROT UR QL STRIP: NEGATIVE
RBC # BLD AUTO: 4.12 M/UL (ref 4–5.4)
RBC #/AREA URNS AUTO: 2 /HPF (ref 0–4)
SP GR UR STRIP: 1 (ref 1–1.03)
SQUAMOUS #/AREA URNS AUTO: 11 /HPF
URN SPEC COLLECT METH UR: ABNORMAL
WBC # BLD AUTO: 5.72 K/UL (ref 3.9–12.7)
WBC #/AREA URNS AUTO: 18 /HPF (ref 0–5)
YEAST UR QL AUTO: ABNORMAL

## 2021-03-11 PROCEDURE — 81001 URINALYSIS AUTO W/SCOPE: CPT | Performed by: INTERNAL MEDICINE

## 2021-03-11 PROCEDURE — 36415 COLL VENOUS BLD VENIPUNCTURE: CPT | Mod: PO | Performed by: INTERNAL MEDICINE

## 2021-03-11 PROCEDURE — 82570 ASSAY OF URINE CREATININE: CPT | Performed by: INTERNAL MEDICINE

## 2021-03-11 PROCEDURE — 82043 UR ALBUMIN QUANTITATIVE: CPT | Performed by: INTERNAL MEDICINE

## 2021-03-11 PROCEDURE — 85025 COMPLETE CBC W/AUTO DIFF WBC: CPT | Performed by: INTERNAL MEDICINE

## 2021-03-11 PROCEDURE — 80069 RENAL FUNCTION PANEL: CPT | Performed by: INTERNAL MEDICINE

## 2021-03-12 LAB
ALBUMIN SERPL BCP-MCNC: 3.6 G/DL (ref 3.5–5.2)
ALBUMIN/CREAT UR: 10.3 UG/MG (ref 0–30)
ANION GAP SERPL CALC-SCNC: 14 MMOL/L (ref 8–16)
BUN SERPL-MCNC: 10 MG/DL (ref 8–23)
CALCIUM SERPL-MCNC: 9 MG/DL (ref 8.7–10.5)
CHLORIDE SERPL-SCNC: 98 MMOL/L (ref 95–110)
CO2 SERPL-SCNC: 25 MMOL/L (ref 23–29)
CREAT SERPL-MCNC: 1.1 MG/DL (ref 0.5–1.4)
CREAT UR-MCNC: 29 MG/DL (ref 15–325)
EST. GFR  (AFRICAN AMERICAN): >60 ML/MIN/1.73 M^2
EST. GFR  (NON AFRICAN AMERICAN): 53.2 ML/MIN/1.73 M^2
GLUCOSE SERPL-MCNC: 334 MG/DL (ref 70–110)
MICROALBUMIN UR DL<=1MG/L-MCNC: 3 UG/ML
PHOSPHATE SERPL-MCNC: 4 MG/DL (ref 2.7–4.5)
POTASSIUM SERPL-SCNC: 3.8 MMOL/L (ref 3.5–5.1)
SODIUM SERPL-SCNC: 137 MMOL/L (ref 136–145)

## 2021-04-14 ENCOUNTER — OFFICE VISIT (OUTPATIENT)
Dept: FAMILY MEDICINE | Facility: CLINIC | Age: 66
End: 2021-04-14
Payer: COMMERCIAL

## 2021-04-14 VITALS
RESPIRATION RATE: 18 BRPM | SYSTOLIC BLOOD PRESSURE: 119 MMHG | DIASTOLIC BLOOD PRESSURE: 76 MMHG | OXYGEN SATURATION: 98 % | WEIGHT: 185.88 LBS | HEART RATE: 85 BPM | HEIGHT: 66 IN | BODY MASS INDEX: 29.87 KG/M2

## 2021-04-14 DIAGNOSIS — E10.22 STAGE 3 CHRONIC KIDNEY DISEASE DUE TO TYPE 1 DIABETES MELLITUS: ICD-10-CM

## 2021-04-14 DIAGNOSIS — N18.30 STAGE 3 CHRONIC KIDNEY DISEASE DUE TO TYPE 1 DIABETES MELLITUS: ICD-10-CM

## 2021-04-14 DIAGNOSIS — M54.42 CHRONIC BILATERAL LOW BACK PAIN WITH BILATERAL SCIATICA: ICD-10-CM

## 2021-04-14 DIAGNOSIS — E11.43 DIABETIC GASTROPARESIS: ICD-10-CM

## 2021-04-14 DIAGNOSIS — Z00.00 ROUTINE GENERAL MEDICAL EXAMINATION AT A HEALTH CARE FACILITY: Primary | ICD-10-CM

## 2021-04-14 DIAGNOSIS — G89.29 CHRONIC BILATERAL LOW BACK PAIN WITH BILATERAL SCIATICA: ICD-10-CM

## 2021-04-14 DIAGNOSIS — E10.42 CONTROLLED TYPE 1 DIABETES MELLITUS WITH DIABETIC POLYNEUROPATHY, WITH LONG-TERM CURRENT USE OF INSULIN: ICD-10-CM

## 2021-04-14 DIAGNOSIS — K31.84 DIABETIC GASTROPARESIS: ICD-10-CM

## 2021-04-14 DIAGNOSIS — M54.41 CHRONIC BILATERAL LOW BACK PAIN WITH BILATERAL SCIATICA: ICD-10-CM

## 2021-04-14 PROCEDURE — 3044F PR MOST RECENT HEMOGLOBIN A1C LEVEL <7.0%: ICD-10-PCS | Mod: CPTII,S$GLB,, | Performed by: STUDENT IN AN ORGANIZED HEALTH CARE EDUCATION/TRAINING PROGRAM

## 2021-04-14 PROCEDURE — 3008F PR BODY MASS INDEX (BMI) DOCUMENTED: ICD-10-PCS | Mod: CPTII,S$GLB,, | Performed by: STUDENT IN AN ORGANIZED HEALTH CARE EDUCATION/TRAINING PROGRAM

## 2021-04-14 PROCEDURE — 99999 PR PBB SHADOW E&M-EST. PATIENT-LVL III: ICD-10-PCS | Mod: PBBFAC,,, | Performed by: STUDENT IN AN ORGANIZED HEALTH CARE EDUCATION/TRAINING PROGRAM

## 2021-04-14 PROCEDURE — 3044F HG A1C LEVEL LT 7.0%: CPT | Mod: CPTII,S$GLB,, | Performed by: STUDENT IN AN ORGANIZED HEALTH CARE EDUCATION/TRAINING PROGRAM

## 2021-04-14 PROCEDURE — 99214 OFFICE O/P EST MOD 30 MIN: CPT | Mod: S$GLB,,, | Performed by: STUDENT IN AN ORGANIZED HEALTH CARE EDUCATION/TRAINING PROGRAM

## 2021-04-14 PROCEDURE — 99214 PR OFFICE/OUTPT VISIT, EST, LEVL IV, 30-39 MIN: ICD-10-PCS | Mod: S$GLB,,, | Performed by: STUDENT IN AN ORGANIZED HEALTH CARE EDUCATION/TRAINING PROGRAM

## 2021-04-14 PROCEDURE — 99999 PR PBB SHADOW E&M-EST. PATIENT-LVL III: CPT | Mod: PBBFAC,,, | Performed by: STUDENT IN AN ORGANIZED HEALTH CARE EDUCATION/TRAINING PROGRAM

## 2021-04-14 PROCEDURE — 3008F BODY MASS INDEX DOCD: CPT | Mod: CPTII,S$GLB,, | Performed by: STUDENT IN AN ORGANIZED HEALTH CARE EDUCATION/TRAINING PROGRAM

## 2021-04-14 RX ORDER — ONDANSETRON 4 MG/1
4 TABLET, ORALLY DISINTEGRATING ORAL EVERY 8 HOURS PRN
Qty: 60 TABLET | Refills: 1 | Status: SHIPPED | OUTPATIENT
Start: 2021-04-14

## 2021-04-14 RX ORDER — ONDANSETRON 4 MG/1
4 TABLET, ORALLY DISINTEGRATING ORAL EVERY 8 HOURS PRN
Qty: 60 TABLET | Refills: 1 | Status: SHIPPED | OUTPATIENT
Start: 2021-04-14 | End: 2021-04-14

## 2021-04-20 ENCOUNTER — LAB VISIT (OUTPATIENT)
Dept: LAB | Facility: HOSPITAL | Age: 66
End: 2021-04-20
Attending: INTERNAL MEDICINE
Payer: COMMERCIAL

## 2021-04-20 DIAGNOSIS — E78.00 PURE HYPERCHOLESTEROLEMIA: ICD-10-CM

## 2021-04-20 DIAGNOSIS — I51.9 MYXEDEMA HEART DISEASE: ICD-10-CM

## 2021-04-20 DIAGNOSIS — Z79.899 ENCOUNTER FOR LONG-TERM (CURRENT) USE OF OTHER MEDICATIONS: ICD-10-CM

## 2021-04-20 DIAGNOSIS — E03.9 MYXEDEMA HEART DISEASE: ICD-10-CM

## 2021-04-20 DIAGNOSIS — E10.9 DIABETES MELLITUS TYPE I: Primary | ICD-10-CM

## 2021-04-20 LAB
ANION GAP SERPL CALC-SCNC: 7 MMOL/L (ref 8–16)
BUN SERPL-MCNC: 10 MG/DL (ref 8–23)
CALCIUM SERPL-MCNC: 8.9 MG/DL (ref 8.7–10.5)
CHLORIDE SERPL-SCNC: 104 MMOL/L (ref 95–110)
CHOLEST SERPL-MCNC: 150 MG/DL (ref 120–199)
CHOLEST/HDLC SERPL: 3.3 {RATIO} (ref 2–5)
CO2 SERPL-SCNC: 31 MMOL/L (ref 23–29)
CREAT SERPL-MCNC: 1 MG/DL (ref 0.5–1.4)
EST. GFR  (AFRICAN AMERICAN): >60 ML/MIN/1.73 M^2
EST. GFR  (NON AFRICAN AMERICAN): 59.7 ML/MIN/1.73 M^2
ESTIMATED AVG GLUCOSE: 151 MG/DL (ref 68–131)
GLUCOSE SERPL-MCNC: 130 MG/DL (ref 70–110)
HBA1C MFR BLD: 6.9 % (ref 4–5.6)
HDLC SERPL-MCNC: 45 MG/DL (ref 40–75)
HDLC SERPL: 30 % (ref 20–50)
LDLC SERPL CALC-MCNC: 91 MG/DL (ref 63–159)
NONHDLC SERPL-MCNC: 105 MG/DL
POTASSIUM SERPL-SCNC: 4 MMOL/L (ref 3.5–5.1)
SODIUM SERPL-SCNC: 142 MMOL/L (ref 136–145)
TRIGL SERPL-MCNC: 70 MG/DL (ref 30–150)
TSH SERPL DL<=0.005 MIU/L-ACNC: 1.76 UIU/ML (ref 0.4–4)

## 2021-04-20 PROCEDURE — 36415 COLL VENOUS BLD VENIPUNCTURE: CPT | Mod: PO | Performed by: INTERNAL MEDICINE

## 2021-04-20 PROCEDURE — 83036 HEMOGLOBIN GLYCOSYLATED A1C: CPT | Performed by: INTERNAL MEDICINE

## 2021-04-20 PROCEDURE — 80061 LIPID PANEL: CPT | Performed by: INTERNAL MEDICINE

## 2021-04-20 PROCEDURE — 84443 ASSAY THYROID STIM HORMONE: CPT | Performed by: INTERNAL MEDICINE

## 2021-04-20 PROCEDURE — 80048 BASIC METABOLIC PNL TOTAL CA: CPT | Performed by: INTERNAL MEDICINE

## 2021-05-04 ENCOUNTER — TELEPHONE (OUTPATIENT)
Dept: SPINE | Facility: CLINIC | Age: 66
End: 2021-05-04

## 2021-05-04 RX ORDER — BACLOFEN 20 MG/1
20 TABLET ORAL 3 TIMES DAILY
Qty: 90 TABLET | Refills: 1 | Status: SHIPPED | OUTPATIENT
Start: 2021-05-04 | End: 2022-11-29

## 2021-07-28 ENCOUNTER — PATIENT OUTREACH (OUTPATIENT)
Dept: FAMILY MEDICINE | Facility: CLINIC | Age: 66
End: 2021-07-28

## 2021-08-05 ENCOUNTER — PATIENT OUTREACH (OUTPATIENT)
Dept: ADMINISTRATIVE | Facility: HOSPITAL | Age: 66
End: 2021-08-05

## 2021-08-19 LAB
HUMAN PAPILLOMAVIRUS (HPV): NORMAL
PAP SMEAR: ABNORMAL

## 2021-08-20 ENCOUNTER — LAB VISIT (OUTPATIENT)
Dept: LAB | Facility: HOSPITAL | Age: 66
End: 2021-08-20
Attending: INTERNAL MEDICINE
Payer: COMMERCIAL

## 2021-08-20 DIAGNOSIS — Z79.899 ENCOUNTER FOR LONG-TERM (CURRENT) USE OF OTHER MEDICATIONS: ICD-10-CM

## 2021-08-20 DIAGNOSIS — E10.9 DIABETES MELLITUS TYPE I: Primary | ICD-10-CM

## 2021-08-20 DIAGNOSIS — E03.9 MYXEDEMA HEART DISEASE: ICD-10-CM

## 2021-08-20 DIAGNOSIS — I51.9 MYXEDEMA HEART DISEASE: ICD-10-CM

## 2021-08-20 DIAGNOSIS — E78.00 PURE HYPERCHOLESTEROLEMIA: ICD-10-CM

## 2021-08-20 LAB
ALBUMIN SERPL BCP-MCNC: 3.6 G/DL (ref 3.5–5.2)
ALBUMIN/CREAT UR: 2 UG/MG (ref 0–30)
ALP SERPL-CCNC: 58 U/L (ref 55–135)
ALT SERPL W/O P-5'-P-CCNC: 27 U/L (ref 10–44)
ANION GAP SERPL CALC-SCNC: 12 MMOL/L (ref 8–16)
AST SERPL-CCNC: 29 U/L (ref 10–40)
BILIRUB SERPL-MCNC: 1.1 MG/DL (ref 0.1–1)
BUN SERPL-MCNC: 10 MG/DL (ref 8–23)
CALCIUM SERPL-MCNC: 9.2 MG/DL (ref 8.7–10.5)
CHLORIDE SERPL-SCNC: 101 MMOL/L (ref 95–110)
CHOLEST SERPL-MCNC: 154 MG/DL (ref 120–199)
CHOLEST/HDLC SERPL: 3.3 {RATIO} (ref 2–5)
CO2 SERPL-SCNC: 27 MMOL/L (ref 23–29)
CREAT SERPL-MCNC: 1 MG/DL (ref 0.5–1.4)
CREAT UR-MCNC: 360 MG/DL (ref 15–325)
EST. GFR  (AFRICAN AMERICAN): >60 ML/MIN/1.73 M^2
EST. GFR  (NON AFRICAN AMERICAN): 59.7 ML/MIN/1.73 M^2
ESTIMATED AVG GLUCOSE: 154 MG/DL (ref 68–131)
GLUCOSE SERPL-MCNC: 209 MG/DL (ref 70–110)
HBA1C MFR BLD: 7 % (ref 4.5–6.2)
HDLC SERPL-MCNC: 46 MG/DL (ref 40–75)
HDLC SERPL: 29.9 % (ref 20–50)
LDLC SERPL CALC-MCNC: 88.8 MG/DL (ref 63–159)
MICROALBUMIN UR DL<=1MG/L-MCNC: 7.1 UG/ML
NONHDLC SERPL-MCNC: 108 MG/DL
POTASSIUM SERPL-SCNC: 3.9 MMOL/L (ref 3.5–5.1)
PROT SERPL-MCNC: 6.6 G/DL (ref 6–8.4)
SODIUM SERPL-SCNC: 140 MMOL/L (ref 136–145)
TRIGL SERPL-MCNC: 96 MG/DL (ref 30–150)

## 2021-08-20 PROCEDURE — 83036 HEMOGLOBIN GLYCOSYLATED A1C: CPT | Performed by: INTERNAL MEDICINE

## 2021-08-20 PROCEDURE — 82570 ASSAY OF URINE CREATININE: CPT | Performed by: INTERNAL MEDICINE

## 2021-08-20 PROCEDURE — 80053 COMPREHEN METABOLIC PANEL: CPT | Performed by: INTERNAL MEDICINE

## 2021-08-20 PROCEDURE — 36415 COLL VENOUS BLD VENIPUNCTURE: CPT | Performed by: INTERNAL MEDICINE

## 2021-08-20 PROCEDURE — 80061 LIPID PANEL: CPT | Performed by: INTERNAL MEDICINE

## 2021-09-07 ENCOUNTER — TELEPHONE (OUTPATIENT)
Dept: FAMILY MEDICINE | Facility: CLINIC | Age: 66
End: 2021-09-07

## 2021-09-08 ENCOUNTER — OFFICE VISIT (OUTPATIENT)
Dept: PODIATRY | Facility: CLINIC | Age: 66
End: 2021-09-08
Payer: COMMERCIAL

## 2021-09-08 VITALS — WEIGHT: 185.88 LBS | BODY MASS INDEX: 29.87 KG/M2 | HEIGHT: 66 IN

## 2021-09-08 DIAGNOSIS — E10.42 CONTROLLED TYPE 1 DIABETES MELLITUS WITH DIABETIC POLYNEUROPATHY: Primary | ICD-10-CM

## 2021-09-08 DIAGNOSIS — M20.42 HAMMER TOES OF BOTH FEET: ICD-10-CM

## 2021-09-08 DIAGNOSIS — L84 CORN OR CALLUS: ICD-10-CM

## 2021-09-08 DIAGNOSIS — M20.41 HAMMER TOES OF BOTH FEET: ICD-10-CM

## 2021-09-08 PROCEDURE — 11056 PARNG/CUTG B9 HYPRKR LES 2-4: CPT | Mod: S$GLB,,, | Performed by: PODIATRIST

## 2021-09-08 PROCEDURE — 99213 PR OFFICE/OUTPT VISIT, EST, LEVL III, 20-29 MIN: ICD-10-PCS | Mod: 25,S$GLB,, | Performed by: PODIATRIST

## 2021-09-08 PROCEDURE — 99999 PR PBB SHADOW E&M-EST. PATIENT-LVL II: ICD-10-PCS | Mod: PBBFAC,,, | Performed by: PODIATRIST

## 2021-09-08 PROCEDURE — 3008F PR BODY MASS INDEX (BMI) DOCUMENTED: ICD-10-PCS | Mod: CPTII,S$GLB,, | Performed by: PODIATRIST

## 2021-09-08 PROCEDURE — 1159F MED LIST DOCD IN RCRD: CPT | Mod: CPTII,S$GLB,, | Performed by: PODIATRIST

## 2021-09-08 PROCEDURE — 11056 PR TRIM BENIGN HYPERKERATOTIC SKIN LESION,2-4: ICD-10-PCS | Mod: S$GLB,,, | Performed by: PODIATRIST

## 2021-09-08 PROCEDURE — 3061F NEG MICROALBUMINURIA REV: CPT | Mod: CPTII,S$GLB,, | Performed by: PODIATRIST

## 2021-09-08 PROCEDURE — 3051F HG A1C>EQUAL 7.0%<8.0%: CPT | Mod: CPTII,S$GLB,, | Performed by: PODIATRIST

## 2021-09-08 PROCEDURE — 99213 OFFICE O/P EST LOW 20 MIN: CPT | Mod: 25,S$GLB,, | Performed by: PODIATRIST

## 2021-09-08 PROCEDURE — 3051F PR MOST RECENT HEMOGLOBIN A1C LEVEL 7.0 - < 8.0%: ICD-10-PCS | Mod: CPTII,S$GLB,, | Performed by: PODIATRIST

## 2021-09-08 PROCEDURE — 3066F PR DOCUMENTATION OF TREATMENT FOR NEPHROPATHY: ICD-10-PCS | Mod: CPTII,S$GLB,, | Performed by: PODIATRIST

## 2021-09-08 PROCEDURE — 99999 PR PBB SHADOW E&M-EST. PATIENT-LVL II: CPT | Mod: PBBFAC,,, | Performed by: PODIATRIST

## 2021-09-08 PROCEDURE — 3061F PR NEG MICROALBUMINURIA RESULT DOCUMENTED/REVIEW: ICD-10-PCS | Mod: CPTII,S$GLB,, | Performed by: PODIATRIST

## 2021-09-08 PROCEDURE — 1159F PR MEDICATION LIST DOCUMENTED IN MEDICAL RECORD: ICD-10-PCS | Mod: CPTII,S$GLB,, | Performed by: PODIATRIST

## 2021-09-08 PROCEDURE — 3008F BODY MASS INDEX DOCD: CPT | Mod: CPTII,S$GLB,, | Performed by: PODIATRIST

## 2021-09-08 PROCEDURE — 3066F NEPHROPATHY DOC TX: CPT | Mod: CPTII,S$GLB,, | Performed by: PODIATRIST

## 2021-09-09 ENCOUNTER — LAB VISIT (OUTPATIENT)
Dept: LAB | Facility: HOSPITAL | Age: 66
End: 2021-09-09
Attending: INTERNAL MEDICINE
Payer: COMMERCIAL

## 2021-09-09 DIAGNOSIS — N18.2 CHRONIC KIDNEY DISEASE, STAGE II (MILD): Primary | ICD-10-CM

## 2021-09-09 LAB
ALBUMIN SERPL BCP-MCNC: 4.1 G/DL (ref 3.5–5.2)
ALBUMIN/CREAT UR: NORMAL UG/MG (ref 0–30)
ANION GAP SERPL CALC-SCNC: 12 MMOL/L (ref 8–16)
BASOPHILS # BLD AUTO: 0.03 K/UL (ref 0–0.2)
BASOPHILS NFR BLD: 0.4 % (ref 0–1.9)
BILIRUB UR QL STRIP: NEGATIVE
BUN SERPL-MCNC: 9 MG/DL (ref 8–23)
CALCIUM SERPL-MCNC: 9.6 MG/DL (ref 8.7–10.5)
CHLORIDE SERPL-SCNC: 99 MMOL/L (ref 95–110)
CLARITY UR: CLEAR
CO2 SERPL-SCNC: 31 MMOL/L (ref 23–29)
COLOR UR: YELLOW
CREAT SERPL-MCNC: 1 MG/DL (ref 0.5–1.4)
CREAT UR-MCNC: 48 MG/DL (ref 15–325)
DIFFERENTIAL METHOD: ABNORMAL
EOSINOPHIL # BLD AUTO: 0.2 K/UL (ref 0–0.5)
EOSINOPHIL NFR BLD: 2.6 % (ref 0–8)
ERYTHROCYTE [DISTWIDTH] IN BLOOD BY AUTOMATED COUNT: 14.7 % (ref 11.5–14.5)
EST. GFR  (AFRICAN AMERICAN): >60 ML/MIN/1.73 M^2
EST. GFR  (NON AFRICAN AMERICAN): 59.7 ML/MIN/1.73 M^2
GLUCOSE SERPL-MCNC: 109 MG/DL (ref 70–110)
GLUCOSE UR QL STRIP: NEGATIVE
HCT VFR BLD AUTO: 44.2 % (ref 37–48.5)
HGB BLD-MCNC: 13.8 G/DL (ref 12–16)
HGB UR QL STRIP: NEGATIVE
IMM GRANULOCYTES # BLD AUTO: 0.01 K/UL (ref 0–0.04)
IMM GRANULOCYTES NFR BLD AUTO: 0.1 % (ref 0–0.5)
KETONES UR QL STRIP: NEGATIVE
LEUKOCYTE ESTERASE UR QL STRIP: NEGATIVE
LYMPHOCYTES # BLD AUTO: 1.2 K/UL (ref 1–4.8)
LYMPHOCYTES NFR BLD: 16.2 % (ref 18–48)
MCH RBC QN AUTO: 30.3 PG (ref 27–31)
MCHC RBC AUTO-ENTMCNC: 31.2 G/DL (ref 32–36)
MCV RBC AUTO: 97 FL (ref 82–98)
MICROALBUMIN UR DL<=1MG/L-MCNC: <2 UG/ML
MONOCYTES # BLD AUTO: 0.5 K/UL (ref 0.3–1)
MONOCYTES NFR BLD: 6.6 % (ref 4–15)
NEUTROPHILS # BLD AUTO: 5.6 K/UL (ref 1.8–7.7)
NEUTROPHILS NFR BLD: 74.1 % (ref 38–73)
NITRITE UR QL STRIP: NEGATIVE
NRBC BLD-RTO: 0 /100 WBC
PH UR STRIP: 6 [PH] (ref 5–8)
PHOSPHATE SERPL-MCNC: 3.4 MG/DL (ref 2.7–4.5)
PLATELET # BLD AUTO: 200 K/UL (ref 150–450)
PMV BLD AUTO: 10.2 FL (ref 9.2–12.9)
POTASSIUM SERPL-SCNC: 3.7 MMOL/L (ref 3.5–5.1)
PROT UR QL STRIP: NEGATIVE
RBC # BLD AUTO: 4.55 M/UL (ref 4–5.4)
SODIUM SERPL-SCNC: 142 MMOL/L (ref 136–145)
SP GR UR STRIP: 1.01 (ref 1–1.03)
URN SPEC COLLECT METH UR: NORMAL
UROBILINOGEN UR STRIP-ACNC: NEGATIVE EU/DL
WBC # BLD AUTO: 7.55 K/UL (ref 3.9–12.7)

## 2021-09-09 PROCEDURE — 81003 URINALYSIS AUTO W/O SCOPE: CPT | Performed by: INTERNAL MEDICINE

## 2021-09-09 PROCEDURE — 85025 COMPLETE CBC W/AUTO DIFF WBC: CPT | Performed by: INTERNAL MEDICINE

## 2021-09-09 PROCEDURE — 80069 RENAL FUNCTION PANEL: CPT | Performed by: INTERNAL MEDICINE

## 2021-09-09 PROCEDURE — 82570 ASSAY OF URINE CREATININE: CPT | Performed by: INTERNAL MEDICINE

## 2021-09-09 PROCEDURE — 36415 COLL VENOUS BLD VENIPUNCTURE: CPT | Performed by: INTERNAL MEDICINE

## 2021-09-30 LAB
LEFT EYE DM RETINOPATHY: POSITIVE
RIGHT EYE DM RETINOPATHY: POSITIVE

## 2021-10-04 ENCOUNTER — OFFICE VISIT (OUTPATIENT)
Dept: FAMILY MEDICINE | Facility: CLINIC | Age: 66
End: 2021-10-04
Payer: COMMERCIAL

## 2021-10-04 VITALS
HEIGHT: 66 IN | TEMPERATURE: 98 F | DIASTOLIC BLOOD PRESSURE: 72 MMHG | OXYGEN SATURATION: 98 % | RESPIRATION RATE: 18 BRPM | SYSTOLIC BLOOD PRESSURE: 110 MMHG | BODY MASS INDEX: 29.83 KG/M2 | WEIGHT: 185.63 LBS | HEART RATE: 83 BPM

## 2021-10-04 DIAGNOSIS — H65.192 ACUTE MUCOID OTITIS MEDIA OF LEFT EAR: Primary | ICD-10-CM

## 2021-10-04 DIAGNOSIS — J30.2 CHRONIC SEASONAL ALLERGIC RHINITIS: ICD-10-CM

## 2021-10-04 PROCEDURE — 1159F PR MEDICATION LIST DOCUMENTED IN MEDICAL RECORD: ICD-10-PCS | Mod: CPTII,S$GLB,, | Performed by: STUDENT IN AN ORGANIZED HEALTH CARE EDUCATION/TRAINING PROGRAM

## 2021-10-04 PROCEDURE — 3078F PR MOST RECENT DIASTOLIC BLOOD PRESSURE < 80 MM HG: ICD-10-PCS | Mod: CPTII,S$GLB,, | Performed by: STUDENT IN AN ORGANIZED HEALTH CARE EDUCATION/TRAINING PROGRAM

## 2021-10-04 PROCEDURE — 99214 OFFICE O/P EST MOD 30 MIN: CPT | Mod: S$GLB,,, | Performed by: STUDENT IN AN ORGANIZED HEALTH CARE EDUCATION/TRAINING PROGRAM

## 2021-10-04 PROCEDURE — 3066F NEPHROPATHY DOC TX: CPT | Mod: CPTII,S$GLB,, | Performed by: STUDENT IN AN ORGANIZED HEALTH CARE EDUCATION/TRAINING PROGRAM

## 2021-10-04 PROCEDURE — 3078F DIAST BP <80 MM HG: CPT | Mod: CPTII,S$GLB,, | Performed by: STUDENT IN AN ORGANIZED HEALTH CARE EDUCATION/TRAINING PROGRAM

## 2021-10-04 PROCEDURE — 3074F PR MOST RECENT SYSTOLIC BLOOD PRESSURE < 130 MM HG: ICD-10-PCS | Mod: CPTII,S$GLB,, | Performed by: STUDENT IN AN ORGANIZED HEALTH CARE EDUCATION/TRAINING PROGRAM

## 2021-10-04 PROCEDURE — 3074F SYST BP LT 130 MM HG: CPT | Mod: CPTII,S$GLB,, | Performed by: STUDENT IN AN ORGANIZED HEALTH CARE EDUCATION/TRAINING PROGRAM

## 2021-10-04 PROCEDURE — 1160F RVW MEDS BY RX/DR IN RCRD: CPT | Mod: CPTII,S$GLB,, | Performed by: STUDENT IN AN ORGANIZED HEALTH CARE EDUCATION/TRAINING PROGRAM

## 2021-10-04 PROCEDURE — 3061F NEG MICROALBUMINURIA REV: CPT | Mod: CPTII,S$GLB,, | Performed by: STUDENT IN AN ORGANIZED HEALTH CARE EDUCATION/TRAINING PROGRAM

## 2021-10-04 PROCEDURE — 3008F BODY MASS INDEX DOCD: CPT | Mod: CPTII,S$GLB,, | Performed by: STUDENT IN AN ORGANIZED HEALTH CARE EDUCATION/TRAINING PROGRAM

## 2021-10-04 PROCEDURE — 1160F PR REVIEW ALL MEDS BY PRESCRIBER/CLIN PHARMACIST DOCUMENTED: ICD-10-PCS | Mod: CPTII,S$GLB,, | Performed by: STUDENT IN AN ORGANIZED HEALTH CARE EDUCATION/TRAINING PROGRAM

## 2021-10-04 PROCEDURE — 3051F HG A1C>EQUAL 7.0%<8.0%: CPT | Mod: CPTII,S$GLB,, | Performed by: STUDENT IN AN ORGANIZED HEALTH CARE EDUCATION/TRAINING PROGRAM

## 2021-10-04 PROCEDURE — 99999 PR PBB SHADOW E&M-EST. PATIENT-LVL V: ICD-10-PCS | Mod: PBBFAC,,, | Performed by: STUDENT IN AN ORGANIZED HEALTH CARE EDUCATION/TRAINING PROGRAM

## 2021-10-04 PROCEDURE — 3008F PR BODY MASS INDEX (BMI) DOCUMENTED: ICD-10-PCS | Mod: CPTII,S$GLB,, | Performed by: STUDENT IN AN ORGANIZED HEALTH CARE EDUCATION/TRAINING PROGRAM

## 2021-10-04 PROCEDURE — 3061F PR NEG MICROALBUMINURIA RESULT DOCUMENTED/REVIEW: ICD-10-PCS | Mod: CPTII,S$GLB,, | Performed by: STUDENT IN AN ORGANIZED HEALTH CARE EDUCATION/TRAINING PROGRAM

## 2021-10-04 PROCEDURE — 1159F MED LIST DOCD IN RCRD: CPT | Mod: CPTII,S$GLB,, | Performed by: STUDENT IN AN ORGANIZED HEALTH CARE EDUCATION/TRAINING PROGRAM

## 2021-10-04 PROCEDURE — 99214 PR OFFICE/OUTPT VISIT, EST, LEVL IV, 30-39 MIN: ICD-10-PCS | Mod: S$GLB,,, | Performed by: STUDENT IN AN ORGANIZED HEALTH CARE EDUCATION/TRAINING PROGRAM

## 2021-10-04 PROCEDURE — 99999 PR PBB SHADOW E&M-EST. PATIENT-LVL V: CPT | Mod: PBBFAC,,, | Performed by: STUDENT IN AN ORGANIZED HEALTH CARE EDUCATION/TRAINING PROGRAM

## 2021-10-04 PROCEDURE — 3051F PR MOST RECENT HEMOGLOBIN A1C LEVEL 7.0 - < 8.0%: ICD-10-PCS | Mod: CPTII,S$GLB,, | Performed by: STUDENT IN AN ORGANIZED HEALTH CARE EDUCATION/TRAINING PROGRAM

## 2021-10-04 PROCEDURE — 3066F PR DOCUMENTATION OF TREATMENT FOR NEPHROPATHY: ICD-10-PCS | Mod: CPTII,S$GLB,, | Performed by: STUDENT IN AN ORGANIZED HEALTH CARE EDUCATION/TRAINING PROGRAM

## 2021-10-04 RX ORDER — MONTELUKAST SODIUM 10 MG/1
10 TABLET ORAL NIGHTLY
Qty: 30 TABLET | Refills: 5 | Status: SHIPPED | OUTPATIENT
Start: 2021-10-04 | End: 2022-02-24

## 2021-10-04 RX ORDER — FLUTICASONE PROPIONATE 50 MCG
1 SPRAY, SUSPENSION (ML) NASAL DAILY
Qty: 16 G | Refills: 2 | Status: SHIPPED | OUTPATIENT
Start: 2021-10-04 | End: 2021-12-23

## 2021-10-04 RX ORDER — AMOXICILLIN AND CLAVULANATE POTASSIUM 875; 125 MG/1; MG/1
1 TABLET, FILM COATED ORAL EVERY 12 HOURS
Qty: 10 TABLET | Refills: 0 | Status: SHIPPED | OUTPATIENT
Start: 2021-10-04 | End: 2021-10-09

## 2021-10-04 RX ORDER — PROMETHAZINE HYDROCHLORIDE AND CODEINE PHOSPHATE 6.25; 1 MG/5ML; MG/5ML
5 SOLUTION ORAL NIGHTLY PRN
Qty: 118 ML | Refills: 0 | Status: SHIPPED | OUTPATIENT
Start: 2021-10-04 | End: 2021-10-14

## 2021-10-04 RX ORDER — GLUCAGON INJECTION, SOLUTION 1 MG/.2ML
INJECTION, SOLUTION SUBCUTANEOUS
COMMUNITY
Start: 2021-04-28

## 2021-10-04 RX ORDER — INSULIN GLARGINE 300 U/ML
34 INJECTION, SOLUTION SUBCUTANEOUS DAILY
COMMUNITY
Start: 2021-09-07

## 2021-10-15 ENCOUNTER — CLINICAL SUPPORT (OUTPATIENT)
Dept: INTERNAL MEDICINE | Facility: CLINIC | Age: 66
End: 2021-10-15
Payer: COMMERCIAL

## 2021-10-15 VITALS — DIASTOLIC BLOOD PRESSURE: 72 MMHG | SYSTOLIC BLOOD PRESSURE: 110 MMHG

## 2021-10-15 DIAGNOSIS — Z23 ENCOUNTER FOR IMMUNIZATION: ICD-10-CM

## 2021-10-15 PROCEDURE — 99999 PR PBB SHADOW E&M-EST. PATIENT-LVL I: ICD-10-PCS | Mod: PBBFAC,,,

## 2021-10-15 PROCEDURE — 90686 FLU VACCINE (QUAD) GREATER THAN OR EQUAL TO 3YO PRESERVATIVE FREE IM: ICD-10-PCS | Mod: S$GLB,,, | Performed by: STUDENT IN AN ORGANIZED HEALTH CARE EDUCATION/TRAINING PROGRAM

## 2021-10-15 PROCEDURE — 99999 PR PBB SHADOW E&M-EST. PATIENT-LVL I: CPT | Mod: PBBFAC,,,

## 2021-10-15 PROCEDURE — 90686 IIV4 VACC NO PRSV 0.5 ML IM: CPT | Mod: S$GLB,,, | Performed by: STUDENT IN AN ORGANIZED HEALTH CARE EDUCATION/TRAINING PROGRAM

## 2021-10-15 PROCEDURE — 90471 FLU VACCINE (QUAD) GREATER THAN OR EQUAL TO 3YO PRESERVATIVE FREE IM: ICD-10-PCS | Mod: S$GLB,,, | Performed by: STUDENT IN AN ORGANIZED HEALTH CARE EDUCATION/TRAINING PROGRAM

## 2021-10-15 PROCEDURE — 90471 IMMUNIZATION ADMIN: CPT | Mod: S$GLB,,, | Performed by: STUDENT IN AN ORGANIZED HEALTH CARE EDUCATION/TRAINING PROGRAM

## 2021-11-01 RX ORDER — PROMETHAZINE HYDROCHLORIDE AND CODEINE PHOSPHATE 6.25; 1 MG/5ML; MG/5ML
SOLUTION ORAL
Qty: 118 ML | Refills: 0 | OUTPATIENT
Start: 2021-11-01

## 2021-11-24 ENCOUNTER — HOSPITAL ENCOUNTER (OUTPATIENT)
Dept: RADIOLOGY | Facility: CLINIC | Age: 66
Discharge: HOME | End: 2021-11-24
Attending: OBSTETRICS & GYNECOLOGY
Payer: COMMERCIAL

## 2021-11-24 DIAGNOSIS — Z12.31 VISIT FOR SCREENING MAMMOGRAM: ICD-10-CM

## 2021-11-24 PROCEDURE — 77067 MAMMO DIGITAL SCREENING BILAT WITH TOMO: ICD-10-PCS | Mod: 26,,, | Performed by: RADIOLOGY

## 2021-11-24 PROCEDURE — 77067 SCR MAMMO BI INCL CAD: CPT | Mod: TC,PO

## 2021-11-24 PROCEDURE — 77067 SCR MAMMO BI INCL CAD: CPT | Mod: 26,,, | Performed by: RADIOLOGY

## 2021-11-24 PROCEDURE — 77063 MAMMO DIGITAL SCREENING BILAT WITH TOMO: ICD-10-PCS | Mod: 26,,, | Performed by: RADIOLOGY

## 2021-11-24 PROCEDURE — 77063 BREAST TOMOSYNTHESIS BI: CPT | Mod: 26,,, | Performed by: RADIOLOGY

## 2022-01-08 ENCOUNTER — LAB VISIT (OUTPATIENT)
Dept: LAB | Facility: HOSPITAL | Age: 67
End: 2022-01-08
Attending: INTERNAL MEDICINE
Payer: MEDICARE

## 2022-01-08 DIAGNOSIS — E10.9 DIABETES MELLITUS TYPE I: ICD-10-CM

## 2022-01-08 DIAGNOSIS — E78.00 PURE HYPERCHOLESTEROLEMIA: ICD-10-CM

## 2022-01-08 DIAGNOSIS — Z79.899 ENCOUNTER FOR LONG-TERM (CURRENT) USE OF OTHER MEDICATIONS: Primary | ICD-10-CM

## 2022-01-08 DIAGNOSIS — E03.9 MYXEDEMA HEART DISEASE: ICD-10-CM

## 2022-01-08 DIAGNOSIS — I51.9 MYXEDEMA HEART DISEASE: ICD-10-CM

## 2022-01-08 LAB
ANION GAP SERPL CALC-SCNC: 8 MMOL/L (ref 8–16)
BUN SERPL-MCNC: 11 MG/DL (ref 8–23)
CALCIUM SERPL-MCNC: 9.1 MG/DL (ref 8.7–10.5)
CHLORIDE SERPL-SCNC: 103 MMOL/L (ref 95–110)
CHOLEST SERPL-MCNC: 171 MG/DL (ref 120–199)
CHOLEST/HDLC SERPL: 3.4 {RATIO} (ref 2–5)
CO2 SERPL-SCNC: 31 MMOL/L (ref 23–29)
CREAT SERPL-MCNC: 0.9 MG/DL (ref 0.5–1.4)
EST. GFR  (AFRICAN AMERICAN): >60 ML/MIN/1.73 M^2
EST. GFR  (NON AFRICAN AMERICAN): >60 ML/MIN/1.73 M^2
ESTIMATED AVG GLUCOSE: 134 MG/DL (ref 68–131)
GLUCOSE SERPL-MCNC: 142 MG/DL (ref 70–110)
HBA1C MFR BLD: 6.3 % (ref 4.5–6.2)
HDLC SERPL-MCNC: 50 MG/DL (ref 40–75)
HDLC SERPL: 29.2 % (ref 20–50)
LDLC SERPL CALC-MCNC: 98.8 MG/DL (ref 63–159)
NONHDLC SERPL-MCNC: 121 MG/DL
POTASSIUM SERPL-SCNC: 4 MMOL/L (ref 3.5–5.1)
SODIUM SERPL-SCNC: 142 MMOL/L (ref 136–145)
T4 FREE SERPL-MCNC: 0.97 NG/DL (ref 0.71–1.51)
TRIGL SERPL-MCNC: 111 MG/DL (ref 30–150)
TSH SERPL DL<=0.005 MIU/L-ACNC: 2.86 UIU/ML (ref 0.34–5.6)

## 2022-01-08 PROCEDURE — 84443 ASSAY THYROID STIM HORMONE: CPT | Performed by: INTERNAL MEDICINE

## 2022-01-08 PROCEDURE — 84439 ASSAY OF FREE THYROXINE: CPT | Performed by: INTERNAL MEDICINE

## 2022-01-08 PROCEDURE — 80061 LIPID PANEL: CPT | Performed by: INTERNAL MEDICINE

## 2022-01-08 PROCEDURE — 80048 BASIC METABOLIC PNL TOTAL CA: CPT | Performed by: INTERNAL MEDICINE

## 2022-01-08 PROCEDURE — 36415 COLL VENOUS BLD VENIPUNCTURE: CPT | Performed by: INTERNAL MEDICINE

## 2022-01-08 PROCEDURE — 83036 HEMOGLOBIN GLYCOSYLATED A1C: CPT | Performed by: INTERNAL MEDICINE

## 2022-01-18 ENCOUNTER — PATIENT OUTREACH (OUTPATIENT)
Dept: ADMINISTRATIVE | Facility: HOSPITAL | Age: 67
End: 2022-01-18
Payer: MEDICARE

## 2022-02-22 DIAGNOSIS — J30.2 CHRONIC SEASONAL ALLERGIC RHINITIS: ICD-10-CM

## 2022-02-22 DIAGNOSIS — I10 ESSENTIAL HYPERTENSION: ICD-10-CM

## 2022-02-22 NOTE — TELEPHONE ENCOUNTER
No new care gaps identified.  Powered by [employer]. Reference number: [id number].   2/22/2022 1:02:41 PM CST

## 2022-02-23 ENCOUNTER — OFFICE VISIT (OUTPATIENT)
Dept: FAMILY MEDICINE | Facility: CLINIC | Age: 67
End: 2022-02-23
Payer: MEDICARE

## 2022-02-23 VITALS
BODY MASS INDEX: 29.09 KG/M2 | WEIGHT: 181 LBS | TEMPERATURE: 98 F | OXYGEN SATURATION: 96 % | SYSTOLIC BLOOD PRESSURE: 106 MMHG | HEIGHT: 66 IN | HEART RATE: 93 BPM | RESPIRATION RATE: 18 BRPM | DIASTOLIC BLOOD PRESSURE: 60 MMHG

## 2022-02-23 DIAGNOSIS — I10 PRIMARY HYPERTENSION: ICD-10-CM

## 2022-02-23 DIAGNOSIS — E10.42 CONTROLLED TYPE 1 DIABETES MELLITUS WITH DIABETIC POLYNEUROPATHY, WITH LONG-TERM CURRENT USE OF INSULIN: ICD-10-CM

## 2022-02-23 DIAGNOSIS — R09.81 SINUS CONGESTION: Primary | ICD-10-CM

## 2022-02-23 PROCEDURE — 99999 PR PBB SHADOW E&M-EST. PATIENT-LVL V: ICD-10-PCS | Mod: PBBFAC,,, | Performed by: STUDENT IN AN ORGANIZED HEALTH CARE EDUCATION/TRAINING PROGRAM

## 2022-02-23 PROCEDURE — 99999 PR PBB SHADOW E&M-EST. PATIENT-LVL V: CPT | Mod: PBBFAC,,, | Performed by: STUDENT IN AN ORGANIZED HEALTH CARE EDUCATION/TRAINING PROGRAM

## 2022-02-23 PROCEDURE — 99213 OFFICE O/P EST LOW 20 MIN: CPT | Mod: S$PBB,,, | Performed by: STUDENT IN AN ORGANIZED HEALTH CARE EDUCATION/TRAINING PROGRAM

## 2022-02-23 PROCEDURE — 99215 OFFICE O/P EST HI 40 MIN: CPT | Mod: PBBFAC,PO | Performed by: STUDENT IN AN ORGANIZED HEALTH CARE EDUCATION/TRAINING PROGRAM

## 2022-02-23 PROCEDURE — 99213 PR OFFICE/OUTPT VISIT, EST, LEVL III, 20-29 MIN: ICD-10-PCS | Mod: S$PBB,,, | Performed by: STUDENT IN AN ORGANIZED HEALTH CARE EDUCATION/TRAINING PROGRAM

## 2022-02-23 RX ORDER — PROMETHAZINE HYDROCHLORIDE AND CODEINE PHOSPHATE 6.25; 1 MG/5ML; MG/5ML
5 SOLUTION ORAL EVERY 6 HOURS PRN
Qty: 118 ML | Refills: 0 | Status: SHIPPED | OUTPATIENT
Start: 2022-02-23 | End: 2022-03-05

## 2022-02-23 NOTE — PROGRESS NOTES
Subjective:       Patient ID: Kateryna Desai is a 67 y.o. female.    Chief Complaint: Cough, Nasal Congestion, Sore Throat, and Otalgia (left)    HPI:  67-YO F complains of URI symptoms (productive cough (clear phlegm), nasal congestion, rhinorrhea, sore throat, and sinus congestion) since 24. She tried Robitussin DM Max and Flonase, but had no relief. She also reports chest tightness while she is coughing (most likely musculoskeletal related). Tested COVID negative on Monday at [facility]. Denies any sick contacts recently.     She also reports L ear pain that started yesterday. She has a history of ear infections (last one 10-4-21). She put swimmer's ear drops in her ears which somewhat helped her symptoms.       Past Medical History:   Diagnosis Date    Allergy     Arthritis     degnerative disease low back,muscle spasms, shoulders    Asian flu type A 2017    Bunionette 2012    Diabetes mellitus type I     since age 13    Diabetic gastroparesis     takes Cytotec    Diabetic retinopathy of both eyes     gets periodic laser treatments    Difficult intubation     as a child had sore throat after a procedure    Encounter for blood transfusion     GERD (gastroesophageal reflux disease)     Hallux abducto valgus 3/31/2014    Headache(784.0)     Hiatal hernia     with GERD    Hyperlipidemia     Hypertension     Infection of the upper respiratory tract 2012    Lumbar spinal stenosis     Osteopenia     Rosacea     Seizures     Pt states during pgy with stroke    Stroke ?    while pregnant    Tachycardia     asymptomatic with Toprol    Thyroid disease     hypothyroidism    Venous insufficiency of leg     improved since EVLT     Past Surgical History:   Procedure Laterality Date    BUNIONECTOMY Right 2012    CARDIAC CATHETERIZATION  2008    no stents     SECTION      COLONOSCOPY  2007    Dr. Rose, 10 year recheck    EXOSTECTOMY   8/16/12    left foot, local anesthesia, in office    EYE SURGERY      has had many laser procedures for diabetic retinopathy    VASCULAR SURGERY      VEIN SURGERY  April 2012    EVLT right greater saphenous, IV sedation     Review of patient's allergies indicates:   Allergen Reactions    Sulfamethoxazole-trimethoprim Rash and Hives     Patient experienced on 12/3/14 redness to face and rash to chest and arms/ with no SOB or airway obstruction    Zinc-25 Hives and Swelling     Aching pains in the knees     Heparin analogues     Percocet [oxycodone-acetaminophen] Nausea And Vomiting     Also caused dizziness and passed out    Iodinated contrast media Swelling and Rash     Says topical iodine OK    Niaspan extended-release [niacin] Itching and Other (See Comments)     Skin flushing and redness       She reports that she has never smoked. She has never used smokeless tobacco. She reports current alcohol use. She reports that she does not use drugs.    Herfamily history includes Alcohol abuse in her maternal uncle; Alzheimer's disease in her maternal uncle; Arthritis in her mother; Breast cancer in her maternal aunt; Breast cancer (age of onset: 38) in her maternal grandmother; Cancer in her maternal aunt, maternal grandfather, and mother; Diabetes in her cousin, maternal grandfather, and maternal grandmother; Heart disease in her father, maternal uncle, and paternal uncle; Melanoma in her mother; No Known Problems in her brother, daughter, paternal aunt, and sister; Stroke in her father.  Medications listed in plan.    Review of Systems   Constitutional: Negative for activity change, fatigue and fever.   HENT: Positive for congestion, ear pain, rhinorrhea, sinus pressure, sinus pain and sore throat. Negative for ear discharge, postnasal drip, sneezing and trouble swallowing.    Respiratory: Positive for cough and chest tightness. Negative for shortness of breath.    Cardiovascular: Negative for chest pain and  "palpitations.   Gastrointestinal: Negative for abdominal pain, constipation, diarrhea, nausea and vomiting.   Musculoskeletal: Negative for arthralgias and myalgias.   Skin: Negative for rash.   Neurological: Negative for dizziness, light-headedness and headaches.         Objective:      /60 (BP Location: Right arm, Patient Position: Sitting, BP Method: Large (Manual))   Pulse 93   Temp 98.3 °F (36.8 °C) (Oral)   Resp 18   Ht 5' 6" (1.676 m)   Wt 82.1 kg (181 lb)   SpO2 96%   BMI 29.21 kg/m²     Wt Readings from Last 3 Encounters:   02/23/22 82.1 kg (181 lb)   10/04/21 84.2 kg (185 lb 10 oz)   09/08/21 84.3 kg (185 lb 13.6 oz)     BP Readings from Last 3 Encounters:   02/23/22 106/60   10/15/21 110/72   10/04/21 110/72     Physical Exam  Vitals and nursing note reviewed.   HENT:      Head: Normocephalic.      Right Ear: Tympanic membrane, ear canal and external ear normal.      Left Ear: Tympanic membrane, ear canal and external ear normal.      Nose: Congestion present. No rhinorrhea.      Mouth/Throat:      Mouth: Mucous membranes are moist.   Eyes:      Conjunctiva/sclera: Conjunctivae normal.   Cardiovascular:      Rate and Rhythm: Normal rate and regular rhythm.      Heart sounds: Normal heart sounds. No murmur heard.    No friction rub. No gallop.   Pulmonary:      Effort: Pulmonary effort is normal. No respiratory distress.      Breath sounds: Normal breath sounds. No wheezing, rhonchi or rales.   Abdominal:      General: Abdomen is flat.      Palpations: Abdomen is soft.      Tenderness: There is no abdominal tenderness. There is no guarding.   Musculoskeletal:      Right lower leg: No edema.      Left lower leg: No edema.   Neurological:      Mental Status: She is alert.   Psychiatric:         Mood and Affect: Mood normal.         Behavior: Behavior normal.       Assessment:     1. Sinus congestion    2. Controlled type 1 diabetes mellitus with diabetic polyneuropathy, with long-term current " "use of insulin    3. Primary hypertension          Plan:     1. Sinus congestion  - promethazine-codeine 6.25-10 mg/5 ml (PHENERGAN WITH CODEINE) 6.25-10 mg/5 mL syrup; Take 5 mLs by mouth every 6 (six) hours as needed for Cough.  Dispense: 118 mL; Refill: 0    2. Controlled type 1 diabetes mellitus with diabetic polyneuropathy, with long-term current use of insulin  - She is stable. Will continue to monitor condition and current medications. She is taking meds as prescribed.     3. Primary hypertension  - /60 today. She is stable. Will continue to monitor condition and current medications.    F/u as needed if symptoms do not improve in 1-2 weeks.     Annual with Dr. Chow 4-25-22. Dr. Chow sent in promethazine-codeine.    Medication List with Changes/Refills   New Medications    PROMETHAZINE-CODEINE 6.25-10 MG/5 ML (PHENERGAN WITH CODEINE) 6.25-10 MG/5 ML SYRUP    Take 5 mLs by mouth every 6 (six) hours as needed for Cough.   Current Medications    ACZONE 5 % TOPICAL GEL    Apply topically 2 (two) times daily as needed (roseacea).     ALPHA LIPOIC ACID 600 MG CAP    Take 1 capsule by mouth once daily.    ALPHAGAN P 0.1 % DROP    Place 1 drop into both eyes 3 (three) times daily.     ASCORBIC ACID, VITAMIN C, (VITAMIN C) 100 MG TABLET    Take 100 mg by mouth 2 (two) times daily.    ASPIRIN (ECOTRIN) 81 MG EC TABLET    Take 81 mg by mouth once daily.    AZELASTINE (ASTELIN) 137 MCG (0.1 %) NASAL SPRAY    USE ONE SPRAY IN EACH NOSTRIL TWICE A DAY    BACLOFEN (LIORESAL) 20 MG TABLET    Take 1 tablet (20 mg total) by mouth 3 (three) times daily.    BD VEO INSULIN SYRINGE UF 1/2 ML 31 GAUGE X 15/64" SYRG        BENEFIBER, GUAR GUM, ORAL    Take 1 Dose by mouth 2 (two) times daily as needed.     CALCIUM CITRATE-VITAMIN D3 315-200 MG (CITRACAL+D) 315-200 MG-UNIT PER TABLET    Take 1 tablet by mouth 2 (two) times daily.    CHOLECALCIFEROL, VITAMIN D3, (VITAMIN D3) 50 MCG (2,000 UNIT) CAP    Take 1 capsule by " "mouth once daily.    CINNAMON BARK 500 MG CAPSULE    Take 500 mg by mouth 2 (two) times daily.    CLOBETASOL (TEMOVATE) 0.05 % EXTERNAL SOLUTION        CRESTOR 40 MG TAB    Take 40 mg by mouth once daily.     DOCOSAHEXANOIC ACID/EPA (FISH OIL ORAL)    Take 1,200 mg by mouth 2 (two) times daily.    DOXYCYCLINE (VIBRAMYCIN) 50 MG CAPSULE        ESTRACE 0.01 % (0.1 MG/GRAM) VAGINAL CREAM    Place vaginally twice a week. Tuesday, Saturday    ESTRADIOL 10 MCG TAB    Place 1 tablet vaginally twice a week. Tuesday, Saturday    EZETIMIBE (ZETIA) 10 MG TABLET    Take 10 mg by mouth once daily.    FLUCONAZOLE (DIFLUCAN) 150 MG TAB        FLUTICASONE PROPIONATE (FLONASE) 50 MCG/ACTUATION NASAL SPRAY    SPRAY ONCE INTO EACH NOSTRIL ONCE DAILY    FUROSEMIDE (LASIX) 20 MG TABLET    TAKE ONE TABLET BY MOUTH TWO TIMES A DAY    GABAPENTIN (NEURONTIN) 300 MG CAPSULE    Take 1 capsule (300 mg total) by mouth every evening.    GLUCAGON EMERGENCY 1 MG INJECTION    1 mg as needed.     GVOKE HYPOPEN 2-PACK 1 MG/0.2 ML ATIN    USE AS DIRECTED TO TREAT PROFOUND HYPOGLYCEMIA    HUMALOG 100 UNIT/ML INJECTION    Inject into the skin 3 (three) times daily before meals. 10-17 units tid sliding scale    L.ACID/L.CASEI/B.BIF/B.CEDRIC/FOS (PROBIOTIC BLEND ORAL)    Take 1 capsule by mouth every Mon, Wed, Fri.    METOPROLOL SUCCINATE (TOPROL-XL) 25 MG 24 HR TABLET    TAKE ONE TABLET BY MOUTH EVERY DAY    MONTELUKAST (SINGULAIR) 10 MG TABLET    Take 1 tablet (10 mg total) by mouth every evening.    NOVOFINE 32 32 GAUGE X 1/4" NDLE        ONDANSETRON (ZOFRAN-ODT) 4 MG TBDL    Take 1 tablet (4 mg total) by mouth every 8 (eight) hours as needed (Nausea).    ONETOUCH ULTRA BLUE TEST STRIP STRP        PANTOPRAZOLE (PROTONIX) 40 MG TABLET    TAKE 1 TABLET BY MOUTH TWICE A DYA    POTASSIUM CHLORIDE (K-TAB) 20 MEQ        PROMETHAZINE (PHENERGAN) 6.25 MG/5 ML SYRUP    Take 5 mLs (6.25 mg total) by mouth nightly as needed for Nausea.    SOOLANTRA 1 % CREA        " SYNTHROID 75 MCG TABLET        SYNTHROID 88 MCG TABLET    Take 88 mcg by mouth every evening.     TOUJEO SOLOSTAR U-300 INSULIN 300 UNIT/ML (1.5 ML) INPN PEN    INJECT UP TO 34 UNITS SUB-Q ONCE A DAY    TRESIBA FLEXTOUCH U-100 100 UNIT/ML (3 ML) INPN    Inject 46 Units into the skin every morning.     VITAMIN B COMPLEX VIT C NO.4 (SUPER B COMPLEX + C ORAL)    Take 1 tablet by mouth once daily.   Discontinued Medications    HYDROCODONE-ACETAMINOPHEN (NORCO) 7.5-325 MG PER TABLET    Take 1 tablet by mouth every 6 (six) hours as needed for Pain.    LEVOCETIRIZINE (XYZAL) 5 MG TABLET    Take 1 tablet (5 mg total) by mouth every evening.    TRAMADOL (ULTRAM) 50 MG TABLET    TAKE 1 TABLET BY MOUTH EVERY 24 HOURS AS NEEDED FOR PAIN    UREA (CARMOL) 40 % CREA    Apply topically 2 (two) times daily.     Modified Medications    No medications on file     Medication Monitoring    I discussed imaging findings, diagnosis, possibilities, treatment options, medications, risks, and benefits. She had many questions regarding the options and long-term effects. All questions were answered. She expressed understanding after counseling regarding the diagnosis and recommendations. She was capable and demonstrated competence with understanding of these options. Shared decision making was performed resulting in her choosing the current treatment plan. Patient handout was given with instructions and recommendations. Advised the patient that if they become pregnant to alert us immediately to assess for medication changes.     I also discussed the importance of close follow up to discuss labs, change or modify her medications if needed, monitor side effects, and further evaluation of medical problems.       Rosemary Welch PA-C  Family Medicine Physician Assistant       02/23/2022     If you are due for any health screening(s) below please notify me so we can arrange them to be ordered and scheduled to maintain your health.     Health Maintenance  Due   Topic Date Due    HIV Screening  Never done    Pneumococcal Vaccines (Age 65+) (2 of 2 - PPSV23) 01/22/2022       I spent a total of 27 minutes on the day of the visit.This includes face to face time and non-face to face time preparing to see the patient (eg, review of tests), obtaining and/or reviewing separately obtained history, documenting clinical information in the electronic or other health record, independently interpreting results and communicating results to the patient/family/caregiver, or care coordinator.    We have addressed [3] Low: 2 or more self-limited or minor problems / 1 stable chronic illness / 1 acute, uncomplicated illness or injury  The complexity of the data reviewed and analyzed for this visit was [3] Limited (Reviewed prior external note, ordered unique testing or reviewed the results of each unique test)   The risk of complications and/or morbidity or mortality are [3] Low risk   The level of Medical Decision Making for this visit is [3] Low

## 2022-02-24 RX ORDER — METOPROLOL SUCCINATE 25 MG/1
TABLET, EXTENDED RELEASE ORAL
Qty: 90 TABLET | Refills: 2 | Status: SHIPPED | OUTPATIENT
Start: 2022-02-24 | End: 2022-09-22

## 2022-02-24 RX ORDER — MONTELUKAST SODIUM 10 MG/1
TABLET ORAL
Qty: 90 TABLET | Refills: 2 | Status: SHIPPED | OUTPATIENT
Start: 2022-02-24 | End: 2022-09-21

## 2022-02-24 NOTE — TELEPHONE ENCOUNTER
Refill Routing Note   Medication(s) are not appropriate for processing by [facility] for the following reason(s):      - Drug-Disease Interaction (metoprolol succinate and Venous insufficiency of leg)    [facility] action(s):  Defer  Approve Medication-related problems identified: Drug-disease interaction        --->Care Gap information included in message below if applicable.   Medication reconciliation completed: No   Automatic Epic Generated Protocol Data:    Orders Placed This Encounter    montelukast (SINGULAIR) 10 mg tablet      Requested Prescriptions   Pending Prescriptions Disp Refills    metoprolol succinate (TOPROL-XL) 25 MG 24 hr tablet [Pharmacy Med Name: METOPROLOL SUCCINATE ER 25 25 Tablet] 90 tablet 2     Sig: TAKE 1 TABLET BY MOUTH ONCE DAILY       Cardiovascular:  Beta Blockers Passed - 2/22/2022  1:02 PM        Passed - Patient is at least 18 years old        Passed - Last BP in normal range within 360 days     BP Readings from Last 1 Encounters:   02/23/22 106/60               Passed - Last Heart Rate in normal range within 360 days     Pulse Readings from Last 1 Encounters:   02/23/22 93              Passed - Valid encounter within last 15 months     Recent Visits  Date Type Provider Dept   10/04/21 Office Visit Guido Chow MD [facility]   04/14/21 Office Visit Guido Chow MD [facility]   10/14/20 Office Visit Guido Chow MD [facility]   05/21/20 Office Visit Jani Cortes,  [facility]   Showing recent visits within past 720 days and meeting all other requirements  Future Appointments  No visits were found meeting these conditions.  Showing future appointments within next 150 days and meeting all other requirements      Future Appointments              In 2 weeks JEFFERY Zelaya - [facility]    In 2 months MD Nydia Rapp - [facility]                 Signed Prescriptions Disp Refills     montelukast (SINGULAIR) 10 mg tablet 90 tablet 2     Sig: TAKE ONE TABLET BY MOUTH EVERY EVENING       Pulmonology:  Leukotriene Inhibitors Passed - 2/24/2022  4:06 PM        Passed - Patient is at least 18 years old        Passed - Valid encounter within last 15 months     Recent Visits  Date Type Provider Dept   10/04/21 Office Visit Guido Chow MD [facility]   04/14/21 Office Visit Guido Chow MD [facility]   10/14/20 Office Visit Guido Chow MD [facility]   05/21/20 Office Visit Jani Cortes,  [facility]   Showing recent visits within past 720 days and meeting all other requirements  Future Appointments  No visits were found meeting these conditions.  Showing future appointments within next 150 days and meeting all other requirements      Future Appointments              In 2 weeks JEFFERY Zelaya - Podiatry, [place]    In 2 months MD Nydia Rapp - Family Medicine, [place]                      Appointments  past 12m or future 3m with PCP    Date Provider   Last Visit   10/4/2021 Guido Chow MD   Next Visit   4/25/2022 Guido Chow MD   ED visits in past 90 days: 0        Note composed:4:08 PM 02/24/2022

## 2022-03-05 ENCOUNTER — LAB VISIT (OUTPATIENT)
Dept: LAB | Facility: HOSPITAL | Age: 67
End: 2022-03-05
Attending: INTERNAL MEDICINE
Payer: MEDICARE

## 2022-03-05 DIAGNOSIS — E21.0 PRIMARY HYPERPARATHYROIDISM: ICD-10-CM

## 2022-03-05 DIAGNOSIS — N18.2 CHRONIC KIDNEY DISEASE, STAGE II (MILD): Primary | ICD-10-CM

## 2022-03-05 LAB
25(OH)D3+25(OH)D2 SERPL-MCNC: >120 NG/ML (ref 30–96)
ALBUMIN SERPL BCP-MCNC: 3.8 G/DL (ref 3.5–5.2)
ANION GAP SERPL CALC-SCNC: 10 MMOL/L (ref 8–16)
BACTERIA #/AREA URNS HPF: ABNORMAL /HPF
BILIRUB UR QL STRIP: NEGATIVE
BUN SERPL-MCNC: 12 MG/DL (ref 8–23)
CALCIUM SERPL-MCNC: 9.7 MG/DL (ref 8.7–10.5)
CHLORIDE SERPL-SCNC: 100 MMOL/L (ref 95–110)
CLARITY UR: ABNORMAL
CO2 SERPL-SCNC: 30 MMOL/L (ref 23–29)
COLOR UR: YELLOW
CREAT SERPL-MCNC: 1.2 MG/DL (ref 0.5–1.4)
EST. GFR  (AFRICAN AMERICAN): 54.8 ML/MIN/1.73 M^2
EST. GFR  (NON AFRICAN AMERICAN): 47.5 ML/MIN/1.73 M^2
GLUCOSE SERPL-MCNC: 110 MG/DL (ref 70–110)
GLUCOSE UR QL STRIP: ABNORMAL
HGB UR QL STRIP: NEGATIVE
HYALINE CASTS #/AREA URNS LPF: 50 /LPF
KETONES UR QL STRIP: NEGATIVE
LEUKOCYTE ESTERASE UR QL STRIP: ABNORMAL
MICROSCOPIC COMMENT: ABNORMAL
NITRITE UR QL STRIP: NEGATIVE
PH UR STRIP: 6 [PH] (ref 5–8)
PHOSPHATE SERPL-MCNC: 3.2 MG/DL (ref 2.7–4.5)
POTASSIUM SERPL-SCNC: 3.9 MMOL/L (ref 3.5–5.1)
PROT UR QL STRIP: ABNORMAL
PTH-INTACT SERPL-MCNC: 58.9 PG/ML (ref 9–77)
RBC #/AREA URNS HPF: 3 /HPF (ref 0–4)
SODIUM SERPL-SCNC: 140 MMOL/L (ref 136–145)
SP GR UR STRIP: 1.02 (ref 1–1.03)
SQUAMOUS #/AREA URNS HPF: 9 /HPF
URN SPEC COLLECT METH UR: ABNORMAL
UROBILINOGEN UR STRIP-ACNC: NEGATIVE EU/DL
WBC #/AREA URNS HPF: 71 /HPF (ref 0–5)

## 2022-03-05 PROCEDURE — 82306 VITAMIN D 25 HYDROXY: CPT | Performed by: INTERNAL MEDICINE

## 2022-03-05 PROCEDURE — 80069 RENAL FUNCTION PANEL: CPT | Performed by: INTERNAL MEDICINE

## 2022-03-05 PROCEDURE — 83970 ASSAY OF PARATHORMONE: CPT | Performed by: INTERNAL MEDICINE

## 2022-03-05 PROCEDURE — 36415 COLL VENOUS BLD VENIPUNCTURE: CPT | Performed by: INTERNAL MEDICINE

## 2022-03-05 PROCEDURE — 84156 ASSAY OF PROTEIN URINE: CPT | Performed by: INTERNAL MEDICINE

## 2022-03-05 PROCEDURE — 81001 URINALYSIS AUTO W/SCOPE: CPT | Performed by: INTERNAL MEDICINE

## 2022-03-07 LAB — PROT UR-MCNC: 17 MG/DL (ref 6–15)

## 2022-03-08 ENCOUNTER — PATIENT OUTREACH (OUTPATIENT)
Dept: ADMINISTRATIVE | Facility: OTHER | Age: 67
End: 2022-03-08
Payer: MEDICARE

## 2022-03-08 NOTE — PROGRESS NOTES
Health Maintenance Due   Topic Date Due    HIV Screening  Never done    Pneumococcal Vaccines (Age 65+) (2 of 2 - PPSV23) 01/22/2022     Updates were requested from care everywhere.  Chart was reviewed for overdue Proactive [facility] ([place]) topics (CRS, Breast Cancer Screening, Eye exam)  Health Maintenance has been updated.  [place] immunization registry triggered.  Immunizations were reconciled.

## 2022-03-11 ENCOUNTER — OFFICE VISIT (OUTPATIENT)
Dept: PODIATRY | Facility: CLINIC | Age: 67
End: 2022-03-11
Payer: MEDICARE

## 2022-03-11 VITALS — WEIGHT: 181 LBS | HEIGHT: 66 IN | BODY MASS INDEX: 29.09 KG/M2

## 2022-03-11 DIAGNOSIS — M20.41 HAMMER TOES OF BOTH FEET: ICD-10-CM

## 2022-03-11 DIAGNOSIS — E10.42 CONTROLLED TYPE 1 DIABETES MELLITUS WITH DIABETIC POLYNEUROPATHY: Primary | ICD-10-CM

## 2022-03-11 DIAGNOSIS — L84 CORN OR CALLUS: ICD-10-CM

## 2022-03-11 DIAGNOSIS — M20.42 HAMMER TOES OF BOTH FEET: ICD-10-CM

## 2022-03-11 PROCEDURE — 99999 PR PBB SHADOW E&M-EST. PATIENT-LVL II: CPT | Mod: PBBFAC,,, | Performed by: PODIATRIST

## 2022-03-11 PROCEDURE — 11056 PARNG/CUTG B9 HYPRKR LES 2-4: CPT | Mod: Q9,S$PBB,, | Performed by: PODIATRIST

## 2022-03-11 PROCEDURE — 11056 PR TRIM BENIGN HYPERKERATOTIC SKIN LESION,2-4: ICD-10-PCS | Mod: Q9,S$PBB,, | Performed by: PODIATRIST

## 2022-03-11 PROCEDURE — 99999 PR PBB SHADOW E&M-EST. PATIENT-LVL II: ICD-10-PCS | Mod: PBBFAC,,, | Performed by: PODIATRIST

## 2022-03-11 PROCEDURE — 11056 PARNG/CUTG B9 HYPRKR LES 2-4: CPT | Mod: PBBFAC,PN | Performed by: PODIATRIST

## 2022-03-11 PROCEDURE — 99212 OFFICE O/P EST SF 10 MIN: CPT | Mod: PBBFAC,PN | Performed by: PODIATRIST

## 2022-03-11 PROCEDURE — 99213 OFFICE O/P EST LOW 20 MIN: CPT | Mod: 25,S$PBB,, | Performed by: PODIATRIST

## 2022-03-11 PROCEDURE — 99213 PR OFFICE/OUTPT VISIT, EST, LEVL III, 20-29 MIN: ICD-10-PCS | Mod: 25,S$PBB,, | Performed by: PODIATRIST

## 2022-03-12 NOTE — PROGRESS NOTES
Subjective:      Patient ID: Kateryna Desai is a 67 y.o. female.    Chief Complaint: Diabetic Foot Exam and Diabetes Mellitus ([place] 4/25/22)    Kateryna is a 67 y.o. female who presents to the clinic for routine evaluation and treatment of diabetic feet. Kateryna has a past medical history of Allergy, Arthritis, Asian flu type A (12/22/2017), Bunionette (7/26/2012), Diabetes mellitus type I, Diabetic gastroparesis, Diabetic retinopathy of both eyes, Difficult intubation, Encounter for blood transfusion, GERD (gastroesophageal reflux disease), Hallux abducto valgus (3/31/2014), Headache(784.0), Hiatal hernia, Hyperlipidemia, Hypertension, Infection of the upper respiratory tract (June 2012), Lumbar spinal stenosis, Osteopenia, Rosacea, Seizures, Stroke (1985?), Tachycardia, Thyroid disease, and Venous insufficiency of leg. Patient relates nocturnal paresthesias of the Rt. Posterior heel.  States symptoms resolve quickly with changing her sleeping position.  Denies noticing this issue with weight bearing.   She continues to build up call to the Lt. Posterior heel and more recently to the Rt. Medial 2nd toe.  Notes slight discomfort with pressure to the Rt. 2nd toe callus with wearing closed toe shoes.  She has been filing the site and applying moisturizer to the area consistently. She continues to note excellent control over her blood glucose.  Denies any additional pedal complaints.      PCP: Guido Chow MD    Date Last Seen by PCP: 4/22      Hemoglobin A1C   Date Value Ref Range Status   01/08/2022 6.3 (H) 4.5 - 6.2 % Final     Comment:     According to ADA guidelines, hemoglobin A1C <7.0% represents  optimal control in non-pregnant diabetic patients.  Different  metrics may apply to specific populations.   Standards of Medical Care in Diabetes - 2016.    For the purpose of screening for the presence of diabetes:  <5.7%     Consistent with the absence of diabetes  5.7-6.4%  Consistent with  increasing risk for diabetes   (prediabetes)  >or=6.5%  Consistent with diabetes    Currently no consensus exists for use of hemoglobin A1C  for diagnosis of diabetes for children.     08/20/2021 7.0 (H) 4.5 - 6.2 % Final     Comment:     According to ADA guidelines, hemoglobin A1C <7.0% represents  optimal control in non-pregnant diabetic patients.  Different  metrics may apply to specific populations.   Standards of Medical Care in Diabetes - 2016.    For the purpose of screening for the presence of diabetes:  <5.7%     Consistent with the absence of diabetes  5.7-6.4%  Consistent with increasing risk for diabetes   (prediabetes)  >or=6.5%  Consistent with diabetes    Currently no consensus exists for use of hemoglobin A1C  for diagnosis of diabetes for children.     04/20/2021 6.9 (H) 4.0 - 5.6 % Final     Comment:     ADA Screening Guidelines:  5.7-6.4%  Consistent with prediabetes  >or=6.5%  Consistent with diabetes    High levels of fetal hemoglobin interfere with the HbA1C  assay. Heterozygous hemoglobin variants (HbS, HgC, etc)do  not significantly interfere with this assay.   However, presence of multiple variants may affect accuracy.             Past Medical History:   Diagnosis Date    Allergy     Arthritis     degnerative disease low back,muscle spasms, shoulders    Asian flu type A 12/22/2017    Bunionette 7/26/2012    Diabetes mellitus type I     since age 13    Diabetic gastroparesis     takes Cytotec    Diabetic retinopathy of both eyes     gets periodic laser treatments    Difficult intubation     as a child had sore throat after a procedure    Encounter for blood transfusion     GERD (gastroesophageal reflux disease)     Hallux abducto valgus 3/31/2014    Headache(784.0)     Hiatal hernia     with GERD    Hyperlipidemia     Hypertension     Infection of the upper respiratory tract June 2012    Lumbar spinal stenosis     Osteopenia     Rosacea     Seizures     Pt states during  pgy with stroke    Stroke ?    while pregnant    Tachycardia     asymptomatic with Toprol    Thyroid disease     hypothyroidism    Venous insufficiency of leg     improved since EVLT       Past Surgical History:   Procedure Laterality Date    BUNIONECTOMY Right 2012    CARDIAC CATHETERIZATION      no stents     SECTION      COLONOSCOPY  2007    Dr. Rose, 10 year recheck    EXOSTECTOMY  12    left foot, local anesthesia, in office    EYE SURGERY      has had many laser procedures for diabetic retinopathy    VASCULAR SURGERY      VEIN SURGERY  2012    EVLT right greater saphenous, IV sedation       Family History   Problem Relation Age of Onset    Cancer Maternal Aunt         breast    Breast cancer Maternal Aunt     Diabetes Maternal Grandmother     Breast cancer Maternal Grandmother 38    Cancer Maternal Grandfather         prostate    Diabetes Maternal Grandfather     Diabetes Cousin     Arthritis Mother     Cancer Mother     Melanoma Mother     Heart disease Father     Stroke Father     No Known Problems Sister     No Known Problems Brother     No Known Problems Daughter     Alzheimer's disease Maternal Uncle     Alcohol abuse Maternal Uncle         x2    Heart disease Maternal Uncle     No Known Problems Paternal Aunt     Heart disease Paternal Uncle        Social History     Socioeconomic History    Marital status:    Tobacco Use    Smoking status: Never Smoker    Smokeless tobacco: Never Used   Substance and Sexual Activity    Alcohol use: Yes     Comment: social, 1 drink monthly    Drug use: No    Sexual activity: Yes     Partners: Male       Current Outpatient Medications   Medication Sig Dispense Refill    ACZONE 5 % topical gel Apply topically 2 (two) times daily as needed (roseacea).   0    alpha lipoic acid 600 mg Cap Take 1 capsule by mouth once daily.      ALPHAGAN P 0.1 % Drop Place 1 drop into both eyes 3 (three) times  "daily.   6    ascorbic acid, vitamin C, (VITAMIN C) 100 MG tablet Take 100 mg by mouth 2 (two) times daily.      aspirin (ECOTRIN) 81 MG EC tablet Take 81 mg by mouth once daily.      azelastine (ASTELIN) 137 mcg (0.1 %) nasal spray USE ONE SPRAY IN EACH NOSTRIL TWICE A DAY 30 mL 3    baclofen (LIORESAL) 20 MG tablet Take 1 tablet (20 mg total) by mouth 3 (three) times daily. 90 tablet 1    BD VEO INSULIN SYRINGE UF 1/2 mL 31 gauge x 15/64" Syrg   4    BENEFIBER, GUAR GUM, ORAL Take 1 Dose by mouth 2 (two) times daily as needed.       calcium citrate-vitamin D3 315-200 mg (CITRACAL+D) 315-200 mg-unit per tablet Take 1 tablet by mouth 2 (two) times daily.      cholecalciferol, vitamin D3, (VITAMIN D3) 50 mcg (2,000 unit) Cap Take 1 capsule by mouth once daily.      cinnamon bark 500 mg capsule Take 500 mg by mouth 2 (two) times daily.      clobetasoL (TEMOVATE) 0.05 % external solution       CRESTOR 40 mg Tab Take 40 mg by mouth once daily.       DOCOSAHEXANOIC ACID/EPA (FISH OIL ORAL) Take 1,200 mg by mouth 2 (two) times daily.      doxycycline (VIBRAMYCIN) 50 MG capsule       ESTRACE 0.01 % (0.1 mg/gram) vaginal cream Place vaginally twice a week. Tuesday, Saturday  0    estradiol 10 mcg Tab Place 1 tablet vaginally twice a week. Tuesday, Saturday      ezetimibe (ZETIA) 10 mg tablet Take 10 mg by mouth once daily.      fluconazole (DIFLUCAN) 150 MG Tab       fluticasone propionate (FLONASE) 50 mcg/actuation nasal spray SPRAY ONCE INTO EACH NOSTRIL ONCE DAILY 16 g 2    furosemide (LASIX) 20 MG tablet TAKE ONE TABLET BY MOUTH TWO TIMES A  tablet 2    gabapentin (NEURONTIN) 300 MG capsule Take 1 capsule (300 mg total) by mouth every evening. 30 capsule 0    GLUCAGON EMERGENCY 1 mg injection 1 mg as needed.       GVOKE HYPOPEN 2-PACK 1 mg/0.2 mL AtIn USE AS DIRECTED TO TREAT PROFOUND HYPOGLYCEMIA      HUMALOG 100 unit/mL injection Inject into the skin 3 (three) times daily before meals. " "10-17 units tid sliding scale  1    L.ACID/L.CASEI/B.BIF/B.CEDRIC/FOS (PROBIOTIC BLEND ORAL) Take 1 capsule by mouth every Mon, Wed, Fri.      metoprolol succinate (TOPROL-XL) 25 MG 24 hr tablet TAKE 1 TABLET BY MOUTH ONCE DAILY 90 tablet 2    montelukast (SINGULAIR) 10 mg tablet TAKE ONE TABLET BY MOUTH EVERY EVENING 90 tablet 2    NOVOFINE 32 32 gauge x 1/4" Ndle       ondansetron (ZOFRAN-ODT) 4 MG TbDL Take 1 tablet (4 mg total) by mouth every 8 (eight) hours as needed (Nausea). 60 tablet 1    ONETOUCH ULTRA BLUE TEST STRIP Strp   11    pantoprazole (PROTONIX) 40 MG tablet TAKE 1 TABLET BY MOUTH TWICE A DYA 60 tablet 2    potassium chloride (K-TAB) 20 mEq   6    promethazine (PHENERGAN) 6.25 mg/5 mL syrup Take 5 mLs (6.25 mg total) by mouth nightly as needed for Nausea. 118 mL 0    SOOLANTRA 1 % Crea       SYNTHROID 75 mcg tablet       SYNTHROID 88 mcg tablet Take 88 mcg by mouth every evening.   0    TOUJEO SOLOSTAR U-300 INSULIN 300 unit/mL (1.5 mL) InPn pen INJECT UP TO 34 UNITS SUB-Q ONCE A DAY      TRESIBA FLEXTOUCH U-100 100 unit/mL (3 mL) InPn Inject 46 Units into the skin every morning.   6    VITAMIN B COMPLEX VIT C NO.4 (SUPER B COMPLEX + C ORAL) Take 1 tablet by mouth once daily.       Current Facility-Administered Medications   Medication Dose Route Frequency Provider Last Rate Last Admin    methylPREDNISolone acetate injection 40 mg  40 mg Intramuscular Once Doug Mcghee MD           Review of patient's allergies indicates:   Allergen Reactions    Bactrim [sulfamethoxazole-trimethoprim] Rash     Patient experienced on 12/3/14 redness to face and rash to chest and arms/ with no SOB or airway obstruction    Percocet [oxycodone-acetaminophen] Nausea And Vomiting     Also caused dizziness and passed out    Iodinated contrast media - iv dye Swelling and Rash     Says topical iodine OK    Norco [hydrocodone-acetaminophen] Itching, Swelling and Rash     Can tolerate if she takes " Benadryl with it    Niaspan extended-release [niacin] Itching and Other (See Comments)     Skin flushing and redness         Review of Systems   Constitutional: Negative for chills, decreased appetite, diaphoresis and fever.   Cardiovascular: Negative for claudication and leg swelling.   Skin: Positive for dry skin. Negative for color change, flushing, itching and nail changes.   Musculoskeletal: Negative for arthritis, back pain, falls, gout, joint pain, joint swelling and myalgias.   Gastrointestinal: Negative for nausea and vomiting.   Neurological: Positive for numbness and paresthesias.   Psychiatric/Behavioral: Negative for altered mental status.           Objective:      Physical Exam  Constitutional:       General: She is not in acute distress.     Appearance: She is well-developed. She is not diaphoretic.   Cardiovascular:      Pulses:           Dorsalis pedis pulses are 2+ on the right side and 2+ on the left side.        Posterior tibial pulses are 1+ on the right side and 1+ on the left side.      Comments: CFT < 3 seconds bilateral.  Pedal hair growth decreased bilateral.  Varicosities noted to bilateral lower extremity. Mild nonpitting edema noted to bilateral lower extremity.  Toes are cool to touch bilateral.      Musculoskeletal:         General: No tenderness. Normal range of motion.      Comments: Muscle strength 5/5 in all muscle groups bilateral.  (-) for pain with active and passive ROM of bilateral foot and ankle.  (-) for pain with palpation of bilateral foot and ankle.  Bilateral pes planus foot type.  Bilateral hallux abducto valgus.  Bilateral semi-rigid contracture of toes 2-5 with adductovarus rotation of the 5th.     Skin:     General: Skin is warm and dry.      Coloration: Skin is not pale.      Findings: Lesion present. No abrasion, bruising, burn, ecchymosis, erythema, laceration, petechiae or rash.      Nails: There is no clubbing.      Comments: Toenails x 10 appear dystrophic but  well maintained. No open wounds, interdigital maceration noted bilateral.  Cicatrix of the Lt. Posterior heel is well healed with no adjacent break in skin integrity.  Hyperkeratotic lesion noted to the Lt. Posterior heel and medial Rt. 2nd PIP joint.         Neurological:      Mental Status: She is alert and oriented to person, place, and time.      Sensory: Sensory deficit present.      Motor: No weakness or atrophy.      Comments: Protective sensation per [place] monofilament decreased bilateral.    Light touch intact bilateral.               Assessment:       Encounter Diagnoses   Name Primary?    Controlled type 1 diabetes mellitus with diabetic polyneuropathy Yes    Corn or callus     Hammer toes of both feet          Plan:       Kateryna was seen today for diabetic foot exam and diabetes mellitus.    Diagnoses and all orders for this visit:    Controlled type 1 diabetes mellitus with diabetic polyneuropathy    Corn or callus    Hammer toes of both feet      I counseled the patient on her conditions, their implications and medical management.    - Discussed applying CBD oil to the Rt. Posterior heel to address nocturnal paresthesias of the Rt. Heel.    - To continue applying moisturizer to areas of callus build up daily.    - Fitted and dispensed a toe spacers to minimize pressure to the Rt. 2nd toe on account of contracture.    - Advised to continue wearing shoe gear that accommodates for digital deformities.  To inspect daily for excessive signs of shearing consistent with pressure ulceration as well as the development of pre-ulceration.      - Shoe inspection. Diabetic Foot Education. Patient reminded of the importance of good nutrition and blood sugar control to help prevent podiatric complications of diabetes. Patient instructed on proper foot hygeine. We discussed wearing proper shoe gear, daily foot inspections, never walking without protective shoe gear, never putting sharp instruments to  feet    - With patient's permission, a sterile #15 scalpel was used to trim lesions x 2 down to smooth appearing skin without incident.  Patient relates relief following the procedure. She will continue to monitor the areas daily, inspect her feet, wear protective shoe gear when ambulatory, moisturizer to maintain skin integrity and follow in this office in approximately 6 months, sooner p.r.n.    Follow up in about 6 months (around 9/11/2022).    Lei Rhoades DPM

## 2022-03-16 ENCOUNTER — LAB VISIT (OUTPATIENT)
Dept: LAB | Facility: HOSPITAL | Age: 67
End: 2022-03-16
Attending: INTERNAL MEDICINE
Payer: MEDICARE

## 2022-03-16 DIAGNOSIS — E21.0 PRIMARY HYPERPARATHYROIDISM: Primary | ICD-10-CM

## 2022-03-16 LAB — 25(OH)D3+25(OH)D2 SERPL-MCNC: >120 NG/ML (ref 30–96)

## 2022-03-16 PROCEDURE — 82306 VITAMIN D 25 HYDROXY: CPT | Performed by: INTERNAL MEDICINE

## 2022-03-16 PROCEDURE — 36415 COLL VENOUS BLD VENIPUNCTURE: CPT | Performed by: INTERNAL MEDICINE

## 2022-04-25 ENCOUNTER — OFFICE VISIT (OUTPATIENT)
Dept: FAMILY MEDICINE | Facility: CLINIC | Age: 67
End: 2022-04-25
Payer: MEDICARE

## 2022-04-25 VITALS
BODY MASS INDEX: 28.6 KG/M2 | DIASTOLIC BLOOD PRESSURE: 70 MMHG | OXYGEN SATURATION: 98 % | WEIGHT: 177.94 LBS | HEIGHT: 66 IN | SYSTOLIC BLOOD PRESSURE: 126 MMHG | HEART RATE: 86 BPM | RESPIRATION RATE: 18 BRPM

## 2022-04-25 DIAGNOSIS — B96.89 ACUTE BACTERIAL SINUSITIS: ICD-10-CM

## 2022-04-25 DIAGNOSIS — I35.8 AORTIC VALVE SCLEROSIS: ICD-10-CM

## 2022-04-25 DIAGNOSIS — J01.90 ACUTE BACTERIAL SINUSITIS: ICD-10-CM

## 2022-04-25 DIAGNOSIS — E10.42 CONTROLLED TYPE 1 DIABETES MELLITUS WITH DIABETIC POLYNEUROPATHY, WITH LONG-TERM CURRENT USE OF INSULIN: ICD-10-CM

## 2022-04-25 DIAGNOSIS — Z00.00 ROUTINE GENERAL MEDICAL EXAMINATION AT A HEALTH CARE FACILITY: Primary | ICD-10-CM

## 2022-04-25 PROCEDURE — 99215 OFFICE O/P EST HI 40 MIN: CPT | Mod: PBBFAC,PO | Performed by: STUDENT IN AN ORGANIZED HEALTH CARE EDUCATION/TRAINING PROGRAM

## 2022-04-25 PROCEDURE — 99214 PR OFFICE/OUTPT VISIT, EST, LEVL IV, 30-39 MIN: ICD-10-PCS | Mod: S$PBB,,, | Performed by: STUDENT IN AN ORGANIZED HEALTH CARE EDUCATION/TRAINING PROGRAM

## 2022-04-25 PROCEDURE — 99999 PR PBB SHADOW E&M-EST. PATIENT-LVL V: ICD-10-PCS | Mod: PBBFAC,,, | Performed by: STUDENT IN AN ORGANIZED HEALTH CARE EDUCATION/TRAINING PROGRAM

## 2022-04-25 PROCEDURE — 99999 PR PBB SHADOW E&M-EST. PATIENT-LVL V: CPT | Mod: PBBFAC,,, | Performed by: STUDENT IN AN ORGANIZED HEALTH CARE EDUCATION/TRAINING PROGRAM

## 2022-04-25 PROCEDURE — 99214 OFFICE O/P EST MOD 30 MIN: CPT | Mod: S$PBB,,, | Performed by: STUDENT IN AN ORGANIZED HEALTH CARE EDUCATION/TRAINING PROGRAM

## 2022-04-25 RX ORDER — AMOXICILLIN AND CLAVULANATE POTASSIUM 875; 125 MG/1; MG/1
1 TABLET, FILM COATED ORAL 2 TIMES DAILY
Qty: 10 TABLET | Refills: 0 | Status: SHIPPED | OUTPATIENT
Start: 2022-04-25 | End: 2022-04-30

## 2022-04-25 RX ORDER — PROMETHAZINE HYDROCHLORIDE AND CODEINE PHOSPHATE 6.25; 1 MG/5ML; MG/5ML
5 SOLUTION ORAL NIGHTLY PRN
Qty: 35 ML | Refills: 0 | Status: SHIPPED | OUTPATIENT
Start: 2022-04-25 | End: 2022-05-02

## 2022-04-25 NOTE — PROGRESS NOTES
[facility] MEDICINE CLINIC NOTE    Patient Name: Kateryna Desai  YOB: 1955    PRESENTING HISTORY   Chief Complaint:   Chief Complaint   Patient presents with    Annual Exam        History of Present Illness:  Ms. Kateryna Desai is a 67 y.o. female here for annual but she is acutely ill.      1 week of PND, cough  Taking OTC tussin, flonase.   Ears, throat sore.   No fevers.   No GI issues.   Decreased appetite.   Since onset, worsened over the weekend.       T1DM is under good control, managed by Dr. Benjamin.     Aortic sclerosis, no longer follows with Cardiology                                             Review of Systems   All other systems reviewed and are negative.        PAST HISTORY:     Past Medical History:   Diagnosis Date    Allergy     Arthritis     degnerative disease low back,muscle spasms, shoulders    Asian flu type A 2017    Bunionette 2012    Diabetes mellitus type I     since age 13    Diabetic gastroparesis     takes Cytotec    Diabetic retinopathy of both eyes     gets periodic laser treatments    Difficult intubation     as a child had sore throat after a procedure    Encounter for blood transfusion     GERD (gastroesophageal reflux disease)     Hallux abducto valgus 3/31/2014    Headache(784.0)     Hiatal hernia     with GERD    Hyperlipidemia     Hypertension     Infection of the upper respiratory tract 2012    Lumbar spinal stenosis     Osteopenia     Rosacea     Seizures     Pt states during pgy with stroke    Stroke ?    while pregnant    Tachycardia     asymptomatic with Toprol    Thyroid disease     hypothyroidism    Venous insufficiency of leg     improved since EVLT       Past Surgical History:   Procedure Laterality Date    BUNIONECTOMY Right 2012    CARDIAC CATHETERIZATION      no stents     SECTION      COLONOSCOPY  2007    Dr. Rose, 10 year recheck     EXOSTECTOMY  8/16/12    left foot, local anesthesia, in office    EYE SURGERY      has had many laser procedures for diabetic retinopathy    VASCULAR SURGERY      VEIN SURGERY  April 2012    EVLT right greater saphenous, IV sedation       Family History   Problem Relation Age of Onset    Cancer Maternal Aunt         breast    Breast cancer Maternal Aunt     Diabetes Maternal Grandmother     Breast cancer Maternal Grandmother 38    Cancer Maternal Grandfather         prostate    Diabetes Maternal Grandfather     Diabetes Cousin     Arthritis Mother     Cancer Mother     Melanoma Mother     Heart disease Father     Stroke Father     No Known Problems Sister     No Known Problems Brother     No Known Problems Daughter     Alzheimer's disease Maternal Uncle     Alcohol abuse Maternal Uncle         x2    Heart disease Maternal Uncle     No Known Problems Paternal Aunt     Heart disease Paternal Uncle        Social History     Socioeconomic History    Marital status:    Tobacco Use    Smoking status: Never Smoker    Smokeless tobacco: Never Used   Substance and Sexual Activity    Alcohol use: Yes     Comment: social, 1 drink monthly    Drug use: No    Sexual activity: Yes     Partners: Male       MEDICATIONS & ALLERGIES:     Current Outpatient Medications on File Prior to Visit   Medication Sig    ACZONE 5 % topical gel Apply topically 2 (two) times daily as needed (roseacea).     alpha lipoic acid 600 mg Cap Take 1 capsule by mouth once daily.    ALPHAGAN P 0.1 % Drop Place 1 drop into both eyes 3 (three) times daily.     ascorbic acid, vitamin C, (VITAMIN C) 100 MG tablet Take 100 mg by mouth 2 (two) times daily.    aspirin (ECOTRIN) 81 MG EC tablet Take 81 mg by mouth once daily.    azelastine (ASTELIN) 137 mcg (0.1 %) nasal spray USE ONE SPRAY IN EACH NOSTRIL TWICE A DAY    baclofen (LIORESAL) 20 MG tablet Take 1 tablet (20 mg total) by mouth 3 (three) times daily.    BD VEO  "INSULIN SYRINGE UF 1/2 mL 31 gauge x 15/64" Syrg     calcium citrate-vitamin D3 315-200 mg (CITRACAL+D) 315-200 mg-unit per tablet Take 1 tablet by mouth 2 (two) times daily.    cholecalciferol, vitamin D3, (VITAMIN D3) 50 mcg (2,000 unit) Cap Take 1 capsule by mouth once daily.    cinnamon bark 500 mg capsule Take 500 mg by mouth 2 (two) times daily.    clobetasoL (TEMOVATE) 0.05 % external solution     CRESTOR 40 mg Tab Take 40 mg by mouth once daily.     DOCOSAHEXANOIC ACID/EPA (FISH OIL ORAL) Take 1,200 mg by mouth 2 (two) times daily.    doxycycline (VIBRAMYCIN) 50 MG capsule     ESTRACE 0.01 % (0.1 mg/gram) vaginal cream Place vaginally twice a week. Tuesday, Saturday    estradiol 10 mcg Tab Place 1 tablet vaginally twice a week. Tuesday, Saturday    ezetimibe (ZETIA) 10 mg tablet Take 10 mg by mouth once daily.    fluconazole (DIFLUCAN) 150 MG Tab     fluticasone propionate (FLONASE) 50 mcg/actuation nasal spray SPRAY ONCE INTO EACH NOSTRIL ONCE DAILY    furosemide (LASIX) 20 MG tablet TAKE ONE TABLET BY MOUTH TWO TIMES A DAY    gabapentin (NEURONTIN) 300 MG capsule Take 1 capsule (300 mg total) by mouth every evening.    GLUCAGON EMERGENCY 1 mg injection 1 mg as needed.     GVOKE HYPOPEN 2-PACK 1 mg/0.2 mL AtIn USE AS DIRECTED TO TREAT PROFOUND HYPOGLYCEMIA    HUMALOG 100 unit/mL injection Inject into the skin 3 (three) times daily before meals. 10-17 units tid sliding scale    L.ACID/L.CASEI/B.BIF/B.CEDRIC/FOS (PROBIOTIC BLEND ORAL) Take 1 capsule by mouth every Mon, Wed, Fri.    metoprolol succinate (TOPROL-XL) 25 MG 24 hr tablet TAKE 1 TABLET BY MOUTH ONCE DAILY    montelukast (SINGULAIR) 10 mg tablet TAKE ONE TABLET BY MOUTH EVERY EVENING    NOVOFINE 32 32 gauge x 1/4" Ndle     ondansetron (ZOFRAN-ODT) 4 MG TbDL Take 1 tablet (4 mg total) by mouth every 8 (eight) hours as needed (Nausea).    ONETOUCH ULTRA BLUE TEST STRIP Strp     pantoprazole (PROTONIX) 40 MG tablet TAKE 1 TABLET BY " "MOUTH TWICE A DAY    potassium chloride (K-TAB) 20 mEq     SOOLANTRA 1 % Crea     SYNTHROID 75 mcg tablet     SYNTHROID 88 mcg tablet Take 88 mcg by mouth every evening.     TOUJEO SOLOSTAR U-300 INSULIN 300 unit/mL (1.5 mL) InPn pen INJECT UP TO 34 UNITS SUB-Q ONCE A DAY    TRESIBA FLEXTOUCH U-100 100 unit/mL (3 mL) InPn Inject 46 Units into the skin every morning.     VITAMIN B COMPLEX VIT C NO.4 (SUPER B COMPLEX + C ORAL) Take 1 tablet by mouth once daily.    promethazine (PHENERGAN) 6.25 mg/5 mL syrup Take 5 mLs (6.25 mg total) by mouth nightly as needed for Nausea. (Patient not taking: Reported on 4/25/2022)    [DISCONTINUED] BENEFIBER, GUAR GUM, ORAL Take 2 Doses by mouth 2 (two) times daily as needed.     Current Facility-Administered Medications on File Prior to Visit   Medication    methylPREDNISolone acetate injection 40 mg       Review of patient's allergies indicates:   Allergen Reactions    Sulfamethoxazole-trimethoprim Rash and Hives     Patient experienced on 12/3/14 redness to face and rash to chest and arms/ with no SOB or airway obstruction    Zinc-25 Hives and Swelling     Aching pains in the knees     Heparin analogues     Percocet [oxycodone-acetaminophen] Nausea And Vomiting     Also caused dizziness and passed out    Iodinated contrast media Swelling and Rash     Says topical iodine OK    Niaspan extended-release [niacin] Itching and Other (See Comments)     Skin flushing and redness       OBJECTIVE:   Vital Signs:  Vitals:    04/25/22 1538   BP: 126/70   Pulse: 86   Resp: 18   SpO2: 98%   Weight: 80.7 kg (177 lb 14.6 oz)   Height: 5' 6" (1.676 m)       No results found for this or any previous visit (from the past 24 hour(s)).      Physical Exam  Vitals and nursing note reviewed.   Constitutional:       Appearance: She is not diaphoretic.   HENT:      Head: Normocephalic and atraumatic.      Ears:      Comments: Marked maxillary sinus tenderness     Mouth/Throat:      Pharynx: " No oropharyngeal exudate or posterior oropharyngeal erythema.   Eyes:      General: No scleral icterus.     Conjunctiva/sclera: Conjunctivae normal.      Pupils: Pupils are equal, round, and reactive to light.   Neck:      Thyroid: No thyromegaly.      Vascular: No carotid bruit.   Cardiovascular:      Rate and Rhythm: Normal rate and regular rhythm.      Heart sounds: Murmur (4/6 systolic murmur with no radiation to carotids) heard.   Pulmonary:      Effort: Pulmonary effort is normal. No respiratory distress.      Breath sounds: Normal breath sounds. No wheezing or rales.   Musculoskeletal:         General: No tenderness or deformity. Normal range of motion.      Cervical back: Normal range of motion and neck supple.      Right lower leg: No edema.      Left lower leg: No edema.   Lymphadenopathy:      Cervical: No cervical adenopathy.   Skin:     General: Skin is warm and dry.      Findings: No erythema or rash.   Neurological:      Mental Status: She is alert and oriented to person, place, and time.      Gait: Gait is intact.   Psychiatric:         Mood and Affect: Mood and affect normal.         Cognition and Memory: Memory normal.         Judgment: Judgment normal.         ASSESSMENT & PLAN:     Routine general medical examination at a health care facility    Acute bacterial sinusitis  -     promethazine-codeine 6.25-10 mg/5 ml (PHENERGAN WITH CODEINE) 6.25-10 mg/5 mL syrup; Take 5 mLs by mouth nightly as needed for Cough.  Dispense: 35 mL; Refill: 0  -     amoxicillin-clavulanate 875-125mg (AUGMENTIN) 875-125 mg per tablet; Take 1 tablet by mouth 2 (two) times daily. for 5 days  Dispense: 10 tablet; Refill: 0    Aortic valve sclerosis  Suspect has not progressed to stenosis based on exam.   Consider repeat Echo in future.     Controlled type 1 diabetes mellitus with diabetic polyneuropathy, with long-term current use of insulin  I will defer management to Dr. Benjamin. Continue current medicines.             Guido Chow MD   Internal Medicine    This note was created using Dragon voice recognition software that occasionally misinterprets phrases or words.

## 2022-05-05 ENCOUNTER — LAB VISIT (OUTPATIENT)
Dept: LAB | Facility: HOSPITAL | Age: 67
End: 2022-05-05
Attending: INTERNAL MEDICINE
Payer: MEDICARE

## 2022-05-05 DIAGNOSIS — E10.9 DIABETES MELLITUS TYPE I: ICD-10-CM

## 2022-05-05 DIAGNOSIS — Z79.899 ENCOUNTER FOR LONG-TERM (CURRENT) USE OF OTHER MEDICATIONS: Primary | ICD-10-CM

## 2022-05-05 LAB
ALBUMIN SERPL BCP-MCNC: 3.8 G/DL (ref 3.5–5.2)
ALP SERPL-CCNC: 64 U/L (ref 55–135)
ALT SERPL W/O P-5'-P-CCNC: 30 U/L (ref 10–44)
ANION GAP SERPL CALC-SCNC: 9 MMOL/L (ref 8–16)
AST SERPL-CCNC: 29 U/L (ref 10–40)
BILIRUB SERPL-MCNC: 0.6 MG/DL (ref 0.1–1)
BUN SERPL-MCNC: 13 MG/DL (ref 8–23)
CALCIUM SERPL-MCNC: 9.1 MG/DL (ref 8.7–10.5)
CHLORIDE SERPL-SCNC: 100 MMOL/L (ref 95–110)
CHOLEST SERPL-MCNC: 159 MG/DL (ref 120–199)
CHOLEST/HDLC SERPL: 3.1 {RATIO} (ref 2–5)
CO2 SERPL-SCNC: 31 MMOL/L (ref 23–29)
CREAT SERPL-MCNC: 1 MG/DL (ref 0.5–1.4)
EST. GFR  (AFRICAN AMERICAN): >60 ML/MIN/1.73 M^2
EST. GFR  (NON AFRICAN AMERICAN): 59.3 ML/MIN/1.73 M^2
GLUCOSE SERPL-MCNC: 147 MG/DL (ref 70–110)
HDLC SERPL-MCNC: 52 MG/DL (ref 40–75)
HDLC SERPL: 32.7 % (ref 20–50)
LDLC SERPL CALC-MCNC: 89.6 MG/DL (ref 63–159)
NONHDLC SERPL-MCNC: 107 MG/DL
POTASSIUM SERPL-SCNC: 3.6 MMOL/L (ref 3.5–5.1)
PROT SERPL-MCNC: 6.8 G/DL (ref 6–8.4)
SODIUM SERPL-SCNC: 140 MMOL/L (ref 136–145)
TRIGL SERPL-MCNC: 87 MG/DL (ref 30–150)

## 2022-05-05 PROCEDURE — 36415 COLL VENOUS BLD VENIPUNCTURE: CPT | Performed by: INTERNAL MEDICINE

## 2022-05-05 PROCEDURE — 80053 COMPREHEN METABOLIC PANEL: CPT | Performed by: INTERNAL MEDICINE

## 2022-05-05 PROCEDURE — 80061 LIPID PANEL: CPT | Performed by: INTERNAL MEDICINE

## 2022-05-25 DIAGNOSIS — K21.9 GASTROESOPHAGEAL REFLUX DISEASE WITHOUT ESOPHAGITIS: ICD-10-CM

## 2022-05-25 NOTE — TELEPHONE ENCOUNTER
No new care gaps identified.  [facility] Embedded Care Gaps. Reference number: [id number]. 5/25/2022   9:39:21 AM CDT

## 2022-05-26 RX ORDER — PANTOPRAZOLE SODIUM 40 MG/1
40 TABLET, DELAYED RELEASE ORAL 2 TIMES DAILY
Qty: 60 TABLET | Refills: 2 | Status: SHIPPED | OUTPATIENT
Start: 2022-05-26 | End: 2022-06-21

## 2022-07-21 DIAGNOSIS — E11.9 TYPE 2 DIABETES MELLITUS WITHOUT COMPLICATION: ICD-10-CM

## 2022-09-02 ENCOUNTER — LAB VISIT (OUTPATIENT)
Dept: LAB | Facility: HOSPITAL | Age: 67
End: 2022-09-02
Attending: INTERNAL MEDICINE
Payer: MEDICARE

## 2022-09-02 DIAGNOSIS — E03.9 PRIMARY HYPOTHYROIDISM: ICD-10-CM

## 2022-09-02 DIAGNOSIS — E10.9 DIABETES MELLITUS TYPE I: Primary | ICD-10-CM

## 2022-09-02 DIAGNOSIS — Z79.899 ENCOUNTER FOR LONG-TERM (CURRENT) USE OF OTHER MEDICATIONS: ICD-10-CM

## 2022-09-02 LAB
ALBUMIN/CREAT UR: 4.4 UG/MG (ref 0–30)
ANION GAP SERPL CALC-SCNC: 7 MMOL/L (ref 8–16)
BUN SERPL-MCNC: 11 MG/DL (ref 8–23)
CALCIUM SERPL-MCNC: 9.1 MG/DL (ref 8.7–10.5)
CHLORIDE SERPL-SCNC: 103 MMOL/L (ref 95–110)
CHOLEST SERPL-MCNC: 157 MG/DL (ref 120–199)
CHOLEST/HDLC SERPL: 2.8 {RATIO} (ref 2–5)
CO2 SERPL-SCNC: 31 MMOL/L (ref 23–29)
CREAT SERPL-MCNC: 1 MG/DL (ref 0.5–1.4)
CREAT UR-MCNC: 236 MG/DL (ref 15–325)
EST. GFR  (NO RACE VARIABLE): >60 ML/MIN/1.73 M^2
ESTIMATED AVG GLUCOSE: 114 MG/DL (ref 68–131)
GLUCOSE SERPL-MCNC: 96 MG/DL (ref 70–110)
HBA1C MFR BLD: 5.6 % (ref 4.5–6.2)
HDLC SERPL-MCNC: 57 MG/DL (ref 40–75)
HDLC SERPL: 36.3 % (ref 20–50)
LDLC SERPL CALC-MCNC: 85 MG/DL (ref 63–159)
MICROALBUMIN UR DL<=1MG/L-MCNC: 10.4 UG/ML
NONHDLC SERPL-MCNC: 100 MG/DL
POTASSIUM SERPL-SCNC: 4.2 MMOL/L (ref 3.5–5.1)
SODIUM SERPL-SCNC: 141 MMOL/L (ref 136–145)
T4 FREE SERPL-MCNC: 1.07 NG/DL (ref 0.71–1.51)
TRIGL SERPL-MCNC: 75 MG/DL (ref 30–150)
TSH SERPL DL<=0.005 MIU/L-ACNC: 1.8 UIU/ML (ref 0.34–5.6)

## 2022-09-02 PROCEDURE — 36415 COLL VENOUS BLD VENIPUNCTURE: CPT | Performed by: INTERNAL MEDICINE

## 2022-09-02 PROCEDURE — 83036 HEMOGLOBIN GLYCOSYLATED A1C: CPT | Performed by: INTERNAL MEDICINE

## 2022-09-02 PROCEDURE — 80048 BASIC METABOLIC PNL TOTAL CA: CPT | Performed by: INTERNAL MEDICINE

## 2022-09-02 PROCEDURE — 84439 ASSAY OF FREE THYROXINE: CPT | Performed by: INTERNAL MEDICINE

## 2022-09-02 PROCEDURE — 84443 ASSAY THYROID STIM HORMONE: CPT | Performed by: INTERNAL MEDICINE

## 2022-09-02 PROCEDURE — 82570 ASSAY OF URINE CREATININE: CPT | Performed by: INTERNAL MEDICINE

## 2022-09-02 PROCEDURE — 80061 LIPID PANEL: CPT | Performed by: INTERNAL MEDICINE

## 2022-09-10 DIAGNOSIS — K21.9 GASTROESOPHAGEAL REFLUX DISEASE WITHOUT ESOPHAGITIS: ICD-10-CM

## 2022-09-10 RX ORDER — PANTOPRAZOLE SODIUM 40 MG/1
TABLET, DELAYED RELEASE ORAL
Qty: 180 TABLET | Refills: 2 | Status: SHIPPED | OUTPATIENT
Start: 2022-09-10 | End: 2022-11-28

## 2022-09-10 NOTE — TELEPHONE ENCOUNTER
Refill Decision Note   Kateryna Desai  is requesting a refill authorization.  Brief Assessment and Rationale for Refill:  Approve     Medication Therapy Plan:       Medication Reconciliation Completed: No   Comments:     No Care Gaps recommended.     Note composed:3:27 PM 09/10/2022

## 2022-09-10 NOTE — TELEPHONE ENCOUNTER
No new care gaps identified.  [facility] Embedded Care Gaps. Reference number: [id number]. 9/10/2022   12:53:42 PM CDT

## 2022-10-21 ENCOUNTER — PATIENT OUTREACH (OUTPATIENT)
Dept: ADMINISTRATIVE | Facility: HOSPITAL | Age: 67
End: 2022-10-21
Payer: MEDICARE

## 2022-10-21 NOTE — LETTER
October 21, 2022    Kateryna Baez Artemio Lloyd  [address]  [place] [address]             [facility]  [address]  [place] [address]  Phone: [phone] Dear Ms. Ulloa,    Good morning!!    I am reaching out to you because you are over due for a Diabetic eye exam. Have you had an eye exam in the last year,  if so where did you have it? If not,  would you like me to schedule you an eye exam with one of our Physician's at [facility]?    Thanks so much and have a great day!    Brandy OROPEZA LPN [facility]  [facility]  [address] [address]  P- [phone]  F- [phone]

## 2022-10-27 LAB
LEFT EYE DM RETINOPATHY: POSITIVE
RIGHT EYE DM RETINOPATHY: POSITIVE

## 2022-10-28 ENCOUNTER — HOSPITAL ENCOUNTER (OUTPATIENT)
Dept: RADIOLOGY | Facility: CLINIC | Age: 67
Discharge: HOME | End: 2022-10-28
Attending: STUDENT IN AN ORGANIZED HEALTH CARE EDUCATION/TRAINING PROGRAM
Payer: MEDICARE

## 2022-10-28 ENCOUNTER — OFFICE VISIT (OUTPATIENT)
Dept: FAMILY MEDICINE | Facility: CLINIC | Age: 67
End: 2022-10-28
Payer: MEDICARE

## 2022-10-28 VITALS
DIASTOLIC BLOOD PRESSURE: 78 MMHG | OXYGEN SATURATION: 97 % | HEART RATE: 87 BPM | SYSTOLIC BLOOD PRESSURE: 124 MMHG | BODY MASS INDEX: 29.8 KG/M2 | WEIGHT: 185.44 LBS | HEIGHT: 66 IN | RESPIRATION RATE: 18 BRPM

## 2022-10-28 DIAGNOSIS — E10.42 CONTROLLED TYPE 1 DIABETES MELLITUS WITH DIABETIC POLYNEUROPATHY, WITH LONG-TERM CURRENT USE OF INSULIN: Primary | ICD-10-CM

## 2022-10-28 DIAGNOSIS — I35.8 AORTIC VALVE SCLEROSIS: ICD-10-CM

## 2022-10-28 DIAGNOSIS — M79.644 PAIN OF FINGER OF RIGHT HAND: ICD-10-CM

## 2022-10-28 PROCEDURE — 73140 X-RAY EXAM OF FINGER(S): CPT | Mod: TC,FY,PO,RT

## 2022-10-28 PROCEDURE — 99999 PR PBB SHADOW E&M-EST. PATIENT-LVL V: CPT | Mod: PBBFAC,,, | Performed by: STUDENT IN AN ORGANIZED HEALTH CARE EDUCATION/TRAINING PROGRAM

## 2022-10-28 PROCEDURE — 99999 PR PBB SHADOW E&M-EST. PATIENT-LVL V: ICD-10-PCS | Mod: PBBFAC,,, | Performed by: STUDENT IN AN ORGANIZED HEALTH CARE EDUCATION/TRAINING PROGRAM

## 2022-10-28 PROCEDURE — 99214 PR OFFICE/OUTPT VISIT, EST, LEVL IV, 30-39 MIN: ICD-10-PCS | Mod: S$PBB,,, | Performed by: STUDENT IN AN ORGANIZED HEALTH CARE EDUCATION/TRAINING PROGRAM

## 2022-10-28 PROCEDURE — 73140 XR FINGER 2 OR MORE VIEWS RIGHT: ICD-10-PCS | Mod: 26,RT,S$GLB, | Performed by: RADIOLOGY

## 2022-10-28 PROCEDURE — 99214 OFFICE O/P EST MOD 30 MIN: CPT | Mod: S$PBB,,, | Performed by: STUDENT IN AN ORGANIZED HEALTH CARE EDUCATION/TRAINING PROGRAM

## 2022-10-28 PROCEDURE — 99215 OFFICE O/P EST HI 40 MIN: CPT | Mod: PBBFAC,PO | Performed by: STUDENT IN AN ORGANIZED HEALTH CARE EDUCATION/TRAINING PROGRAM

## 2022-10-28 PROCEDURE — 73140 X-RAY EXAM OF FINGER(S): CPT | Mod: 26,RT,S$GLB, | Performed by: RADIOLOGY

## 2022-10-28 RX ORDER — MELOXICAM 7.5 MG/1
7.5 TABLET ORAL DAILY
Qty: 14 TABLET | Refills: 0 | Status: SHIPPED | OUTPATIENT
Start: 2022-10-28 | End: 2022-11-29

## 2022-11-02 NOTE — PROGRESS NOTES
"[facility] MEDICINE CLINIC NOTE    Patient Name: Kateryna Desai  YOB: 1955    PRESENTING HISTORY     History of Present Illness:  Ms. Kateryna Desai is a 67 y.o. female here for scheduled f/u.     Her T1DM control has been excellent. Occ low bgs.    A1c most likely agrees with blood glucose readings.    Will defer medication changes to patient and her endocrinologist.    Check CBC to confirm accuracy of A1c.     She c/o pain at right middle finger, especially distal part at DIP joint.   A/w swelling.   No trauma that she can recall.     ROS      OBJECTIVE:   Vital Signs:  Vitals:    10/28/22 1346   BP: 124/78   Pulse: 87   Resp: 18   SpO2: 97%   Weight: 84.1 kg (185 lb 6.5 oz)   Height: 5' 6" (1.676 m)            Physical Exam  Vitals and nursing note reviewed.   Constitutional:       Appearance: She is not diaphoretic.   HENT:      Head: Normocephalic and atraumatic.      Ears:      Comments: Marked maxillary sinus tenderness     Mouth/Throat:      Pharynx: No oropharyngeal exudate or posterior oropharyngeal erythema.   Eyes:      General: No scleral icterus.     Conjunctiva/sclera: Conjunctivae normal.      Pupils: Pupils are equal, round, and reactive to light.   Neck:      Thyroid: No thyromegaly.      Vascular: No carotid bruit.   Cardiovascular:      Rate and Rhythm: Normal rate and regular rhythm.      Heart sounds: Murmur (3/6 systolic murmur with radiation to carotids) heard.   Pulmonary:      Effort: Pulmonary effort is normal. No respiratory distress.      Breath sounds: Normal breath sounds. No wheezing or rales.   Musculoskeletal:         General: No tenderness or deformity. Normal range of motion.      Cervical back: Normal range of motion and neck supple.      Right lower leg: No edema.      Left lower leg: No edema.   Lymphadenopathy:      Cervical: No cervical adenopathy.   Skin:     General: Skin is warm and dry.      Findings: No erythema or " rash.   Neurological:      Mental Status: She is alert and oriented to person, place, and time.      Gait: Gait is intact.   Psychiatric:         Mood and Affect: Mood and affect normal.         Cognition and Memory: Memory normal.         Judgment: Judgment normal.       ASSESSMENT & PLAN:     Controlled type 1 diabetes mellitus with diabetic polyneuropathy, with long-term current use of insulin  -     CBC Auto Differential; Future; Expected date: 10/28/2022  Will not make any medication adjustments at this time. Monitor    Aortic valve sclerosis  -     Echo; Future  There may be stenosis based on exam. Get Echo.   No signs of CHF at this time    Pain of finger of right hand  -     X-Ray Finger 2 or More Views Right; Future; Expected date: 10/28/2022  -     meloxicam (MOBIC) 7.5 MG tablet; Take 1 tablet (7.5 mg total) by mouth once daily.  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 Trial of mobic. I think the risk of short term NSAID to renal health is low and her kidney function has been good.            Guido Chow MD   Internal Medicine               No

## 2022-11-07 ENCOUNTER — CLINICAL SUPPORT (OUTPATIENT)
Dept: CARDIOLOGY | Facility: HOSPITAL | Age: 67
End: 2022-11-07
Attending: STUDENT IN AN ORGANIZED HEALTH CARE EDUCATION/TRAINING PROGRAM
Payer: MEDICARE

## 2022-11-07 VITALS — BODY MASS INDEX: 29.8 KG/M2 | WEIGHT: 185.44 LBS | HEIGHT: 66 IN

## 2022-11-07 DIAGNOSIS — I35.8 AORTIC VALVE SCLEROSIS: ICD-10-CM

## 2022-11-07 PROCEDURE — 93306 TTE W/DOPPLER COMPLETE: CPT | Mod: 26,,, | Performed by: INTERNAL MEDICINE

## 2022-11-07 PROCEDURE — 93306 TTE W/DOPPLER COMPLETE: CPT

## 2022-11-07 PROCEDURE — 93306 ECHO (CUPID ONLY): ICD-10-PCS | Mod: 26,,, | Performed by: INTERNAL MEDICINE

## 2022-11-08 LAB
AORTIC ROOT ANNULUS: 2.83 CM
AORTIC VALVE CUSP SEPERATION: 0.97 CM
AV INDEX (PROSTH): 0.37
AV MEAN GRADIENT: 18 MMHG
AV PEAK GRADIENT: 30 MMHG
AV VALVE AREA: 1.16 CM2
AV VELOCITY RATIO: 0.36
BSA FOR ECHO PROCEDURE: 1.98 M2
CV ECHO LV RWT: 0.42 CM
DOP CALC AO PEAK VEL: 2.72 M/S
DOP CALC AO VTI: 65.4 CM
DOP CALC LVOT AREA: 3.1 CM2
DOP CALC LVOT DIAMETER: 2 CM
DOP CALC LVOT PEAK VEL: 0.99 M/S
DOP CALC LVOT STROKE VOLUME: 75.67 CM3
DOP CALC MV VTI: 29.1 CM
DOP CALCLVOT PEAK VEL VTI: 24.1 CM
E WAVE DECELERATION TIME: 209.31 MSEC
E/A RATIO: 1.04
E/E' RATIO: 15.54 M/S
ECHO LV POSTERIOR WALL: 0.95 CM (ref 0.6–1.1)
EJECTION FRACTION: 65 %
FRACTIONAL SHORTENING: 43 % (ref 28–44)
INTERVENTRICULAR SEPTUM: 1.19 CM (ref 0.6–1.1)
IVRT: 78.73 MSEC
LEFT INTERNAL DIMENSION IN SYSTOLE: 2.62 CM (ref 2.1–4)
LEFT VENTRICLE DIASTOLIC VOLUME INDEX: 49.48 ML/M2
LEFT VENTRICLE DIASTOLIC VOLUME: 96 ML
LEFT VENTRICLE MASS INDEX: 89 G/M2
LEFT VENTRICLE SYSTOLIC VOLUME INDEX: 12.9 ML/M2
LEFT VENTRICLE SYSTOLIC VOLUME: 24.97 ML
LEFT VENTRICULAR INTERNAL DIMENSION IN DIASTOLE: 4.57 CM (ref 3.5–6)
LEFT VENTRICULAR MASS: 172.55 G
LV LATERAL E/E' RATIO: 14.43 M/S
LV SEPTAL E/E' RATIO: 16.83 M/S
LVOT MG: 1.91 MMHG
LVOT MV: 0.64 CM/S
MV MEAN GRADIENT: 3 MMHG
MV PEAK A VEL: 0.97 M/S
MV PEAK E VEL: 1.01 M/S
MV STENOSIS PRESSURE HALF TIME: 60.7 MS
MV VALVE AREA BY CONTINUITY EQUATION: 2.6 CM2
MV VALVE AREA P 1/2 METHOD: 3.62 CM2
PISA MRMAX VEL: 4.77 M/S
PISA TR MAX VEL: 2.58 M/S
PV MV: 0.78 M/S
PV PEAK VELOCITY: 1.06 CM/S
RA PRESSURE: 3 MMHG
SINUS: 2.62 CM
STJ: 3.15 CM
TDI LATERAL: 0.07 M/S
TDI SEPTAL: 0.06 M/S
TDI: 0.07 M/S
TR MAX PG: 27 MMHG
TRICUSPID ANNULAR PLANE SYSTOLIC EXCURSION: 2 CM
TV REST PULMONARY ARTERY PRESSURE: 30 MMHG

## 2022-11-09 ENCOUNTER — TELEPHONE (OUTPATIENT)
Dept: FAMILY MEDICINE | Facility: CLINIC | Age: 67
End: 2022-11-09
Payer: MEDICARE

## 2022-11-09 DIAGNOSIS — I35.0 AORTIC VALVE STENOSIS, ETIOLOGY OF CARDIAC VALVE DISEASE UNSPECIFIED: Primary | ICD-10-CM

## 2022-11-09 NOTE — TELEPHONE ENCOUNTER
Call placed to patient for notification. Patient verbalized understanding. Patient states prior to pandemic she was seeing a cardiologist in [place]. States she would like referral to a cardiologist in [place]. Patient also states she is leaving today to go on a mini vacation. States will return 11-21-22.States she will contact office when she returns to schedule appointment with cardiology. Will send follow up message to MELLISSA Linn requesting cardiology referral to have on file when patient returns and contacts office to schedule appointment.

## 2022-11-16 ENCOUNTER — TELEPHONE (OUTPATIENT)
Dept: FAMILY MEDICINE | Facility: CLINIC | Age: 67
End: 2022-11-16
Payer: MEDICARE

## 2022-11-16 NOTE — TELEPHONE ENCOUNTER
I spoke with pt via phone. Phone number given to cardiology in [facility]. No further questions at this time.     ----- Message from Serina Fishman sent at 11/16/2022  1:32 PM CST -----  Contact: called at [phone]  Type: Needs Medical Advice  Who Called:  Pt  Best Call Back Number: [phone](Home)  Additional Information: PT is returning a call she missed while she was out of town. The call was regarding the need to make an appt for her to see a cardiologist. Please call back and advice.

## 2022-11-28 ENCOUNTER — OFFICE VISIT (OUTPATIENT)
Dept: CARDIOLOGY | Facility: CLINIC | Age: 67
End: 2022-11-28
Payer: MEDICARE

## 2022-11-28 VITALS
HEART RATE: 88 BPM | DIASTOLIC BLOOD PRESSURE: 81 MMHG | WEIGHT: 188.5 LBS | HEIGHT: 66 IN | BODY MASS INDEX: 30.29 KG/M2 | SYSTOLIC BLOOD PRESSURE: 118 MMHG

## 2022-11-28 DIAGNOSIS — I10 PRIMARY HYPERTENSION: ICD-10-CM

## 2022-11-28 DIAGNOSIS — I35.0 AORTIC VALVE STENOSIS, ETIOLOGY OF CARDIAC VALVE DISEASE UNSPECIFIED: ICD-10-CM

## 2022-11-28 DIAGNOSIS — I87.2 VENOUS INSUFFICIENCY OF BOTH LOWER EXTREMITIES: Primary | ICD-10-CM

## 2022-11-28 DIAGNOSIS — E10.22 STAGE 3 CHRONIC KIDNEY DISEASE DUE TO TYPE 1 DIABETES MELLITUS: ICD-10-CM

## 2022-11-28 DIAGNOSIS — I83.893 VARICOSE VEINS OF LOWER EXTREMITY WITH EDEMA, BILATERAL: ICD-10-CM

## 2022-11-28 DIAGNOSIS — E78.5 HYPERLIPIDEMIA, UNSPECIFIED HYPERLIPIDEMIA TYPE: ICD-10-CM

## 2022-11-28 DIAGNOSIS — N18.30 STAGE 3 CHRONIC KIDNEY DISEASE DUE TO TYPE 1 DIABETES MELLITUS: ICD-10-CM

## 2022-11-28 DIAGNOSIS — I35.0 NONRHEUMATIC AORTIC VALVE STENOSIS: ICD-10-CM

## 2022-11-28 PROCEDURE — 99214 OFFICE O/P EST MOD 30 MIN: CPT | Mod: S$PBB,,, | Performed by: INTERNAL MEDICINE

## 2022-11-28 PROCEDURE — 99214 PR OFFICE/OUTPT VISIT, EST, LEVL IV, 30-39 MIN: ICD-10-PCS | Mod: S$PBB,,, | Performed by: INTERNAL MEDICINE

## 2022-11-28 PROCEDURE — 99999 PR PBB SHADOW E&M-EST. PATIENT-LVL IV: ICD-10-PCS | Mod: PBBFAC,,, | Performed by: INTERNAL MEDICINE

## 2022-11-28 PROCEDURE — 99214 OFFICE O/P EST MOD 30 MIN: CPT | Mod: PBBFAC,PO | Performed by: INTERNAL MEDICINE

## 2022-11-28 PROCEDURE — 99999 PR PBB SHADOW E&M-EST. PATIENT-LVL IV: CPT | Mod: PBBFAC,,, | Performed by: INTERNAL MEDICINE

## 2022-11-28 NOTE — PROGRESS NOTES
Subjective:    Patient ID:  Kateryna Desai is a 67 y.o. female patient here for evaluation Establish Care (Discuss echo that showed regurgitation )      History of Present Illness:  The patient cardiac evaluation.  Patient referred for abnormal echo.  Valvular heart disease.  Echo with preserved ejection fraction, moderate AS, mild MR.  No prior history of known structural arrhythmic or ischemic heart disease.  Patient did undergo an angiogram number of years ago therapy recommended.  No records.  Last nuclear perfusion study was performed 20 18- for ischemia.  Denies angina chest pain.  No syncope/presyncope.  No shortness of breath.  No PND orthopnea.  No history of CVA Ca.  No history of DVT PE.  No hepatic disease.  Patient did have acute kidney injury a number of years ago now normal.     Risk factors include hypertension, diabetes mellitus, dyslipidemia.    Remote history of venous insufficiency treated in the past x1 with laser therapy.  No DVT    Review of patient's allergies indicates:   Allergen Reactions    Sulfamethoxazole-trimethoprim Rash and Hives     Patient experienced on 12/3/14 redness to face and rash to chest and arms/ with no SOB or airway obstruction    Zinc-25 Hives and Swelling     Aching pains in the knees     Heparin analogues     Percocet [oxycodone-acetaminophen] Nausea And Vomiting     Also caused dizziness and passed out    Iodinated contrast media Swelling and Rash     Says topical iodine OK    Niaspan extended-release [niacin] Itching and Other (See Comments)     Skin flushing and redness       Past Medical History:   Diagnosis Date    Allergy     Arthritis     degnerative disease low back,muscle spasms, shoulders    Asian flu type A 12/22/2017    Bunionette 7/26/2012    Diabetes mellitus type I     since age 13    Diabetic gastroparesis     takes Cytotec    Diabetic retinopathy of both eyes     gets periodic laser treatments    Difficult intubation     as a child  had sore throat after a procedure    Encounter for blood transfusion     GERD (gastroesophageal reflux disease)     Hallux abducto valgus 3/31/2014    Headache(784.0)     Hiatal hernia     with GERD    Hyperlipidemia     Hypertension     Infection of the upper respiratory tract 2012    Lumbar spinal stenosis     Osteopenia     Rosacea     Seizures     Pt states during pgy with stroke    Stroke ?    while pregnant    Tachycardia     asymptomatic with Toprol    Thyroid disease     hypothyroidism    Venous insufficiency of leg     improved since EVLT     Past Surgical History:   Procedure Laterality Date    BUNIONECTOMY Right 2012    CARDIAC CATHETERIZATION      no stents     SECTION      COLONOSCOPY  2007    Dr. Rose, 10 year recheck    EXOSTECTOMY  12    left foot, local anesthesia, in office    EYE SURGERY      has had many laser procedures for diabetic retinopathy    VASCULAR SURGERY      VEIN SURGERY  2012    EVLT right greater saphenous, IV sedation     Social History     Tobacco Use    Smoking status: Never    Smokeless tobacco: Never   Substance Use Topics    Alcohol use: Yes     Comment: social, 1 drink monthly    Drug use: No        Review of Systems:    As noted in HPI in addition         REVIEW OF SYSTEMS  Review of Systems   Constitutional: Negative for decreased appetite, diaphoresis, night sweats, weight gain and weight loss.   HENT:  Negative for nosebleeds and odynophagia.    Eyes:  Negative for double vision and photophobia.   Cardiovascular:  Negative for chest pain, claudication, cyanosis, dyspnea on exertion, irregular heartbeat, leg swelling, near-syncope, orthopnea, palpitations, paroxysmal nocturnal dyspnea and syncope.   Respiratory:  Negative for cough, hemoptysis, shortness of breath and wheezing.    Hematologic/Lymphatic: Negative for adenopathy.   Skin:  Negative for flushing, skin cancer and suspicious lesions.   Musculoskeletal:  Negative for gout,  myalgias and neck pain.   Gastrointestinal:  Negative for abdominal pain, heartburn, hematemesis and hematochezia.   Genitourinary:  Negative for bladder incontinence, hesitancy and nocturia.   Neurological:  Negative for focal weakness, headaches, light-headedness and paresthesias.   Psychiatric/Behavioral:  Negative for memory loss and substance abuse.      Objective:        Vitals:    11/28/22 1359   BP: 118/81   Pulse: 88       Lab Results   Component Value Date    WBC 7.55 09/09/2021    HGB 13.8 09/09/2021    HCT 44.2 09/09/2021     09/09/2021    CHOL 157 09/02/2022    TRIG 75 09/02/2022    HDL 57 09/02/2022    ALT 30 05/05/2022    AST 29 05/05/2022     09/02/2022     09/02/2022    K 4.2 09/02/2022    K 4.3 09/02/2022     09/02/2022     09/02/2022    CREATININE 1.0 09/02/2022    CREATININE 1.1 09/02/2022    BUN 11 09/02/2022    BUN 12 09/02/2022    CO2 31 (H) 09/02/2022    CO2 31 (H) 09/02/2022    TSH 1.800 09/02/2022    INR 1.1 02/26/2020    HGBA1C 5.6 09/02/2022      CARDIOGRAM RESULTS  Results for orders placed in visit on 11/07/22    Echo    Interpretation Summary  · The left ventricle is normal in size with mild concentric hypertrophy and normal systolic function.  · The estimated ejection fraction is 65%.  · Indeterminate left ventricular diastolic function.  · Normal right ventricular size with normal right ventricular systolic function.  · Mild tricuspid regurgitation.  · Mild mitral regurgitation.  · There is moderate aortic valve stenosis.  · Aortic valve area is 1.16 cm2; peak velocity is 2.72 m/s; mean gradient is 18 mmHg.  · Normal central venous pressure (3 mmHg).  · The estimated PA systolic pressure is 30 mmHg.        CURRENT/PREVIOUS VISIT EKG  Results for orders placed or performed during the hospital encounter of 02/26/20   EKG 12-lead    Collection Time: 02/26/20 11:57 AM    Narrative    Test Reason : R11.10,    Vent. Rate : 097 BPM     Atrial Rate : 097 BPM     " P-R Int : 160 ms          QRS Dur : 090 ms      QT Int : 364 ms       P-R-T Axes : 058 022 026 degrees     QTc Int : 462 ms    Normal sinus rhythm  Normal ECG  When compared with ECG of 20-AUG-2018 12:46,  ST no longer elevated in Inferior leads  Confirmed by Cleveland Taylor MD ([id number]) on 2/26/2020 8:04:05 PM    Referred By: [email or web address]   SELF           Confirmed By:Cleveland Taylor MD     No valid procedures specified.   No results found for this or any previous visit.    No valid procedures specified.    PHYSICAL EXAM  CONSTITUTIONAL: Well built, well nourished in no apparent distress  NECK: no carotid bruit, no JVD  LUNGS: CTA  CHEST WALL: no tenderness,  HEART: regular rate and rhythm, S1, S2 distant.  Grade 2/6 crescendo decrescendo murmur aortic area.  I poorly localized.  ABDOMEN: soft, non-tender; bowel sounds normal; no masses,  no organomegaly  EXTREMITIES: Extremities normal, no edema, no calf tenderness noted  VASCULAR EXAM: 2 PLUS UPPER AND +1 LOWER EXT PULSES  NEURO: AAO X 3, NO ACUTE FOCAL OR LATERALIZING FINDINGS    I HAVE REVIEWED :    The vital signs, nurses notes, and all the pertinent radiology and labs.         Current Outpatient Medications   Medication Instructions    ACZONE 5 % topical gel Topical (Top), 2 times daily PRN    alpha lipoic acid 600 mg Cap 1 capsule, Oral, Daily    ALPHAGAN P 0.1 % Drop 1 drop, Both Eyes, 3 times daily    ascorbic acid (vitamin C) (VITAMIN C) 100 mg, Oral, 2 times daily    aspirin (ECOTRIN) 81 mg, Oral, Daily    azelastine (ASTELIN) 137 mcg (0.1 %) nasal spray USE ONE SPRAY IN EACH NOSTRIL TWICE A DAY    baclofen (LIORESAL) 20 mg, Oral, 3 times daily    BD VEO INSULIN SYRINGE UF 1/2 mL 31 gauge x 15/64" Syrg No dose, route, or frequency recorded.    calcium citrate-vitamin D3 315-200 mg (CITRACAL+D) 315-200 mg-unit per tablet 1 tablet, Oral, 2 times daily    cholecalciferol, vitamin D3, (VITAMIN D3) 50 mcg (2,000 unit) Cap 1 capsule, Oral, Daily    cinnamon bark " "500 mg, Oral, 2 times daily    clobetasoL (TEMOVATE) 0.05 % external solution No dose, route, or frequency recorded.    CRESTOR 40 mg, Oral, Daily    DOCOSAHEXANOIC ACID/EPA (FISH OIL ORAL) 1,200 mg, Oral, 2 times daily    doxycycline (VIBRAMYCIN) 50 MG capsule No dose, route, or frequency recorded.    ESTRACE 0.01 % (0.1 mg/gram) vaginal cream Vaginal, Twice weekly, Tuesday, Saturday    estradiol 10 mcg Tab 1 tablet, Vaginal, Twice weekly, Tuesday, Saturday    ezetimibe (ZETIA) 10 mg, Oral, Daily    fluconazole (DIFLUCAN) 150 MG Tab No dose, route, or frequency recorded.    fluticasone propionate (FLONASE) 50 mcg/actuation nasal spray USE 1 SPRAY INTO THE EACH NOSTRIL ONCE A DAY    furosemide (LASIX) 20 MG tablet TAKE 1 TABLET BY MOUTH TWICE A DAY    gabapentin (NEURONTIN) 300 mg, Oral, Nightly    GLUCAGON EMERGENCY KIT (HUMAN) 1 mg, As needed (PRN)    GVOKE HYPOPEN 2-PACK 1 mg/0.2 mL AtIn USE AS DIRECTED TO TREAT PROFOUND HYPOGLYCEMIA    HUMALOG 100 unit/mL injection Subcutaneous, 3 times daily before meals, 10-17 units tid sliding scale    L.ACID/L.CASEI/B.BIF/B.CEDRIC/FOS (PROBIOTIC BLEND ORAL) 1 capsule, Oral, Every Mon, Wed, Fri    meloxicam (MOBIC) 7.5 mg, Oral, Daily    metoprolol succinate (TOPROL-XL) 25 mg, Oral, Daily    montelukast (SINGULAIR) 10 mg, Oral, Nightly    NOVOFINE 32 32 gauge x 1/4" Ndle No dose, route, or frequency recorded.    ondansetron (ZOFRAN-ODT) 4 mg, Oral, Every 8 hours PRN    ONETOUCH ULTRA BLUE TEST STRIP Strp No dose, route, or frequency recorded.    pantoprazole (PROTONIX) 40 MG tablet TAKE 1 TABLET BY MOUTH TWICE A DAY    potassium chloride (K-TAB) 20 mEq No dose, route, or frequency recorded.    promethazine (PHENERGAN) 6.25 mg, Oral, Nightly PRN    SOOLANTRA 1 % Crea No dose, route, or frequency recorded.    SYNTHROID 75 mcg tablet No dose, route, or frequency recorded.    SYNTHROID 88 mcg, Oral, Nightly    TOUJEO SOLOSTAR U-300 INSULIN 300 unit/mL (1.5 mL) InPn pen INJECT UP TO " 34 UNITS SUB-Q ONCE A DAY    TRESIBA FLEXTOUCH U-100 46 Units, Subcutaneous, Every morning    VITAMIN B COMPLEX VIT C NO.4 (SUPER B COMPLEX + C ORAL) 1 tablet, Oral, Daily          Assessment:   Valvular heart disease, documented by recent echo with moderate AS, mild AI, preserved ejection fraction.  Negative nuclear study 2018 for ischemia.  Risk factors positive for hypertension, diabetes mellitus, dyslipidemia.  Positive family history        Plan:   Patient completely asymptomatic.  No signs of angina, congestive heart failure or arrhythmia.  Positive family history, multiple CV risk factors.  Patient demonstrates good exercise tolerance as evaluated by history.  Suggest six-month follow-up.  No further workup unless symptomatic.          No follow-ups on file.

## 2022-11-29 ENCOUNTER — TELEPHONE (OUTPATIENT)
Dept: FAMILY MEDICINE | Facility: CLINIC | Age: 67
End: 2022-11-29

## 2022-11-29 ENCOUNTER — HOSPITAL ENCOUNTER (OUTPATIENT)
Dept: RADIOLOGY | Facility: CLINIC | Age: 67
Discharge: HOME | End: 2022-11-29
Attending: FAMILY MEDICINE
Payer: MEDICARE

## 2022-11-29 ENCOUNTER — OFFICE VISIT (OUTPATIENT)
Dept: FAMILY MEDICINE | Facility: CLINIC | Age: 67
End: 2022-11-29
Payer: MEDICARE

## 2022-11-29 VITALS
SYSTOLIC BLOOD PRESSURE: 100 MMHG | RESPIRATION RATE: 14 BRPM | OXYGEN SATURATION: 95 % | WEIGHT: 185.63 LBS | TEMPERATURE: 99 F | HEART RATE: 83 BPM | DIASTOLIC BLOOD PRESSURE: 60 MMHG | HEIGHT: 66 IN | BODY MASS INDEX: 29.83 KG/M2

## 2022-11-29 DIAGNOSIS — R05.3 PERSISTENT COUGH FOR 3 WEEKS OR LONGER: ICD-10-CM

## 2022-11-29 DIAGNOSIS — J06.9 VIRAL URI WITH COUGH: ICD-10-CM

## 2022-11-29 DIAGNOSIS — E10.42 CONTROLLED TYPE 1 DIABETES MELLITUS WITH DIABETIC POLYNEUROPATHY, WITH LONG-TERM CURRENT USE OF INSULIN: ICD-10-CM

## 2022-11-29 DIAGNOSIS — I35.0 AORTIC VALVE STENOSIS, ETIOLOGY OF CARDIAC VALVE DISEASE UNSPECIFIED: ICD-10-CM

## 2022-11-29 DIAGNOSIS — J06.9 VIRAL URI WITH COUGH: Primary | ICD-10-CM

## 2022-11-29 PROCEDURE — 99215 OFFICE O/P EST HI 40 MIN: CPT | Mod: PBBFAC,PO,25 | Performed by: FAMILY MEDICINE

## 2022-11-29 PROCEDURE — 99999 PR PBB SHADOW E&M-EST. PATIENT-LVL V: ICD-10-PCS | Mod: PBBFAC,,, | Performed by: FAMILY MEDICINE

## 2022-11-29 PROCEDURE — 71046 X-RAY EXAM CHEST 2 VIEWS: CPT | Mod: TC,FY,PO

## 2022-11-29 PROCEDURE — 71046 X-RAY EXAM CHEST 2 VIEWS: CPT | Mod: 26,,, | Performed by: RADIOLOGY

## 2022-11-29 PROCEDURE — 99999 PR PBB SHADOW E&M-EST. PATIENT-LVL V: CPT | Mod: PBBFAC,,, | Performed by: FAMILY MEDICINE

## 2022-11-29 PROCEDURE — 99214 PR OFFICE/OUTPT VISIT, EST, LEVL IV, 30-39 MIN: ICD-10-PCS | Mod: S$PBB,,, | Performed by: FAMILY MEDICINE

## 2022-11-29 PROCEDURE — 99214 OFFICE O/P EST MOD 30 MIN: CPT | Mod: S$PBB,,, | Performed by: FAMILY MEDICINE

## 2022-11-29 PROCEDURE — 71046 XR CHEST PA AND LATERAL: ICD-10-PCS | Mod: 26,,, | Performed by: RADIOLOGY

## 2022-11-29 RX ORDER — ALBUTEROL SULFATE 90 UG/1
2 AEROSOL, METERED RESPIRATORY (INHALATION) 4 TIMES DAILY
Qty: 18 G | Refills: 1 | Status: SHIPPED | OUTPATIENT
Start: 2022-11-29 | End: 2023-01-03

## 2022-11-29 RX ORDER — PREDNISONE 20 MG/1
TABLET ORAL
Qty: 10 TABLET | Refills: 0 | Status: SHIPPED | OUTPATIENT
Start: 2022-11-29 | End: 2023-07-13 | Stop reason: ALTCHOICE

## 2022-11-29 NOTE — TELEPHONE ENCOUNTER
----- Message from Edny Perez MD sent at 11/29/2022 12:56 PM CST -----  Notify Ms Desai that the CXR is clear.  Proceed as planned.

## 2022-11-29 NOTE — PROGRESS NOTES
Subjective:       Patient ID: Kateryna Desai is a 67 y.o. female.    Chief Complaint: Cough (X 3 weeks - flonase and robutussin - states it's worse during the day)    Known to me patient here for UC visit.  Hx of 3-4 weeks of cough - dry initially, now has some sputum on occasion - white, no blood.  No fever or SOB or pl pain.  Occ mid-face HA and some AM's scratchy throat.  Using OTC Robitussin, Flonase and Montelukast.  Glucoses and A1c well controlled and she uses sliding scale to monitor and Rx prn w past steroid use..  Has a Dexcom CGM.  Review of Systems   Constitutional:  Negative for fever.   Respiratory:  Positive for cough. Negative for shortness of breath.    Cardiovascular:  Negative for chest pain.        Saw [facility] yesterday; stable and will continue to monitor the AS   Gastrointestinal:  Negative for abdominal pain and nausea.   Endocrine:        Glucose 119 now per Dexcom and she has eaten breakfast.   Skin:  Negative for rash.   All other systems reviewed and are negative.    Objective:      Physical Exam  Constitutional:       Appearance: She is well-developed. She is not ill-appearing.   HENT:      Right Ear: Tympanic membrane normal.      Left Ear: Tympanic membrane normal.      Nose:      Right Sinus: No maxillary sinus tenderness.      Left Sinus: No maxillary sinus tenderness.      Mouth/Throat:      Mouth: Mucous membranes are moist.      Pharynx: Oropharynx is clear. No posterior oropharyngeal erythema.   Eyes:      General: No scleral icterus.     Pupils: Pupils are equal, round, and reactive to light.   Cardiovascular:      Rate and Rhythm: Normal rate and regular rhythm.      Heart sounds: Murmur heard.   Pulmonary:      Effort: Pulmonary effort is normal.      Breath sounds: Normal breath sounds. No wheezing or rales.   Musculoskeletal:         General: No tenderness.      Cervical back: Neck supple.      Right lower leg: No edema.      Left lower leg: No edema.    Lymphadenopathy:      Cervical: No cervical adenopathy.   Skin:     General: Skin is warm and dry.   Neurological:      Mental Status: She is alert.       Assessment:       1. Viral URI with cough    2. Persistent cough for 3 weeks or longer    3. Aortic valve stenosis, etiology of cardiac valve disease unspecified    4. Controlled type 1 diabetes mellitus with diabetic polyneuropathy, with long-term current use of insulin        Plan:       Viral URI with cough  -     X-Ray Chest PA And Lateral; Future; Expected date: 11/29/2022  -     albuterol (PROVENTIL/VENTOLIN HFA) 90 mcg/actuation inhaler; Inhale 2 puffs into the lungs 4 (four) times daily.  Dispense: 18 g; Refill: 1  -     predniSONE (DELTASONE) 20 MG tablet; One BID for 3 days then once a day orally  Dispense: 10 tablet; Refill: 0    Persistent cough for 3 weeks or longer  -     X-Ray Chest PA And Lateral; Future; Expected date: 11/29/2022    Aortic valve stenosis, etiology of cardiac valve disease unspecified    Controlled type 1 diabetes mellitus with diabetic polyneuropathy, with long-term current use of insulin    Patient Instructions   Push fluids intake.  Drink plenty of water.     Contact me or your PCP if any worsening or for any new concerns as we discussed.

## 2022-11-29 NOTE — TELEPHONE ENCOUNTER
----- Message from Vani Rojas sent at 11/29/2022  3:10 PM CST -----  Contact: [phone]  Type:  Patient Returning Call    Who Called:  Pt   Who Left Message for Patient:  NA   Does the patient know what this is regarding?:  Xray results   Best Call Back Number:  [phone]    Additional Information:  Pls call back and advise

## 2022-12-01 ENCOUNTER — HOSPITAL ENCOUNTER (OUTPATIENT)
Dept: RADIOLOGY | Facility: CLINIC | Age: 67
Discharge: HOME | End: 2022-12-01
Attending: OBSTETRICS & GYNECOLOGY
Payer: MEDICARE

## 2022-12-01 DIAGNOSIS — Z12.31 ENCOUNTER FOR SCREENING MAMMOGRAM FOR MALIGNANT NEOPLASM OF BREAST: ICD-10-CM

## 2022-12-01 PROCEDURE — 77063 BREAST TOMOSYNTHESIS BI: CPT | Mod: 26,,, | Performed by: RADIOLOGY

## 2022-12-01 PROCEDURE — 77063 MAMMO DIGITAL SCREENING BILAT WITH TOMO: ICD-10-PCS | Mod: 26,,, | Performed by: RADIOLOGY

## 2022-12-01 PROCEDURE — 77067 MAMMO DIGITAL SCREENING BILAT WITH TOMO: ICD-10-PCS | Mod: 26,,, | Performed by: RADIOLOGY

## 2022-12-01 PROCEDURE — 77063 BREAST TOMOSYNTHESIS BI: CPT | Mod: TC,PO

## 2022-12-01 PROCEDURE — 77067 SCR MAMMO BI INCL CAD: CPT | Mod: 26,,, | Performed by: RADIOLOGY

## 2022-12-01 PROCEDURE — 77067 SCR MAMMO BI INCL CAD: CPT | Mod: TC,PO

## 2022-12-07 ENCOUNTER — TELEPHONE (OUTPATIENT)
Dept: FAMILY MEDICINE | Facility: CLINIC | Age: 67
End: 2022-12-07
Payer: MEDICARE

## 2022-12-07 DIAGNOSIS — B37.0 ORAL THRUSH: Primary | ICD-10-CM

## 2022-12-07 RX ORDER — NYSTATIN 100000 [USP'U]/ML
4 SUSPENSION ORAL 4 TIMES DAILY
Qty: 160 ML | Refills: 0 | Status: SHIPPED | OUTPATIENT
Start: 2022-12-07 | End: 2022-12-17

## 2022-12-07 NOTE — TELEPHONE ENCOUNTER
Call placed to patient for notification. Patient verbalized understanding.       Guido Chow MD  You 1 hour ago (2:40 PM)     Nystatin swish and spit sent to pharmacy.

## 2022-12-07 NOTE — TELEPHONE ENCOUNTER
----- Message from Sindi Wheeler sent at 12/7/2022  2:23 PM CST -----  Contact: pt  Type: Return Call    Who Called: pt  Who Left Message for Pt: Sonja  Does the patient know what this is regarding: yes  Best Call Back Number: [phone]    Thank you~

## 2022-12-07 NOTE — TELEPHONE ENCOUNTER
Please refer to message below. Call placed to patient who states she is experiencing oral thrush. Reports red rash to tongue and inner mouth. States there is a build up on her tongue. States this is painful. Has tried throat lozenges as she read on the Internet this may help with pain, to no avail. Patient states she was seen in office last week by Dr. Perez related to mucous draining in her throat, and is unsure if this may be related to that. Preferred pharmacy is [facility]. Please advise. Thank you.         Kaia Lora Staff  Caller: Unspecified (Today,  9:52 AM)  Who Called: Patient    ----- Message from Kaia Parish sent at 12/7/2022  9:52 AM CST -----  Who Called: Patient    What is the reqeust in detail: Requesting call back to discuss her recent visit and now a case of thrush. Patient was last seen by Dr Perez and the medications are helping. Her symptoms are not gone entirely but now she a case of thrush in her mouth and is in need of assistance. Please advise.     Can the clinic reply by [facility]? No    Best Call Back Number: [phone]    Additional Information:

## 2022-12-12 ENCOUNTER — TELEPHONE (OUTPATIENT)
Dept: PODIATRY | Facility: CLINIC | Age: 67
End: 2022-12-12
Payer: MEDICARE

## 2022-12-12 NOTE — TELEPHONE ENCOUNTER
----- Message from Dinorah Castro sent at 12/12/2022  9:45 AM CST -----  Contact: self  Type: Sooner Appointment Request        Caller is requesting a sooner appointment. Caller declined first available appointment listed below. Caller will not accept being placed on the waitlist and is requesting a message be sent to doctor.        Name of Caller: Patient   Best Call Back Number: [phone]  Additional Information: Pt states she needs to r/s her appt from the 12/15/22 z call pt with next available doesn't want to wait until march to be seen pt is a diabetic needs foot check and nail care. Thanks

## 2022-12-20 ENCOUNTER — PATIENT OUTREACH (OUTPATIENT)
Dept: ADMINISTRATIVE | Facility: HOSPITAL | Age: 67
End: 2022-12-20
Payer: MEDICARE

## 2022-12-22 ENCOUNTER — PATIENT OUTREACH (OUTPATIENT)
Dept: ADMINISTRATIVE | Facility: HOSPITAL | Age: 67
End: 2022-12-22
Payer: MEDICARE

## 2023-01-11 ENCOUNTER — OFFICE VISIT (OUTPATIENT)
Dept: PODIATRY | Facility: CLINIC | Age: 68
End: 2023-01-11
Payer: MEDICARE

## 2023-01-11 VITALS — HEIGHT: 66 IN | BODY MASS INDEX: 29.83 KG/M2 | WEIGHT: 185.63 LBS

## 2023-01-11 DIAGNOSIS — M20.42 HAMMER TOES OF BOTH FEET: ICD-10-CM

## 2023-01-11 DIAGNOSIS — L84 CORN OR CALLUS: ICD-10-CM

## 2023-01-11 DIAGNOSIS — E10.42 CONTROLLED TYPE 1 DIABETES MELLITUS WITH DIABETIC POLYNEUROPATHY: Primary | ICD-10-CM

## 2023-01-11 DIAGNOSIS — M20.41 HAMMER TOES OF BOTH FEET: ICD-10-CM

## 2023-01-11 PROCEDURE — 99212 OFFICE O/P EST SF 10 MIN: CPT | Mod: PBBFAC,PN | Performed by: PODIATRIST

## 2023-01-11 PROCEDURE — 11055 PR TRIM HYPERKERATOTIC SKIN LESION, ONE: ICD-10-PCS | Mod: Q9,S$PBB,, | Performed by: PODIATRIST

## 2023-01-11 PROCEDURE — 11055 PARING/CUTG B9 HYPRKER LES 1: CPT | Mod: PBBFAC,PN | Performed by: PODIATRIST

## 2023-01-11 PROCEDURE — 99499 UNLISTED E&M SERVICE: CPT | Mod: S$PBB,,, | Performed by: PODIATRIST

## 2023-01-11 PROCEDURE — 11055 PARING/CUTG B9 HYPRKER LES 1: CPT | Mod: Q9,S$PBB,, | Performed by: PODIATRIST

## 2023-01-11 PROCEDURE — 99999 PR PBB SHADOW E&M-EST. PATIENT-LVL II: CPT | Mod: PBBFAC,,, | Performed by: PODIATRIST

## 2023-01-11 PROCEDURE — 99999 PR PBB SHADOW E&M-EST. PATIENT-LVL II: ICD-10-PCS | Mod: PBBFAC,,, | Performed by: PODIATRIST

## 2023-01-11 PROCEDURE — 99499 NO LOS: ICD-10-PCS | Mod: S$PBB,,, | Performed by: PODIATRIST

## 2023-01-11 NOTE — PROGRESS NOTES
Subjective:      Patient ID: Kateryna Desai is a 67 y.o. female.    Chief Complaint: Diabetes Mellitus and Diabetic Foot Exam ([place] 12/7/22)    Kateryna is a 67 y.o. female who presents to the clinic for routine evaluation and treatment of diabetic feet. Kateryna has a past medical history of Allergy, Arthritis, Asian flu type A (12/22/2017), Bunionette (7/26/2012), Diabetes mellitus type I, Diabetic gastroparesis, Diabetic retinopathy of both eyes, Difficult intubation, Encounter for blood transfusion, GERD (gastroesophageal reflux disease), Hallux abducto valgus (3/31/2014), Headache(784.0), Hiatal hernia, Hyperlipidemia, Hypertension, Infection of the upper respiratory tract (June 2012), Lumbar spinal stenosis, Osteopenia, Rosacea, Seizures, Stroke (1985?), Tachycardia, Thyroid disease, and Venous insufficiency of leg. Patient relates occasional nerve pain in the Lt. Posterior heel, however, this has been less frequent when compared to prior exams.  Relates the usual callus build up, specifically to said heel.  She has continued applying moisturizer to the affected area with some improvement noted in build up. Continues to note excellent control over her blood glucose.  Denies any additional pedal complaints.      PCP: Guido Chow MD    Date Last Seen by PCP: 12/22      Hemoglobin A1C   Date Value Ref Range Status   09/02/2022 5.6 4.5 - 6.2 % Final     Comment:     According to ADA guidelines, hemoglobin A1C <7.0% represents  optimal control in non-pregnant diabetic patients.  Different  metrics may apply to specific populations.   Standards of Medical Care in Diabetes - 2016.    For the purpose of screening for the presence of diabetes:  <5.7%     Consistent with the absence of diabetes  5.7-6.4%  Consistent with increasing risk for diabetes   (prediabetes)  >or=6.5%  Consistent with diabetes    Currently no consensus exists for use of hemoglobin A1C  for diagnosis of diabetes for  children.     01/08/2022 6.3 (H) 4.5 - 6.2 % Final     Comment:     According to ADA guidelines, hemoglobin A1C <7.0% represents  optimal control in non-pregnant diabetic patients.  Different  metrics may apply to specific populations.   Standards of Medical Care in Diabetes - 2016.    For the purpose of screening for the presence of diabetes:  <5.7%     Consistent with the absence of diabetes  5.7-6.4%  Consistent with increasing risk for diabetes   (prediabetes)  >or=6.5%  Consistent with diabetes    Currently no consensus exists for use of hemoglobin A1C  for diagnosis of diabetes for children.     08/20/2021 7.0 (H) 4.5 - 6.2 % Final     Comment:     According to ADA guidelines, hemoglobin A1C <7.0% represents  optimal control in non-pregnant diabetic patients.  Different  metrics may apply to specific populations.   Standards of Medical Care in Diabetes - 2016.    For the purpose of screening for the presence of diabetes:  <5.7%     Consistent with the absence of diabetes  5.7-6.4%  Consistent with increasing risk for diabetes   (prediabetes)  >or=6.5%  Consistent with diabetes    Currently no consensus exists for use of hemoglobin A1C  for diagnosis of diabetes for children.             Past Medical History:   Diagnosis Date    Allergy     Arthritis     degnerative disease low back,muscle spasms, shoulders    Asian flu type A 12/22/2017    Bunionette 7/26/2012    Diabetes mellitus type I     since age 13    Diabetic gastroparesis     takes Cytotec    Diabetic retinopathy of both eyes     gets periodic laser treatments    Difficult intubation     as a child had sore throat after a procedure    Encounter for blood transfusion     GERD (gastroesophageal reflux disease)     Hallux abducto valgus 3/31/2014    Headache(784.0)     Hiatal hernia     with GERD    Hyperlipidemia     Hypertension     Infection of the upper respiratory tract June 2012    Lumbar spinal stenosis     Osteopenia     Rosacea     Seizures      Pt states during pgy with stroke    Stroke ?    while pregnant    Tachycardia     asymptomatic with Toprol    Thyroid disease     hypothyroidism    Venous insufficiency of leg     improved since EVLT       Past Surgical History:   Procedure Laterality Date    BUNIONECTOMY Right 2012    CARDIAC CATHETERIZATION      no stents     SECTION      COLONOSCOPY  2007    Dr. Rose, 10 year recheck    EXOSTECTOMY  12    left foot, local anesthesia, in office    EYE SURGERY      has had many laser procedures for diabetic retinopathy    VASCULAR SURGERY      VEIN SURGERY  2012    EVLT right greater saphenous, IV sedation       Family History   Problem Relation Age of Onset    Cancer Maternal Aunt         breast    Breast cancer Maternal Aunt     Diabetes Maternal Grandmother     Breast cancer Maternal Grandmother 38    Cancer Maternal Grandfather         prostate    Diabetes Maternal Grandfather     Diabetes Cousin     Arthritis Mother     Cancer Mother     Melanoma Mother     Heart disease Father     Stroke Father     No Known Problems Sister     No Known Problems Brother     No Known Problems Daughter     Alzheimer's disease Maternal Uncle     Alcohol abuse Maternal Uncle         x2    Heart disease Maternal Uncle     No Known Problems Paternal Aunt     Heart disease Paternal Uncle        Social History     Socioeconomic History    Marital status:    Tobacco Use    Smoking status: Never    Smokeless tobacco: Never   Substance and Sexual Activity    Alcohol use: Yes     Comment: social, 1 drink monthly    Drug use: No    Sexual activity: Yes     Partners: Male       Current Outpatient Medications   Medication Sig Dispense Refill    ACZONE 5 % topical gel Apply topically 2 (two) times daily as needed (roseacea).   0    albuterol (PROVENTIL/VENTOLIN HFA) 90 mcg/actuation inhaler INHALE 2 PUFFS INTO THE LUNGS FOUR TIMES DAILY. 8.5 g 1    alpha lipoic acid 600 mg Cap Take 1 capsule by mouth  "once daily.      ALPHAGAN P 0.1 % Drop Place 1 drop into both eyes 3 (three) times daily.   6    ascorbic acid, vitamin C, (VITAMIN C) 100 MG tablet Take 100 mg by mouth 2 (two) times daily.      aspirin (ECOTRIN) 81 MG EC tablet Take 81 mg by mouth once daily.      azelastine (ASTELIN) 137 mcg (0.1 %) nasal spray USE ONE SPRAY IN EACH NOSTRIL TWICE A DAY 30 mL 3    BD VEO INSULIN SYRINGE UF 1/2 mL 31 gauge x 15/64" Syrg   4    calcium citrate-vitamin D3 315-200 mg (CITRACAL+D) 315-200 mg-unit per tablet Take 1 tablet by mouth 2 (two) times daily.      cinnamon bark 500 mg capsule Take 500 mg by mouth 2 (two) times daily.      clobetasoL (TEMOVATE) 0.05 % external solution       CRESTOR 40 mg Tab Take 40 mg by mouth once daily.       DOCOSAHEXANOIC ACID/EPA (FISH OIL ORAL) Take 1,200 mg by mouth 2 (two) times daily.      doxycycline (VIBRAMYCIN) 50 MG capsule       ESTRACE 0.01 % (0.1 mg/gram) vaginal cream Place vaginally twice a week. Tuesday, Saturday  0    estradiol 10 mcg Tab Place 1 tablet vaginally twice a week. Tuesday, Saturday      ezetimibe (ZETIA) 10 mg tablet Take 10 mg by mouth once daily.      fluticasone propionate (FLONASE) 50 mcg/actuation nasal spray USE 1 SPRAY INTO THE EACH NOSTRIL ONCE A DAY 32 g 3    furosemide (LASIX) 20 MG tablet TAKE 1 TABLET BY MOUTH TWICE A  tablet 2    GLUCAGON EMERGENCY 1 mg injection 1 mg as needed.       GVOKE HYPOPEN 2-PACK 1 mg/0.2 mL AtIn USE AS DIRECTED TO TREAT PROFOUND HYPOGLYCEMIA      HUMALOG 100 unit/mL injection Inject into the skin 3 (three) times daily before meals. 10-17 units tid sliding scale  1    metoprolol succinate (TOPROL-XL) 25 MG 24 hr tablet Take 1 tablet (25 mg total) by mouth once daily. 90 tablet 2    montelukast (SINGULAIR) 10 mg tablet Take 1 tablet (10 mg total) by mouth every evening. 90 tablet 2    NOVOFINE 32 32 gauge x 1/4" Ndle       ondansetron (ZOFRAN-ODT) 4 MG TbDL Take 1 tablet (4 mg total) by mouth every 8 (eight) hours as " needed (Nausea). 60 tablet 1    ONETOUCH ULTRA BLUE TEST STRIP Strp   11    pantoprazole (PROTONIX) 40 MG tablet TAKE 1 TABLET BY MOUTH TWO TIMES A DAY 60 tablet 2    potassium chloride (K-TAB) 20 mEq   6    predniSONE (DELTASONE) 20 MG tablet One BID for 3 days then once a day orally 10 tablet 0    promethazine (PHENERGAN) 6.25 mg/5 mL syrup Take 5 mLs (6.25 mg total) by mouth nightly as needed for Nausea. 118 mL 0    SOOLANTRA 1 % Crea       SYNTHROID 75 mcg tablet       SYNTHROID 88 mcg tablet Take 88 mcg by mouth every evening.   0    TOUJEO SOLOSTAR U-300 INSULIN 300 unit/mL (1.5 mL) InPn pen INJECT UP TO 34 UNITS SUB-Q ONCE A DAY      VITAMIN B COMPLEX VIT C NO.4 (SUPER B COMPLEX + C ORAL) Take 1 tablet by mouth once daily.       No current facility-administered medications for this visit.       Review of patient's allergies indicates:   Allergen Reactions    Bactrim [sulfamethoxazole-trimethoprim] Rash     Patient experienced on 12/3/14 redness to face and rash to chest and arms/ with no SOB or airway obstruction    Percocet [oxycodone-acetaminophen] Nausea And Vomiting     Also caused dizziness and passed out    Iodinated contrast media - iv dye Swelling and Rash     Says topical iodine OK    Norco [hydrocodone-acetaminophen] Itching, Swelling and Rash     Can tolerate if she takes Benadryl with it    Niaspan extended-release [niacin] Itching and Other (See Comments)     Skin flushing and redness         Review of Systems   Constitutional: Negative for chills, decreased appetite, diaphoresis and fever.   Cardiovascular:  Negative for claudication and leg swelling.   Skin:  Positive for dry skin. Negative for color change, flushing, itching and nail changes.   Musculoskeletal:  Negative for arthritis, back pain, falls, gout, joint pain, joint swelling and myalgias.   Gastrointestinal:  Negative for nausea and vomiting.   Neurological:  Positive for numbness and paresthesias.   Psychiatric/Behavioral:   Negative for altered mental status.          Objective:      Physical Exam  Constitutional:       General: She is not in acute distress.     Appearance: She is well-developed. She is not diaphoretic.   Cardiovascular:      Pulses:           Dorsalis pedis pulses are 2+ on the right side and 2+ on the left side.        Posterior tibial pulses are 2+ on the right side and 2+ on the left side.      Comments: CFT < 3 seconds bilateral.  Pedal hair growth decreased bilateral.  Varicosities noted to bilateral lower extremity. Mild nonpitting edema noted to bilateral lower extremity.  Toes are cool to touch bilateral.      Musculoskeletal:         General: No tenderness. Normal range of motion.      Comments: Muscle strength 5/5 in all muscle groups bilateral.  (-) for pain with active and passive ROM of bilateral foot and ankle.  (-) for pain with palpation of bilateral foot and ankle.  Bilateral pes planus foot type.  Bilateral hallux abducto valgus.  Bilateral semi-rigid contracture of toes 2-5 with adductovarus rotation of the 5th.     Skin:     General: Skin is warm and dry.      Coloration: Skin is not pale.      Findings: Lesion present. No abrasion, bruising, burn, ecchymosis, erythema, laceration, petechiae or rash.      Nails: There is no clubbing.      Comments: Toenails x 10 appear dystrophic but well maintained. No open wounds, interdigital maceration noted bilateral.  Cicatrix of the Lt. Posterior heel is well healed with no adjacent break in skin integrity.  Hyperkeratotic lesion noted to the Lt. Posterior heel.         Neurological:      Mental Status: She is alert and oriented to person, place, and time.      Sensory: Sensory deficit present.      Motor: No weakness or atrophy.      Comments: Protective sensation per [place] monofilament decreased bilateral.    Light touch intact bilateral.             Assessment:       Encounter Diagnoses   Name Primary?    Controlled type 1 diabetes mellitus  with diabetic polyneuropathy Yes    Hammer toes of both feet     Corn or callus          Plan:       Kateryna was seen today for diabetes mellitus and diabetic foot exam.    Diagnoses and all orders for this visit:    Controlled type 1 diabetes mellitus with diabetic polyneuropathy    Hammer toes of both feet    Corn or callus    I counseled the patient on her conditions, their implications and medical management.    - Recommend applying 40% urea and 2% salicylic cream to the area of callus build up once daily.      - To continue use of toe spacers to minimize pressure to the Rt. 2nd toe on account of contracture.    - Advised to continue wearing shoe gear that accommodates for digital deformities.  To inspect daily for excessive signs of shearing consistent with pressure ulceration as well as the development of pre-ulceration.      - Shoe inspection. Diabetic Foot Education. Patient reminded of the importance of good nutrition and blood sugar control to help prevent podiatric complications of diabetes. Patient instructed on proper foot hygeine. We discussed wearing proper shoe gear, daily foot inspections, never walking without protective shoe gear, never putting sharp instruments to feet    - With patient's permission, a sterile #15 scalpel was used to trim lesion x 1 down to smooth appearing skin without incident.  Patient relates relief following the procedure. She will continue to monitor the areas daily, inspect her feet, wear protective shoe gear when ambulatory, moisturizer to maintain skin integrity and follow in this office in approximately 6 months, sooner p.r.n.      Lei Rhoades DPM

## 2023-01-17 ENCOUNTER — LAB VISIT (OUTPATIENT)
Dept: LAB | Facility: HOSPITAL | Age: 68
End: 2023-01-17
Attending: INTERNAL MEDICINE
Payer: MEDICARE

## 2023-01-17 DIAGNOSIS — I51.9 MYXEDEMA HEART DISEASE: ICD-10-CM

## 2023-01-17 DIAGNOSIS — Z79.899 ENCOUNTER FOR LONG-TERM (CURRENT) USE OF OTHER MEDICATIONS: Primary | ICD-10-CM

## 2023-01-17 DIAGNOSIS — E78.00 PURE HYPERCHOLESTEROLEMIA: ICD-10-CM

## 2023-01-17 DIAGNOSIS — E10.9 DIABETES MELLITUS TYPE I: ICD-10-CM

## 2023-01-17 DIAGNOSIS — E03.9 MYXEDEMA HEART DISEASE: ICD-10-CM

## 2023-01-17 LAB
ANION GAP SERPL CALC-SCNC: 9 MMOL/L (ref 8–16)
BUN SERPL-MCNC: 10 MG/DL (ref 8–23)
CALCIUM SERPL-MCNC: 9.4 MG/DL (ref 8.7–10.5)
CHLORIDE SERPL-SCNC: 101 MMOL/L (ref 95–110)
CHOLEST SERPL-MCNC: 148 MG/DL (ref 120–199)
CHOLEST/HDLC SERPL: 3 {RATIO} (ref 2–5)
CO2 SERPL-SCNC: 30 MMOL/L (ref 23–29)
CREAT SERPL-MCNC: 1 MG/DL (ref 0.5–1.4)
EST. GFR  (NO RACE VARIABLE): >60 ML/MIN/1.73 M^2
ESTIMATED AVG GLUCOSE: 126 MG/DL (ref 68–131)
GLUCOSE SERPL-MCNC: 138 MG/DL (ref 70–110)
HBA1C MFR BLD: 6 % (ref 4.5–6.2)
HDLC SERPL-MCNC: 50 MG/DL (ref 40–75)
HDLC SERPL: 33.8 % (ref 20–50)
LDLC SERPL CALC-MCNC: 79.8 MG/DL (ref 63–159)
NONHDLC SERPL-MCNC: 98 MG/DL
POTASSIUM SERPL-SCNC: 4.1 MMOL/L (ref 3.5–5.1)
SODIUM SERPL-SCNC: 140 MMOL/L (ref 136–145)
T4 FREE SERPL-MCNC: 1.11 NG/DL (ref 0.71–1.51)
TRIGL SERPL-MCNC: 91 MG/DL (ref 30–150)
TSH SERPL DL<=0.005 MIU/L-ACNC: 3.11 UIU/ML (ref 0.34–5.6)

## 2023-01-17 PROCEDURE — 36415 COLL VENOUS BLD VENIPUNCTURE: CPT | Performed by: INTERNAL MEDICINE

## 2023-01-17 PROCEDURE — 84443 ASSAY THYROID STIM HORMONE: CPT | Performed by: INTERNAL MEDICINE

## 2023-01-17 PROCEDURE — 83036 HEMOGLOBIN GLYCOSYLATED A1C: CPT | Performed by: INTERNAL MEDICINE

## 2023-01-17 PROCEDURE — 80048 BASIC METABOLIC PNL TOTAL CA: CPT | Performed by: INTERNAL MEDICINE

## 2023-01-17 PROCEDURE — 84439 ASSAY OF FREE THYROXINE: CPT | Performed by: INTERNAL MEDICINE

## 2023-01-17 PROCEDURE — 80061 LIPID PANEL: CPT | Performed by: INTERNAL MEDICINE

## 2023-01-26 ENCOUNTER — HOSPITAL ENCOUNTER (EMERGENCY)
Facility: HOSPITAL | Age: 68
Discharge: HOME | End: 2023-01-26
Attending: EMERGENCY MEDICINE
Payer: MEDICARE

## 2023-01-26 VITALS
DIASTOLIC BLOOD PRESSURE: 77 MMHG | BODY MASS INDEX: 30.53 KG/M2 | OXYGEN SATURATION: 98 % | HEIGHT: 66 IN | HEART RATE: 87 BPM | TEMPERATURE: 98 F | WEIGHT: 190 LBS | RESPIRATION RATE: 18 BRPM | SYSTOLIC BLOOD PRESSURE: 141 MMHG

## 2023-01-26 DIAGNOSIS — M25.569 KNEE PAIN: ICD-10-CM

## 2023-01-26 DIAGNOSIS — M25.579 ANKLE PAIN: ICD-10-CM

## 2023-01-26 DIAGNOSIS — R73.9 HYPERGLYCEMIA: Primary | ICD-10-CM

## 2023-01-26 DIAGNOSIS — S06.0X1A CONCUSSION WITH LOSS OF CONSCIOUSNESS OF 30 MINUTES OR LESS, INITIAL ENCOUNTER: ICD-10-CM

## 2023-01-26 DIAGNOSIS — S20.222A CONTUSION OF LEFT SIDE OF BACK, INITIAL ENCOUNTER: ICD-10-CM

## 2023-01-26 LAB
ALBUMIN SERPL BCP-MCNC: 4.1 G/DL (ref 3.5–5.2)
ALLENS TEST: ABNORMAL
ALP SERPL-CCNC: 79 U/L (ref 55–135)
ALT SERPL W/O P-5'-P-CCNC: 29 U/L (ref 10–44)
ANION GAP SERPL CALC-SCNC: 13 MMOL/L (ref 8–16)
AST SERPL-CCNC: 31 U/L (ref 10–40)
B-OH-BUTYR BLD STRIP-SCNC: 1.4 MMOL/L (ref 0–0.5)
BACTERIA #/AREA URNS HPF: NEGATIVE /HPF
BASOPHILS # BLD AUTO: 0.03 K/UL (ref 0–0.2)
BASOPHILS NFR BLD: 0.3 % (ref 0–1.9)
BILIRUB SERPL-MCNC: 1.4 MG/DL (ref 0.1–1)
BILIRUB UR QL STRIP: NEGATIVE
BUN SERPL-MCNC: 15 MG/DL (ref 8–23)
CALCIUM SERPL-MCNC: 9.6 MG/DL (ref 8.7–10.5)
CHLORIDE SERPL-SCNC: 94 MMOL/L (ref 95–110)
CLARITY UR: CLEAR
CO2 SERPL-SCNC: 26 MMOL/L (ref 23–29)
COLOR UR: YELLOW
CREAT SERPL-MCNC: 1.2 MG/DL (ref 0.5–1.4)
DELSYS: ABNORMAL
DIFFERENTIAL METHOD: ABNORMAL
EOSINOPHIL # BLD AUTO: 0 K/UL (ref 0–0.5)
EOSINOPHIL NFR BLD: 0.1 % (ref 0–8)
ERYTHROCYTE [DISTWIDTH] IN BLOOD BY AUTOMATED COUNT: 14.6 % (ref 11.5–14.5)
EST. GFR  (NO RACE VARIABLE): 49.9 ML/MIN/1.73 M^2
GLUCOSE SERPL-MCNC: 222 MG/DL (ref 70–110)
GLUCOSE SERPL-MCNC: 292 MG/DL (ref 70–110)
GLUCOSE SERPL-MCNC: 316 MG/DL (ref 70–110)
GLUCOSE SERPL-MCNC: 366 MG/DL (ref 70–110)
GLUCOSE SERPL-MCNC: 409 MG/DL (ref 70–110)
GLUCOSE SERPL-MCNC: 423 MG/DL (ref 70–110)
GLUCOSE UR QL STRIP: ABNORMAL
HCO3 UR-SCNC: 27.2 MMOL/L (ref 24–28)
HCT VFR BLD AUTO: 40 % (ref 37–48.5)
HCT VFR BLD CALC: 43 %PCV (ref 36–54)
HGB BLD-MCNC: 12.7 G/DL (ref 12–16)
HGB UR QL STRIP: NEGATIVE
HYALINE CASTS #/AREA URNS LPF: 7 /LPF
IMM GRANULOCYTES # BLD AUTO: 0.05 K/UL (ref 0–0.04)
IMM GRANULOCYTES NFR BLD AUTO: 0.4 % (ref 0–0.5)
KETONES UR QL STRIP: ABNORMAL
LEUKOCYTE ESTERASE UR QL STRIP: NEGATIVE
LYMPHOCYTES # BLD AUTO: 0.7 K/UL (ref 1–4.8)
LYMPHOCYTES NFR BLD: 5.7 % (ref 18–48)
MCH RBC QN AUTO: 30.9 PG (ref 27–31)
MCHC RBC AUTO-ENTMCNC: 31.8 G/DL (ref 32–36)
MCV RBC AUTO: 97 FL (ref 82–98)
MICROSCOPIC COMMENT: ABNORMAL
MODE: ABNORMAL
MONOCYTES # BLD AUTO: 0.4 K/UL (ref 0.3–1)
MONOCYTES NFR BLD: 3 % (ref 4–15)
NEUTROPHILS # BLD AUTO: 10.7 K/UL (ref 1.8–7.7)
NEUTROPHILS NFR BLD: 90.5 % (ref 38–73)
NITRITE UR QL STRIP: NEGATIVE
NRBC BLD-RTO: 0 /100 WBC
PCO2 BLDA: 50.2 MMHG (ref 35–45)
PH SMN: 7.34 [PH] (ref 7.35–7.45)
PH UR STRIP: 6 [PH] (ref 5–8)
PLATELET # BLD AUTO: 189 K/UL (ref 150–450)
PMV BLD AUTO: 10.9 FL (ref 9.2–12.9)
PO2 BLDA: 32 MMHG (ref 40–60)
POC BE: 1 MMOL/L
POC IONIZED CALCIUM: 1.16 MMOL/L (ref 1.06–1.42)
POC SATURATED O2: 58 % (ref 95–100)
POC TCO2: 29 MMOL/L (ref 24–29)
POTASSIUM BLD-SCNC: 4.7 MMOL/L (ref 3.5–5.1)
POTASSIUM SERPL-SCNC: 4.8 MMOL/L (ref 3.5–5.1)
PROT SERPL-MCNC: 6.9 G/DL (ref 6–8.4)
PROT UR QL STRIP: NEGATIVE
RBC # BLD AUTO: 4.11 M/UL (ref 4–5.4)
RBC #/AREA URNS HPF: 3 /HPF (ref 0–4)
SAMPLE: ABNORMAL
SITE: ABNORMAL
SODIUM BLD-SCNC: 133 MMOL/L (ref 136–145)
SODIUM SERPL-SCNC: 133 MMOL/L (ref 136–145)
SP GR UR STRIP: 1.02 (ref 1–1.03)
SQUAMOUS #/AREA URNS HPF: 10 /HPF
URN SPEC COLLECT METH UR: ABNORMAL
UROBILINOGEN UR STRIP-ACNC: NEGATIVE EU/DL
WBC # BLD AUTO: 11.78 K/UL (ref 3.9–12.7)
WBC #/AREA URNS HPF: 2 /HPF (ref 0–5)
YEAST URNS QL MICRO: ABNORMAL

## 2023-01-26 PROCEDURE — 93010 ELECTROCARDIOGRAM REPORT: CPT | Mod: ,,, | Performed by: INTERNAL MEDICINE

## 2023-01-26 PROCEDURE — 25000003 PHARM REV CODE 250: Performed by: EMERGENCY MEDICINE

## 2023-01-26 PROCEDURE — 93010 EKG 12-LEAD: ICD-10-PCS | Mod: ,,, | Performed by: INTERNAL MEDICINE

## 2023-01-26 PROCEDURE — 82803 BLOOD GASES ANY COMBINATION: CPT

## 2023-01-26 PROCEDURE — 99285 EMERGENCY DEPT VISIT HI MDM: CPT | Mod: 25

## 2023-01-26 PROCEDURE — 81001 URINALYSIS AUTO W/SCOPE: CPT | Performed by: NURSE PRACTITIONER

## 2023-01-26 PROCEDURE — 82962 GLUCOSE BLOOD TEST: CPT | Mod: 91

## 2023-01-26 PROCEDURE — 85025 COMPLETE CBC W/AUTO DIFF WBC: CPT | Performed by: NURSE PRACTITIONER

## 2023-01-26 PROCEDURE — 63600175 PHARM REV CODE 636 W HCPCS: Performed by: NURSE PRACTITIONER

## 2023-01-26 PROCEDURE — 84295 ASSAY OF SERUM SODIUM: CPT

## 2023-01-26 PROCEDURE — 96365 THER/PROPH/DIAG IV INF INIT: CPT

## 2023-01-26 PROCEDURE — 96375 TX/PRO/DX INJ NEW DRUG ADDON: CPT

## 2023-01-26 PROCEDURE — 85014 HEMATOCRIT: CPT | Mod: 91

## 2023-01-26 PROCEDURE — 25000003 PHARM REV CODE 250: Performed by: NURSE PRACTITIONER

## 2023-01-26 PROCEDURE — 99900035 HC TECH TIME PER 15 MIN (STAT)

## 2023-01-26 PROCEDURE — 80053 COMPREHEN METABOLIC PANEL: CPT | Performed by: NURSE PRACTITIONER

## 2023-01-26 PROCEDURE — 81003 URINALYSIS AUTO W/O SCOPE: CPT | Mod: 59 | Performed by: NURSE PRACTITIONER

## 2023-01-26 PROCEDURE — 82010 KETONE BODYS QUAN: CPT | Performed by: NURSE PRACTITIONER

## 2023-01-26 PROCEDURE — 96361 HYDRATE IV INFUSION ADD-ON: CPT

## 2023-01-26 PROCEDURE — 84132 ASSAY OF SERUM POTASSIUM: CPT

## 2023-01-26 PROCEDURE — 82330 ASSAY OF CALCIUM: CPT

## 2023-01-26 PROCEDURE — 93005 ELECTROCARDIOGRAM TRACING: CPT | Performed by: INTERNAL MEDICINE

## 2023-01-26 PROCEDURE — 63600175 PHARM REV CODE 636 W HCPCS: Performed by: EMERGENCY MEDICINE

## 2023-01-26 RX ORDER — METHOCARBAMOL 500 MG/1
1000 TABLET, FILM COATED ORAL
Status: COMPLETED | OUTPATIENT
Start: 2023-01-26 | End: 2023-01-26

## 2023-01-26 RX ORDER — PROMETHAZINE HYDROCHLORIDE 25 MG/1
25 TABLET ORAL EVERY 6 HOURS PRN
Qty: 10 TABLET | Refills: 0 | Status: SHIPPED | OUTPATIENT
Start: 2023-01-26

## 2023-01-26 RX ORDER — BACLOFEN 10 MG/1
10 TABLET ORAL 2 TIMES DAILY PRN
Qty: 10 TABLET | Refills: 0 | Status: SHIPPED | OUTPATIENT
Start: 2023-01-26 | End: 2023-02-01 | Stop reason: SDUPTHER

## 2023-01-26 RX ADMIN — SODIUM CHLORIDE 1000 ML: 0.9 INJECTION, SOLUTION INTRAVENOUS at 04:01

## 2023-01-26 RX ADMIN — PROMETHAZINE HYDROCHLORIDE 12.5 MG: 25 INJECTION INTRAMUSCULAR; INTRAVENOUS at 04:01

## 2023-01-26 RX ADMIN — SODIUM CHLORIDE, SODIUM LACTATE, POTASSIUM CHLORIDE, AND CALCIUM CHLORIDE 1000 ML: .6; .31; .03; .02 INJECTION, SOLUTION INTRAVENOUS at 05:01

## 2023-01-26 RX ADMIN — METHOCARBAMOL TABLETS 1000 MG: 500 TABLET, COATED ORAL at 05:01

## 2023-01-26 RX ADMIN — HUMAN INSULIN 6 UNITS: 100 INJECTION, SOLUTION SUBCUTANEOUS at 07:01

## 2023-01-26 NOTE — FIRST PROVIDER EVALUATION
"Medical screening examination initiated.  I have conducted a focused provider triage encounter, findings are as follows:    Brief history of present illness:  Fell backwards yesterday after tripping on a chair. Struck back of head on a cabinet and had two episodes of vomiting today. Reports pain to the right knee, right ankle, left side of back.     Vitals:    01/26/23 1514   BP: 119/61   BP Location: Left arm   Patient Position: Sitting   Pulse: 86   Resp: (!) 24   Temp: 97.5 °F (36.4 °C)   TempSrc: Oral   SpO2: 100%   Weight: 86.2 kg (190 lb)   Height: 5' 6" (1.676 m)       Pertinent physical exam:  Tenderness to right knee. Bruising noted to left upper back. No flank ecchymosis    Brief workup plan:  Labs, imaging    Preliminary workup initiated; this workup will be continued and followed by the physician or advanced practice provider that is assigned to the patient when roomed.  "

## 2023-01-26 NOTE — CARE UPDATE
01/26/23 1626   Patient Assessment/Suction   Level of Consciousness (AVPU) alert   Rhythm/Pattern, Respiratory unlabored   PRE-TX-O2   Device (Oxygen Therapy) room air   Code Blue/Rapid Response   $ Was an ABG obtained? Venous Line;ISTAT - Blood gas;ISTAT - Calcium;ISTAT - Hematocrit;ISTAT - Potassium;ISTAT - Sodium   Labs   $ Labs Tech Time 15 min   Critical Value Communication   Date Result Received 01/26/23   Time Result Received 1626   Resulting Department of Critical Value RESP   Who communicated critical value from resulting department? KDAO RRT   Critical Test #1 PH   Critical Test #1 Result 7.342   Critical Test #2 PCO2   Critical Test #2 Result 50.2   Critical Test #3  PO2   Critical Test #3 Result 32  (VENOUS)   Critical Test #4 GLU   Critical Test #4 Result 409   Name of Notified Physician/Designee DR. CAO   Date Notified 01/26/23   Time Notified 1626   Respiratory Evaluation   $ Care Plan Tech Time 15 min

## 2023-01-26 NOTE — ED NOTES
C/o pain to left ribs, rt knee and rt ankle after trip/fall at home yesterday.  Hit head on cabinet- was home alone, possible LOC.  Skin intact, small knot to back of head.  AAO x4 on arrival to [facility].  PERRL.  Ambulates with discomfort due to rt knee and ankle.  Pt has auto reader for blood glucose, says 412 at 1620 and pt dosed self with 20 units insulin

## 2023-01-27 ENCOUNTER — TELEPHONE (OUTPATIENT)
Dept: FAMILY MEDICINE | Facility: CLINIC | Age: 68
End: 2023-01-27
Payer: MEDICARE

## 2023-01-27 NOTE — TELEPHONE ENCOUNTER
----- Message from Kenia Kline sent at 1/27/2023  9:50 AM CST -----  Type:  Sooner Apoointment Request    Caller is requesting a sooner appointment.  Caller declined first available appointment listed below.  Caller will not accept being placed on the waitlist and is requesting a message be sent to doctor.  Name of Caller:Kateryna Sashayaritza Desai     When is the first available appointment?2/2/23 with the NP     Symptoms:Schedule an appointment with Guido Chow MD (Family Medicine) in 1 day (1/27/2023) Hos follow up     Would the patient rather a call back or a response via [facility]?  Call back     Best Call Back Number:[phone] or [phone] (mobile)     Additional Information:

## 2023-01-27 NOTE — TELEPHONE ENCOUNTER
Patient seen in ER on yesterday 1-26-23 and advised to be seen by Dr. Chow within 1 day for follow up. Writer also noted these instructions on patient's discharge orders. First available appointment with Dr. Chow noted on 1-31-23. Offered to look for a sooner appointment with a different provider. Patient verbalized would rather schedule appointment with Dr. Chow on 1-31-23. Appointment scheduled as per patient request.

## 2023-01-27 NOTE — DISCHARGE INSTRUCTIONS
Take over-the-counter medications such as Tylenol if you do not have any adverse reactions to this medication for pain control.  Stretch, use warm compresses and massage sore muscles.  Take Norco for breakthrough pain.  Please follow a strict diabetic diet and monitor her glucose closely.  Follow-up tomorrow with your PCP or endocrinologist.  Return at once if you are unable to control your sugar feel weak, lightheaded or have any other concerning symptoms.

## 2023-01-28 NOTE — ED PROVIDER NOTES
"Encounter Date: 1/26/2023       History     Chief Complaint   Patient presents with    Fall     Fall yesterday - hit back of head on cabinet and has some soreness on left lateral side and r ankle  Has been vomiting since waking up. Has headache     69 yo woman with IDDM, GERD, HTN, diabetic gastroparesis presents to the ED with a headache and vomiting. The patient fell backwards yesterday striking her head on a cabinet. Unclear LOC. She also complains of pain to left upper back and right ankle. She woke up this morning with a generalized headache and vomiting. Her glucose has been registering "high" on her dexcom. Denies focal neuro deficits, thunderclap nature, neck stiffness or fevers/chills.     Review of patient's allergies indicates:   Allergen Reactions    Sulfamethoxazole-trimethoprim Rash and Hives     Patient experienced on 12/3/14 redness to face and rash to chest and arms/ with no SOB or airway obstruction    Zinc-25 Hives and Swelling     Aching pains in the knees     Heparin analogues     Percocet [oxycodone-acetaminophen] Nausea And Vomiting     Also caused dizziness and passed out    Iodinated contrast media Swelling and Rash     Says topical iodine OK    Niaspan extended-release [niacin] Itching and Other (See Comments)     Skin flushing and redness     Past Medical History:   Diagnosis Date    Allergy     Arthritis     degnerative disease low back,muscle spasms, shoulders    Asian flu type A 12/22/2017    Bunionette 7/26/2012    Diabetes mellitus type I     since age 13    Diabetic gastroparesis     takes Cytotec    Diabetic retinopathy of both eyes     gets periodic laser treatments    Difficult intubation     as a child had sore throat after a procedure    Encounter for blood transfusion     GERD (gastroesophageal reflux disease)     Hallux abducto valgus 3/31/2014    Headache(784.0)     Hiatal hernia     with GERD    Hyperlipidemia     Hypertension     Infection of the upper respiratory tract " 2012    Lumbar spinal stenosis     Osteopenia     Rosacea     Seizures     Pt states during pgy with stroke    Stroke 1985?    while pregnant    Tachycardia     asymptomatic with Toprol    Thyroid disease     hypothyroidism    Venous insufficiency of leg     improved since EVLT     Past Surgical History:   Procedure Laterality Date    BUNIONECTOMY Right 2012    CARDIAC CATHETERIZATION      no stents     SECTION      COLONOSCOPY  2007    Dr. Rose, 10 year recheck    EXOSTECTOMY  12    left foot, local anesthesia, in office    EYE SURGERY      has had many laser procedures for diabetic retinopathy    VASCULAR SURGERY      VEIN SURGERY  2012    EVLT right greater saphenous, IV sedation     Family History   Problem Relation Age of Onset    Cancer Maternal Aunt         breast    Breast cancer Maternal Aunt     Diabetes Maternal Grandmother     Breast cancer Maternal Grandmother 38    Cancer Maternal Grandfather         prostate    Diabetes Maternal Grandfather     Diabetes Cousin     Arthritis Mother     Cancer Mother     Melanoma Mother     Heart disease Father     Stroke Father     No Known Problems Sister     No Known Problems Brother     No Known Problems Daughter     Alzheimer's disease Maternal Uncle     Alcohol abuse Maternal Uncle         x2    Heart disease Maternal Uncle     No Known Problems Paternal Aunt     Heart disease Paternal Uncle      Social History     Tobacco Use    Smoking status: Never    Smokeless tobacco: Never   Substance Use Topics    Alcohol use: Yes     Comment: social, 1 drink monthly    Drug use: No     Review of Systems   Constitutional:  Negative for fever.   Eyes:  Negative for visual disturbance.   Respiratory:  Negative for shortness of breath.    Cardiovascular:  Negative for chest pain.   Gastrointestinal:  Positive for nausea and vomiting. Negative for abdominal pain.   Musculoskeletal:  Negative for neck stiffness.   Skin:  Positive for wound.    Neurological:  Positive for headaches. Negative for seizures, syncope, speech difficulty, weakness, light-headedness and numbness.     Physical Exam     Initial Vitals [01/26/23 1514]   BP Pulse Resp Temp SpO2   119/61 86 (!) 24 97.5 °F (36.4 °C) 100 %      MAP       --         Physical Exam    Nursing note and vitals reviewed.  Constitutional: She appears well-developed and well-nourished. No distress.   HENT:   Head: Normocephalic and atraumatic.   Eyes: EOM are normal. Pupils are equal, round, and reactive to light.   Neck: Neck supple.   No midline TTP, step offs or deformities   Normal range of motion.  Cardiovascular:  Normal rate, regular rhythm, normal heart sounds and intact distal pulses.           Pulmonary/Chest: Breath sounds normal. She has no wheezes. She has no rhonchi. She has no rales.   Abdominal: Abdomen is soft. She exhibits no distension. There is no abdominal tenderness. There is no rebound.   Musculoskeletal:      Cervical back: Normal range of motion and neck supple.      Comments: Ecchymosis to left upper back; R ankle with TTP     Neurological: She is alert and oriented to person, place, and time. She has normal strength. No cranial nerve deficit or sensory deficit. GCS score is 15. GCS eye subscore is 4. GCS verbal subscore is 5. GCS motor subscore is 6.   Skin: Skin is warm and dry.   Psychiatric: Thought content normal.       ED Course   Procedures  Labs Reviewed   CBC W/ AUTO DIFFERENTIAL - Abnormal; Notable for the following components:       Result Value    MCHC 31.8 (*)     RDW 14.6 (*)     Gran # (ANC) 10.7 (*)     Immature Grans (Abs) 0.05 (*)     Lymph # 0.7 (*)     Gran % 90.5 (*)     Lymph % 5.7 (*)     Mono % 3.0 (*)     All other components within normal limits   COMPREHENSIVE METABOLIC PANEL - Abnormal; Notable for the following components:    Sodium 133 (*)     Chloride 94 (*)     Glucose 423 (*)     Total Bilirubin 1.4 (*)     eGFR 49.9 (*)     All other components  within normal limits   BETA - HYDROXYBUTYRATE, SERUM - Abnormal; Notable for the following components:    Beta-Hydroxybutyrate 1.4 (*)     All other components within normal limits   URINALYSIS, REFLEX TO URINE CULTURE - Abnormal; Notable for the following components:    Glucose, UA 4+ (*)     Ketones, UA 1+ (*)     All other components within normal limits    Narrative:     Specimen Source->Urine   URINALYSIS MICROSCOPIC - Abnormal; Notable for the following components:    Hyaline Casts, UA 7 (*)     All other components within normal limits    Narrative:     Specimen Source->Urine   POCT GLUCOSE - Abnormal; Notable for the following components:    POC Glucose 366 (*)     All other components within normal limits   ISTAT PROCEDURE - Abnormal; Notable for the following components:    POC PH 7.342 (*)     POC PCO2 50.2 (*)     POC PO2 32 (*)     POC SATURATED O2 58 (*)     POC Glucose 409 (*)     POC Sodium 133 (*)     All other components within normal limits   POCT GLUCOSE - Abnormal; Notable for the following components:    POC Glucose 316 (*)     All other components within normal limits   POCT GLUCOSE - Abnormal; Notable for the following components:    POC Glucose 292 (*)     All other components within normal limits   POCT GLUCOSE - Abnormal; Notable for the following components:    POC Glucose 222 (*)     All other components within normal limits     EKG Readings: (Independently Interpreted)   ECG NSR rate 100 bpm with no DAKOTA/STD   ECG Results              EKG 12-lead (Final result)  Result time 01/29/23 16:16:29      Final result by Interface, [facility] (01/29/23 16:16:29)                   Narrative:    Test Reason : R73.9,    Vent. Rate : 100 BPM     Atrial Rate : 100 BPM     P-R Int : 182 ms          QRS Dur : 086 ms      QT Int : 372 ms       P-R-T Axes : 081 072 046 degrees     QTc Int : 479 ms    Program found technically poor ECG  Normal sinus rhythm  Nonspecific ST and T wave abnormality  When  compared with ECG of 26-FEB-2020 11:57,  No significant change was found  Confirmed by Matthieu Taylor MD ([id number]) on 1/29/2023 4:16:18 PM    Referred By: [email or web address]   SELF           Confirmed By:Matthieu Taylor MD                                  Imaging Results              CT Cervical Spine Without Contrast (Final result)  Result time 01/26/23 16:36:10      Final result by Darrian Hemrosillo MD (01/26/23 16:36:10)                   Narrative:    CMS MANDATED QUALITY DATA - CT RADIATION - 436    All CT scans at this facility utilize dose modulation, iterative reconstruction, and/or weight based dosing when appropriate to reduce radiation dose to as low as reasonably achievable.        Reason: Neck trauma (Age >= 65y)    TECHNIQUE: Cervical spine CT without IV contrast obtained with coronal and sagittal reformations.    COMPARISON:12/27/2017    FINDINGS:  Negative for fracture. No epidural hematoma or prevertebral soft tissue swelling.    Mild bilateral carotid artery calcifications are present. Visualized lung apices are clear.    Bilateral facet joint osteoarthrosis occurs at C2-C3, C3-C4, and C4-C5. Moderate degenerative spondylosis occurs at C5-C6.    Coronal and sagittal reformations show normal alignment without abnormal facet widening.    IMPRESSION:  Cervical spine degenerative changes, without acute abnormality.    Electronically signed by:  Darrian Hermosillo MD  1/26/2023 4:36 PM CST Workstation: [id number]                                     CT Head Without Contrast (Final result)  Result time 01/26/23 16:31:49      Final result by Nicholas Mendez MD (01/26/23 16:31:49)                   Narrative:    CMS MANDATED QUALITY DATA-CT RADIATION DOSE-436  All CT scans at this facility dose modulation, iterative reconstruction, and or weight-based dosing when appropriate to reduce radiation dose to as low as reasonably achievable.    HISTORY: Head trauma, minor (Age >= 65y)    FINDINGS: Comparison to head CT of 12/27/2017.  There is no acute intracranial hemorrhage, with no mass effect or abnormal extra-axial fluid. Gray white differentiation is maintained, with the cortical sulci, ventricles and basal cisterns normal in size for the patient's age.    The cerebellum and brainstem are unremarkable. The visualized paranasal sinuses and mastoid air cells are clear. There are carotid siphon vascular calcifications. There is no acute osseous abnormality.    IMPRESSION: Negative noncontrast head CT.    Electronically signed by:  Nicholas Mendez MD  1/26/2023 4:31 PM CST Workstation: [id number]                                     X-Ray Knee 3 View Right (Final result)  Result time 01/26/23 16:00:42      Final result by Nicholas Mendez MD (01/26/23 16:00:42)                   Narrative:    HISTORY: Right knee pain.    FINDINGS: 4 views of the right knee show no acute fracture, dislocation or destructive osseous lesion. There is mild medial tibiofemoral and patellofemoral compartment joint space narrowing. The bones appear mildly osteopenic.    IMPRESSION: Negative for acute fracture or dislocation.    Electronically signed by:  Nicholas Mendez MD  1/26/2023 4:00 PM CST Workstation: [id number]                                     X-Ray Chest AP Portable (Final result)  Result time 01/26/23 16:01:17      Final result by Nicholas Mendez MD (01/26/23 16:01:17)                   Narrative:    HISTORY: hyperglycemia    FINDINGS: Portable chest radiograph at 1524 hours compared to prior exams shows the cardiomediastinal silhouette and pulmonary vasculature are within normal limits. There are aortic vascular calcifications.    The lungs are normally expanded, with no consolidation, large pleural effusion, or evidence of pulmonary edema. No confluent infiltrates or pneumothorax. No acute fractures or destructive osseous lesions.    IMPRESSION: No evidence of acute cardiopulmonary disease.    Electronically signed by:  Nicholas Mendez MD  1/26/2023 4:01 PM CST  Workstation: [id number]                                     X-Ray Ankle Complete Right (Final result)  Result time 01/26/23 15:58:03      Final result by Oh Miller MD (01/26/23 15:58:03)                   Narrative:    Reason: Pain status post fall.    FINDINGS:    Three views of the right ankle show no fracture, dislocation, or destructive osseous lesion. Ankle joint space is preserved. No gross soft tissue abnormalities.    IMPRESSION:    No acute osseous abnormality observed.    Electronically signed by:  Oh Miller DO  1/26/2023 3:58 PM CST Workstation: [id number]                                  X-Rays:   Independently Interpreted Readings:   Chest X-Ray: Normal heart size.  No infiltrates.  No acute abnormalities.   Other Readings:  XR R ankle without acute abnormality  XR R knee without acute abnormality  CT head and C Spine without acute abnormality, cervical spine degenerative changes    Medications   sodium chloride 0.9% bolus 1,000 mL 1,000 mL (0 mLs Intravenous Stopped 1/26/23 1722)   promethazine (PHENERGAN) 12.5 mg in dextrose 5 % (D5W) 50 mL IVPB (0 mg Intravenous Stopped 1/26/23 1643)   lactated ringers bolus 1,000 mL (0 mLs Intravenous Stopped 1/26/23 1811)   methocarbamoL tablet 1,000 mg (1,000 mg Oral Given 1/26/23 1747)   insulin regular injection 6 Units 0.06 mL (6 Units Intravenous Given 1/26/23 1916)     Medical Decision Making:   ED Management:  67 yo woman with HA and vomiting s/p mechanical fall with head trauma. Ddx includes but is not limited to ICH, fracture, dislocation, PTX, DKA, dehydration, gastroparesis, concussion, UTI. Labs remarkable for WBC count 11.7, H/H normal, glucose 423 but lytes overall wnl. Mild ketonemia but RESPIRATORY acidosis, normal bicarb. UA without evidence of infection but does have glucose and ketones. Thus picture not consistent with DKA. Imaging all reviewed and negative for acute process. Patient given IVF, antiemetics, insulin and pain control.  Upon reassessment her glucose has improved to 222 and her pain has improved. She is here with her  and they are able to monitor her glucose tightly with her dexcom and are well versed on her home insulin regimen. She feels comfortable going home. I counseled her to rest and follow diabetic diet. Follow up closely with Endo or PCP. Will discharge with phenergan and baclofen for nausea and pain control as she reports these medications have worked best for her in the past. The patient is aware of and agrees with the plan of care. Detailed return precautions discussed.    Sasha Wilkins MD  Emergency Medicine  2023 2:23 PM                                Clinical Impression:   Final diagnoses:  [R73.9] Hyperglycemia (Primary)  [M25.569] Knee pain  [M25.579] Ankle pain  [S20.222A] Contusion of left side of back, initial encounter  [S06.0X1A] Concussion with loss of consciousness of 30 minutes or less, initial encounter        ED Disposition Condition    Discharge Stable          ED Prescriptions       Medication Sig Dispense Start Date End Date Auth. Provider    promethazine (PHENERGAN) 25 MG tablet Take 1 tablet (25 mg total) by mouth every 6 (six) hours as needed for Nausea. 10 tablet 2023 -- Sasha Wilkins MD    baclofen (LIORESAL) 10 MG tablet () Take 1 tablet (10 mg total) by mouth 2 (two) times daily as needed (muscle pain). 10 tablet 2023 Sasha Wilkins MD          Follow-up Information       Follow up With Specialties Details Why Contact Info Additional Information    Guido Chow MD Family Medicine Schedule an appointment as soon as possible for a visit in 1 day  [address] [facility] [address] [phone]       [facility] - Emergency Dept Emergency Medicine  As needed, If symptoms worsen [address] [facility] [address] [phone] 1st floor             Sasha Wilkins MD  23 2110       Sasha Wilkins  MD  02/17/23 [id number]

## 2023-01-31 ENCOUNTER — OFFICE VISIT (OUTPATIENT)
Dept: FAMILY MEDICINE | Facility: CLINIC | Age: 68
End: 2023-01-31
Payer: MEDICARE

## 2023-01-31 ENCOUNTER — HOSPITAL ENCOUNTER (OUTPATIENT)
Dept: RADIOLOGY | Facility: HOSPITAL | Age: 68
Discharge: HOME | End: 2023-01-31
Attending: STUDENT IN AN ORGANIZED HEALTH CARE EDUCATION/TRAINING PROGRAM
Payer: MEDICARE

## 2023-01-31 VITALS
BODY MASS INDEX: 30.86 KG/M2 | HEART RATE: 115 BPM | OXYGEN SATURATION: 95 % | RESPIRATION RATE: 18 BRPM | SYSTOLIC BLOOD PRESSURE: 126 MMHG | HEIGHT: 66 IN | WEIGHT: 192 LBS | DIASTOLIC BLOOD PRESSURE: 72 MMHG

## 2023-01-31 DIAGNOSIS — N18.30 STAGE 3 CHRONIC KIDNEY DISEASE DUE TO TYPE 1 DIABETES MELLITUS: ICD-10-CM
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
## 2023-01-31 DIAGNOSIS — E10.22 STAGE 3 CHRONIC KIDNEY DISEASE DUE TO TYPE 1 DIABETES MELLITUS: ICD-10-CM
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
## 2023-01-31 DIAGNOSIS — E10.3593 TYPE 1 DIABETES MELLITUS WITH PROLIFERATIVE DIABETIC RETINOPATHY WITHOUT MACULAR EDEMA, BILATERAL: ICD-10-CM

## 2023-01-31 PROCEDURE — 99999 PR PBB SHADOW E&M-EST. PATIENT-LVL V: CPT | Mod: PBBFAC,,, | Performed by: STUDENT IN AN ORGANIZED HEALTH CARE EDUCATION/TRAINING PROGRAM

## 2023-01-31 PROCEDURE — 71100 XR RIBS 2 VIEW LEFT: ICD-10-PCS | Mod: 26,LT,, | Performed by: RADIOLOGY

## 2023-01-31 PROCEDURE — 99999 PR PBB SHADOW E&M-EST. PATIENT-LVL V: ICD-10-PCS | Mod: PBBFAC,,, | Performed by: STUDENT IN AN ORGANIZED HEALTH CARE EDUCATION/TRAINING PROGRAM

## 2023-01-31 PROCEDURE — 71100 X-RAY EXAM RIBS UNI 2 VIEWS: CPT | Mod: 26,LT,, | Performed by: RADIOLOGY

## 2023-01-31 PROCEDURE — 99215 OFFICE O/P EST HI 40 MIN: CPT | Mod: PBBFAC,PO | Performed by: STUDENT IN AN ORGANIZED HEALTH CARE EDUCATION/TRAINING PROGRAM

## 2023-01-31 PROCEDURE — 71100 X-RAY EXAM RIBS UNI 2 VIEWS: CPT | Mod: TC,FY,LT

## 2023-01-31 PROCEDURE — 99213 OFFICE O/P EST LOW 20 MIN: CPT | Mod: S$PBB,,, | Performed by: STUDENT IN AN ORGANIZED HEALTH CARE EDUCATION/TRAINING PROGRAM

## 2023-01-31 PROCEDURE — 99213 PR OFFICE/OUTPT VISIT, EST, LEVL III, 20-29 MIN: ICD-10-PCS | Mod: S$PBB,,, | Performed by: STUDENT IN AN ORGANIZED HEALTH CARE EDUCATION/TRAINING PROGRAM

## 2023-01-31 RX ORDER — HYDROCODONE BITARTRATE AND ACETAMINOPHEN 5; 325 MG/1; MG/1
1 TABLET ORAL EVERY 8 HOURS PRN
Qty: 15 TABLET | Refills: 0 | Status: SHIPPED | OUTPATIENT
Start: 2023-01-31 | End: 2023-02-05

## 2023-01-31 NOTE — PATIENT INSTRUCTIONS
Cut lunch and dinner mealtime insulin by 2 units on the sliding scale.     Xray ribs on the right.       Cooper Avendaño,     If you are due for any health screening(s) below please notify me so we can arrange them to be ordered and scheduled to maintain your health. Most healthy patients complete it. Don't lose out on improving your health.     All of your core healthy metrics are met.

## 2023-01-31 NOTE — PROGRESS NOTES
"[facility] MEDICINE CLINIC NOTE    Patient Name: Kateryna Desai  YOB: 1955    PRESENTING HISTORY     History of Present Illness:  Ms. Kateryna Desai is a 68 y.o. female here s/p ED visit.     Hypoglycemic episode caused syncopal episode, fell against dresser.   Hit back of head.   Went to ED next day .   Had CT head, neck, xrays mult other body parts.     That morning had decent breakfast.   Had LUnchable for lunch, cup of grapes.   After lunch went in to take a shower.     Current insulin:   SS 10 units up to 180, >180 13 units, 200 15 units, 250 17 units.   Toujeo-recently lowered to 36.   Saw Dr. Padilla last Tuesday.     Having issues with Dexcom not being accurate.             ROS      OBJECTIVE:   Vital Signs:  Vitals:    01/31/23 1335   BP: 126/72   Pulse: (!) 115   Resp: 18   SpO2: 95%   Weight: 87.1 kg (192 lb 0.3 oz)   Height: 5' 6" (1.676 m)         Physical Exam  Vitals and nursing note reviewed.   Constitutional:       Appearance: She is not diaphoretic.   HENT:      Head: Normocephalic and atraumatic.      Right Ear: External ear normal.      Left Ear: External ear normal.   Eyes:      General: No scleral icterus.     Conjunctiva/sclera: Conjunctivae normal.      Pupils: Pupils are equal, round, and reactive to light.   Neck:      Thyroid: No thyromegaly.   Cardiovascular:      Rate and Rhythm: Normal rate and regular rhythm.      Heart sounds: Normal heart sounds. No murmur heard.     Comments: Tachycardia resolved at time of exam  Pulmonary:      Effort: Pulmonary effort is normal. No respiratory distress.      Breath sounds: Normal breath sounds. No wheezing or rales.   Musculoskeletal:      Cervical back: Normal range of motion and neck supple.      Comments: Approx 15 cm ecchymosis in a linear pattern overlying left inferior and middle scapula.    Lymphadenopathy:      Cervical: No cervical adenopathy.   Skin:     General: Skin is warm and " dry.      Findings: No erythema or rash.   Neurological:      Mental Status: She is alert and oriented to person, place, and time.      Gait: Gait is intact.   Psychiatric:         Mood and Affect: Mood and affect normal.         Cognition and Memory: Memory normal.         Judgment: Judgment normal.       ASSESSMENT & PLAN:     Fall, subsequent encounter  -     X-Ray Ribs 2 View Left; Future; Expected date: 01/31/2023  -     HYDROcodone-acetaminophen (NORCO) 5-325 mg per tablet; Take 1 tablet by mouth every 8 (eight) hours as needed for Pain.  Dispense: 15 tablet; Refill: 0    Type 1 diabetes mellitus with proliferative diabetic retinopathy without macular edema, bilateral  Adjustments below    Stage 3 chronic kidney disease due to type 1 diabetes mellitus  Stable     Reduce insulin at lunch and dinner by 2 units to lower risk of future episodes.     Xray ribs. Pain control.     Monitor for other injuries that she notices while healing.           Guido Chow MD   Internal Medicine

## 2023-02-01 NOTE — TELEPHONE ENCOUNTER
I spoke with pt via phone she states that when she was in the ED she was given baclofen. She states that in the past she was on 10 mg and it did nothing for her. So she increased to 20mg and now she is out of the medication. I have pended the original order from the ED. Please advise, thank you !

## 2023-02-02 NOTE — TELEPHONE ENCOUNTER
I spoke with pt via phone. She states that she woke up this morning and her feet and ankles are swollen. She states that she was swollen at her appointment on Tuesday but today its just a bit worse. Please advise, thank you !      ----- Message from Vani Rojas sent at 2/2/2023  8:24 AM CST -----  Contact: [phone]  Type: Needs Medical Advice  Who Called:  Pt   Symptoms (please be specific):  Swollen ankles and feet   How long has patient had these symptoms:  24hrs     Best Call Back Number: [phone] (home)     Additional Information: Pt requesting Nazia call her back

## 2023-02-03 ENCOUNTER — HOSPITAL ENCOUNTER (OUTPATIENT)
Dept: RADIOLOGY | Facility: HOSPITAL | Age: 68
Discharge: HOME | End: 2023-02-03
Attending: STUDENT IN AN ORGANIZED HEALTH CARE EDUCATION/TRAINING PROGRAM
Payer: MEDICARE

## 2023-02-03 DIAGNOSIS — M79.89 LEG SWELLING: ICD-10-CM

## 2023-02-03 DIAGNOSIS — M79.89 LEG SWELLING: Primary | ICD-10-CM

## 2023-02-03 DIAGNOSIS — R60.9 EDEMA, UNSPECIFIED TYPE: ICD-10-CM

## 2023-02-03 PROCEDURE — 93970 EXTREMITY STUDY: CPT | Mod: 26,,, | Performed by: RADIOLOGY

## 2023-02-03 PROCEDURE — 93970 US LOWER EXTREMITY VEINS BILATERAL: ICD-10-PCS | Mod: 26,,, | Performed by: RADIOLOGY

## 2023-02-03 PROCEDURE — 93970 EXTREMITY STUDY: CPT | Mod: TC

## 2023-02-03 RX ORDER — BACLOFEN 10 MG/1
10 TABLET ORAL 2 TIMES DAILY PRN
Qty: 20 TABLET | Refills: 0 | Status: SHIPPED | OUTPATIENT
Start: 2023-02-03 | End: 2023-02-28 | Stop reason: SDUPTHER

## 2023-02-03 RX ORDER — BACLOFEN 10 MG/1
10 TABLET ORAL 2 TIMES DAILY PRN
Qty: 20 TABLET | Refills: 0 | Status: SHIPPED | OUTPATIENT
Start: 2023-02-03 | End: 2023-02-03 | Stop reason: SDUPTHER

## 2023-02-03 NOTE — TELEPHONE ENCOUNTER
I spoke with pt via phone. I advised her of the recommendations per Dr. Chow.     Will complete blood work here at the clinic on Monday.

## 2023-02-03 NOTE — TELEPHONE ENCOUNTER
----- Message from Guido Chow MD sent at 2/3/2023  7:49 AM CST -----  For leg swelling.     US legs today.   Double Lasix dose.   BMP Monday.

## 2023-02-06 ENCOUNTER — LAB VISIT (OUTPATIENT)
Dept: LAB | Facility: HOSPITAL | Age: 68
End: 2023-02-06
Attending: STUDENT IN AN ORGANIZED HEALTH CARE EDUCATION/TRAINING PROGRAM
Payer: MEDICARE

## 2023-02-06 DIAGNOSIS — M79.89 LEG SWELLING: ICD-10-CM

## 2023-02-06 LAB
ANION GAP SERPL CALC-SCNC: 11 MMOL/L (ref 8–16)
BUN SERPL-MCNC: 7 MG/DL (ref 8–23)
CALCIUM SERPL-MCNC: 9.2 MG/DL (ref 8.7–10.5)
CHLORIDE SERPL-SCNC: 98 MMOL/L (ref 95–110)
CO2 SERPL-SCNC: 28 MMOL/L (ref 23–29)
CREAT SERPL-MCNC: 1.1 MG/DL (ref 0.5–1.4)
EST. GFR  (NO RACE VARIABLE): 55.4 ML/MIN/1.73 M^2
GLUCOSE SERPL-MCNC: 162 MG/DL (ref 70–110)
POTASSIUM SERPL-SCNC: 3.6 MMOL/L (ref 3.5–5.1)
SODIUM SERPL-SCNC: 137 MMOL/L (ref 136–145)

## 2023-02-06 PROCEDURE — 80048 BASIC METABOLIC PNL TOTAL CA: CPT | Performed by: STUDENT IN AN ORGANIZED HEALTH CARE EDUCATION/TRAINING PROGRAM

## 2023-02-06 PROCEDURE — 36415 COLL VENOUS BLD VENIPUNCTURE: CPT | Mod: PO | Performed by: STUDENT IN AN ORGANIZED HEALTH CARE EDUCATION/TRAINING PROGRAM

## 2023-03-01 RX ORDER — BACLOFEN 10 MG/1
10 TABLET ORAL 2 TIMES DAILY PRN
Qty: 20 TABLET | Refills: 0 | Status: SHIPPED | OUTPATIENT
Start: 2023-03-01 | End: 2024-02-01

## 2023-03-06 ENCOUNTER — LAB VISIT (OUTPATIENT)
Dept: LAB | Facility: HOSPITAL | Age: 68
End: 2023-03-06
Attending: INTERNAL MEDICINE
Payer: MEDICARE

## 2023-03-06 DIAGNOSIS — N18.2 CHRONIC KIDNEY DISEASE, STAGE II (MILD): Primary | ICD-10-CM

## 2023-03-06 DIAGNOSIS — E21.0 PRIMARY HYPERPARATHYROIDISM: ICD-10-CM

## 2023-03-06 LAB
25(OH)D3+25(OH)D2 SERPL-MCNC: 52 NG/ML (ref 30–96)
ALBUMIN SERPL BCP-MCNC: 3.6 G/DL (ref 3.5–5.2)
ANION GAP SERPL CALC-SCNC: 8 MMOL/L (ref 8–16)
BACTERIA #/AREA URNS HPF: NEGATIVE /HPF
BUN SERPL-MCNC: 6 MG/DL (ref 8–23)
CALCIUM SERPL-MCNC: 9.3 MG/DL (ref 8.7–10.5)
CHLORIDE SERPL-SCNC: 101 MMOL/L (ref 95–110)
CO2 SERPL-SCNC: 30 MMOL/L (ref 23–29)
CREAT SERPL-MCNC: 0.9 MG/DL (ref 0.5–1.4)
EST. GFR  (NO RACE VARIABLE): >60 ML/MIN/1.73 M^2
GLUCOSE SERPL-MCNC: 151 MG/DL (ref 70–110)
HYALINE CASTS #/AREA URNS LPF: 17 /LPF
MICROSCOPIC COMMENT: ABNORMAL
PHOSPHATE SERPL-MCNC: 3.9 MG/DL (ref 2.7–4.5)
POTASSIUM SERPL-SCNC: 4.1 MMOL/L (ref 3.5–5.1)
PROT UR-MCNC: 12 MG/DL (ref 6–15)
PTH-INTACT SERPL-MCNC: 95.3 PG/ML (ref 9–77)
RBC #/AREA URNS HPF: 3 /HPF (ref 0–4)
SODIUM SERPL-SCNC: 139 MMOL/L (ref 136–145)
SQUAMOUS #/AREA URNS HPF: 16 /HPF
WBC #/AREA URNS HPF: 5 /HPF (ref 0–5)

## 2023-03-06 PROCEDURE — 81001 URINALYSIS AUTO W/SCOPE: CPT | Performed by: INTERNAL MEDICINE

## 2023-03-06 PROCEDURE — 83970 ASSAY OF PARATHORMONE: CPT | Performed by: INTERNAL MEDICINE

## 2023-03-06 PROCEDURE — 84156 ASSAY OF PROTEIN URINE: CPT | Performed by: INTERNAL MEDICINE

## 2023-03-06 PROCEDURE — 82306 VITAMIN D 25 HYDROXY: CPT | Performed by: INTERNAL MEDICINE

## 2023-03-06 PROCEDURE — 36415 COLL VENOUS BLD VENIPUNCTURE: CPT | Performed by: INTERNAL MEDICINE

## 2023-03-06 PROCEDURE — 80069 RENAL FUNCTION PANEL: CPT | Performed by: INTERNAL MEDICINE

## 2023-04-28 ENCOUNTER — OFFICE VISIT (OUTPATIENT)
Dept: FAMILY MEDICINE | Facility: CLINIC | Age: 68
End: 2023-04-28
Payer: MEDICARE

## 2023-04-28 VITALS
DIASTOLIC BLOOD PRESSURE: 80 MMHG | OXYGEN SATURATION: 97 % | RESPIRATION RATE: 18 BRPM | SYSTOLIC BLOOD PRESSURE: 122 MMHG | HEIGHT: 66 IN | WEIGHT: 190.06 LBS | BODY MASS INDEX: 30.54 KG/M2 | HEART RATE: 89 BPM

## 2023-04-28 DIAGNOSIS — E03.9 ACQUIRED HYPOTHYROIDISM: ICD-10-CM

## 2023-04-28 DIAGNOSIS — Z00.00 ROUTINE GENERAL MEDICAL EXAMINATION AT A HEALTH CARE FACILITY: Primary | ICD-10-CM

## 2023-04-28 DIAGNOSIS — Z12.11 ENCOUNTER FOR SCREENING FOR MALIGNANT NEOPLASM OF COLON: ICD-10-CM

## 2023-04-28 DIAGNOSIS — E10.42 CONTROLLED TYPE 1 DIABETES MELLITUS WITH DIABETIC POLYNEUROPATHY, WITH LONG-TERM CURRENT USE OF INSULIN: ICD-10-CM

## 2023-04-28 DIAGNOSIS — I35.0 AORTIC VALVE STENOSIS, ETIOLOGY OF CARDIAC VALVE DISEASE UNSPECIFIED: ICD-10-CM

## 2023-04-28 PROCEDURE — 99999 PR PBB SHADOW E&M-EST. PATIENT-LVL V: CPT | Mod: PBBFAC,,, | Performed by: STUDENT IN AN ORGANIZED HEALTH CARE EDUCATION/TRAINING PROGRAM

## 2023-04-28 PROCEDURE — 99214 PR OFFICE/OUTPT VISIT, EST, LEVL IV, 30-39 MIN: ICD-10-PCS | Mod: S$PBB,,, | Performed by: STUDENT IN AN ORGANIZED HEALTH CARE EDUCATION/TRAINING PROGRAM

## 2023-04-28 PROCEDURE — 99214 OFFICE O/P EST MOD 30 MIN: CPT | Mod: S$PBB,,, | Performed by: STUDENT IN AN ORGANIZED HEALTH CARE EDUCATION/TRAINING PROGRAM

## 2023-04-28 PROCEDURE — 99999 PR PBB SHADOW E&M-EST. PATIENT-LVL V: ICD-10-PCS | Mod: PBBFAC,,, | Performed by: STUDENT IN AN ORGANIZED HEALTH CARE EDUCATION/TRAINING PROGRAM

## 2023-04-28 PROCEDURE — 99215 OFFICE O/P EST HI 40 MIN: CPT | Mod: PBBFAC,PO | Performed by: STUDENT IN AN ORGANIZED HEALTH CARE EDUCATION/TRAINING PROGRAM

## 2023-04-28 NOTE — PROGRESS NOTES
"[facility] MEDICINE CLINIC NOTE    Patient Name: Kateryna Desai  YOB: 1955    PRESENTING HISTORY     History of Present Illness:  Ms. Kateryna Desai is a 68 y.o. female here for routine f.u.     Dr Padilla adjusted her thyroid dose.     BG- doing well.     C/o intermittent le swelling without pain. Longstanding. Already on 20mg Lasix.    Due for colon screening. Discussed options. She has difficulty with prep due to DM2. No hx colon cancer in family. I think FIT testing is reasonable for her.     AS on Echo in Fall. No dyspnea on exertion, orthopnea or PND.     ROS      OBJECTIVE:   Vital Signs:  Vitals:    04/28/23 1403   BP: 122/80   Pulse: 89   Resp: 18   SpO2: 97%   Weight: 86.2 kg (190 lb 0.6 oz)   Height: 5' 6" (1.676 m)            Physical Exam  Vitals and nursing note reviewed.   Constitutional:       Appearance: She is not diaphoretic.   HENT:      Head: Normocephalic and atraumatic.      Right Ear: External ear normal.      Left Ear: External ear normal.   Eyes:      General: No scleral icterus.     Conjunctiva/sclera: Conjunctivae normal.      Pupils: Pupils are equal, round, and reactive to light.   Neck:      Thyroid: No thyromegaly.   Cardiovascular:      Rate and Rhythm: Normal rate and regular rhythm.      Heart sounds: Murmur heard.   Pulmonary:      Effort: Pulmonary effort is normal. No respiratory distress.      Breath sounds: Normal breath sounds. No wheezing or rales.   Musculoskeletal:         General: No tenderness or deformity. Normal range of motion.      Cervical back: Normal range of motion and neck supple.   Lymphadenopathy:      Cervical: No cervical adenopathy.   Skin:     General: Skin is warm and dry.      Findings: No erythema or rash.   Neurological:      Mental Status: She is alert and oriented to person, place, and time.      Gait: Gait is intact.   Psychiatric:         Mood and Affect: Mood and affect normal.         " Cognition and Memory: Memory normal.         Judgment: Judgment normal.       ASSESSMENT & PLAN:     Routine general medical examination at a health care facility    Encounter for screening for malignant neoplasm of colon  -     Fecal Immunochemical Test (iFOBT); Future; Expected date: 04/28/2023    Aortic valve stenosis, etiology of cardiac valve disease unspecified  Not symptomatic, monitor with annual Echo    Controlled type 1 diabetes mellitus with diabetic polyneuropathy, with long-term current use of insulin  Continue current medications    Acquired hypothyroidism  Continue current medications             Guido Chow MD   Internal Medicine

## 2023-05-15 ENCOUNTER — TELEPHONE (OUTPATIENT)
Dept: FAMILY MEDICINE | Facility: CLINIC | Age: 68
End: 2023-05-15
Payer: MEDICARE

## 2023-05-15 NOTE — TELEPHONE ENCOUNTER
I spoke with pt via phone, she states that she lost her fit kit that was given in office. I advised her that I will place a new one in the mail. I advised her that it may take a little time to get to her as our mail goes to main campus.no further questions.     ----- Message from Margaret Mitchell sent at 5/15/2023 11:23 AM CDT -----  Regarding: Needs Medical Advice  Contact: patient at [phone]  Type: Needs Medical Advice  Who Called:  patient    Best Call Back Number: [phone]    Additional Information: patient is needing a new FIT kit because the one she had was accidentally thrown away. She wants to know when she could come pick it up. Please call and advise. Thank you

## 2023-06-01 ENCOUNTER — LAB VISIT (OUTPATIENT)
Dept: LAB | Facility: HOSPITAL | Age: 68
End: 2023-06-01
Attending: INTERNAL MEDICINE
Payer: MEDICARE

## 2023-06-01 DIAGNOSIS — E03.9 MYXEDEMA HEART DISEASE: ICD-10-CM

## 2023-06-01 DIAGNOSIS — M85.9 LOW BONE DENSITY FOR AGE: ICD-10-CM

## 2023-06-01 DIAGNOSIS — E78.00 PURE HYPERCHOLESTEROLEMIA: ICD-10-CM

## 2023-06-01 DIAGNOSIS — I51.9 MYXEDEMA HEART DISEASE: ICD-10-CM

## 2023-06-01 DIAGNOSIS — Z79.1 ENCOUNTER FOR LONG-TERM (CURRENT) USE OF NON-STEROIDAL ANTI-INFLAMMATORIES: ICD-10-CM

## 2023-06-01 DIAGNOSIS — E10.9 DIABETES MELLITUS TYPE I: Primary | ICD-10-CM

## 2023-06-01 LAB
ALBUMIN SERPL BCP-MCNC: 3.7 G/DL (ref 3.5–5.2)
ALP SERPL-CCNC: 66 U/L (ref 55–135)
ALT SERPL W/O P-5'-P-CCNC: 22 U/L (ref 10–44)
ANION GAP SERPL CALC-SCNC: 6 MMOL/L (ref 8–16)
AST SERPL-CCNC: 32 U/L (ref 10–40)
BILIRUB SERPL-MCNC: 0.7 MG/DL (ref 0.1–1)
BUN SERPL-MCNC: 10 MG/DL (ref 8–23)
CALCIUM SERPL-MCNC: 9.1 MG/DL (ref 8.7–10.5)
CHLORIDE SERPL-SCNC: 103 MMOL/L (ref 95–110)
CHOLEST SERPL-MCNC: 141 MG/DL (ref 120–199)
CHOLEST/HDLC SERPL: 2.6 {RATIO} (ref 2–5)
CO2 SERPL-SCNC: 29 MMOL/L (ref 23–29)
CREAT SERPL-MCNC: 1 MG/DL (ref 0.5–1.4)
EST. GFR  (NO RACE VARIABLE): >60 ML/MIN/1.73 M^2
ESTIMATED AVG GLUCOSE: 131 MG/DL (ref 68–131)
GLUCOSE SERPL-MCNC: 147 MG/DL (ref 70–110)
HBA1C MFR BLD: 6.2 % (ref 4.5–6.2)
HDLC SERPL-MCNC: 54 MG/DL (ref 40–75)
HDLC SERPL: 38.3 % (ref 20–50)
LDLC SERPL CALC-MCNC: 76.4 MG/DL (ref 63–159)
NONHDLC SERPL-MCNC: 87 MG/DL
POTASSIUM SERPL-SCNC: 4.4 MMOL/L (ref 3.5–5.1)
PROT SERPL-MCNC: 6.6 G/DL (ref 6–8.4)
SODIUM SERPL-SCNC: 138 MMOL/L (ref 136–145)
T4 FREE SERPL-MCNC: 1.11 NG/DL (ref 0.71–1.51)
TRIGL SERPL-MCNC: 53 MG/DL (ref 30–150)
TSH SERPL DL<=0.005 MIU/L-ACNC: 1.32 UIU/ML (ref 0.34–5.6)

## 2023-06-01 PROCEDURE — 83036 HEMOGLOBIN GLYCOSYLATED A1C: CPT | Performed by: INTERNAL MEDICINE

## 2023-06-01 PROCEDURE — 84443 ASSAY THYROID STIM HORMONE: CPT | Performed by: INTERNAL MEDICINE

## 2023-06-01 PROCEDURE — 80061 LIPID PANEL: CPT | Performed by: INTERNAL MEDICINE

## 2023-06-01 PROCEDURE — 36415 COLL VENOUS BLD VENIPUNCTURE: CPT | Performed by: INTERNAL MEDICINE

## 2023-06-01 PROCEDURE — 80053 COMPREHEN METABOLIC PANEL: CPT | Performed by: INTERNAL MEDICINE

## 2023-06-01 PROCEDURE — 84439 ASSAY OF FREE THYROXINE: CPT | Performed by: INTERNAL MEDICINE

## 2023-06-05 ENCOUNTER — LAB VISIT (OUTPATIENT)
Dept: LAB | Facility: HOSPITAL | Age: 68
End: 2023-06-05
Attending: STUDENT IN AN ORGANIZED HEALTH CARE EDUCATION/TRAINING PROGRAM
Payer: MEDICARE

## 2023-06-05 DIAGNOSIS — Z12.11 ENCOUNTER FOR SCREENING FOR MALIGNANT NEOPLASM OF COLON: ICD-10-CM

## 2023-06-05 PROCEDURE — 82274 ASSAY TEST FOR BLOOD FECAL: CPT | Performed by: STUDENT IN AN ORGANIZED HEALTH CARE EDUCATION/TRAINING PROGRAM

## 2023-06-07 DIAGNOSIS — J30.2 CHRONIC SEASONAL ALLERGIC RHINITIS: ICD-10-CM

## 2023-06-07 RX ORDER — FLUTICASONE PROPIONATE 50 MCG
SPRAY, SUSPENSION (ML) NASAL
Qty: 32 G | Refills: 3 | Status: SHIPPED | OUTPATIENT
Start: 2023-06-07 | End: 2023-06-07 | Stop reason: SDUPTHER

## 2023-06-07 RX ORDER — FLUTICASONE PROPIONATE 50 MCG
SPRAY, SUSPENSION (ML) NASAL
Qty: 32 G | Refills: 3 | Status: SHIPPED | OUTPATIENT
Start: 2023-06-07

## 2023-06-07 NOTE — TELEPHONE ENCOUNTER
No care due was identified.  [facility] Embedded Care Due Messages. Reference number: [id number].   6/07/2023 9:28:35 AM CDT

## 2023-06-07 NOTE — TELEPHONE ENCOUNTER
Alexis Lora Staff    ----- Message from Alexis Walker sent at 6/7/2023  9:18 AM CDT -----  Type:  RX Refill Request    Who Called:  pt  Refill or New Rx:  refill  RX Name and Strength:  fluticasone propionate (FLONASE) 50 mcg/actuation nasal spray  How is the patient currently taking it? (ex. 1XDay):  as directed  Is this a 30 day or 90 day RX:  30  Preferred Pharmacy with phone number:    [facility] - [place], [place] - [address]  [address]  [place] [address]  Phone: [phone] Fax: [phone]      Local or Mail Order:  local  Ordering Provider:  Guido Cartagena Call Back Number:  [phone] (home)     Additional Information:  please call and advise--thank you

## 2023-06-13 LAB — HEMOCCULT STL QL IA: NEGATIVE

## 2023-06-14 ENCOUNTER — TELEPHONE (OUTPATIENT)
Dept: FAMILY MEDICINE | Facility: CLINIC | Age: 68
End: 2023-06-14
Payer: MEDICARE

## 2023-06-16 DIAGNOSIS — I10 ESSENTIAL HYPERTENSION: ICD-10-CM

## 2023-06-16 DIAGNOSIS — J30.2 CHRONIC SEASONAL ALLERGIC RHINITIS: ICD-10-CM

## 2023-06-16 DIAGNOSIS — K21.9 GASTROESOPHAGEAL REFLUX DISEASE WITHOUT ESOPHAGITIS: ICD-10-CM

## 2023-06-16 RX ORDER — METOPROLOL SUCCINATE 25 MG/1
TABLET, EXTENDED RELEASE ORAL
Qty: 90 TABLET | Refills: 3 | Status: SHIPPED | OUTPATIENT
Start: 2023-06-16

## 2023-06-16 RX ORDER — MONTELUKAST SODIUM 10 MG/1
TABLET ORAL
Qty: 90 TABLET | Refills: 3 | Status: SHIPPED | OUTPATIENT
Start: 2023-06-16

## 2023-06-16 NOTE — TELEPHONE ENCOUNTER
No care due was identified.  [facility] Embedded Care Due Messages. Reference number: [id number].   6/16/2023 9:29:36 AM CDT

## 2023-06-16 NOTE — TELEPHONE ENCOUNTER
Refill Routing Note   Medication(s) are not appropriate for processing by [facility] for the following reason(s):      Medication outside of protocol: PPI dose outside of [facility] protocol  Drug-disease interaction: Toprol XL    [facility] action(s):  Defer  Route  Approve Care Due:  None identified   Medication Therapy Plan: Drug-Disease: metoprolol succinate and Venous insufficiency of leg    Pharmacist review requested: Yes     Appointments  past 12m or future 3m with PCP    Date Provider   Last Visit   4/28/2023 Guido Chow MD   Next Visit   11/29/2023 Guido Chow MD   ED visits in past 90 days: 0        Note composed:12:06 PM 06/16/2023

## 2023-06-17 NOTE — TELEPHONE ENCOUNTER
Refill Routing Note   Medication(s) are not appropriate for processing by [facility] for the following reason(s):      Medication outside of protocol    [facility] action(s):  Defer  Approve Care Due:  None identified   Medication Therapy Plan: defer pantoprazole- PPI dose outside of [facility] protocol; approve toprol    Pharmacist review requested: Yes     Appointments  past 12m or future 3m with PCP    Date Provider   Last Visit   4/28/2023 Guido Chow MD   Next Visit   11/29/2023 Guido Chow MD   ED visits in past 90 days: 0        Note composed:8:25 PM 06/16/2023

## 2023-06-19 RX ORDER — PANTOPRAZOLE SODIUM 40 MG/1
TABLET, DELAYED RELEASE ORAL
Qty: 180 TABLET | Refills: 3 | Status: SHIPPED | OUTPATIENT
Start: 2023-06-19

## 2023-06-29 ENCOUNTER — OFFICE VISIT (OUTPATIENT)
Dept: CARDIOLOGY | Facility: CLINIC | Age: 68
End: 2023-06-29
Payer: MEDICARE

## 2023-06-29 ENCOUNTER — TELEPHONE (OUTPATIENT)
Dept: CARDIOLOGY | Facility: CLINIC | Age: 68
End: 2023-06-29
Payer: MEDICARE

## 2023-06-29 VITALS
SYSTOLIC BLOOD PRESSURE: 122 MMHG | WEIGHT: 193.31 LBS | BODY MASS INDEX: 31.07 KG/M2 | HEART RATE: 83 BPM | DIASTOLIC BLOOD PRESSURE: 75 MMHG | HEIGHT: 66 IN

## 2023-06-29 DIAGNOSIS — E78.2 MIXED HYPERLIPIDEMIA: ICD-10-CM

## 2023-06-29 DIAGNOSIS — I35.0 NONRHEUMATIC AORTIC VALVE STENOSIS: ICD-10-CM

## 2023-06-29 DIAGNOSIS — I87.2 VENOUS INSUFFICIENCY OF BOTH LOWER EXTREMITIES: Primary | ICD-10-CM

## 2023-06-29 DIAGNOSIS — I10 PRIMARY HYPERTENSION: ICD-10-CM

## 2023-06-29 DIAGNOSIS — E10.3593 TYPE 1 DIABETES MELLITUS WITH PROLIFERATIVE DIABETIC RETINOPATHY WITHOUT MACULAR EDEMA, BILATERAL: ICD-10-CM

## 2023-06-29 PROCEDURE — 99999 PR PBB SHADOW E&M-EST. PATIENT-LVL IV: CPT | Mod: PBBFAC,,, | Performed by: INTERNAL MEDICINE

## 2023-06-29 PROCEDURE — 99214 OFFICE O/P EST MOD 30 MIN: CPT | Mod: PBBFAC,PO | Performed by: INTERNAL MEDICINE

## 2023-06-29 PROCEDURE — 99214 PR OFFICE/OUTPT VISIT, EST, LEVL IV, 30-39 MIN: ICD-10-PCS | Mod: S$PBB,,, | Performed by: INTERNAL MEDICINE

## 2023-06-29 PROCEDURE — 99214 OFFICE O/P EST MOD 30 MIN: CPT | Mod: S$PBB,,, | Performed by: INTERNAL MEDICINE

## 2023-06-29 PROCEDURE — 99999 PR PBB SHADOW E&M-EST. PATIENT-LVL IV: ICD-10-PCS | Mod: PBBFAC,,, | Performed by: INTERNAL MEDICINE

## 2023-06-29 NOTE — PROGRESS NOTES
Subjective:    Patient ID:  Kateryna Desai is a 68 y.o. female patient here for evaluation Follow-up      History of Present Illness:  Cardiology follow-up.  Valvular heart disease with moderate AS, mild MR, echo 11/2022.  No chest pain, syncope/presyncope or dyspnea.  Chronic lower extremity edema most likely venous insufficiency.  Past evaluation for ischemic heart disease with negative nuclear study 2018.  Remote history of left heart catheterization number of years ago no significant coronary artery disease.  Risk factors include diabetes mellitus, dyslipidemia hypertension.    Remote history of trauma induced acute kidney injury, non recurrent.  Follows with Nephrology.             Review of patient's allergies indicates:   Allergen Reactions    Sulfamethoxazole-trimethoprim Rash and Hives     Patient experienced on 12/3/14 redness to face and rash to chest and arms/ with no SOB or airway obstruction    Zinc-25 Hives and Swelling     Aching pains in the knees     Heparin analogues     Percocet [oxycodone-acetaminophen] Nausea And Vomiting     Also caused dizziness and passed out    Iodinated contrast media Swelling and Rash     Says topical iodine OK    Niaspan extended-release [niacin] Itching and Other (See Comments)     Skin flushing and redness       Past Medical History:   Diagnosis Date    Allergy     Arthritis     degnerative disease low back,muscle spasms, shoulders    Asian flu type A 12/22/2017    Bunionette 7/26/2012    Diabetes mellitus type I     since age 13    Diabetic gastroparesis     takes Cytotec    Diabetic retinopathy of both eyes     gets periodic laser treatments    Difficult intubation     as a child had sore throat after a procedure    Encounter for blood transfusion     GERD (gastroesophageal reflux disease)     Hallux abducto valgus 3/31/2014    Headache(784.0)     Hiatal hernia     with GERD    Hyperlipidemia     Hypertension     Infection of the upper respiratory  tract 2012    Lumbar spinal stenosis     Osteopenia     Rosacea     Seizures     Pt states during pgy with stroke    Stroke 1985?    while pregnant    Tachycardia     asymptomatic with Toprol    Thyroid disease     hypothyroidism    Venous insufficiency of leg     improved since EVLT     Past Surgical History:   Procedure Laterality Date    BUNIONECTOMY Right 2012    CARDIAC CATHETERIZATION      no stents     SECTION      COLONOSCOPY  2007    Dr. Rose, 10 year recheck    EXOSTECTOMY  12    left foot, local anesthesia, in office    EYE SURGERY      has had many laser procedures for diabetic retinopathy    VASCULAR SURGERY      VEIN SURGERY  2012    EVLT right greater saphenous, IV sedation     Social History     Tobacco Use    Smoking status: Never    Smokeless tobacco: Never   Substance Use Topics    Alcohol use: Yes     Comment: social, 1 drink monthly    Drug use: No        Review of Systems:    As noted in HPI in addition      REVIEW OF SYSTEMS  Review of Systems   Constitutional: Negative for decreased appetite, diaphoresis, night sweats, weight gain and weight loss.   HENT:  Negative for nosebleeds and odynophagia.    Eyes:  Negative for double vision and photophobia.   Cardiovascular:  Negative for chest pain, claudication, cyanosis, dyspnea on exertion, irregular heartbeat, leg swelling, near-syncope, orthopnea, palpitations, paroxysmal nocturnal dyspnea and syncope.   Respiratory:  Negative for cough, hemoptysis, shortness of breath and wheezing.    Hematologic/Lymphatic: Negative for adenopathy.   Skin:  Negative for flushing, skin cancer and suspicious lesions.   Musculoskeletal:  Negative for gout, myalgias and neck pain.   Gastrointestinal:  Negative for abdominal pain, heartburn, hematemesis and hematochezia.   Genitourinary:  Negative for bladder incontinence, hesitancy and nocturia.   Neurological:  Negative for focal weakness, headaches, light-headedness and  paresthesias.   Psychiatric/Behavioral:  Negative for memory loss and substance abuse.             Objective:        Vitals:    06/29/23 1131   BP: 122/75   Pulse: 83       Lab Results   Component Value Date    WBC 11.78 01/26/2023    HGB 12.7 01/26/2023    HCT 43 01/26/2023     01/26/2023    CHOL 141 06/01/2023    TRIG 53 06/01/2023    HDL 54 06/01/2023    ALT 22 06/01/2023    AST 32 06/01/2023     06/01/2023    K 4.4 06/01/2023     06/01/2023    CREATININE 1.0 06/01/2023    BUN 10 06/01/2023    CO2 29 06/01/2023    TSH 1.320 06/01/2023    INR 1.1 02/26/2020    HGBA1C 6.2 06/01/2023        ECHOCARDIOGRAM RESULTS  Results for orders placed in visit on 11/07/22    Echo    Interpretation Summary  · The left ventricle is normal in size with mild concentric hypertrophy and normal systolic function.  · The estimated ejection fraction is 65%.  · Indeterminate left ventricular diastolic function.  · Normal right ventricular size with normal right ventricular systolic function.  · Mild tricuspid regurgitation.  · Mild mitral regurgitation.  · There is moderate aortic valve stenosis.  · Aortic valve area is 1.16 cm2; peak velocity is 2.72 m/s; mean gradient is 18 mmHg.  · Normal central venous pressure (3 mmHg).  · The estimated PA systolic pressure is 30 mmHg.        CURRENT/PREVIOUS VISIT EKG  Results for orders placed or performed during the hospital encounter of 01/26/23   EKG 12-lead    Collection Time: 01/26/23  5:44 PM    Narrative    Test Reason : R73.9,    Vent. Rate : 100 BPM     Atrial Rate : 100 BPM     P-R Int : 182 ms          QRS Dur : 086 ms      QT Int : 372 ms       P-R-T Axes : 081 072 046 degrees     QTc Int : 479 ms    Program found technically poor ECG  Normal sinus rhythm  Nonspecific ST and T wave abnormality  When compared with ECG of 26-FEB-2020 11:57,  No significant change was found  Confirmed by Matthieu Taylor MD ([id number]) on 1/29/2023 4:16:18 PM    Referred By: ROZ   SELF    "        Confirmed By:Matthieu Taylor MD     No valid procedures specified.   No results found for this or any previous visit.    No valid procedures specified.    PHYSICAL EXAM  CONSTITUTIONAL: Well built, well nourished in no apparent distress  NECK: no carotid bruit, no JVD  LUNGS: CTA  CHEST WALL: no tenderness,  HEART: regular rate and rhythm, S1, S2 distant.  Grade 1-2/6 crescendo decrescendo murmur aortic area.  ABDOMEN: soft, non-tender; bowel sounds normal; no masses,  no organomegaly  EXTREMITIES: Extremities normal, no edema, no calf tenderness noted  NEURO: AAO X 3    I HAVE REVIEWED :    The vital signs, nurses notes, and all the pertinent radiology and labs.         Current Outpatient Medications   Medication Instructions    ACZONE 5 % topical gel 1 application, Topical (Top), 2 times daily PRN    albuterol (PROVENTIL/VENTOLIN HFA) 90 mcg/actuation inhaler INHALE 2 PUFFS INTO THE LUNGS FOUR TIMES DAILY.    alpha lipoic acid 600 mg Cap 2 capsules, Oral, Daily    ALPHAGAN P 0.1 % Drop 1 drop, Both Eyes, 3 times daily    ascorbic acid (vitamin C) (VITAMIN C) 100 mg, Oral, 2 times daily    aspirin (ECOTRIN) 81 mg, Oral, Daily    azelastine (ASTELIN) 137 mcg (0.1 %) nasal spray USE ONE SPRAY IN EACH NOSTRIL TWICE A DAY    baclofen (LIORESAL) 10 mg, Oral, 2 times daily PRN    BD VEO INSULIN SYRINGE UF 1/2 mL 31 gauge x 15/64" Syrg No dose, route, or frequency recorded.    calcium citrate-vitamin D3 315-200 mg (CITRACAL+D) 315-200 mg-unit per tablet 1 tablet, Oral, 2 times daily    cinnamon bark 500 mg, Oral, 2 times daily    clobetasoL (TEMOVATE) 0.05 % external solution 1 application, Topical (Top), 2 times daily    CRESTOR 40 mg, Oral, Daily    DOCOSAHEXANOIC ACID/EPA (FISH OIL ORAL) 1,200 mg, Oral, Daily    doxycycline 50 mg, Oral, 2 times daily    ESTRACE 0.01 % (0.1 mg/gram) vaginal cream Vaginal, Twice weekly, Tuesday, Saturday    estradiol 10 mcg Tab 1 tablet, Vaginal, Twice weekly, Tuesday, Saturday    " "ezetimibe (ZETIA) 10 mg, Oral, Daily    fluticasone propionate (FLONASE) 50 mcg/actuation nasal spray USE 1 SPRAY INTO THE EACH NOSTRIL ONCE A DAY Strength: 50 mcg/actuation    furosemide (LASIX) 20 MG tablet TAKE 1 TABLET BY MOUTH TWICE A DAY    GLUCAGON EMERGENCY KIT (HUMAN) 1 mg, Oral, As needed (PRN)    GVOKE HYPOPEN 2-PACK 1 mg/0.2 mL AtIn USE AS DIRECTED TO TREAT PROFOUND HYPOGLYCEMIA    HumaLOG U-100 Insulin 10-17 Units, Subcutaneous, 3 times daily before meals, 10-17 units tid sliding scale    metoprolol succinate (TOPROL-XL) 25 MG 24 hr tablet TAKE 1 TABLET BY MOUTH ONCE A DAY    montelukast (SINGULAIR) 10 mg tablet TAKE 1 TABLET BY MOUTH EVERY EVENING    NOVOFINE 32 32 gauge x 1/4" Ndle No dose, route, or frequency recorded.    ondansetron (ZOFRAN-ODT) 4 mg, Oral, Every 8 hours PRN    ONETOUCH ULTRA BLUE TEST STRIP Strp No dose, route, or frequency recorded.    pantoprazole (PROTONIX) 40 MG tablet TAKE 1 TABLET BY MOUTH TWICE A DAY    potassium chloride (K-TAB) 20 mEq 40 mEq, Oral, 2 times daily    predniSONE (DELTASONE) 20 MG tablet One BID for 3 days then once a day orally    promethazine (PHENERGAN) 25 mg, Oral, Every 6 hours PRN    SOOLANTRA 1 % Crea 1 application, Topical (Top), Daily    SYNTHROID 88 mcg, Oral, Every Tues, Thurs, Sat, Sun    SYNTHROID 75 mcg, Oral, Every Mon, Wed, Fri    TOUJEO SOLOSTAR U-300 INSULIN 34 Units, Subcutaneous, Daily    VITAMIN B COMPLEX VIT C NO.4 (SUPER B COMPLEX + C ORAL) 1 tablet, Oral, Daily          Assessment:   Moderate AS, mild MR.  Echo stable 11/2022.  Negative nuclear study 2018.  Diabetes mellitus hypertension, dyslipidemia.  Remote history of acute kidney injury related to trauma,      Plan:   Non recurrent resolved hospital Nephrology.  Cardiac exam review of systems stable.  Meds reviewed and reconciled.  Recommend no changes.  Follow up 6 months.          No follow-ups on file.       "

## 2023-06-29 NOTE — TELEPHONE ENCOUNTER
----- Message from Maya Berrios MA sent at 6/29/2023 10:47 AM CDT -----  Contact: self  Type: Needs Medical Advice  Who Called:  pt  Best Call Back Number: [phone]\  Additional Information: pt running 30 min late for appt

## 2023-07-03 ENCOUNTER — TELEPHONE (OUTPATIENT)
Dept: PODIATRY | Facility: CLINIC | Age: 68
End: 2023-07-03
Payer: MEDICARE

## 2023-07-03 NOTE — TELEPHONE ENCOUNTER
----- Message from Catrachita Gooden sent at 7/3/2023  8:07 AM CDT -----  Regarding: Blister  on the right foot  Type:  Needs Medical Advice    Who Called: Pt    Symptoms (please be specific): Blister  on the right foot    How [facility] had these symptoms:  [facility]    Pharmacy name and phone #:        [facility] - [place], [place] - [address]  [address] [place] [address]  Phone: [phone] Fax: [phone]     Would the patient rather a call back or a response via [facility]? Callback    Best Call Back Number: [phone]    Additional Information:  Sts the Blister is on the hammer toe and its swollen, no infection but she is concern. Please advise -------------------Thank you.

## 2023-07-06 ENCOUNTER — LAB VISIT (OUTPATIENT)
Dept: LAB | Facility: HOSPITAL | Age: 68
End: 2023-07-06
Attending: INTERNAL MEDICINE
Payer: MEDICARE

## 2023-07-06 ENCOUNTER — OFFICE VISIT (OUTPATIENT)
Dept: PODIATRY | Facility: CLINIC | Age: 68
End: 2023-07-06
Payer: MEDICARE

## 2023-07-06 VITALS — HEIGHT: 66 IN | BODY MASS INDEX: 31.02 KG/M2 | WEIGHT: 193 LBS

## 2023-07-06 DIAGNOSIS — M20.41 HAMMERTOE OF RIGHT FOOT: ICD-10-CM

## 2023-07-06 DIAGNOSIS — E03.9 MYXEDEMA HEART DISEASE: ICD-10-CM

## 2023-07-06 DIAGNOSIS — E10.42 CONTROLLED TYPE 1 DIABETES MELLITUS WITH DIABETIC POLYNEUROPATHY: ICD-10-CM

## 2023-07-06 DIAGNOSIS — M85.9 LOW BONE DENSITY FOR AGE: ICD-10-CM

## 2023-07-06 DIAGNOSIS — Z79.4 ENCOUNTER FOR LONG-TERM (CURRENT) USE OF INSULIN: ICD-10-CM

## 2023-07-06 DIAGNOSIS — E10.9 DIABETES MELLITUS TYPE I: Primary | ICD-10-CM

## 2023-07-06 DIAGNOSIS — E78.00 PURE HYPERCHOLESTEROLEMIA: ICD-10-CM

## 2023-07-06 DIAGNOSIS — L84 CORN OR CALLUS: Primary | ICD-10-CM

## 2023-07-06 DIAGNOSIS — I51.9 MYXEDEMA HEART DISEASE: ICD-10-CM

## 2023-07-06 LAB
ALBUMIN SERPL BCP-MCNC: 3.7 G/DL (ref 3.5–5.2)
ALBUMIN/CREAT UR: 2.8 UG/MG (ref 0–30)
ALP SERPL-CCNC: 67 U/L (ref 55–135)
ALT SERPL W/O P-5'-P-CCNC: 21 U/L (ref 10–44)
ANION GAP SERPL CALC-SCNC: 6 MMOL/L (ref 8–16)
AST SERPL-CCNC: 25 U/L (ref 10–40)
BILIRUB SERPL-MCNC: 0.7 MG/DL (ref 0.1–1)
BUN SERPL-MCNC: 8 MG/DL (ref 8–23)
CALCIUM SERPL-MCNC: 9.1 MG/DL (ref 8.7–10.5)
CHLORIDE SERPL-SCNC: 106 MMOL/L (ref 95–110)
CHOLEST SERPL-MCNC: 154 MG/DL (ref 120–199)
CHOLEST/HDLC SERPL: 3.1 {RATIO} (ref 2–5)
CO2 SERPL-SCNC: 31 MMOL/L (ref 23–29)
CREAT SERPL-MCNC: 1 MG/DL (ref 0.5–1.4)
CREAT UR-MCNC: 305 MG/DL (ref 15–325)
EST. GFR  (NO RACE VARIABLE): >60 ML/MIN/1.73 M^2
ESTIMATED AVG GLUCOSE: 134 MG/DL (ref 68–131)
GLUCOSE SERPL-MCNC: 100 MG/DL (ref 70–110)
HBA1C MFR BLD: 6.3 % (ref 4.5–6.2)
HDLC SERPL-MCNC: 50 MG/DL (ref 40–75)
HDLC SERPL: 32.5 % (ref 20–50)
LDLC SERPL CALC-MCNC: 88 MG/DL (ref 63–159)
MICROALBUMIN UR DL<=1MG/L-MCNC: 8.6 UG/ML
NONHDLC SERPL-MCNC: 104 MG/DL
POTASSIUM SERPL-SCNC: 3.8 MMOL/L (ref 3.5–5.1)
PROT SERPL-MCNC: 6.8 G/DL (ref 6–8.4)
SODIUM SERPL-SCNC: 143 MMOL/L (ref 136–145)
T4 FREE SERPL-MCNC: 1.07 NG/DL (ref 0.71–1.51)
TRIGL SERPL-MCNC: 80 MG/DL (ref 30–150)
TSH SERPL DL<=0.005 MIU/L-ACNC: 1.95 UIU/ML (ref 0.34–5.6)

## 2023-07-06 PROCEDURE — 82570 ASSAY OF URINE CREATININE: CPT | Performed by: INTERNAL MEDICINE

## 2023-07-06 PROCEDURE — 99214 OFFICE O/P EST MOD 30 MIN: CPT | Mod: S$PBB,,, | Performed by: PODIATRIST

## 2023-07-06 PROCEDURE — 99999 PR PBB SHADOW E&M-EST. PATIENT-LVL V: ICD-10-PCS | Mod: PBBFAC,,, | Performed by: PODIATRIST

## 2023-07-06 PROCEDURE — 99999 PR PBB SHADOW E&M-EST. PATIENT-LVL V: CPT | Mod: PBBFAC,,, | Performed by: PODIATRIST

## 2023-07-06 PROCEDURE — 84443 ASSAY THYROID STIM HORMONE: CPT | Performed by: INTERNAL MEDICINE

## 2023-07-06 PROCEDURE — 80053 COMPREHEN METABOLIC PANEL: CPT | Performed by: INTERNAL MEDICINE

## 2023-07-06 PROCEDURE — 83036 HEMOGLOBIN GLYCOSYLATED A1C: CPT | Performed by: INTERNAL MEDICINE

## 2023-07-06 PROCEDURE — 80061 LIPID PANEL: CPT | Performed by: INTERNAL MEDICINE

## 2023-07-06 PROCEDURE — 99214 PR OFFICE/OUTPT VISIT, EST, LEVL IV, 30-39 MIN: ICD-10-PCS | Mod: S$PBB,,, | Performed by: PODIATRIST

## 2023-07-06 PROCEDURE — 36415 COLL VENOUS BLD VENIPUNCTURE: CPT | Performed by: INTERNAL MEDICINE

## 2023-07-06 PROCEDURE — 99215 OFFICE O/P EST HI 40 MIN: CPT | Mod: PBBFAC,PN | Performed by: PODIATRIST

## 2023-07-06 PROCEDURE — 84439 ASSAY OF FREE THYROXINE: CPT | Performed by: INTERNAL MEDICINE

## 2023-07-06 NOTE — PROGRESS NOTES
Subjective:      Patient ID: Kateryna Desai is a 68 y.o. female.    Chief Complaint: Toe Pain (R #2)    Kateryna is a 68 y.o. female with a past medical history of Allergy, Arthritis, Asian flu type A (12/22/2017), Bunionette (7/26/2012), Diabetes mellitus type I, Diabetic gastroparesis, Diabetic retinopathy of both eyes, Difficult intubation, Encounter for blood transfusion, GERD (gastroesophageal reflux disease), Hallux abducto valgus (3/31/2014), Headache(784.0), Hiatal hernia, Hyperlipidemia, Hypertension, Infection of the upper respiratory tract (June 2012), Lumbar spinal stenosis, Osteopenia, Rosacea, Seizures, Stroke (1985?), Tachycardia, Thyroid disease, and Venous insufficiency of leg.  Patient presents to clinic with the chief complaint of excessive shearing of the Rt. 2nd toe.  States the PIP joint appeared red and swollen with use of casual shoe gear.  States there was also pain associated with said skin changes.  Patient is concerned she may develop an ulceration, as she has struggled with a similar issue in the past.  Denies noticing drainage from the area.  She has been treating by covering the digit with a foam toe sleeve.  Denies any additional pedal complaints.        Hemoglobin A1C   Date Value Ref Range Status   07/06/2023 6.3 (H) 4.5 - 6.2 % Final     Comment:     According to ADA guidelines, hemoglobin A1C <7.0% represents  optimal control in non-pregnant diabetic patients.  Different  metrics may apply to specific populations.   Standards of Medical Care in Diabetes - 2016.    For the purpose of screening for the presence of diabetes:  <5.7%     Consistent with the absence of diabetes  5.7-6.4%  Consistent with increasing risk for diabetes   (prediabetes)  >or=6.5%  Consistent with diabetes    Currently no consensus exists for use of hemoglobin A1C  for diagnosis of diabetes for children.     06/01/2023 6.2 4.5 - 6.2 % Final     Comment:     According to ADA guidelines,  hemoglobin A1C <7.0% represents  optimal control in non-pregnant diabetic patients.  Different  metrics may apply to specific populations.   Standards of Medical Care in Diabetes - 2016.    For the purpose of screening for the presence of diabetes:  <5.7%     Consistent with the absence of diabetes  5.7-6.4%  Consistent with increasing risk for diabetes   (prediabetes)  >or=6.5%  Consistent with diabetes    Currently no consensus exists for use of hemoglobin A1C  for diagnosis of diabetes for children.     01/17/2023 6.0 4.5 - 6.2 % Final     Comment:     According to ADA guidelines, hemoglobin A1C <7.0% represents  optimal control in non-pregnant diabetic patients.  Different  metrics may apply to specific populations.   Standards of Medical Care in Diabetes - 2016.    For the purpose of screening for the presence of diabetes:  <5.7%     Consistent with the absence of diabetes  5.7-6.4%  Consistent with increasing risk for diabetes   (prediabetes)  >or=6.5%  Consistent with diabetes    Currently no consensus exists for use of hemoglobin A1C  for diagnosis of diabetes for children.             Past Medical History:   Diagnosis Date    Allergy     Arthritis     degnerative disease low back,muscle spasms, shoulders    Asian flu type A 12/22/2017    Bunionette 7/26/2012    Diabetes mellitus type I     since age 13    Diabetic gastroparesis     takes Cytotec    Diabetic retinopathy of both eyes     gets periodic laser treatments    Difficult intubation     as a child had sore throat after a procedure    Encounter for blood transfusion     GERD (gastroesophageal reflux disease)     Hallux abducto valgus 3/31/2014    Headache(784.0)     Hiatal hernia     with GERD    Hyperlipidemia     Hypertension     Infection of the upper respiratory tract June 2012    Lumbar spinal stenosis     Osteopenia     Rosacea     Seizures     Pt states during pgy with stroke    Stroke 1985?    while pregnant    Tachycardia     asymptomatic  with Toprol    Thyroid disease     hypothyroidism    Venous insufficiency of leg     improved since EVLT       Past Surgical History:   Procedure Laterality Date    BUNIONECTOMY Right 2012    CARDIAC CATHETERIZATION      no stents     SECTION      COLONOSCOPY  2007    Dr. Rose, 10 year recheck    EXOSTECTOMY  12    left foot, local anesthesia, in office    EYE SURGERY      has had many laser procedures for diabetic retinopathy    VASCULAR SURGERY      VEIN SURGERY  2012    EVLT right greater saphenous, IV sedation       Family History   Problem Relation Age of Onset    Cancer Maternal Aunt         breast    Breast cancer Maternal Aunt     Diabetes Maternal Grandmother     Breast cancer Maternal Grandmother 38    Cancer Maternal Grandfather         prostate    Diabetes Maternal Grandfather     Diabetes Cousin     Arthritis Mother     Cancer Mother     Melanoma Mother     Heart disease Father     Stroke Father     No Known Problems Sister     No Known Problems Brother     No Known Problems Daughter     Alzheimer's disease Maternal Uncle     Alcohol abuse Maternal Uncle         x2    Heart disease Maternal Uncle     No Known Problems Paternal Aunt     Heart disease Paternal Uncle        Social History     Socioeconomic History    Marital status:    Tobacco Use    Smoking status: Never    Smokeless tobacco: Never   Substance and Sexual Activity    Alcohol use: Yes     Comment: social, 1 drink monthly    Drug use: No    Sexual activity: Yes     Partners: Male       Current Outpatient Medications   Medication Sig Dispense Refill    ACZONE 5 % topical gel Apply 1 application topically 2 (two) times daily as needed (roseacea).  0    albuterol (PROVENTIL/VENTOLIN HFA) 90 mcg/actuation inhaler INHALE 2 PUFFS INTO THE LUNGS FOUR TIMES DAILY. 6.7 g 1    alpha lipoic acid 600 mg Cap Take 2 capsules by mouth once daily.      ALPHAGAN P 0.1 % Drop Place 1 drop into both eyes 3 (three) times  "daily.   6    ascorbic acid, vitamin C, (VITAMIN C) 100 MG tablet Take 100 mg by mouth 2 (two) times daily.      aspirin (ECOTRIN) 81 MG EC tablet Take 81 mg by mouth once daily.      azelastine (ASTELIN) 137 mcg (0.1 %) nasal spray USE ONE SPRAY IN EACH NOSTRIL TWICE A DAY (Patient taking differently: 1 spray by Nasal route 2 (two) times daily.) 30 mL 3    BD VEO INSULIN SYRINGE UF 1/2 mL 31 gauge x 15/64" Syrg   4    calcium citrate-vitamin D3 315-200 mg (CITRACAL+D) 315-200 mg-unit per tablet Take 1 tablet by mouth 2 (two) times daily.      cinnamon bark 500 mg capsule Take 500 mg by mouth 2 (two) times daily.      clobetasoL (TEMOVATE) 0.05 % external solution Apply 1 application topically 2 (two) times daily.      CRESTOR 40 mg Tab Take 40 mg by mouth once daily.       DOCOSAHEXANOIC ACID/EPA (FISH OIL ORAL) Take 1,200 mg by mouth once daily.      doxycycline (VIBRAMYCIN) 50 MG capsule Take 50 mg by mouth 2 (two) times daily.      ESTRACE 0.01 % (0.1 mg/gram) vaginal cream Place vaginally twice a week. Tuesday, Saturday  0    estradiol 10 mcg Tab Place 1 tablet vaginally twice a week. Tuesday, Saturday      ezetimibe (ZETIA) 10 mg tablet Take 10 mg by mouth once daily.      fluticasone propionate (FLONASE) 50 mcg/actuation nasal spray USE 1 SPRAY INTO THE EACH NOSTRIL ONCE A DAY Strength: 50 mcg/actuation 32 g 3    furosemide (LASIX) 20 MG tablet TAKE 1 TABLET BY MOUTH TWICE A DAY (Patient taking differently: Take 20 mg by mouth 2 (two) times a day.) 180 tablet 2    GLUCAGON EMERGENCY 1 mg injection Take 1 mg by mouth as needed.      GVOKE HYPOPEN 2-PACK 1 mg/0.2 mL AtIn USE AS DIRECTED TO TREAT PROFOUND HYPOGLYCEMIA      HUMALOG 100 unit/mL injection Inject 10-17 Units into the skin 3 (three) times daily before meals. 10-17 units tid sliding scale  1    metoprolol succinate (TOPROL-XL) 25 MG 24 hr tablet TAKE 1 TABLET BY MOUTH ONCE A DAY 90 tablet 3    montelukast (SINGULAIR) 10 mg tablet TAKE 1 TABLET BY " "MOUTH EVERY EVENING 90 tablet 3    NOVOFINE 32 32 gauge x 1/4" Ndle       ondansetron (ZOFRAN-ODT) 4 MG TbDL Take 1 tablet (4 mg total) by mouth every 8 (eight) hours as needed (Nausea). 60 tablet 1    ONETOUCH ULTRA BLUE TEST STRIP Strp   11    pantoprazole (PROTONIX) 40 MG tablet TAKE 1 TABLET BY MOUTH TWICE A  tablet 3    potassium chloride (K-TAB) 20 mEq Take 40 mEq by mouth 2 (two) times a day.  6    predniSONE (DELTASONE) 20 MG tablet One BID for 3 days then once a day orally 10 tablet 0    promethazine (PHENERGAN) 25 MG tablet Take 1 tablet (25 mg total) by mouth every 6 (six) hours as needed for Nausea. 10 tablet 0    SOOLANTRA 1 % Crea Apply 1 application topically once daily.      SYNTHROID 75 mcg tablet Take 75 mcg by mouth every Mon, Wed, Fri.      SYNTHROID 88 mcg tablet Take 88 mcg by mouth every Tuesday, Thursday, Saturday, Sunday.  0    TOUJEO SOLOSTAR U-300 INSULIN 300 unit/mL (1.5 mL) InPn pen Inject 34 Units into the skin once daily.      VITAMIN B COMPLEX VIT C NO.4 (SUPER B COMPLEX + C ORAL) Take 1 tablet by mouth once daily.      baclofen (LIORESAL) 10 MG tablet Take 1 tablet (10 mg total) by mouth 2 (two) times daily as needed (muscle pain). 20 tablet 0     No current facility-administered medications for this visit.       Review of patient's allergies indicates:   Allergen Reactions    Bactrim [sulfamethoxazole-trimethoprim] Rash     Patient experienced on 12/3/14 redness to face and rash to chest and arms/ with no SOB or airway obstruction    Percocet [oxycodone-acetaminophen] Nausea And Vomiting     Also caused dizziness and passed out    Iodinated contrast media - iv dye Swelling and Rash     Says topical iodine OK    Norco [hydrocodone-acetaminophen] Itching, Swelling and Rash     Can tolerate if she takes Benadryl with it    Niaspan extended-release [niacin] Itching and Other (See Comments)     Skin flushing and redness         Review of Systems   Constitutional: Negative for " chills, decreased appetite, diaphoresis and fever.   Cardiovascular:  Negative for claudication and leg swelling.   Skin:  Negative for color change, dry skin, flushing, itching and nail changes.   Musculoskeletal:  Positive for joint pain. Negative for arthritis, back pain, falls, gout, joint swelling and myalgias.   Gastrointestinal:  Negative for nausea and vomiting.   Neurological:  Positive for numbness and paresthesias.   Psychiatric/Behavioral:  Negative for altered mental status.          Objective:      Physical Exam  Constitutional:       General: She is not in acute distress.     Appearance: She is well-developed. She is not diaphoretic.   Cardiovascular:      Pulses:           Dorsalis pedis pulses are 2+ on the right side and 2+ on the left side.        Posterior tibial pulses are 2+ on the right side and 2+ on the left side.      Comments: CFT < 3 seconds bilateral.  Pedal hair growth decreased bilateral.  Varicosities noted to bilateral lower extremity. Mild nonpitting edema noted to bilateral lower extremity.  Toes are cool to touch bilateral.      Musculoskeletal:         General: Tenderness present. Normal range of motion.      Comments: Muscle strength 5/5 in all muscle groups bilateral.  (-) for pain with active and passive ROM of bilateral foot and ankle.  Mild pain with palpation to the Rt. Dorsal 2nd PIP joint.  Bilateral pes planus foot type.  Bilateral hallux abducto valgus.  Bilateral semi-rigid contracture of toes 2-5 with adductovarus rotation of the 5th.     Skin:     General: Skin is warm and dry.      Coloration: Skin is not pale.      Findings: No abrasion, bruising, burn, ecchymosis, erythema, laceration, lesion, petechiae or rash.      Nails: There is no clubbing.      Comments: Toenails x 10 appear dystrophic but well maintained. No open wounds, interdigital maceration noted bilateral.  Cicatrix of the Lt. Posterior heel is well healed with no adjacent break in skin integrity.   Hyperpigmentation noted to the dorsum of the Rt. 2nd PIP joint.           Neurological:      Mental Status: She is alert and oriented to person, place, and time.      Sensory: Sensory deficit present.      Motor: No weakness or atrophy.      Comments: Protective sensation per [place] monofilament decreased bilateral.    Light touch intact bilateral.             Assessment:       Encounter Diagnoses   Name Primary?    Corn or callus Yes    Hammertoe of right foot     Controlled type 1 diabetes mellitus with diabetic polyneuropathy          Plan:       Kateryna was seen today for toe pain.    Diagnoses and all orders for this visit:    Corn or callus    Hammertoe of right foot  -     X-Ray Foot Complete Right; Future  -     US Lower Extremity Arteries Right; Future    Controlled type 1 diabetes mellitus with diabetic polyneuropathy  -     US Lower Extremity Arteries Right; Future      I counseled the patient on her conditions, their implications and medical management.    Orders written for an ultrasound of the Rt. LE.    Orders written for an x-ray of the Rt. Foot.    To continue use of the toe sleeve to minimize pressure to the Rt. 2nd toe on account of contracture.    Briefly discussed performing a hammertoe correction of the Rt. 2nd toe, as she is now at risk of ulceration to said digit.  Patient is amenable to said plan.      Patient to obtain surgical clearance from her PCP.    Will place a case request pending clearance.    Written informed consent to be obtained the day of the procedure.    Patient to be NPO at midnight the day of the procedure.    RTC 1 week postop.    JEFFERY Zelaya DPM

## 2023-07-06 NOTE — H&P (VIEW-ONLY)
Subjective:      Patient ID: Kateryna Desai is a 68 y.o. female.    Chief Complaint: Toe Pain (R #2)    Kateryna is a 68 y.o. female with a past medical history of Allergy, Arthritis, Asian flu type A (12/22/2017), Bunionette (7/26/2012), Diabetes mellitus type I, Diabetic gastroparesis, Diabetic retinopathy of both eyes, Difficult intubation, Encounter for blood transfusion, GERD (gastroesophageal reflux disease), Hallux abducto valgus (3/31/2014), Headache(784.0), Hiatal hernia, Hyperlipidemia, Hypertension, Infection of the upper respiratory tract (June 2012), Lumbar spinal stenosis, Osteopenia, Rosacea, Seizures, Stroke (1985?), Tachycardia, Thyroid disease, and Venous insufficiency of leg.  Patient presents to clinic with the chief complaint of excessive shearing of the Rt. 2nd toe.  States the PIP joint appeared red and swollen with use of casual shoe gear.  States there was also pain associated with said skin changes.  Patient is concerned she may develop an ulceration, as she has struggled with a similar issue in the past.  Denies noticing drainage from the area.  She has been treating by covering the digit with a foam toe sleeve.  Denies any additional pedal complaints.        Hemoglobin A1C   Date Value Ref Range Status   07/06/2023 6.3 (H) 4.5 - 6.2 % Final     Comment:     According to ADA guidelines, hemoglobin A1C <7.0% represents  optimal control in non-pregnant diabetic patients.  Different  metrics may apply to specific populations.   Standards of Medical Care in Diabetes - 2016.    For the purpose of screening for the presence of diabetes:  <5.7%     Consistent with the absence of diabetes  5.7-6.4%  Consistent with increasing risk for diabetes   (prediabetes)  >or=6.5%  Consistent with diabetes    Currently no consensus exists for use of hemoglobin A1C  for diagnosis of diabetes for children.     06/01/2023 6.2 4.5 - 6.2 % Final     Comment:     According to ADA guidelines,  hemoglobin A1C <7.0% represents  optimal control in non-pregnant diabetic patients.  Different  metrics may apply to specific populations.   Standards of Medical Care in Diabetes - 2016.    For the purpose of screening for the presence of diabetes:  <5.7%     Consistent with the absence of diabetes  5.7-6.4%  Consistent with increasing risk for diabetes   (prediabetes)  >or=6.5%  Consistent with diabetes    Currently no consensus exists for use of hemoglobin A1C  for diagnosis of diabetes for children.     01/17/2023 6.0 4.5 - 6.2 % Final     Comment:     According to ADA guidelines, hemoglobin A1C <7.0% represents  optimal control in non-pregnant diabetic patients.  Different  metrics may apply to specific populations.   Standards of Medical Care in Diabetes - 2016.    For the purpose of screening for the presence of diabetes:  <5.7%     Consistent with the absence of diabetes  5.7-6.4%  Consistent with increasing risk for diabetes   (prediabetes)  >or=6.5%  Consistent with diabetes    Currently no consensus exists for use of hemoglobin A1C  for diagnosis of diabetes for children.             Past Medical History:   Diagnosis Date    Allergy     Arthritis     degnerative disease low back,muscle spasms, shoulders    Asian flu type A 12/22/2017    Bunionette 7/26/2012    Diabetes mellitus type I     since age 13    Diabetic gastroparesis     takes Cytotec    Diabetic retinopathy of both eyes     gets periodic laser treatments    Difficult intubation     as a child had sore throat after a procedure    Encounter for blood transfusion     GERD (gastroesophageal reflux disease)     Hallux abducto valgus 3/31/2014    Headache(784.0)     Hiatal hernia     with GERD    Hyperlipidemia     Hypertension     Infection of the upper respiratory tract June 2012    Lumbar spinal stenosis     Osteopenia     Rosacea     Seizures     Pt states during pgy with stroke    Stroke 1985?    while pregnant    Tachycardia     asymptomatic  with Toprol    Thyroid disease     hypothyroidism    Venous insufficiency of leg     improved since EVLT       Past Surgical History:   Procedure Laterality Date    BUNIONECTOMY Right 2012    CARDIAC CATHETERIZATION      no stents     SECTION      COLONOSCOPY  2007    Dr. Rose, 10 year recheck    EXOSTECTOMY  12    left foot, local anesthesia, in office    EYE SURGERY      has had many laser procedures for diabetic retinopathy    VASCULAR SURGERY      VEIN SURGERY  2012    EVLT right greater saphenous, IV sedation       Family History   Problem Relation Age of Onset    Cancer Maternal Aunt         breast    Breast cancer Maternal Aunt     Diabetes Maternal Grandmother     Breast cancer Maternal Grandmother 38    Cancer Maternal Grandfather         prostate    Diabetes Maternal Grandfather     Diabetes Cousin     Arthritis Mother     Cancer Mother     Melanoma Mother     Heart disease Father     Stroke Father     No Known Problems Sister     No Known Problems Brother     No Known Problems Daughter     Alzheimer's disease Maternal Uncle     Alcohol abuse Maternal Uncle         x2    Heart disease Maternal Uncle     No Known Problems Paternal Aunt     Heart disease Paternal Uncle        Social History     Socioeconomic History    Marital status:    Tobacco Use    Smoking status: Never    Smokeless tobacco: Never   Substance and Sexual Activity    Alcohol use: Yes     Comment: social, 1 drink monthly    Drug use: No    Sexual activity: Yes     Partners: Male       Current Outpatient Medications   Medication Sig Dispense Refill    ACZONE 5 % topical gel Apply 1 application topically 2 (two) times daily as needed (roseacea).  0    albuterol (PROVENTIL/VENTOLIN HFA) 90 mcg/actuation inhaler INHALE 2 PUFFS INTO THE LUNGS FOUR TIMES DAILY. 6.7 g 1    alpha lipoic acid 600 mg Cap Take 2 capsules by mouth once daily.      ALPHAGAN P 0.1 % Drop Place 1 drop into both eyes 3 (three) times  "daily.   6    ascorbic acid, vitamin C, (VITAMIN C) 100 MG tablet Take 100 mg by mouth 2 (two) times daily.      aspirin (ECOTRIN) 81 MG EC tablet Take 81 mg by mouth once daily.      azelastine (ASTELIN) 137 mcg (0.1 %) nasal spray USE ONE SPRAY IN EACH NOSTRIL TWICE A DAY (Patient taking differently: 1 spray by Nasal route 2 (two) times daily.) 30 mL 3    BD VEO INSULIN SYRINGE UF 1/2 mL 31 gauge x 15/64" Syrg   4    calcium citrate-vitamin D3 315-200 mg (CITRACAL+D) 315-200 mg-unit per tablet Take 1 tablet by mouth 2 (two) times daily.      cinnamon bark 500 mg capsule Take 500 mg by mouth 2 (two) times daily.      clobetasoL (TEMOVATE) 0.05 % external solution Apply 1 application topically 2 (two) times daily.      CRESTOR 40 mg Tab Take 40 mg by mouth once daily.       DOCOSAHEXANOIC ACID/EPA (FISH OIL ORAL) Take 1,200 mg by mouth once daily.      doxycycline (VIBRAMYCIN) 50 MG capsule Take 50 mg by mouth 2 (two) times daily.      ESTRACE 0.01 % (0.1 mg/gram) vaginal cream Place vaginally twice a week. Tuesday, Saturday  0    estradiol 10 mcg Tab Place 1 tablet vaginally twice a week. Tuesday, Saturday      ezetimibe (ZETIA) 10 mg tablet Take 10 mg by mouth once daily.      fluticasone propionate (FLONASE) 50 mcg/actuation nasal spray USE 1 SPRAY INTO THE EACH NOSTRIL ONCE A DAY Strength: 50 mcg/actuation 32 g 3    furosemide (LASIX) 20 MG tablet TAKE 1 TABLET BY MOUTH TWICE A DAY (Patient taking differently: Take 20 mg by mouth 2 (two) times a day.) 180 tablet 2    GLUCAGON EMERGENCY 1 mg injection Take 1 mg by mouth as needed.      GVOKE HYPOPEN 2-PACK 1 mg/0.2 mL AtIn USE AS DIRECTED TO TREAT PROFOUND HYPOGLYCEMIA      HUMALOG 100 unit/mL injection Inject 10-17 Units into the skin 3 (three) times daily before meals. 10-17 units tid sliding scale  1    metoprolol succinate (TOPROL-XL) 25 MG 24 hr tablet TAKE 1 TABLET BY MOUTH ONCE A DAY 90 tablet 3    montelukast (SINGULAIR) 10 mg tablet TAKE 1 TABLET BY " "MOUTH EVERY EVENING 90 tablet 3    NOVOFINE 32 32 gauge x 1/4" Ndle       ondansetron (ZOFRAN-ODT) 4 MG TbDL Take 1 tablet (4 mg total) by mouth every 8 (eight) hours as needed (Nausea). 60 tablet 1    ONETOUCH ULTRA BLUE TEST STRIP Strp   11    pantoprazole (PROTONIX) 40 MG tablet TAKE 1 TABLET BY MOUTH TWICE A  tablet 3    potassium chloride (K-TAB) 20 mEq Take 40 mEq by mouth 2 (two) times a day.  6    predniSONE (DELTASONE) 20 MG tablet One BID for 3 days then once a day orally 10 tablet 0    promethazine (PHENERGAN) 25 MG tablet Take 1 tablet (25 mg total) by mouth every 6 (six) hours as needed for Nausea. 10 tablet 0    SOOLANTRA 1 % Crea Apply 1 application topically once daily.      SYNTHROID 75 mcg tablet Take 75 mcg by mouth every Mon, Wed, Fri.      SYNTHROID 88 mcg tablet Take 88 mcg by mouth every Tuesday, Thursday, Saturday, Sunday.  0    TOUJEO SOLOSTAR U-300 INSULIN 300 unit/mL (1.5 mL) InPn pen Inject 34 Units into the skin once daily.      VITAMIN B COMPLEX VIT C NO.4 (SUPER B COMPLEX + C ORAL) Take 1 tablet by mouth once daily.      baclofen (LIORESAL) 10 MG tablet Take 1 tablet (10 mg total) by mouth 2 (two) times daily as needed (muscle pain). 20 tablet 0     No current facility-administered medications for this visit.       Review of patient's allergies indicates:   Allergen Reactions    Bactrim [sulfamethoxazole-trimethoprim] Rash     Patient experienced on 12/3/14 redness to face and rash to chest and arms/ with no SOB or airway obstruction    Percocet [oxycodone-acetaminophen] Nausea And Vomiting     Also caused dizziness and passed out    Iodinated contrast media - iv dye Swelling and Rash     Says topical iodine OK    Norco [hydrocodone-acetaminophen] Itching, Swelling and Rash     Can tolerate if she takes Benadryl with it    Niaspan extended-release [niacin] Itching and Other (See Comments)     Skin flushing and redness         Review of Systems   Constitutional: Negative for " chills, decreased appetite, diaphoresis and fever.   Cardiovascular:  Negative for claudication and leg swelling.   Skin:  Negative for color change, dry skin, flushing, itching and nail changes.   Musculoskeletal:  Positive for joint pain. Negative for arthritis, back pain, falls, gout, joint swelling and myalgias.   Gastrointestinal:  Negative for nausea and vomiting.   Neurological:  Positive for numbness and paresthesias.   Psychiatric/Behavioral:  Negative for altered mental status.          Objective:      Physical Exam  Constitutional:       General: She is not in acute distress.     Appearance: She is well-developed. She is not diaphoretic.   Cardiovascular:      Pulses:           Dorsalis pedis pulses are 2+ on the right side and 2+ on the left side.        Posterior tibial pulses are 2+ on the right side and 2+ on the left side.      Comments: CFT < 3 seconds bilateral.  Pedal hair growth decreased bilateral.  Varicosities noted to bilateral lower extremity. Mild nonpitting edema noted to bilateral lower extremity.  Toes are cool to touch bilateral.      Musculoskeletal:         General: Tenderness present. Normal range of motion.      Comments: Muscle strength 5/5 in all muscle groups bilateral.  (-) for pain with active and passive ROM of bilateral foot and ankle.  Mild pain with palpation to the Rt. Dorsal 2nd PIP joint.  Bilateral pes planus foot type.  Bilateral hallux abducto valgus.  Bilateral semi-rigid contracture of toes 2-5 with adductovarus rotation of the 5th.     Skin:     General: Skin is warm and dry.      Coloration: Skin is not pale.      Findings: No abrasion, bruising, burn, ecchymosis, erythema, laceration, lesion, petechiae or rash.      Nails: There is no clubbing.      Comments: Toenails x 10 appear dystrophic but well maintained. No open wounds, interdigital maceration noted bilateral.  Cicatrix of the Lt. Posterior heel is well healed with no adjacent break in skin integrity.   Hyperpigmentation noted to the dorsum of the Rt. 2nd PIP joint.           Neurological:      Mental Status: She is alert and oriented to person, place, and time.      Sensory: Sensory deficit present.      Motor: No weakness or atrophy.      Comments: Protective sensation per [place] monofilament decreased bilateral.    Light touch intact bilateral.             Assessment:       Encounter Diagnoses   Name Primary?    Corn or callus Yes    Hammertoe of right foot     Controlled type 1 diabetes mellitus with diabetic polyneuropathy          Plan:       Kateryna was seen today for toe pain.    Diagnoses and all orders for this visit:    Corn or callus    Hammertoe of right foot  -     X-Ray Foot Complete Right; Future  -     US Lower Extremity Arteries Right; Future    Controlled type 1 diabetes mellitus with diabetic polyneuropathy  -     US Lower Extremity Arteries Right; Future      I counseled the patient on her conditions, their implications and medical management.    Orders written for an ultrasound of the Rt. LE.    Orders written for an x-ray of the Rt. Foot.    To continue use of the toe sleeve to minimize pressure to the Rt. 2nd toe on account of contracture.    Briefly discussed performing a hammertoe correction of the Rt. 2nd toe, as she is now at risk of ulceration to said digit.  Patient is amenable to said plan.      Patient to obtain surgical clearance from her PCP.    Will place a case request pending clearance.    Written informed consent to be obtained the day of the procedure.    Patient to be NPO at midnight the day of the procedure.    RTC 1 week postop.    JEFFERY Zelaya DPM

## 2023-07-07 ENCOUNTER — HOSPITAL ENCOUNTER (OUTPATIENT)
Dept: RADIOLOGY | Facility: HOSPITAL | Age: 68
Discharge: HOME | End: 2023-07-07
Attending: PODIATRIST
Payer: MEDICARE

## 2023-07-07 DIAGNOSIS — M20.41 HAMMERTOE OF RIGHT FOOT: ICD-10-CM

## 2023-07-07 DIAGNOSIS — E10.42 CONTROLLED TYPE 1 DIABETES MELLITUS WITH DIABETIC POLYNEUROPATHY: ICD-10-CM

## 2023-07-07 PROCEDURE — 93926 US LOWER EXTREMITY ARTERIES RIGHT: ICD-10-PCS | Mod: 26,RT,, | Performed by: RADIOLOGY

## 2023-07-07 PROCEDURE — 73630 X-RAY EXAM OF FOOT: CPT | Mod: TC,RT

## 2023-07-07 PROCEDURE — 93926 LOWER EXTREMITY STUDY: CPT | Mod: TC,RT

## 2023-07-07 PROCEDURE — 73630 XR FOOT COMPLETE 3 VIEW RIGHT: ICD-10-PCS | Mod: 26,RT,, | Performed by: RADIOLOGY

## 2023-07-07 PROCEDURE — 93926 LOWER EXTREMITY STUDY: CPT | Mod: 26,RT,, | Performed by: RADIOLOGY

## 2023-07-07 PROCEDURE — 73630 X-RAY EXAM OF FOOT: CPT | Mod: 26,RT,, | Performed by: RADIOLOGY

## 2023-07-10 ENCOUNTER — TELEPHONE (OUTPATIENT)
Dept: PODIATRY | Facility: CLINIC | Age: 68
End: 2023-07-10
Payer: MEDICARE

## 2023-07-10 NOTE — TELEPHONE ENCOUNTER
Spoke with patient and she voiced understanding regarding Xray results. She reports she is getting her preop clearance this week and would like to schedule surgery on a Thursday or Friday when her  isn't working

## 2023-07-10 NOTE — TELEPHONE ENCOUNTER
----- Message from Lei Rhoades DPM sent at 7/8/2023  2:46 PM CDT -----  Please let the patient know her ultrasound shows normal blood flow on the Rt.  Also, there is no orthopedic hardware in the affected toe.  That makes my job a whole heck of a lot easier.  I would simply remove a portion of the joint to float the toe and minimize its chances of mercy upward.    ----- Message -----  From: Interface, Rad Results In  Sent: 7/7/2023   1:48 PM CDT  To: Lei Rhoades DPM

## 2023-07-13 ENCOUNTER — OFFICE VISIT (OUTPATIENT)
Dept: FAMILY MEDICINE | Facility: CLINIC | Age: 68
End: 2023-07-13
Payer: MEDICARE

## 2023-07-13 ENCOUNTER — HOSPITAL ENCOUNTER (OUTPATIENT)
Dept: RADIOLOGY | Facility: CLINIC | Age: 68
Discharge: HOME | End: 2023-07-13
Attending: NURSE PRACTITIONER
Payer: MEDICARE

## 2023-07-13 VITALS
BODY MASS INDEX: 30.54 KG/M2 | HEART RATE: 86 BPM | OXYGEN SATURATION: 96 % | HEIGHT: 66 IN | DIASTOLIC BLOOD PRESSURE: 60 MMHG | SYSTOLIC BLOOD PRESSURE: 108 MMHG | TEMPERATURE: 98 F | WEIGHT: 190.06 LBS

## 2023-07-13 DIAGNOSIS — M20.41 HAMMER TOE OF RIGHT FOOT: ICD-10-CM

## 2023-07-13 DIAGNOSIS — Z01.818 PRE-OP EVALUATION: Primary | ICD-10-CM

## 2023-07-13 DIAGNOSIS — Z01.818 PRE-OP EVALUATION: ICD-10-CM

## 2023-07-13 PROCEDURE — 99213 OFFICE O/P EST LOW 20 MIN: CPT | Mod: S$PBB,,, | Performed by: NURSE PRACTITIONER

## 2023-07-13 PROCEDURE — 71046 X-RAY EXAM CHEST 2 VIEWS: CPT | Mod: TC,FY,PO

## 2023-07-13 PROCEDURE — 99999 PR PBB SHADOW E&M-EST. PATIENT-LVL V: CPT | Mod: PBBFAC,,, | Performed by: NURSE PRACTITIONER

## 2023-07-13 PROCEDURE — 71046 X-RAY EXAM CHEST 2 VIEWS: CPT | Mod: 26,,, | Performed by: RADIOLOGY

## 2023-07-13 PROCEDURE — 93010 ELECTROCARDIOGRAM REPORT: CPT | Mod: S$PBB,,, | Performed by: INTERNAL MEDICINE

## 2023-07-13 PROCEDURE — 93010 EKG 12-LEAD: ICD-10-PCS | Mod: S$PBB,,, | Performed by: INTERNAL MEDICINE

## 2023-07-13 PROCEDURE — 99213 PR OFFICE/OUTPT VISIT, EST, LEVL III, 20-29 MIN: ICD-10-PCS | Mod: S$PBB,,, | Performed by: NURSE PRACTITIONER

## 2023-07-13 PROCEDURE — 99215 OFFICE O/P EST HI 40 MIN: CPT | Mod: PBBFAC,PO,25 | Performed by: NURSE PRACTITIONER

## 2023-07-13 PROCEDURE — 99999 PR PBB SHADOW E&M-EST. PATIENT-LVL V: ICD-10-PCS | Mod: PBBFAC,,, | Performed by: NURSE PRACTITIONER

## 2023-07-13 PROCEDURE — 93005 ELECTROCARDIOGRAM TRACING: CPT | Mod: PBBFAC,PO | Performed by: INTERNAL MEDICINE

## 2023-07-13 PROCEDURE — 71046 XR CHEST PA AND LATERAL: ICD-10-PCS | Mod: 26,,, | Performed by: RADIOLOGY

## 2023-07-13 NOTE — H&P (VIEW-ONLY)
Patient ID: Kateryna Desai is a 68 y.o. female.    Chief Complaint: Pre-op Exam      Kateryna Desai is in the office     HPI  67 y/o female patient with medical problems listed below presents for pre op of right 2nd hammer toe by Dr. Rhoades in podiatry. States the surgery date has not been scheduled yet.   Denies smoking. Denies hx of anesthesia. Patient is currently on toujeo 38 u qd and humalog on sliding scale with meal. Patient states she usually have 2 meals a day (breakfast and dinner).   Denies smoking. Denies hx of anesthesia.     Labs reviewed done on 7/6/2023- A1C 6.3    Past Medical History:   Diagnosis Date    Allergy     Arthritis     degnerative disease low back,muscle spasms, shoulders    Asian flu type A 12/22/2017    Bunionette 7/26/2012    Diabetes mellitus type I     since age 13    Diabetic gastroparesis     takes Cytotec    Diabetic retinopathy of both eyes     gets periodic laser treatments    Difficult intubation     as a child had sore throat after a procedure    Encounter for blood transfusion     GERD (gastroesophageal reflux disease)     Hallux abducto valgus 3/31/2014    Headache(784.0)     Hiatal hernia     with GERD    Hyperlipidemia     Hypertension     Infection of the upper respiratory tract June 2012    Lumbar spinal stenosis     Osteopenia     Rosacea     Seizures     Pt states during pgy with stroke    Stroke 1985?    while pregnant    Tachycardia     asymptomatic with Toprol    Thyroid disease     hypothyroidism    Venous insufficiency of leg     improved since EVLT            Current Outpatient Medications:     ACZONE 5 % topical gel, Apply 1 application topically 2 (two) times daily as needed (roseacea)., Disp: , Rfl: 0    alpha lipoic acid 600 mg Cap, Take 2 capsules by mouth once daily., Disp: , Rfl:     ALPHAGAN P 0.1 % Drop, Place 1 drop into both eyes 3 (three) times daily. , Disp: , Rfl: 6    ascorbic acid, vitamin C, (VITAMIN C) 100  "MG tablet, Take 100 mg by mouth 2 (two) times daily., Disp: , Rfl:     aspirin (ECOTRIN) 81 MG EC tablet, Take 81 mg by mouth once daily., Disp: , Rfl:     azelastine (ASTELIN) 137 mcg (0.1 %) nasal spray, USE ONE SPRAY IN EACH NOSTRIL TWICE A DAY (Patient taking differently: 1 spray by Nasal route 2 (two) times daily.), Disp: 30 mL, Rfl: 3    BD VEO INSULIN SYRINGE UF 1/2 mL 31 gauge x 15/64" Syrg, , Disp: , Rfl: 4    calcium citrate-vitamin D3 315-200 mg (CITRACAL+D) 315-200 mg-unit per tablet, Take 1 tablet by mouth 2 (two) times daily., Disp: , Rfl:     cinnamon bark 500 mg capsule, Take 500 mg by mouth 2 (two) times daily., Disp: , Rfl:     CRESTOR 40 mg Tab, Take 40 mg by mouth once daily. , Disp: , Rfl:     DOCOSAHEXANOIC ACID/EPA (FISH OIL ORAL), Take 1,200 mg by mouth once daily., Disp: , Rfl:     doxycycline (VIBRAMYCIN) 50 MG capsule, Take 50 mg by mouth 2 (two) times daily., Disp: , Rfl:     ESTRACE 0.01 % (0.1 mg/gram) vaginal cream, Place vaginally twice a week. Tuesday, Saturday, Disp: , Rfl: 0    estradiol 10 mcg Tab, Place 1 tablet vaginally twice a week. Tuesday, Saturday, Disp: , Rfl:     ezetimibe (ZETIA) 10 mg tablet, Take 10 mg by mouth once daily., Disp: , Rfl:     fluticasone propionate (FLONASE) 50 mcg/actuation nasal spray, USE 1 SPRAY INTO THE EACH NOSTRIL ONCE A DAY Strength: 50 mcg/actuation, Disp: 32 g, Rfl: 3    furosemide (LASIX) 20 MG tablet, TAKE 1 TABLET BY MOUTH TWICE A DAY (Patient taking differently: Take 20 mg by mouth 2 (two) times a day.), Disp: 180 tablet, Rfl: 2    GLUCAGON EMERGENCY 1 mg injection, Take 1 mg by mouth as needed., Disp: , Rfl:     GVOKE HYPOPEN 2-PACK 1 mg/0.2 mL AtIn, USE AS DIRECTED TO TREAT PROFOUND HYPOGLYCEMIA, Disp: , Rfl:     HUMALOG 100 unit/mL injection, Inject 10-17 Units into the skin 3 (three) times daily before meals. 10-17 units tid sliding scale, Disp: , Rfl: 1    metoprolol succinate (TOPROL-XL) 25 MG 24 hr tablet, TAKE 1 TABLET BY MOUTH ONCE " "A DAY, Disp: 90 tablet, Rfl: 3    montelukast (SINGULAIR) 10 mg tablet, TAKE 1 TABLET BY MOUTH EVERY EVENING, Disp: 90 tablet, Rfl: 3    NOVOFINE 32 32 gauge x 1/4" Ndle, , Disp: , Rfl:     ondansetron (ZOFRAN-ODT) 4 MG TbDL, Take 1 tablet (4 mg total) by mouth every 8 (eight) hours as needed (Nausea)., Disp: 60 tablet, Rfl: 1    ONETOUCH ULTRA BLUE TEST STRIP Strp, , Disp: , Rfl: 11    pantoprazole (PROTONIX) 40 MG tablet, TAKE 1 TABLET BY MOUTH TWICE A DAY, Disp: 180 tablet, Rfl: 3    potassium chloride (K-TAB) 20 mEq, Take 40 mEq by mouth 2 (two) times a day., Disp: , Rfl: 6    promethazine (PHENERGAN) 25 MG tablet, Take 1 tablet (25 mg total) by mouth every 6 (six) hours as needed for Nausea., Disp: 10 tablet, Rfl: 0    SOOLANTRA 1 % Crea, Apply 1 application topically once daily., Disp: , Rfl:     SYNTHROID 75 mcg tablet, Take 75 mcg by mouth every Mon, Wed, Fri., Disp: , Rfl:     SYNTHROID 88 mcg tablet, Take 88 mcg by mouth every Tuesday, Thursday, Saturday, Sunday., Disp: , Rfl: 0    TOUJEO SOLOSTAR U-300 INSULIN 300 unit/mL (1.5 mL) InPn pen, Inject 34 Units into the skin once daily., Disp: , Rfl:     VITAMIN B COMPLEX VIT C NO.4 (SUPER B COMPLEX + C ORAL), Take 1 tablet by mouth once daily., Disp: , Rfl:     albuterol (PROVENTIL/VENTOLIN HFA) 90 mcg/actuation inhaler, INHALE 2 PUFFS INTO THE LUNGS FOUR TIMES DAILY. (Patient not taking: Reported on 7/13/2023), Disp: 6.7 g, Rfl: 1    baclofen (LIORESAL) 10 MG tablet, Take 1 tablet (10 mg total) by mouth 2 (two) times daily as needed (muscle pain)., Disp: 20 tablet, Rfl: 0    clobetasoL (TEMOVATE) 0.05 % external solution, Apply 1 application topically 2 (two) times daily., Disp: , Rfl:     predniSONE (DELTASONE) 20 MG tablet, One BID for 3 days then once a day orally (Patient not taking: Reported on 7/13/2023), Disp: 10 tablet, Rfl: 0    The 10-year ASCVD risk score (Billie NIELSEN, et al., 2019) is: 10.3%    Values used to calculate the score:      Age: 68 " years      Sex: Female      Is Non- : No      Diabetic: Yes      Tobacco smoker: No      Systolic Blood Pressure: 108 mmHg      Is BP treated: Yes      HDL Cholesterol: 50 mg/dL      Total Cholesterol: 154 mg/dL     Wt Readings from Last 3 Encounters:   07/13/23 86.2 kg (190 lb 0.6 oz)   07/06/23 87.5 kg (193 lb)   06/29/23 87.7 kg (193 lb 5.5 oz)     Temp Readings from Last 3 Encounters:   07/13/23 97.9 °F (36.6 °C) (Oral)   01/26/23 97.9 °F (36.6 °C) (Oral)   11/29/22 98.8 °F (37.1 °C) (Oral)     BP Readings from Last 3 Encounters:   07/13/23 108/60   06/29/23 122/75   04/28/23 122/80     Pulse Readings from Last 3 Encounters:   07/13/23 86   06/29/23 83   04/28/23 89     Resp Readings from Last 3 Encounters:   04/28/23 18   01/31/23 18   01/26/23 18     PF Readings from Last 3 Encounters:   No data found for PF     SpO2 Readings from Last 3 Encounters:   07/13/23 96%   04/28/23 97%   01/31/23 95%        Lab Results   Component Value Date    HGBA1C 6.3 (H) 07/06/2023    HGBA1C 6.2 06/01/2023    HGBA1C 6.0 01/17/2023     Lab Results   Component Value Date    LDLCALC 88.0 07/06/2023    CREATININE 1.0 07/06/2023     Review of Systems   Constitutional:  Negative for chills and fever.   Respiratory:  Negative for cough, chest tightness and shortness of breath.    Cardiovascular:  Negative for chest pain and palpitations.   Gastrointestinal:  Negative for abdominal pain.   Neurological:  Negative for dizziness and headaches.         Objective:      Physical Exam  Constitutional:       General: She is not in acute distress.     Appearance: Normal appearance.   HENT:      Head: Atraumatic.   Cardiovascular:      Rate and Rhythm: Normal rate and regular rhythm.      Pulses: Normal pulses.      Heart sounds: Murmur heard.   Pulmonary:      Effort: Pulmonary effort is normal.      Breath sounds: Normal breath sounds.   Abdominal:      General: Abdomen is flat. Bowel sounds are normal.      Palpations:  Abdomen is soft.   Skin:     General: Skin is warm and dry.   Neurological:      General: No focal deficit present.      Mental Status: She is alert and oriented to person, place, and time.   Psychiatric:         Mood and Affect: Mood normal.         Screening recommendations appropriate to age and health status were reviewed.    Pre-op evaluation  -     CBC Auto Differential; Future; Expected date: 07/13/2023  -     EKG 12-lead  -     X-Ray Chest PA And Lateral; Future; Expected date: 07/13/2023    Hammer toe of right foot      RCRI risk factors include: history of cerebrovascular disease and diabetes requiring treatment with insulin. As such, per RCRI the risk of cardiac death, nonfatal myocardial infarction, or nonfatal cardiac arrest is 2.4% and the risk of myocardial infarction, pulmonary edema, ventricular fibrillation, primary cardiac arrest, or complete heart block is 3.6%.  Overall this patient can be considered intermediate risk for this intermediate risk procedure. No further cardiac testing is recommended at this time.     Patient denies any symptoms (as per HPI) concerning for undiagnosed lung disease including MK. Would not recommend obtaining chest X-ray, sleep study, or PFTs at this time. Patient is a non-smoker. We discussed the benefits of early mobilization and deep breathing after surgery.      Screened patient for alcohol misuse, use of illicit drugs, and personal or family history of anesthetic complications or bleeding diathesis and no substantial concerns were identified.     All current medications were reviewed and at this time no changes to medications are recommended prior to surgery. Advised to stop taking aspirin 5-7 days prior to the surgery. Advised to hold Humalog prior to the surgery. Advised to take 20 % decreased dose of Toujeo insulin day before the surgery.     I recommend use of standard pre-op and post-op precautions for this patient. In my opinion, she is medically optimized  for this procedure, and can proceed without further evaluation.

## 2023-07-13 NOTE — PROGRESS NOTES
Patient ID: Kateryna Desai is a 68 y.o. female.    Chief Complaint: Pre-op Exam      Kateryna Desai is in the office     HPI  69 y/o female patient with medical problems listed below presents for pre op of right 2nd hammer toe by Dr. Rhoades in podiatry. States the surgery date has not been scheduled yet.   Denies smoking. Denies hx of anesthesia. Patient is currently on toujeo 38 u qd and humalog on sliding scale with meal. Patient states she usually have 2 meals a day (breakfast and dinner).   Denies smoking. Denies hx of anesthesia.     Labs reviewed done on 7/6/2023- A1C 6.3    Past Medical History:   Diagnosis Date    Allergy     Arthritis     degnerative disease low back,muscle spasms, shoulders    Asian flu type A 12/22/2017    Bunionette 7/26/2012    Diabetes mellitus type I     since age 13    Diabetic gastroparesis     takes Cytotec    Diabetic retinopathy of both eyes     gets periodic laser treatments    Difficult intubation     as a child had sore throat after a procedure    Encounter for blood transfusion     GERD (gastroesophageal reflux disease)     Hallux abducto valgus 3/31/2014    Headache(784.0)     Hiatal hernia     with GERD    Hyperlipidemia     Hypertension     Infection of the upper respiratory tract June 2012    Lumbar spinal stenosis     Osteopenia     Rosacea     Seizures     Pt states during pgy with stroke    Stroke 1985?    while pregnant    Tachycardia     asymptomatic with Toprol    Thyroid disease     hypothyroidism    Venous insufficiency of leg     improved since EVLT            Current Outpatient Medications:     ACZONE 5 % topical gel, Apply 1 application topically 2 (two) times daily as needed (roseacea)., Disp: , Rfl: 0    alpha lipoic acid 600 mg Cap, Take 2 capsules by mouth once daily., Disp: , Rfl:     ALPHAGAN P 0.1 % Drop, Place 1 drop into both eyes 3 (three) times daily. , Disp: , Rfl: 6    ascorbic acid, vitamin C, (VITAMIN C) 100  "MG tablet, Take 100 mg by mouth 2 (two) times daily., Disp: , Rfl:     aspirin (ECOTRIN) 81 MG EC tablet, Take 81 mg by mouth once daily., Disp: , Rfl:     azelastine (ASTELIN) 137 mcg (0.1 %) nasal spray, USE ONE SPRAY IN EACH NOSTRIL TWICE A DAY (Patient taking differently: 1 spray by Nasal route 2 (two) times daily.), Disp: 30 mL, Rfl: 3    BD VEO INSULIN SYRINGE UF 1/2 mL 31 gauge x 15/64" Syrg, , Disp: , Rfl: 4    calcium citrate-vitamin D3 315-200 mg (CITRACAL+D) 315-200 mg-unit per tablet, Take 1 tablet by mouth 2 (two) times daily., Disp: , Rfl:     cinnamon bark 500 mg capsule, Take 500 mg by mouth 2 (two) times daily., Disp: , Rfl:     CRESTOR 40 mg Tab, Take 40 mg by mouth once daily. , Disp: , Rfl:     DOCOSAHEXANOIC ACID/EPA (FISH OIL ORAL), Take 1,200 mg by mouth once daily., Disp: , Rfl:     doxycycline (VIBRAMYCIN) 50 MG capsule, Take 50 mg by mouth 2 (two) times daily., Disp: , Rfl:     ESTRACE 0.01 % (0.1 mg/gram) vaginal cream, Place vaginally twice a week. Tuesday, Saturday, Disp: , Rfl: 0    estradiol 10 mcg Tab, Place 1 tablet vaginally twice a week. Tuesday, Saturday, Disp: , Rfl:     ezetimibe (ZETIA) 10 mg tablet, Take 10 mg by mouth once daily., Disp: , Rfl:     fluticasone propionate (FLONASE) 50 mcg/actuation nasal spray, USE 1 SPRAY INTO THE EACH NOSTRIL ONCE A DAY Strength: 50 mcg/actuation, Disp: 32 g, Rfl: 3    furosemide (LASIX) 20 MG tablet, TAKE 1 TABLET BY MOUTH TWICE A DAY (Patient taking differently: Take 20 mg by mouth 2 (two) times a day.), Disp: 180 tablet, Rfl: 2    GLUCAGON EMERGENCY 1 mg injection, Take 1 mg by mouth as needed., Disp: , Rfl:     GVOKE HYPOPEN 2-PACK 1 mg/0.2 mL AtIn, USE AS DIRECTED TO TREAT PROFOUND HYPOGLYCEMIA, Disp: , Rfl:     HUMALOG 100 unit/mL injection, Inject 10-17 Units into the skin 3 (three) times daily before meals. 10-17 units tid sliding scale, Disp: , Rfl: 1    metoprolol succinate (TOPROL-XL) 25 MG 24 hr tablet, TAKE 1 TABLET BY MOUTH ONCE " "A DAY, Disp: 90 tablet, Rfl: 3    montelukast (SINGULAIR) 10 mg tablet, TAKE 1 TABLET BY MOUTH EVERY EVENING, Disp: 90 tablet, Rfl: 3    NOVOFINE 32 32 gauge x 1/4" Ndle, , Disp: , Rfl:     ondansetron (ZOFRAN-ODT) 4 MG TbDL, Take 1 tablet (4 mg total) by mouth every 8 (eight) hours as needed (Nausea)., Disp: 60 tablet, Rfl: 1    ONETOUCH ULTRA BLUE TEST STRIP Strp, , Disp: , Rfl: 11    pantoprazole (PROTONIX) 40 MG tablet, TAKE 1 TABLET BY MOUTH TWICE A DAY, Disp: 180 tablet, Rfl: 3    potassium chloride (K-TAB) 20 mEq, Take 40 mEq by mouth 2 (two) times a day., Disp: , Rfl: 6    promethazine (PHENERGAN) 25 MG tablet, Take 1 tablet (25 mg total) by mouth every 6 (six) hours as needed for Nausea., Disp: 10 tablet, Rfl: 0    SOOLANTRA 1 % Crea, Apply 1 application topically once daily., Disp: , Rfl:     SYNTHROID 75 mcg tablet, Take 75 mcg by mouth every Mon, Wed, Fri., Disp: , Rfl:     SYNTHROID 88 mcg tablet, Take 88 mcg by mouth every Tuesday, Thursday, Saturday, Sunday., Disp: , Rfl: 0    TOUJEO SOLOSTAR U-300 INSULIN 300 unit/mL (1.5 mL) InPn pen, Inject 34 Units into the skin once daily., Disp: , Rfl:     VITAMIN B COMPLEX VIT C NO.4 (SUPER B COMPLEX + C ORAL), Take 1 tablet by mouth once daily., Disp: , Rfl:     albuterol (PROVENTIL/VENTOLIN HFA) 90 mcg/actuation inhaler, INHALE 2 PUFFS INTO THE LUNGS FOUR TIMES DAILY. (Patient not taking: Reported on 7/13/2023), Disp: 6.7 g, Rfl: 1    baclofen (LIORESAL) 10 MG tablet, Take 1 tablet (10 mg total) by mouth 2 (two) times daily as needed (muscle pain)., Disp: 20 tablet, Rfl: 0    clobetasoL (TEMOVATE) 0.05 % external solution, Apply 1 application topically 2 (two) times daily., Disp: , Rfl:     predniSONE (DELTASONE) 20 MG tablet, One BID for 3 days then once a day orally (Patient not taking: Reported on 7/13/2023), Disp: 10 tablet, Rfl: 0    The 10-year ASCVD risk score (Billie NIELSEN, et al., 2019) is: 10.3%    Values used to calculate the score:      Age: 68 " years      Sex: Female      Is Non- : No      Diabetic: Yes      Tobacco smoker: No      Systolic Blood Pressure: 108 mmHg      Is BP treated: Yes      HDL Cholesterol: 50 mg/dL      Total Cholesterol: 154 mg/dL     Wt Readings from Last 3 Encounters:   07/13/23 86.2 kg (190 lb 0.6 oz)   07/06/23 87.5 kg (193 lb)   06/29/23 87.7 kg (193 lb 5.5 oz)     Temp Readings from Last 3 Encounters:   07/13/23 97.9 °F (36.6 °C) (Oral)   01/26/23 97.9 °F (36.6 °C) (Oral)   11/29/22 98.8 °F (37.1 °C) (Oral)     BP Readings from Last 3 Encounters:   07/13/23 108/60   06/29/23 122/75   04/28/23 122/80     Pulse Readings from Last 3 Encounters:   07/13/23 86   06/29/23 83   04/28/23 89     Resp Readings from Last 3 Encounters:   04/28/23 18   01/31/23 18   01/26/23 18     PF Readings from Last 3 Encounters:   No data found for PF     SpO2 Readings from Last 3 Encounters:   07/13/23 96%   04/28/23 97%   01/31/23 95%        Lab Results   Component Value Date    HGBA1C 6.3 (H) 07/06/2023    HGBA1C 6.2 06/01/2023    HGBA1C 6.0 01/17/2023     Lab Results   Component Value Date    LDLCALC 88.0 07/06/2023    CREATININE 1.0 07/06/2023     Review of Systems   Constitutional:  Negative for chills and fever.   Respiratory:  Negative for cough, chest tightness and shortness of breath.    Cardiovascular:  Negative for chest pain and palpitations.   Gastrointestinal:  Negative for abdominal pain.   Neurological:  Negative for dizziness and headaches.         Objective:      Physical Exam  Constitutional:       General: She is not in acute distress.     Appearance: Normal appearance.   HENT:      Head: Atraumatic.   Cardiovascular:      Rate and Rhythm: Normal rate and regular rhythm.      Pulses: Normal pulses.      Heart sounds: Murmur heard.   Pulmonary:      Effort: Pulmonary effort is normal.      Breath sounds: Normal breath sounds.   Abdominal:      General: Abdomen is flat. Bowel sounds are normal.      Palpations:  Abdomen is soft.   Skin:     General: Skin is warm and dry.   Neurological:      General: No focal deficit present.      Mental Status: She is alert and oriented to person, place, and time.   Psychiatric:         Mood and Affect: Mood normal.         Screening recommendations appropriate to age and health status were reviewed.    Pre-op evaluation  -     CBC Auto Differential; Future; Expected date: 07/13/2023  -     EKG 12-lead  -     X-Ray Chest PA And Lateral; Future; Expected date: 07/13/2023    Hammer toe of right foot      RCRI risk factors include: history of cerebrovascular disease and diabetes requiring treatment with insulin. As such, per RCRI the risk of cardiac death, nonfatal myocardial infarction, or nonfatal cardiac arrest is 2.4% and the risk of myocardial infarction, pulmonary edema, ventricular fibrillation, primary cardiac arrest, or complete heart block is 3.6%.  Overall this patient can be considered intermediate risk for this intermediate risk procedure. No further cardiac testing is recommended at this time.     Patient denies any symptoms (as per HPI) concerning for undiagnosed lung disease including MK. Would not recommend obtaining chest X-ray, sleep study, or PFTs at this time. Patient is a non-smoker. We discussed the benefits of early mobilization and deep breathing after surgery.      Screened patient for alcohol misuse, use of illicit drugs, and personal or family history of anesthetic complications or bleeding diathesis and no substantial concerns were identified.     All current medications were reviewed and at this time no changes to medications are recommended prior to surgery. Advised to stop taking aspirin 5-7 days prior to the surgery. Advised to hold Humalog prior to the surgery. Advised to take 20 % decreased dose of Toujeo insulin day before the surgery.     I recommend use of standard pre-op and post-op precautions for this patient. In my opinion, she is medically optimized  for this procedure, and can proceed without further evaluation.

## 2023-07-17 ENCOUNTER — PES CALL (OUTPATIENT)
Dept: ADMINISTRATIVE | Facility: CLINIC | Age: 68
End: 2023-07-17
Payer: MEDICARE

## 2023-07-20 ENCOUNTER — TELEPHONE (OUTPATIENT)
Dept: PODIATRY | Facility: CLINIC | Age: 68
End: 2023-07-20
Payer: MEDICARE

## 2023-07-20 NOTE — TELEPHONE ENCOUNTER
----- Message from Kareen Fields sent at 7/20/2023  2:13 PM CDT -----  Contact: patient  Type:  Needs Medical Advice    Who Called: patient     Would the patient rather a call back or a response via [facility]? Call     Best Call Back Number: ,[phone] (home)      Additional Information: Patient would like to speak with the nurse in regards to an appointment.     Please call to advise

## 2023-07-20 NOTE — TELEPHONE ENCOUNTER
----- Message from Kareen Fields sent at 7/20/2023  2:13 PM CDT -----  Contact: patient  Type:  Needs Medical Advice    Who Called: patient     Would the patient rather a call back or a response via [facility]? Call     Best Call Back Number: ,[phone] (home)      Additional Information: Patient would like to speak with the nurse in regards to an appointment.     Please call to advise

## 2023-07-21 DIAGNOSIS — M20.41 HAMMERTOE OF RIGHT FOOT: Primary | ICD-10-CM

## 2023-07-21 DIAGNOSIS — M79.674 TOE PAIN, RIGHT: ICD-10-CM

## 2023-07-24 ENCOUNTER — TELEPHONE (OUTPATIENT)
Dept: PODIATRY | Facility: CLINIC | Age: 68
End: 2023-07-24
Payer: MEDICARE

## 2023-07-24 NOTE — TELEPHONE ENCOUNTER
Called patient to let her know that Sx was scheduled for 8/3/23 and [facility] will notify her the day before.

## 2023-07-24 NOTE — TELEPHONE ENCOUNTER
----- Message from Annie Pinzon sent at 7/24/2023  3:20 PM CDT -----  Type: Needs Medical Advice  Who Called:  pt   Symptoms (please be specific):  procedure luiz  Best Call Back Number: .pre  Additional Information: pt stated she is calling to see about luiz procedure or getting info in regards to procedure after finishing pre op info please call back to advise asap, thanks!v pt stated no one has called pt in regards to info for procedure

## 2023-08-01 ENCOUNTER — TELEPHONE (OUTPATIENT)
Dept: SURGERY | Facility: HOSPITAL | Age: 68
End: 2023-08-01
Payer: MEDICARE

## 2023-08-01 NOTE — TELEPHONE ENCOUNTER
Good morning,     Ms. Artemio Desai is scheduled for surgery with Dr. Rhoades on Thursday, 8/3. She states she has ordered a knee scooter but it will not arrive until Friday. She is wondering what type of weight bearing status she will have post-op. She states she does have a walker if that is recommended. Please contact her to discuss.     Thank you!

## 2023-08-02 ENCOUNTER — ANESTHESIA EVENT (OUTPATIENT)
Dept: SURGERY | Facility: HOSPITAL | Age: 68
End: 2023-08-02
Payer: MEDICARE

## 2023-08-03 ENCOUNTER — ANESTHESIA (OUTPATIENT)
Dept: SURGERY | Facility: HOSPITAL | Age: 68
End: 2023-08-03
Payer: MEDICARE

## 2023-08-03 ENCOUNTER — HOSPITAL ENCOUNTER (OUTPATIENT)
Facility: HOSPITAL | Age: 68
Discharge: HOME | End: 2023-08-03
Attending: PODIATRIST | Admitting: PODIATRIST
Payer: MEDICARE

## 2023-08-03 ENCOUNTER — HOSPITAL ENCOUNTER (OUTPATIENT)
Dept: RADIOLOGY | Facility: HOSPITAL | Age: 68
Discharge: HOME | End: 2023-08-03
Attending: PODIATRIST | Admitting: PODIATRIST
Payer: MEDICARE

## 2023-08-03 VITALS
TEMPERATURE: 98 F | HEART RATE: 85 BPM | OXYGEN SATURATION: 99 % | HEIGHT: 66 IN | SYSTOLIC BLOOD PRESSURE: 166 MMHG | BODY MASS INDEX: 30.53 KG/M2 | RESPIRATION RATE: 15 BRPM | DIASTOLIC BLOOD PRESSURE: 72 MMHG | WEIGHT: 190 LBS

## 2023-08-03 DIAGNOSIS — M20.41 HAMMER TOE OF RIGHT FOOT: ICD-10-CM

## 2023-08-03 DIAGNOSIS — M79.674 TOE PAIN, RIGHT: ICD-10-CM

## 2023-08-03 DIAGNOSIS — M20.41 HAMMERTOE OF RIGHT FOOT: Primary | ICD-10-CM

## 2023-08-03 LAB
GLUCOSE SERPL-MCNC: 218 MG/DL (ref 70–110)
GLUCOSE SERPL-MCNC: 55 MG/DL (ref 70–110)
GLUCOSE SERPL-MCNC: 57 MG/DL (ref 70–110)
GLUCOSE SERPL-MCNC: 71 MG/DL (ref 70–110)

## 2023-08-03 PROCEDURE — 73630 X-RAY EXAM OF FOOT: CPT | Mod: TC,FY,PO,RT

## 2023-08-03 PROCEDURE — 28285 PR REPAIR OF HAMMERTOE,ONE: ICD-10-PCS | Mod: T6,,, | Performed by: PODIATRIST

## 2023-08-03 PROCEDURE — 71000033 HC RECOVERY, INTIAL HOUR: Mod: PO | Performed by: PODIATRIST

## 2023-08-03 PROCEDURE — 73630 X-RAY EXAM OF FOOT: CPT | Mod: 26,RT,, | Performed by: RADIOLOGY

## 2023-08-03 PROCEDURE — 63600175 PHARM REV CODE 636 W HCPCS: Mod: PO | Performed by: PODIATRIST

## 2023-08-03 PROCEDURE — 71000015 HC POSTOP RECOV 1ST HR: Mod: PO | Performed by: PODIATRIST

## 2023-08-03 PROCEDURE — 25000003 PHARM REV CODE 250: Mod: PO | Performed by: NURSE ANESTHETIST, CERTIFIED REGISTERED

## 2023-08-03 PROCEDURE — 37000008 HC ANESTHESIA 1ST 15 MINUTES: Mod: PO | Performed by: PODIATRIST

## 2023-08-03 PROCEDURE — 63600175 PHARM REV CODE 636 W HCPCS: Mod: PO | Performed by: NURSE ANESTHETIST, CERTIFIED REGISTERED

## 2023-08-03 PROCEDURE — 36000707: Mod: PO | Performed by: PODIATRIST

## 2023-08-03 PROCEDURE — D9220A PRA ANESTHESIA: ICD-10-PCS | Mod: CRNA,,, | Performed by: NURSE ANESTHETIST, CERTIFIED REGISTERED

## 2023-08-03 PROCEDURE — D9220A PRA ANESTHESIA: ICD-10-PCS | Mod: ANES,,, | Performed by: ANESTHESIOLOGY

## 2023-08-03 PROCEDURE — 36000706: Mod: PO | Performed by: PODIATRIST

## 2023-08-03 PROCEDURE — D9220A PRA ANESTHESIA: Mod: CRNA,,, | Performed by: NURSE ANESTHETIST, CERTIFIED REGISTERED

## 2023-08-03 PROCEDURE — 63600175 PHARM REV CODE 636 W HCPCS: Mod: PO | Performed by: ANESTHESIOLOGY

## 2023-08-03 PROCEDURE — 27201423 OPTIME MED/SURG SUP & DEVICES STERILE SUPPLY: Mod: PO | Performed by: PODIATRIST

## 2023-08-03 PROCEDURE — 37000009 HC ANESTHESIA EA ADD 15 MINS: Mod: PO | Performed by: PODIATRIST

## 2023-08-03 PROCEDURE — D9220A PRA ANESTHESIA: Mod: ANES,,, | Performed by: ANESTHESIOLOGY

## 2023-08-03 PROCEDURE — 82962 GLUCOSE BLOOD TEST: CPT | Mod: PO | Performed by: PODIATRIST

## 2023-08-03 PROCEDURE — 25000003 PHARM REV CODE 250: Mod: PO | Performed by: PODIATRIST

## 2023-08-03 PROCEDURE — 28285 REPAIR OF HAMMERTOE: CPT | Mod: T6,,, | Performed by: PODIATRIST

## 2023-08-03 PROCEDURE — 73630 XR FOOT COMPLETE 3 VIEW RIGHT: ICD-10-PCS | Mod: 26,RT,, | Performed by: RADIOLOGY

## 2023-08-03 RX ORDER — LIDOCAINE HYDROCHLORIDE 10 MG/ML
INJECTION, SOLUTION EPIDURAL; INFILTRATION; INTRACAUDAL; PERINEURAL
Status: DISCONTINUED | OUTPATIENT
Start: 2023-08-03 | End: 2023-08-03 | Stop reason: HOSPADM

## 2023-08-03 RX ORDER — PROPOFOL 10 MG/ML
VIAL (ML) INTRAVENOUS CONTINUOUS PRN
Status: DISCONTINUED | OUTPATIENT
Start: 2023-08-03 | End: 2023-08-03

## 2023-08-03 RX ORDER — ACETAMINOPHEN 10 MG/ML
INJECTION, SOLUTION INTRAVENOUS
Status: DISCONTINUED | OUTPATIENT
Start: 2023-08-03 | End: 2023-08-03

## 2023-08-03 RX ORDER — DEXTROSE MONOHYDRATE 100 MG/ML
INJECTION, SOLUTION INTRAVENOUS CONTINUOUS PRN
Status: DISCONTINUED | OUTPATIENT
Start: 2023-08-03 | End: 2023-08-03

## 2023-08-03 RX ORDER — FENTANYL CITRATE 50 UG/ML
INJECTION, SOLUTION INTRAMUSCULAR; INTRAVENOUS
Status: DISCONTINUED | OUTPATIENT
Start: 2023-08-03 | End: 2023-08-03

## 2023-08-03 RX ORDER — MIDAZOLAM HYDROCHLORIDE 1 MG/ML
INJECTION, SOLUTION INTRAMUSCULAR; INTRAVENOUS
Status: DISCONTINUED | OUTPATIENT
Start: 2023-08-03 | End: 2023-08-03

## 2023-08-03 RX ORDER — HYDROMORPHONE HYDROCHLORIDE 2 MG/ML
0.2 INJECTION, SOLUTION INTRAMUSCULAR; INTRAVENOUS; SUBCUTANEOUS EVERY 5 MIN PRN
Status: ACTIVE | OUTPATIENT
Start: 2023-08-03

## 2023-08-03 RX ORDER — SODIUM CHLORIDE, SODIUM LACTATE, POTASSIUM CHLORIDE, CALCIUM CHLORIDE 600; 310; 30; 20 MG/100ML; MG/100ML; MG/100ML; MG/100ML
INJECTION, SOLUTION INTRAVENOUS CONTINUOUS
Status: DISPENSED | OUTPATIENT
Start: 2023-08-03

## 2023-08-03 RX ORDER — CEFAZOLIN SODIUM 2 G/50ML
2 SOLUTION INTRAVENOUS ONCE
Status: COMPLETED | OUTPATIENT
Start: 2023-08-03 | End: 2023-08-03

## 2023-08-03 RX ORDER — LIDOCAINE HYDROCHLORIDE 10 MG/ML
1 INJECTION, SOLUTION EPIDURAL; INFILTRATION; INTRACAUDAL; PERINEURAL ONCE
Status: ACTIVE | OUTPATIENT
Start: 2023-08-03

## 2023-08-03 RX ORDER — HYDROCODONE BITARTRATE AND ACETAMINOPHEN 5; 325 MG/1; MG/1
1 TABLET ORAL EVERY 4 HOURS PRN
Status: DISCONTINUED | OUTPATIENT
Start: 2023-08-03 | End: 2023-08-03 | Stop reason: HOSPADM

## 2023-08-03 RX ORDER — FENTANYL CITRATE 50 UG/ML
25 INJECTION, SOLUTION INTRAMUSCULAR; INTRAVENOUS EVERY 5 MIN PRN
Status: ACTIVE | OUTPATIENT
Start: 2023-08-03 | End: 2023-08-03

## 2023-08-03 RX ORDER — LIDOCAINE HYDROCHLORIDE 20 MG/ML
INJECTION INTRAVENOUS
Status: DISCONTINUED | OUTPATIENT
Start: 2023-08-03 | End: 2023-08-03

## 2023-08-03 RX ORDER — PROPOFOL 10 MG/ML
VIAL (ML) INTRAVENOUS
Status: DISCONTINUED | OUTPATIENT
Start: 2023-08-03 | End: 2023-08-03

## 2023-08-03 RX ORDER — OXYCODONE HYDROCHLORIDE 5 MG/1
5 TABLET ORAL
Status: ACTIVE | OUTPATIENT
Start: 2023-08-03

## 2023-08-03 RX ORDER — HYDROCODONE BITARTRATE AND ACETAMINOPHEN 5; 325 MG/1; MG/1
1 TABLET ORAL EVERY 6 HOURS PRN
Qty: 28 TABLET | Refills: 0 | Status: SHIPPED | OUTPATIENT
Start: 2023-08-03 | End: 2023-11-29 | Stop reason: ALTCHOICE

## 2023-08-03 RX ORDER — BUPIVACAINE HYDROCHLORIDE 5 MG/ML
INJECTION, SOLUTION EPIDURAL; INTRACAUDAL
Status: DISCONTINUED | OUTPATIENT
Start: 2023-08-03 | End: 2023-08-03 | Stop reason: HOSPADM

## 2023-08-03 RX ADMIN — SODIUM CHLORIDE, POTASSIUM CHLORIDE, SODIUM LACTATE AND CALCIUM CHLORIDE: 600; 310; 30; 20 INJECTION, SOLUTION INTRAVENOUS at 01:08

## 2023-08-03 RX ADMIN — DEXTROSE MONOHYDRATE: 100 INJECTION, SOLUTION INTRAVENOUS at 02:08

## 2023-08-03 RX ADMIN — FENTANYL CITRATE 50 MCG: 0.05 INJECTION, SOLUTION INTRAMUSCULAR; INTRAVENOUS at 02:08

## 2023-08-03 RX ADMIN — KETAMINE HYDROCHLORIDE 10 MG: 100 INJECTION INTRAMUSCULAR; INTRAVENOUS at 02:08

## 2023-08-03 RX ADMIN — PROPOFOL 50 MG: 10 INJECTION, EMULSION INTRAVENOUS at 02:08

## 2023-08-03 RX ADMIN — LIDOCAINE HYDROCHLORIDE 100 MG: 20 INJECTION INTRAVENOUS at 02:08

## 2023-08-03 RX ADMIN — ACETAMINOPHEN 1000 MG: 10 INJECTION, SOLUTION INTRAVENOUS at 02:08

## 2023-08-03 RX ADMIN — KETAMINE HYDROCHLORIDE 15 MG: 100 INJECTION INTRAMUSCULAR; INTRAVENOUS at 02:08

## 2023-08-03 RX ADMIN — MIDAZOLAM HYDROCHLORIDE 2 MG: 1 INJECTION, SOLUTION INTRAMUSCULAR; INTRAVENOUS at 02:08

## 2023-08-03 RX ADMIN — PROPOFOL 100 MCG/KG/MIN: 10 INJECTION, EMULSION INTRAVENOUS at 02:08

## 2023-08-03 RX ADMIN — CEFAZOLIN SODIUM 2 G: 2 SOLUTION INTRAVENOUS at 02:08

## 2023-08-03 RX ADMIN — GLYCOPYRROLATE 0.2 MG: 0.2 INJECTION, SOLUTION INTRAMUSCULAR; INTRAVENOUS at 02:08

## 2023-08-03 NOTE — PLAN OF CARE
Dr. Celeste at bedside- states ok for patient to take her personal glucose tablet in preop. Will recheck blood sugar in 30 minutes per Dr. Celeste order.

## 2023-08-03 NOTE — PLAN OF CARE
Blood sugar 71. Pt continues to deny any symptoms. Skin warm, dry; speech clear, answers questions appropriately.  at bedside. Dr. Celeste notified. Pt and  instructed to notify RN if pt becomes symptomatic- verbalize understanding. Call light within reach. RN to check blood sugar prior to patient transport to OR per Dr. Celeste order.

## 2023-08-03 NOTE — PLAN OF CARE
Blood sugar 55. Dr. Celeste notified and at bedside and report given to HUBER Jeffries CRNA. D10 IV ordered by Dr. Celeste and initiated per HUBER Jeffries CRNA. Pt asymptomatic at this time. Pt in NAD. Pt to OR with surgery team.

## 2023-08-03 NOTE — DISCHARGE INSTRUCTIONS
FOOT SURGERY  After surgery:    DO:  Keep leg elevated until first post operative visit.  Keep ice pack on foot for 20 minutes each hour while awake for next 24-48 hours.  Keep dressing clean and dry. Do not change the bandages.  Advance diet as tolerated.   Check circulation frequently in toes by pressing down on toenail. Nail should turn white and then pink WITHIN 3 SECONDS when released.  Wear surgical shoe/boot. May remove to shower.  Do not walk without shoe/boot.  Resume home medications.    DO NOT:  Do not remove your dressing.  Do not get dressing wet.  Do not drive until released by your doctor.  Do not take additional tylenol/acetaminophen while taking narcotic pain medication that contains tylenol/acetaminophen.     CALL PHYSICIAN FOR:  Burning, or numbness of the toes not relieved by elevation of the leg.  Pale or cold toes.  Blue colored nail beds.  Redness, swelling, or bleeding.  Fever greater than 101,  Drainage (pus) from the operative site.  Pain unrelieved by pain medication.    FOR EMERGENCIES CONTACT YOUR PHYSICIAN'S OFFICE  [phone]

## 2023-08-03 NOTE — PLAN OF CARE
Report received from KEELY Valencia RN. Pt's blood sugar 57 in preop. Pt denies weakness, dizzinesss; skin wamr, dry; speech clear, answers questions appropriately. Dexcom monitor reading 89. Dr. Celeste with Anesthesia notified- awaiting orders.

## 2023-08-03 NOTE — TRANSFER OF CARE
"Anesthesia Transfer of Care Note    Patient: Kateryna Desai    Procedure(s) Performed: Procedure(s) (LRB):  CORRECTION, HAMMER TOE Right second toe (Right)    Patient location: PACU    Anesthesia Type: general    Transport from OR: Transported from OR on room air with adequate spontaneous ventilation    Post pain: adequate analgesia    Post assessment: no apparent anesthetic complications    Post vital signs: stable    Level of consciousness: awake and sedated    Nausea/Vomiting: no nausea/vomiting    Complications: none    Transfer of care protocol was followed      Last vitals:   Visit Vitals  /64   Pulse 82   Temp 36.5 °C (97.7 °F) (Skin)   Resp 13   Ht 5' 6" (1.676 m)   Wt 86.2 kg (190 lb)   SpO2 100%   Breastfeeding No   BMI 30.67 kg/m²     "

## 2023-08-04 NOTE — ANESTHESIA POSTPROCEDURE EVALUATION
Anesthesia Post Evaluation    Patient: Kateryna Desai    Procedure(s) Performed: Procedure(s) (LRB):  CORRECTION, HAMMER TOE Right second toe (Right)    Final Anesthesia Type: general      Patient location during evaluation: PACU  Patient participation: Yes- Able to Participate  Level of consciousness: awake and alert and oriented  Post-procedure vital signs: reviewed and stable  Pain management: adequate  Airway patency: patent  MK mitigation strategies: Multimodal analgesia  PONV status at discharge: No PONV  Anesthetic complications: no      Cardiovascular status: blood pressure returned to baseline  Respiratory status: unassisted, spontaneous ventilation and room air  Hydration status: euvolemic  Follow-up not needed.          Vitals Value Taken Time   /72 08/03/23 1610   Temp 36.5 °C (97.7 °F) 08/03/23 1535   Pulse 85 08/03/23 1610   Resp 15 08/03/23 1610   SpO2 99 % 08/03/23 1610         Event Time   Out of Recovery 16:37:30         Pain/Ashley Score: Pain Rating Prior to Med Admin: 0 (8/3/2023  3:35 PM)  Pain Rating Post Med Admin: 0 (8/3/2023  3:35 PM)  Ashley Score: 10 (8/3/2023  4:10 PM)

## 2023-08-04 NOTE — ANESTHESIA PREPROCEDURE EVALUATION
Pre-op Assessment    I have reviewed the NPO Status.   I have reviewed the Medications.     Review of Systems  Social:  Non-Smoker   Cardiovascular:   Hypertension CAD      Pulmonary:   Sleep Apnea    Renal/:   Chronic Renal Disease, CKD    Hepatic/GI:   Hiatal Hernia, GERD    Neurological:   CVA Neuromuscular Disease, Headaches Seizures    Endocrine:   Diabetes, well controlled, type 1, using insulin Hypothyroidism  Obesity / BMI > 30      Physical Exam  General:  Well nourished      Airway/Jaw/Neck:  Airway Findings: Mouth Opening: Normal   Tongue: Normal   General Airway Assessment: Adult Mallampati: II  Improves to II with phonation.  TM Distance: Normal, at least 6 cm        Chest/Lungs:  Chest/Lungs Findings: Clear to auscultation, Normal Respiratory Rate      Heart/Vascular:  Heart Findings: Rate: Normal  Rhythm: Regular Rhythm             Anesthesia Plan  Type of Anesthesia, risks & benefits discussed:  Anesthesia Type:  MAC, Gen Natural Airway    Patient's Preference:   Plan Factors:          Intra-op Monitoring Plan:   Intra-op Monitoring Plan Comments:   Post Op Pain Control Plan: multimodal analgesia and IV/PO Opioids PRN  Post Op Pain Control Plan Comments:     Induction:   IV  Beta Blocker:         Informed Consent: Informed consent signed with the Patient and all parties understand the risks and agree with anesthesia plan.  All questions answered.  Anesthesia consent signed with patient.  ASA Score: 3     Day of Surgery Review of History & Physical: I have interviewed and examined the patient. I have reviewed the patient's H&P dated: 8/22/13.  There are no significant changes.            Ready For Surgery From Anesthesia Perspective.           Physical Exam  General: Well nourished    Airway:  Mallampati: II / II  Mouth Opening: Normal  TM Distance: Normal, at least 6 cm  Tongue: Normal    Chest/Lungs:  Clear to auscultation, Normal Respiratory Rate    Heart:  Rate: Normal  Rhythm: Regular  Rhythm          Anesthesia Plan  Type of Anesthesia, risks & benefits discussed:    Anesthesia Type: MAC, Gen Natural Airway  Post Op Pain Control Plan: multimodal analgesia and IV/PO Opioids PRN  Induction:  IV  Informed Consent: Informed consent signed with the Patient and all parties understand the risks and agree with anesthesia plan.  All questions answered.   ASA Score: 3  Day of Surgery Review of History & Physical: I have interviewed and examined the patient. I have reviewed the patient's H&P dated: 8/22/13.     Ready For Surgery From Anesthesia Perspective.       .

## 2023-08-04 NOTE — OP NOTE
Op Note:    Patient name: Kateryna Pierce  MRN: [id number]  Date of surgery: 8/3/23    Surgeon: Lei Rhoades DPM    Preoperative diagnosis: Hammertoe, Rt. 2nd  Postoperative diagnosis: Same  Procedure: Hammertoe correction, Rt. 2nd  Anesthesia: Local MAC  Hemostasis: Pneumatic tourniquet  Estimated blood loss: < 0.1 mL  Specimen: None  Culture: None  Complications: None  Condition upon discharge: Stable    Procedure in detail: Under mild sedation, patient was brought into the operating room and placed on the operating table in the supine position. A pneumatic tourniquet was then placed about the Rt. Ankle. A timeout was performed taking care to identify the correct patient, extremity, and procedure to which all were in agreement.  Following IV sedation, an ankle block was performed using 10 mL of a 1:1 mixture of 1% lidocaine plain and 0.5% bupivacaine plain. The foot was then scrubbed, draped, and prepped in the usual aseptic manner. An esmarch bandage was used to exsanguinate the extremity, and the tourniquet was inflated to 250 mmHg.      Attention was directed to the dorsum of the Rt. 2nd ray where a 4cm longitudinal incision was made proximal to the metatarsal joint and extended distal to the proximal interphalangeal joint. The incision was full thickness, taking care to identify and retract all major neurovascular structures.  All bleeders were identified and cauterized with electrocautery. Sharp dissection was continued until the extensor estes and extensor digitorum longus tendon were identified. A full sequential release of the hammertoe was performed. The release included an arthroplasty of the PIP joint utilizing a sagittal saw.  The head of the proximal phalanx was then resected and removed from the operative field.  The site was then copiously irrigated with normal sterile saline.  A plantar stab incision was made at the level of the middle phalanx at midline taking care to perform a tenotomy of  the flexor digitorum longus tendon. The extensor tendon was then re-approximated with 3-0 vicryl. Subcutaneous and deep tissues were re-approximated with 4-0 vicryl.  The skin incisions were re-approximated with 3-0 nylon in a combination of horizontal mattress and simple interrupted suture techniques.     Upon completion, the tourniquet was deflated with a prompt hyperemic response to all toes of the surgical extremity. Incisions were dressed with xeroform, and a football dressing and postoperative shoe were applied. Following the procedure, the patient was transported to recovery where all vital signs and vascular status were noted to be intact. Patient was noted to have tolerated the procedure and anesthesia quite well. After a period of postoperative monitoring, the patient was discharged with the following verbal and written instructions:     1. Keep dressings, clean, dry, and intact to surgical extremity.  2. Rest, ice, and elevate the surgical extremity.  3. Weight bearing to the surgical extremity in postoperative shoe for transfers.  4. Take all medication as discussed.  5. Instructed to call with any postoperative concerns or complications.  6. Follow up in one week in clinic.    Lei Rhoades DPM

## 2023-08-04 NOTE — BRIEF OP NOTE
[facility] - Surgery  Brief Operative Note    Surgery Date: 8/3/2023     Surgeon(s) and Role:     * Lei Rhoades DPM - Primary    Assisting Surgeon: None    Pre-op Diagnosis:  Hammertoe of right foot [M20.41]  Toe pain, right [M79.674]    Post-op Diagnosis:  Post-Op Diagnosis Codes:     * Hammertoe of right foot [M20.41]     * Toe pain, right [M79.674]    Procedure(s) (LRB):  CORRECTION, HAMMER TOE Right second toe (Right)    Anesthesia: Local MAC    Operative Findings: Rt. 2nd hammertoe    Estimated Blood Loss: < 0.2 mL         Specimens:   Specimen (24h ago, onward)      None              Discharge Note    OUTCOME: Patient tolerated treatment/procedure well without complication and is now ready for discharge.    DISPOSITION: Home or Self Care    FINAL DIAGNOSIS:  As above    FOLLOWUP: In clinic    DISCHARGE INSTRUCTIONS:    Discharge Procedure Orders   Diet general     Keep surgical extremity elevated     Ice to affected area     Leave dressing on - Keep it clean, dry, and intact until clinic visit     Call MD for:  temperature >100.4     Call MD for:  persistent nausea and vomiting     Call MD for:  severe uncontrolled pain     Call MD for:  difficulty breathing, headache or visual disturbances     Call MD for:  redness, tenderness, or signs of infection (pain, swelling, redness, odor or green/yellow discharge around incision site)     Call MD for:  hives     Call MD for:  persistent dizziness or light-headedness     Call MD for:  extreme fatigue     Call MD for:     Weight bearing restrictions (specify)   Order Comments: May full weight bear for transfers only.

## 2023-08-10 ENCOUNTER — OFFICE VISIT (OUTPATIENT)
Dept: PODIATRY | Facility: CLINIC | Age: 68
End: 2023-08-10
Payer: MEDICARE

## 2023-08-10 VITALS — HEIGHT: 66 IN | WEIGHT: 190 LBS | BODY MASS INDEX: 30.53 KG/M2

## 2023-08-10 DIAGNOSIS — E10.42 CONTROLLED TYPE 1 DIABETES MELLITUS WITH DIABETIC POLYNEUROPATHY: ICD-10-CM

## 2023-08-10 DIAGNOSIS — Z98.890 STATUS POST HAMMERTOE CORRECTION: Primary | ICD-10-CM

## 2023-08-10 DIAGNOSIS — Z87.39 STATUS POST HAMMERTOE CORRECTION: Primary | ICD-10-CM

## 2023-08-10 PROCEDURE — 99024 PR POST-OP FOLLOW-UP VISIT: ICD-10-PCS | Mod: POP,,, | Performed by: PODIATRIST

## 2023-08-10 PROCEDURE — 99024 POSTOP FOLLOW-UP VISIT: CPT | Mod: POP,,, | Performed by: PODIATRIST

## 2023-08-10 PROCEDURE — 99999 PR PBB SHADOW E&M-EST. PATIENT-LVL II: ICD-10-PCS | Mod: PBBFAC,,, | Performed by: PODIATRIST

## 2023-08-10 PROCEDURE — 99212 OFFICE O/P EST SF 10 MIN: CPT | Mod: PBBFAC,PN | Performed by: PODIATRIST

## 2023-08-10 PROCEDURE — 99999 PR PBB SHADOW E&M-EST. PATIENT-LVL II: CPT | Mod: PBBFAC,,, | Performed by: PODIATRIST

## 2023-08-10 NOTE — PROGRESS NOTES
Subjective:      Patient ID: Kateryna Desai is a 68 y.o. female.    Chief Complaint: No chief complaint on file.  Patient presents to clinic 1 week S/P hammertoe correction of the Rt. 2nd toe. Denies pain to the surgical extremity with today's exam. She has been elevating the limb and minimizing ambulation while in the postoperative shoe.  Has kept the surgical bandage clean, dry, and intact x 1 week.  Denies having N/V/F/C/D.  Denies having chest pain, SOB, and calf/leg pain.  Denies any additional pedal complaints.        Hemoglobin A1C   Date Value Ref Range Status   07/06/2023 6.3 (H) 4.5 - 6.2 % Final     Comment:     According to ADA guidelines, hemoglobin A1C <7.0% represents  optimal control in non-pregnant diabetic patients.  Different  metrics may apply to specific populations.   Standards of Medical Care in Diabetes - 2016.    For the purpose of screening for the presence of diabetes:  <5.7%     Consistent with the absence of diabetes  5.7-6.4%  Consistent with increasing risk for diabetes   (prediabetes)  >or=6.5%  Consistent with diabetes    Currently no consensus exists for use of hemoglobin A1C  for diagnosis of diabetes for children.     06/01/2023 6.2 4.5 - 6.2 % Final     Comment:     According to ADA guidelines, hemoglobin A1C <7.0% represents  optimal control in non-pregnant diabetic patients.  Different  metrics may apply to specific populations.   Standards of Medical Care in Diabetes - 2016.    For the purpose of screening for the presence of diabetes:  <5.7%     Consistent with the absence of diabetes  5.7-6.4%  Consistent with increasing risk for diabetes   (prediabetes)  >or=6.5%  Consistent with diabetes    Currently no consensus exists for use of hemoglobin A1C  for diagnosis of diabetes for children.     01/17/2023 6.0 4.5 - 6.2 % Final     Comment:     According to ADA guidelines, hemoglobin A1C <7.0% represents  optimal control in non-pregnant diabetic patients.   Different  metrics may apply to specific populations.   Standards of Medical Care in Diabetes - 2016.    For the purpose of screening for the presence of diabetes:  <5.7%     Consistent with the absence of diabetes  5.7-6.4%  Consistent with increasing risk for diabetes   (prediabetes)  >or=6.5%  Consistent with diabetes    Currently no consensus exists for use of hemoglobin A1C  for diagnosis of diabetes for children.             Past Medical History:   Diagnosis Date    Allergy     Arthritis     degnerative disease low back,muscle spasms, shoulders    Asian flu type A 2017    Bunionette 2012    Diabetes mellitus type I     since age 13    Diabetic gastroparesis     takes Cytotec    Diabetic retinopathy of both eyes     gets periodic laser treatments    Difficult intubation     as a child had sore throat after a procedure    Encounter for blood transfusion     GERD (gastroesophageal reflux disease)     Hallux abducto valgus 3/31/2014    Headache(784.0)     Hiatal hernia     with GERD    Hyperlipidemia     Hypertension     Infection of the upper respiratory tract 2012    Lumbar spinal stenosis     Osteopenia     Rosacea     Seizures     Pt states during pgy with stroke    Stroke ?    while pregnant    Tachycardia     asymptomatic with Toprol    Thyroid disease     hypothyroidism    Venous insufficiency of leg     improved since EVLT       Past Surgical History:   Procedure Laterality Date    BUNIONECTOMY Right 2012    CARDIAC CATHETERIZATION      no stents     SECTION      COLONOSCOPY  2007    Dr. Rose, 10 year recheck    CORRECTION OF HAMMER TOE Right 8/3/2023    Procedure: CORRECTION, HAMMER TOE Right second toe;  Surgeon: Lei Rhoades DPM;  Location: [facility] OR;  Service: Podiatry;  Laterality: Right;  right 2nd toe    EXOSTECTOMY  12    left foot, local anesthesia, in office    EYE SURGERY      has had many laser procedures for diabetic retinopathy    VASCULAR SURGERY  "     VEIN SURGERY  April 2012    EVLT right greater saphenous, IV sedation       Family History   Problem Relation Age of Onset    Cancer Maternal Aunt         breast    Breast cancer Maternal Aunt     Diabetes Maternal Grandmother     Breast cancer Maternal Grandmother 38    Cancer Maternal Grandfather         prostate    Diabetes Maternal Grandfather     Diabetes Cousin     Arthritis Mother     Cancer Mother     Melanoma Mother     Heart disease Father     Stroke Father     No Known Problems Sister     No Known Problems Brother     No Known Problems Daughter     Alzheimer's disease Maternal Uncle     Alcohol abuse Maternal Uncle         x2    Heart disease Maternal Uncle     No Known Problems Paternal Aunt     Heart disease Paternal Uncle        Social History     Socioeconomic History    Marital status:    Tobacco Use    Smoking status: Never    Smokeless tobacco: Never   Substance and Sexual Activity    Alcohol use: Yes     Comment: rarely; 1 drink/year    Drug use: No    Sexual activity: Yes     Partners: Male       Current Outpatient Medications   Medication Sig Dispense Refill    ACZONE 5 % topical gel Apply 1 application topically 2 (two) times daily as needed (roseacea).  0    alpha lipoic acid 600 mg Cap Take 2 capsules by mouth once daily.      ALPHAGAN P 0.1 % Drop Place 1 drop into both eyes 3 (three) times daily.   6    ascorbic acid, vitamin C, (VITAMIN C) 100 MG tablet Take 100 mg by mouth 2 (two) times daily.      aspirin (ECOTRIN) 81 MG EC tablet Take 81 mg by mouth once daily.      azelastine (ASTELIN) 137 mcg (0.1 %) nasal spray USE ONE SPRAY IN EACH NOSTRIL TWICE A DAY (Patient taking differently: 1 spray by Nasal route 2 (two) times daily.) 30 mL 3    baclofen (LIORESAL) 10 MG tablet Take 1 tablet (10 mg total) by mouth 2 (two) times daily as needed (muscle pain). 20 tablet 0    BD VEO INSULIN SYRINGE UF 1/2 mL 31 gauge x 15/64" Syrg   4    calcium citrate-vitamin D3 315-200 mg " "(CITRACAL+D) 315-200 mg-unit per tablet Take 1 tablet by mouth 2 (two) times daily.      cinnamon bark 500 mg capsule Take 500 mg by mouth 2 (two) times daily.      CRESTOR 40 mg Tab Take 40 mg by mouth once daily.       DOCOSAHEXANOIC ACID/EPA (FISH OIL ORAL) Take 1,200 mg by mouth once daily.      doxycycline (VIBRAMYCIN) 50 MG capsule Take 50 mg by mouth 2 (two) times daily.      ESTRACE 0.01 % (0.1 mg/gram) vaginal cream Place vaginally twice a week. Tuesday, Saturday  0    estradiol 10 mcg Tab Place 1 tablet vaginally twice a week. Tuesday, Saturday      ezetimibe (ZETIA) 10 mg tablet Take 10 mg by mouth once daily.      fluticasone propionate (FLONASE) 50 mcg/actuation nasal spray USE 1 SPRAY INTO THE EACH NOSTRIL ONCE A DAY Strength: 50 mcg/actuation 32 g 3    furosemide (LASIX) 20 MG tablet TAKE 1 TABLET BY MOUTH TWICE A DAY (Patient taking differently: Take 20 mg by mouth 2 (two) times a day.) 180 tablet 2    GLUCAGON EMERGENCY 1 mg injection Take 1 mg by mouth as needed.      GVOKE HYPOPEN 2-PACK 1 mg/0.2 mL AtIn USE AS DIRECTED TO TREAT PROFOUND HYPOGLYCEMIA      HUMALOG 100 unit/mL injection Inject 10-17 Units into the skin 3 (three) times daily before meals. 10-17 units tid sliding scale  1    HYDROcodone-acetaminophen (NORCO) 5-325 mg per tablet Take 1 tablet by mouth every 6 (six) hours as needed for Pain. 28 tablet 0    metoprolol succinate (TOPROL-XL) 25 MG 24 hr tablet TAKE 1 TABLET BY MOUTH ONCE A DAY 90 tablet 3    montelukast (SINGULAIR) 10 mg tablet TAKE 1 TABLET BY MOUTH EVERY EVENING 90 tablet 3    NOVOFINE 32 32 gauge x 1/4" Ndle       ondansetron (ZOFRAN-ODT) 4 MG TbDL Take 1 tablet (4 mg total) by mouth every 8 (eight) hours as needed (Nausea). 60 tablet 1    ONETOUCH ULTRA BLUE TEST STRIP Strp   11    pantoprazole (PROTONIX) 40 MG tablet TAKE 1 TABLET BY MOUTH TWICE A  tablet 3    potassium chloride (K-TAB) 20 mEq Take 40 mEq by mouth 2 (two) times a day.  6    promethazine " (PHENERGAN) 25 MG tablet Take 1 tablet (25 mg total) by mouth every 6 (six) hours as needed for Nausea. 10 tablet 0    SOOLANTRA 1 % Crea Apply 1 application topically once daily.      SYNTHROID 75 mcg tablet Take 75 mcg by mouth every Mon, Wed, Fri.      SYNTHROID 88 mcg tablet Take 88 mcg by mouth every Tuesday, Thursday, Saturday, Sunday.  0    TOUJEO SOLOSTAR U-300 INSULIN 300 unit/mL (1.5 mL) InPn pen Inject 34 Units into the skin once daily.      VITAMIN B COMPLEX VIT C NO.4 (SUPER B COMPLEX + C ORAL) Take 1 tablet by mouth once daily.       No current facility-administered medications for this visit.     Facility-Administered Medications Ordered in Other Visits   Medication Dose Route Frequency Provider Last Rate Last Admin    HYDROmorphone (PF) injection 0.2 mg  0.2 mg Intravenous Q5 Min PRN Naomi Abdullahi MD        lactated ringers infusion   Intravenous Continuous Naoim Abdullahi MD 10 mL/hr at 08/03/23 1332 Restarted at 08/03/23 1442    LIDOcaine (PF) 10 mg/ml (1%) injection 10 mg  1 mL Intradermal Once Naomi Abdullahi MD        oxyCODONE immediate release tablet 5 mg  5 mg Oral Q3H PRN Naomi Abdullahi MD           Review of patient's allergies indicates:   Allergen Reactions    Bactrim [sulfamethoxazole-trimethoprim] Rash     Patient experienced on 12/3/14 redness to face and rash to chest and arms/ with no SOB or airway obstruction    Percocet [oxycodone-acetaminophen] Nausea And Vomiting     Also caused dizziness and passed out    Iodinated contrast media - iv dye Swelling and Rash     Says topical iodine OK    Norco [hydrocodone-acetaminophen] Itching, Swelling and Rash     Can tolerate if she takes Benadryl with it    Niaspan extended-release [niacin] Itching and Other (See Comments)     Skin flushing and redness         Review of Systems   Constitutional: Negative for chills, decreased appetite, diaphoresis and fever.   Cardiovascular:  Negative for claudication and leg  swelling.   Skin:  Negative for color change, dry skin, flushing, itching and nail changes.   Musculoskeletal:  Positive for joint pain. Negative for arthritis, back pain, falls, gout, joint swelling and myalgias.   Gastrointestinal:  Negative for nausea and vomiting.   Neurological:  Positive for numbness and paresthesias.   Psychiatric/Behavioral:  Negative for altered mental status.            Objective:      Physical Exam  Constitutional:       General: She is not in acute distress.     Appearance: She is well-developed. She is not diaphoretic.   Cardiovascular:      Pulses:           Dorsalis pedis pulses are 2+ on the right side and 2+ on the left side.        Posterior tibial pulses are 2+ on the right side and 2+ on the left side.      Comments: CFT < 3 seconds bilateral.  Pedal hair growth decreased bilateral.  Varicosities noted to bilateral lower extremity. Mild nonpitting edema noted to bilateral lower extremity.  Toes are cool to touch bilateral.      Musculoskeletal:         General: No tenderness. Normal range of motion.      Comments: Muscle strength 5/5 in all muscle groups bilateral.  (-) for pain with active and passive ROM of bilateral foot and ankle.  More reducible contracture of the Rt. 2nd toe.  Bilateral pes planus foot type.  Bilateral hallux abducto valgus.  Bilateral semi-rigid contracture of toes 2-5 with adductovarus rotation of the 5th.     Skin:     General: Skin is warm and dry.      Coloration: Skin is not pale.      Findings: No abrasion, bruising, burn, ecchymosis, erythema, laceration, lesion, petechiae or rash.      Nails: There is no clubbing.      Comments: Incision to the dorsum of the Rt. 2nd toe and plantar aspect of the same toe remain well approximated with all suture intact.  No evidence of dehiscence, necrosis, ischemia, or infection the length of the incision.           Neurological:      Mental Status: She is alert and oriented to person, place, and time.       Sensory: Sensory deficit present.      Motor: No weakness or atrophy.      Comments: Protective sensation per [place] monofilament decreased bilateral.    Light touch intact bilateral.               Assessment:       Encounter Diagnoses   Name Primary?    Status post hammertoe correction Yes    Controlled type 1 diabetes mellitus with diabetic polyneuropathy          Plan:       Diagnoses and all orders for this visit:    Status post hammertoe correction    Controlled type 1 diabetes mellitus with diabetic polyneuropathy      I counseled the patient on her conditions, their implications and medical management.    Overall, satisfactory postoperative results.  No evidence of postoperative infection nor incisional dehiscence.     The incisions were painted with betadine, and a football dressing was applied.      Instructed to keep the football dressing CDI x 1 week.       May be fully weight bearing to the extremity in the postoperative shoe but advised to limit her activity level.     RTC in 1 week for possible suture removal.     Lei Rhoades DPM

## 2023-08-18 ENCOUNTER — OFFICE VISIT (OUTPATIENT)
Dept: PODIATRY | Facility: CLINIC | Age: 68
End: 2023-08-18
Payer: MEDICARE

## 2023-08-18 VITALS — BODY MASS INDEX: 30.53 KG/M2 | WEIGHT: 190 LBS | HEIGHT: 66 IN

## 2023-08-18 DIAGNOSIS — Z87.39 STATUS POST HAMMERTOE CORRECTION: Primary | ICD-10-CM

## 2023-08-18 DIAGNOSIS — Z98.890 STATUS POST HAMMERTOE CORRECTION: Primary | ICD-10-CM

## 2023-08-18 DIAGNOSIS — E10.42 CONTROLLED TYPE 1 DIABETES MELLITUS WITH DIABETIC POLYNEUROPATHY: ICD-10-CM

## 2023-08-18 PROCEDURE — 99999 PR PBB SHADOW E&M-EST. PATIENT-LVL III: CPT | Mod: PBBFAC,,, | Performed by: PODIATRIST

## 2023-08-18 PROCEDURE — 99024 POSTOP FOLLOW-UP VISIT: CPT | Mod: POP,,, | Performed by: PODIATRIST

## 2023-08-18 PROCEDURE — 99213 OFFICE O/P EST LOW 20 MIN: CPT | Mod: PBBFAC,PN | Performed by: PODIATRIST

## 2023-08-18 PROCEDURE — 99024 PR POST-OP FOLLOW-UP VISIT: ICD-10-PCS | Mod: POP,,, | Performed by: PODIATRIST

## 2023-08-18 PROCEDURE — 99999 PR PBB SHADOW E&M-EST. PATIENT-LVL III: ICD-10-PCS | Mod: PBBFAC,,, | Performed by: PODIATRIST

## 2023-08-18 NOTE — PROGRESS NOTES
Subjective:      Patient ID: Kateryna Desai is a 68 y.o. female.    Chief Complaint: Post-op Evaluation  Patient presents to clinic 2 week S/P hammertoe correction of the Rt. 2nd toe. Notes a moderate amount of pain, 4/10, from the surgical site with today's.  Relates being more ambulatory since the last exam.  Has kept the surgical bandage clean, dry, and intact x 1 week.  Denies having N/V/F/C/D.  Denies any additional pedal complaints.        Hemoglobin A1C   Date Value Ref Range Status   07/06/2023 6.3 (H) 4.5 - 6.2 % Final     Comment:     According to ADA guidelines, hemoglobin A1C <7.0% represents  optimal control in non-pregnant diabetic patients.  Different  metrics may apply to specific populations.   Standards of Medical Care in Diabetes - 2016.    For the purpose of screening for the presence of diabetes:  <5.7%     Consistent with the absence of diabetes  5.7-6.4%  Consistent with increasing risk for diabetes   (prediabetes)  >or=6.5%  Consistent with diabetes    Currently no consensus exists for use of hemoglobin A1C  for diagnosis of diabetes for children.     06/01/2023 6.2 4.5 - 6.2 % Final     Comment:     According to ADA guidelines, hemoglobin A1C <7.0% represents  optimal control in non-pregnant diabetic patients.  Different  metrics may apply to specific populations.   Standards of Medical Care in Diabetes - 2016.    For the purpose of screening for the presence of diabetes:  <5.7%     Consistent with the absence of diabetes  5.7-6.4%  Consistent with increasing risk for diabetes   (prediabetes)  >or=6.5%  Consistent with diabetes    Currently no consensus exists for use of hemoglobin A1C  for diagnosis of diabetes for children.     01/17/2023 6.0 4.5 - 6.2 % Final     Comment:     According to ADA guidelines, hemoglobin A1C <7.0% represents  optimal control in non-pregnant diabetic patients.  Different  metrics may apply to specific populations.   Standards of Medical Care in  Diabetes - 2016.    For the purpose of screening for the presence of diabetes:  <5.7%     Consistent with the absence of diabetes  5.7-6.4%  Consistent with increasing risk for diabetes   (prediabetes)  >or=6.5%  Consistent with diabetes    Currently no consensus exists for use of hemoglobin A1C  for diagnosis of diabetes for children.             Past Medical History:   Diagnosis Date    Allergy     Arthritis     degnerative disease low back,muscle spasms, shoulders    Asian flu type A 2017    Bunionette 2012    Diabetes mellitus type I     since age 13    Diabetic gastroparesis     takes Cytotec    Diabetic retinopathy of both eyes     gets periodic laser treatments    Difficult intubation     as a child had sore throat after a procedure    Encounter for blood transfusion     GERD (gastroesophageal reflux disease)     Hallux abducto valgus 3/31/2014    Headache(784.0)     Hiatal hernia     with GERD    Hyperlipidemia     Hypertension     Infection of the upper respiratory tract 2012    Lumbar spinal stenosis     Osteopenia     Rosacea     Seizures     Pt states during pgy with stroke    Stroke ?    while pregnant    Tachycardia     asymptomatic with Toprol    Thyroid disease     hypothyroidism    Venous insufficiency of leg     improved since EVLT       Past Surgical History:   Procedure Laterality Date    BUNIONECTOMY Right 2012    CARDIAC CATHETERIZATION      no stents     SECTION      COLONOSCOPY  2007    Dr. Rose, 10 year recheck    CORRECTION OF HAMMER TOE Right 8/3/2023    Procedure: CORRECTION, HAMMER TOE Right second toe;  Surgeon: Lei Rhoades DPM;  Location: [facility];  Service: Podiatry;  Laterality: Right;  right 2nd toe    EXOSTECTOMY  12    left foot, local anesthesia, in office    EYE SURGERY      has had many laser procedures for diabetic retinopathy    VASCULAR SURGERY      VEIN SURGERY  2012    EVLT right greater saphenous, IV sedation  "      Family History   Problem Relation Age of Onset    Cancer Maternal Aunt         breast    Breast cancer Maternal Aunt     Diabetes Maternal Grandmother     Breast cancer Maternal Grandmother 38    Cancer Maternal Grandfather         prostate    Diabetes Maternal Grandfather     Diabetes Cousin     Arthritis Mother     Cancer Mother     Melanoma Mother     Heart disease Father     Stroke Father     No Known Problems Sister     No Known Problems Brother     No Known Problems Daughter     Alzheimer's disease Maternal Uncle     Alcohol abuse Maternal Uncle         x2    Heart disease Maternal Uncle     No Known Problems Paternal Aunt     Heart disease Paternal Uncle        Social History     Socioeconomic History    Marital status:    Tobacco Use    Smoking status: Never    Smokeless tobacco: Never   Substance and Sexual Activity    Alcohol use: Yes     Comment: rarely; 1 drink/year    Drug use: No    Sexual activity: Yes     Partners: Male       Current Outpatient Medications   Medication Sig Dispense Refill    ACZONE 5 % topical gel Apply 1 application topically 2 (two) times daily as needed (roseacea).  0    alpha lipoic acid 600 mg Cap Take 2 capsules by mouth once daily.      ALPHAGAN P 0.1 % Drop Place 1 drop into both eyes 3 (three) times daily.   6    ascorbic acid, vitamin C, (VITAMIN C) 100 MG tablet Take 100 mg by mouth 2 (two) times daily.      aspirin (ECOTRIN) 81 MG EC tablet Take 81 mg by mouth once daily.      azelastine (ASTELIN) 137 mcg (0.1 %) nasal spray USE ONE SPRAY IN EACH NOSTRIL TWICE A DAY (Patient taking differently: 1 spray by Nasal route 2 (two) times daily.) 30 mL 3    BD VEO INSULIN SYRINGE UF 1/2 mL 31 gauge x 15/64" Syrg   4    calcium citrate-vitamin D3 315-200 mg (CITRACAL+D) 315-200 mg-unit per tablet Take 1 tablet by mouth 2 (two) times daily.      cinnamon bark 500 mg capsule Take 500 mg by mouth 2 (two) times daily.      CRESTOR 40 mg Tab Take 40 mg by mouth once daily. " "      DOCOSAHEXANOIC ACID/EPA (FISH OIL ORAL) Take 1,200 mg by mouth once daily.      doxycycline (VIBRAMYCIN) 50 MG capsule Take 50 mg by mouth 2 (two) times daily.      ESTRACE 0.01 % (0.1 mg/gram) vaginal cream Place vaginally twice a week. Tuesday, Saturday  0    estradiol 10 mcg Tab Place 1 tablet vaginally twice a week. Tuesday, Saturday      ezetimibe (ZETIA) 10 mg tablet Take 10 mg by mouth once daily.      fluticasone propionate (FLONASE) 50 mcg/actuation nasal spray USE 1 SPRAY INTO THE EACH NOSTRIL ONCE A DAY Strength: 50 mcg/actuation 32 g 3    furosemide (LASIX) 20 MG tablet TAKE 1 TABLET BY MOUTH TWICE A DAY (Patient taking differently: Take 20 mg by mouth 2 (two) times a day.) 180 tablet 2    GLUCAGON EMERGENCY 1 mg injection Take 1 mg by mouth as needed.      GVOKE HYPOPEN 2-PACK 1 mg/0.2 mL AtIn USE AS DIRECTED TO TREAT PROFOUND HYPOGLYCEMIA      HUMALOG 100 unit/mL injection Inject 10-17 Units into the skin 3 (three) times daily before meals. 10-17 units tid sliding scale  1    HYDROcodone-acetaminophen (NORCO) 5-325 mg per tablet Take 1 tablet by mouth every 6 (six) hours as needed for Pain. 28 tablet 0    metoprolol succinate (TOPROL-XL) 25 MG 24 hr tablet TAKE 1 TABLET BY MOUTH ONCE A DAY 90 tablet 3    montelukast (SINGULAIR) 10 mg tablet TAKE 1 TABLET BY MOUTH EVERY EVENING 90 tablet 3    NOVOFINE 32 32 gauge x 1/4" Ndle       ondansetron (ZOFRAN-ODT) 4 MG TbDL Take 1 tablet (4 mg total) by mouth every 8 (eight) hours as needed (Nausea). 60 tablet 1    ONETOUCH ULTRA BLUE TEST STRIP Strp   11    pantoprazole (PROTONIX) 40 MG tablet TAKE 1 TABLET BY MOUTH TWICE A  tablet 3    potassium chloride (K-TAB) 20 mEq Take 40 mEq by mouth 2 (two) times a day.  6    promethazine (PHENERGAN) 25 MG tablet Take 1 tablet (25 mg total) by mouth every 6 (six) hours as needed for Nausea. 10 tablet 0    SOOLANTRA 1 % Crea Apply 1 application topically once daily.      SYNTHROID 75 mcg tablet Take 75 mcg " by mouth every Mon, Wed, Fri.      SYNTHROID 88 mcg tablet Take 88 mcg by mouth every Tuesday, Thursday, Saturday, Sunday.  0    TOUJEO SOLOSTAR U-300 INSULIN 300 unit/mL (1.5 mL) InPn pen Inject 34 Units into the skin once daily.      VITAMIN B COMPLEX VIT C NO.4 (SUPER B COMPLEX + C ORAL) Take 1 tablet by mouth once daily.      baclofen (LIORESAL) 10 MG tablet Take 1 tablet (10 mg total) by mouth 2 (two) times daily as needed (muscle pain). 20 tablet 0     No current facility-administered medications for this visit.     Facility-Administered Medications Ordered in Other Visits   Medication Dose Route Frequency Provider Last Rate Last Admin    HYDROmorphone (PF) injection 0.2 mg  0.2 mg Intravenous Q5 Min PRN Naomi Abdullahi MD        lactated ringers infusion   Intravenous Continuous Naomi Abdullahi MD 10 mL/hr at 08/03/23 1332 Restarted at 08/03/23 1442    LIDOcaine (PF) 10 mg/ml (1%) injection 10 mg  1 mL Intradermal Once Naomi Abdullahi MD        oxyCODONE immediate release tablet 5 mg  5 mg Oral Q3H PRN Naomi Abdullahi MD           Review of patient's allergies indicates:   Allergen Reactions    Bactrim [sulfamethoxazole-trimethoprim] Rash     Patient experienced on 12/3/14 redness to face and rash to chest and arms/ with no SOB or airway obstruction    Percocet [oxycodone-acetaminophen] Nausea And Vomiting     Also caused dizziness and passed out    Iodinated contrast media - iv dye Swelling and Rash     Says topical iodine OK    Norco [hydrocodone-acetaminophen] Itching, Swelling and Rash     Can tolerate if she takes Benadryl with it    Niaspan extended-release [niacin] Itching and Other (See Comments)     Skin flushing and redness         Review of Systems   Constitutional: Negative for chills, decreased appetite, diaphoresis and fever.   Cardiovascular:  Negative for claudication and leg swelling.   Skin:  Negative for color change, dry skin, flushing, itching and nail changes.    Musculoskeletal:  Positive for joint pain. Negative for arthritis, back pain, falls, gout, joint swelling and myalgias.   Gastrointestinal:  Negative for nausea and vomiting.   Neurological:  Positive for numbness and paresthesias.   Psychiatric/Behavioral:  Negative for altered mental status.            Objective:      Physical Exam  Constitutional:       General: She is not in acute distress.     Appearance: She is well-developed. She is not diaphoretic.   Cardiovascular:      Pulses:           Dorsalis pedis pulses are 2+ on the right side and 2+ on the left side.        Posterior tibial pulses are 2+ on the right side and 2+ on the left side.      Comments: CFT < 3 seconds bilateral.  Pedal hair growth decreased bilateral.  Varicosities noted to bilateral lower extremity. Moderate localized edema noted to the Rt. 2nd toe. Toes are cool to touch bilateral.      Musculoskeletal:         General: Swelling and tenderness present. Normal range of motion.      Comments: Muscle strength 5/5 in all muscle groups bilateral.  Mild pain from the operative site of the Rt. 2nd toe.   More reducible contracture of the Rt. 2nd toe.  Bilateral pes planus foot type.  Bilateral hallux abducto valgus.  Bilateral semi-rigid contracture of toes 2-5 with adductovarus rotation of the 5th.     Skin:     General: Skin is warm and dry.      Coloration: Skin is not pale.      Findings: No abrasion, bruising, burn, ecchymosis, erythema, laceration, lesion, petechiae or rash.      Nails: There is no clubbing.      Comments: Incision to the dorsum of the Rt. 2nd toe and plantar aspect of the same toe remain well approximated with all suture intact.  No evidence of dehiscence, necrosis, ischemia, or infection the length of the incision.           Neurological:      Mental Status: She is alert and oriented to person, place, and time.      Sensory: Sensory deficit present.      Motor: No weakness or atrophy.      Comments: Protective  sensation per [place] monofilament decreased bilateral.    Light touch intact bilateral.               Assessment:       Encounter Diagnoses   Name Primary?    Status post hammertoe correction Yes    Controlled type 1 diabetes mellitus with diabetic polyneuropathy          Plan:       Kateryna was seen today for post-op evaluation.    Diagnoses and all orders for this visit:    Status post hammertoe correction    Controlled type 1 diabetes mellitus with diabetic polyneuropathy      I counseled the patient on her conditions, their implications and medical management.    Overall, satisfactory postoperative results.  No evidence of postoperative infection nor incisional dehiscence.     The incisions were painted with betadine, and a football dressing was applied.      Instructed to keep the football dressing CDI x 1 week.       May continue full weight bearing to the extremity in the postoperative shoe but advised to limit her activity level.     RTC in 1 week for suture removal.     Lei Rhoades DPM

## 2023-08-24 ENCOUNTER — OFFICE VISIT (OUTPATIENT)
Dept: PODIATRY | Facility: CLINIC | Age: 68
End: 2023-08-24
Payer: MEDICARE

## 2023-08-24 VITALS — HEIGHT: 66 IN | WEIGHT: 190 LBS | BODY MASS INDEX: 30.53 KG/M2

## 2023-08-24 DIAGNOSIS — Z98.890 STATUS POST HAMMERTOE CORRECTION: Primary | ICD-10-CM

## 2023-08-24 DIAGNOSIS — Z87.39 STATUS POST HAMMERTOE CORRECTION: Primary | ICD-10-CM

## 2023-08-24 DIAGNOSIS — E10.42 CONTROLLED TYPE 1 DIABETES MELLITUS WITH DIABETIC POLYNEUROPATHY: ICD-10-CM

## 2023-08-24 PROCEDURE — 99024 PR POST-OP FOLLOW-UP VISIT: ICD-10-PCS | Mod: POP,,, | Performed by: PODIATRIST

## 2023-08-24 PROCEDURE — 99999 PR PBB SHADOW E&M-EST. PATIENT-LVL I: ICD-10-PCS | Mod: PBBFAC,,, | Performed by: PODIATRIST

## 2023-08-24 PROCEDURE — 99211 OFF/OP EST MAY X REQ PHY/QHP: CPT | Mod: PBBFAC,PN | Performed by: PODIATRIST

## 2023-08-24 PROCEDURE — 99024 POSTOP FOLLOW-UP VISIT: CPT | Mod: POP,,, | Performed by: PODIATRIST

## 2023-08-24 PROCEDURE — 99999 PR PBB SHADOW E&M-EST. PATIENT-LVL I: CPT | Mod: PBBFAC,,, | Performed by: PODIATRIST

## 2023-08-24 NOTE — PROGRESS NOTES
Subjective:      Patient ID: Kateryna Desai is a 68 y.o. female.    Chief Complaint: Wound Care  Patient presents to clinic 3 week S/P hammertoe correction of the Rt. 2nd toe.  Denies pain from the surgical site with today's exam.  Has kept the surgical bandage clean, dry, and intact x 1 week.  Has been ambulating to tolerance in the DARCO shoe.  Denies having N/V/F/C/D.  Denies any additional pedal complaints.        Hemoglobin A1C   Date Value Ref Range Status   07/06/2023 6.3 (H) 4.5 - 6.2 % Final     Comment:     According to ADA guidelines, hemoglobin A1C <7.0% represents  optimal control in non-pregnant diabetic patients.  Different  metrics may apply to specific populations.   Standards of Medical Care in Diabetes - 2016.    For the purpose of screening for the presence of diabetes:  <5.7%     Consistent with the absence of diabetes  5.7-6.4%  Consistent with increasing risk for diabetes   (prediabetes)  >or=6.5%  Consistent with diabetes    Currently no consensus exists for use of hemoglobin A1C  for diagnosis of diabetes for children.     06/01/2023 6.2 4.5 - 6.2 % Final     Comment:     According to ADA guidelines, hemoglobin A1C <7.0% represents  optimal control in non-pregnant diabetic patients.  Different  metrics may apply to specific populations.   Standards of Medical Care in Diabetes - 2016.    For the purpose of screening for the presence of diabetes:  <5.7%     Consistent with the absence of diabetes  5.7-6.4%  Consistent with increasing risk for diabetes   (prediabetes)  >or=6.5%  Consistent with diabetes    Currently no consensus exists for use of hemoglobin A1C  for diagnosis of diabetes for children.     01/17/2023 6.0 4.5 - 6.2 % Final     Comment:     According to ADA guidelines, hemoglobin A1C <7.0% represents  optimal control in non-pregnant diabetic patients.  Different  metrics may apply to specific populations.   Standards of Medical Care in Diabetes - 2016.    For the  purpose of screening for the presence of diabetes:  <5.7%     Consistent with the absence of diabetes  5.7-6.4%  Consistent with increasing risk for diabetes   (prediabetes)  >or=6.5%  Consistent with diabetes    Currently no consensus exists for use of hemoglobin A1C  for diagnosis of diabetes for children.             Past Medical History:   Diagnosis Date    Allergy     Arthritis     degnerative disease low back,muscle spasms, shoulders    Asian flu type A 2017    Bunionette 2012    Diabetes mellitus type I     since age 13    Diabetic gastroparesis     takes Cytotec    Diabetic retinopathy of both eyes     gets periodic laser treatments    Difficult intubation     as a child had sore throat after a procedure    Encounter for blood transfusion     GERD (gastroesophageal reflux disease)     Hallux abducto valgus 3/31/2014    Headache(784.0)     Hiatal hernia     with GERD    Hyperlipidemia     Hypertension     Infection of the upper respiratory tract 2012    Lumbar spinal stenosis     Osteopenia     Rosacea     Seizures     Pt states during pgy with stroke    Stroke ?    while pregnant    Tachycardia     asymptomatic with Toprol    Thyroid disease     hypothyroidism    Venous insufficiency of leg     improved since EVLT       Past Surgical History:   Procedure Laterality Date    BUNIONECTOMY Right 2012    CARDIAC CATHETERIZATION      no stents     SECTION      COLONOSCOPY  2007    Dr. Rose, 10 year recheck    CORRECTION OF HAMMER TOE Right 8/3/2023    Procedure: CORRECTION, HAMMER TOE Right second toe;  Surgeon: Lei Rhoades DPM;  Location: [facility] OR;  Service: Podiatry;  Laterality: Right;  right 2nd toe    EXOSTECTOMY  12    left foot, local anesthesia, in office    EYE SURGERY      has had many laser procedures for diabetic retinopathy    VASCULAR SURGERY      VEIN SURGERY  2012    EVLT right greater saphenous, IV sedation       Family History   Problem  "Relation Age of Onset    Cancer Maternal Aunt         breast    Breast cancer Maternal Aunt     Diabetes Maternal Grandmother     Breast cancer Maternal Grandmother 38    Cancer Maternal Grandfather         prostate    Diabetes Maternal Grandfather     Diabetes Cousin     Arthritis Mother     Cancer Mother     Melanoma Mother     Heart disease Father     Stroke Father     No Known Problems Sister     No Known Problems Brother     No Known Problems Daughter     Alzheimer's disease Maternal Uncle     Alcohol abuse Maternal Uncle         x2    Heart disease Maternal Uncle     No Known Problems Paternal Aunt     Heart disease Paternal Uncle        Social History     Socioeconomic History    Marital status:    Tobacco Use    Smoking status: Never    Smokeless tobacco: Never   Substance and Sexual Activity    Alcohol use: Yes     Comment: rarely; 1 drink/year    Drug use: No    Sexual activity: Yes     Partners: Male       Current Outpatient Medications   Medication Sig Dispense Refill    ACZONE 5 % topical gel Apply 1 application topically 2 (two) times daily as needed (roseacea).  0    alpha lipoic acid 600 mg Cap Take 2 capsules by mouth once daily.      ALPHAGAN P 0.1 % Drop Place 1 drop into both eyes 3 (three) times daily.   6    ascorbic acid, vitamin C, (VITAMIN C) 100 MG tablet Take 100 mg by mouth 2 (two) times daily.      aspirin (ECOTRIN) 81 MG EC tablet Take 81 mg by mouth once daily.      azelastine (ASTELIN) 137 mcg (0.1 %) nasal spray USE ONE SPRAY IN EACH NOSTRIL TWICE A DAY (Patient taking differently: 1 spray by Nasal route 2 (two) times daily.) 30 mL 3    baclofen (LIORESAL) 10 MG tablet Take 1 tablet (10 mg total) by mouth 2 (two) times daily as needed (muscle pain). 20 tablet 0    BD VEO INSULIN SYRINGE UF 1/2 mL 31 gauge x 15/64" Syrg   4    calcium citrate-vitamin D3 315-200 mg (CITRACAL+D) 315-200 mg-unit per tablet Take 1 tablet by mouth 2 (two) times daily.      cinnamon bark 500 mg " "capsule Take 500 mg by mouth 2 (two) times daily.      CRESTOR 40 mg Tab Take 40 mg by mouth once daily.       DOCOSAHEXANOIC ACID/EPA (FISH OIL ORAL) Take 1,200 mg by mouth once daily.      doxycycline (VIBRAMYCIN) 50 MG capsule Take 50 mg by mouth 2 (two) times daily.      ESTRACE 0.01 % (0.1 mg/gram) vaginal cream Place vaginally twice a week. Tuesday, Saturday  0    estradiol 10 mcg Tab Place 1 tablet vaginally twice a week. Tuesday, Saturday      ezetimibe (ZETIA) 10 mg tablet Take 10 mg by mouth once daily.      fluticasone propionate (FLONASE) 50 mcg/actuation nasal spray USE 1 SPRAY INTO THE EACH NOSTRIL ONCE A DAY Strength: 50 mcg/actuation 32 g 3    furosemide (LASIX) 20 MG tablet TAKE 1 TABLET BY MOUTH TWICE A DAY (Patient taking differently: Take 20 mg by mouth 2 (two) times a day.) 180 tablet 2    GLUCAGON EMERGENCY 1 mg injection Take 1 mg by mouth as needed.      GVOKE HYPOPEN 2-PACK 1 mg/0.2 mL AtIn USE AS DIRECTED TO TREAT PROFOUND HYPOGLYCEMIA      HUMALOG 100 unit/mL injection Inject 10-17 Units into the skin 3 (three) times daily before meals. 10-17 units tid sliding scale  1    HYDROcodone-acetaminophen (NORCO) 5-325 mg per tablet Take 1 tablet by mouth every 6 (six) hours as needed for Pain. 28 tablet 0    metoprolol succinate (TOPROL-XL) 25 MG 24 hr tablet TAKE 1 TABLET BY MOUTH ONCE A DAY 90 tablet 3    montelukast (SINGULAIR) 10 mg tablet TAKE 1 TABLET BY MOUTH EVERY EVENING 90 tablet 3    NOVOFINE 32 32 gauge x 1/4" Ndle       ondansetron (ZOFRAN-ODT) 4 MG TbDL Take 1 tablet (4 mg total) by mouth every 8 (eight) hours as needed (Nausea). 60 tablet 1    ONETOUCH ULTRA BLUE TEST STRIP Strp   11    pantoprazole (PROTONIX) 40 MG tablet TAKE 1 TABLET BY MOUTH TWICE A  tablet 3    potassium chloride (K-TAB) 20 mEq Take 40 mEq by mouth 2 (two) times a day.  6    promethazine (PHENERGAN) 25 MG tablet Take 1 tablet (25 mg total) by mouth every 6 (six) hours as needed for Nausea. 10 tablet 0 "    SOOLANTRA 1 % Crea Apply 1 application topically once daily.      SYNTHROID 75 mcg tablet Take 75 mcg by mouth every Mon, Wed, Fri.      SYNTHROID 88 mcg tablet Take 88 mcg by mouth every Tuesday, Thursday, Saturday, Sunday.  0    TOUJEO SOLOSTAR U-300 INSULIN 300 unit/mL (1.5 mL) InPn pen Inject 34 Units into the skin once daily.      VITAMIN B COMPLEX VIT C NO.4 (SUPER B COMPLEX + C ORAL) Take 1 tablet by mouth once daily.       No current facility-administered medications for this visit.     Facility-Administered Medications Ordered in Other Visits   Medication Dose Route Frequency Provider Last Rate Last Admin    HYDROmorphone (PF) injection 0.2 mg  0.2 mg Intravenous Q5 Min PRN Naomi Abdullahi MD        lactated ringers infusion   Intravenous Continuous Naomi Abdullahi MD 10 mL/hr at 08/03/23 1332 Restarted at 08/03/23 1442    LIDOcaine (PF) 10 mg/ml (1%) injection 10 mg  1 mL Intradermal Once Naomi Abdullahi MD        oxyCODONE immediate release tablet 5 mg  5 mg Oral Q3H PRN Naomi Abdullahi MD           Review of patient's allergies indicates:   Allergen Reactions    Bactrim [sulfamethoxazole-trimethoprim] Rash     Patient experienced on 12/3/14 redness to face and rash to chest and arms/ with no SOB or airway obstruction    Percocet [oxycodone-acetaminophen] Nausea And Vomiting     Also caused dizziness and passed out    Iodinated contrast media - iv dye Swelling and Rash     Says topical iodine OK    Norco [hydrocodone-acetaminophen] Itching, Swelling and Rash     Can tolerate if she takes Benadryl with it    Niaspan extended-release [niacin] Itching and Other (See Comments)     Skin flushing and redness         Review of Systems   Constitutional: Negative for chills, decreased appetite, diaphoresis and fever.   Cardiovascular:  Negative for claudication and leg swelling.   Skin:  Negative for color change, dry skin, flushing, itching and nail changes.   Musculoskeletal:  Positive  for joint pain. Negative for arthritis, back pain, falls, gout, joint swelling and myalgias.   Gastrointestinal:  Negative for nausea and vomiting.   Neurological:  Positive for numbness and paresthesias.   Psychiatric/Behavioral:  Negative for altered mental status.            Objective:      Physical Exam  Constitutional:       General: She is not in acute distress.     Appearance: She is well-developed. She is not diaphoretic.   Cardiovascular:      Pulses:           Dorsalis pedis pulses are 2+ on the right side and 2+ on the left side.        Posterior tibial pulses are 2+ on the right side and 2+ on the left side.      Comments: CFT < 3 seconds bilateral.  Pedal hair growth decreased bilateral.  Varicosities noted to bilateral lower extremity. Mild localized edema noted to the Rt. 2nd toe. Toes are cool to touch bilateral.      Musculoskeletal:         General: Swelling and tenderness present. Normal range of motion.      Comments: Muscle strength 5/5 in all muscle groups bilateral.  Mild pain from the operative site of the Rt. 2nd toe.   More reducible contracture of the Rt. 2nd toe.  Bilateral pes planus foot type.  Bilateral hallux abducto valgus.  Bilateral semi-rigid contracture of toes 2-5 with adductovarus rotation of the 5th.     Skin:     General: Skin is warm and dry.      Coloration: Skin is not pale.      Findings: No abrasion, bruising, burn, ecchymosis, erythema, laceration, lesion, petechiae or rash.      Nails: There is no clubbing.      Comments: Incision to the dorsum of the Rt. 2nd toe and plantar aspect of the same toe remain well approximated with all suture intact.  No evidence of dehiscence, necrosis, ischemia, or infection the length of the incision.           Neurological:      Mental Status: She is alert and oriented to person, place, and time.      Sensory: Sensory deficit present.      Motor: No weakness or atrophy.      Comments: Protective sensation per [place]  monofilament decreased bilateral.    Light touch intact bilateral.               Assessment:       Encounter Diagnoses   Name Primary?    Status post hammertoe correction Yes    Controlled type 1 diabetes mellitus with diabetic polyneuropathy          Plan:       Kateryna was seen today for wound care.    Diagnoses and all orders for this visit:    Status post hammertoe correction    Controlled type 1 diabetes mellitus with diabetic polyneuropathy      I counseled the patient on her conditions, their implications and medical management.    Overall, satisfactory postoperative results.  No evidence of postoperative infection nor incisional dehiscence.     With the patient's verbal consent, a sterile suture removal kit was used to remove remaining sutures without incident.  This was performed without incident.     Advised to apply betadine to the incision line daily x 1 final week.    Advised to continue use of the DARCO shoe x 1 final week prior to transitioning back into casual shoe gear.     May resume her normal showering routine but discouraged from soaking/scrubbing x 2 weeks.    RTC in 4 weeks for a follow up.     Lei Rhoades DPM

## 2023-09-06 ENCOUNTER — LAB VISIT (OUTPATIENT)
Dept: LAB | Facility: HOSPITAL | Age: 68
End: 2023-09-06
Attending: INTERNAL MEDICINE
Payer: MEDICARE

## 2023-09-06 DIAGNOSIS — N18.2 CHRONIC KIDNEY DISEASE, STAGE II (MILD): Primary | ICD-10-CM

## 2023-09-06 DIAGNOSIS — E21.0 PRIMARY HYPERPARATHYROIDISM: ICD-10-CM

## 2023-09-06 LAB
25(OH)D3+25(OH)D2 SERPL-MCNC: 62 NG/ML (ref 30–96)
ALBUMIN SERPL BCP-MCNC: 3.8 G/DL (ref 3.5–5.2)
ANION GAP SERPL CALC-SCNC: 6 MMOL/L (ref 8–16)
BACTERIA #/AREA URNS HPF: NEGATIVE /HPF
BUN SERPL-MCNC: 6 MG/DL (ref 8–23)
CALCIUM SERPL-MCNC: 9 MG/DL (ref 8.7–10.5)
CHLORIDE SERPL-SCNC: 102 MMOL/L (ref 95–110)
CO2 SERPL-SCNC: 29 MMOL/L (ref 23–29)
CREAT SERPL-MCNC: 1.1 MG/DL (ref 0.5–1.4)
EST. GFR  (NO RACE VARIABLE): 55.4 ML/MIN/1.73 M^2
GLUCOSE SERPL-MCNC: 81 MG/DL (ref 70–110)
HYALINE CASTS #/AREA URNS LPF: 3 /LPF
MICROSCOPIC COMMENT: ABNORMAL
PHOSPHATE SERPL-MCNC: 4.5 MG/DL (ref 2.7–4.5)
POTASSIUM SERPL-SCNC: 4.1 MMOL/L (ref 3.5–5.1)
PROT UR-MCNC: <6 MG/DL (ref 6–15)
PTH-INTACT SERPL-MCNC: 143.7 PG/ML (ref 9–77)
RBC #/AREA URNS HPF: 1 /HPF (ref 0–4)
SODIUM SERPL-SCNC: 137 MMOL/L (ref 136–145)
SQUAMOUS #/AREA URNS HPF: 3 /HPF
WBC #/AREA URNS HPF: 1 /HPF (ref 0–5)

## 2023-09-06 PROCEDURE — 81001 URINALYSIS AUTO W/SCOPE: CPT | Performed by: INTERNAL MEDICINE

## 2023-09-06 PROCEDURE — 80069 RENAL FUNCTION PANEL: CPT | Performed by: INTERNAL MEDICINE

## 2023-09-06 PROCEDURE — 82306 VITAMIN D 25 HYDROXY: CPT | Performed by: INTERNAL MEDICINE

## 2023-09-06 PROCEDURE — 36415 COLL VENOUS BLD VENIPUNCTURE: CPT | Performed by: INTERNAL MEDICINE

## 2023-09-06 PROCEDURE — 83970 ASSAY OF PARATHORMONE: CPT | Performed by: INTERNAL MEDICINE

## 2023-09-06 PROCEDURE — 84156 ASSAY OF PROTEIN URINE: CPT | Performed by: INTERNAL MEDICINE

## 2023-09-11 DIAGNOSIS — I83.893 VARICOSE VEINS OF LOWER EXTREMITY WITH EDEMA, BILATERAL: ICD-10-CM

## 2023-09-11 NOTE — TELEPHONE ENCOUNTER
No care due was identified.  [facility] [employer] Embedded Care Due Messages. Reference number: [id number].   9/11/2023 1:57:40 PM CDT

## 2023-09-12 NOTE — TELEPHONE ENCOUNTER
Refill Routing Note   Medication(s) are not appropriate for processing by [facility] for the following reason(s):      Required vitals abnormal    [facility] action(s):  Defer Care Due:  None identified            Appointments  past 12m or future 3m with PCP    Date Provider   Last Visit   4/28/2023 Guido Chow MD   Next Visit   11/29/2023 Guido Chow MD   ED visits in past 90 days: 0        Note composed:3:35 AM 09/12/2023

## 2023-09-14 RX ORDER — FUROSEMIDE 20 MG/1
20 TABLET ORAL 2 TIMES DAILY
Qty: 180 TABLET | Refills: 4 | Status: SHIPPED | OUTPATIENT
Start: 2023-09-14

## 2023-09-22 ENCOUNTER — OFFICE VISIT (OUTPATIENT)
Dept: PODIATRY | Facility: CLINIC | Age: 68
End: 2023-09-22
Payer: MEDICARE

## 2023-09-22 VITALS — HEIGHT: 66 IN | WEIGHT: 190 LBS | BODY MASS INDEX: 30.53 KG/M2

## 2023-09-22 DIAGNOSIS — E10.42 CONTROLLED TYPE 1 DIABETES MELLITUS WITH DIABETIC POLYNEUROPATHY: ICD-10-CM

## 2023-09-22 DIAGNOSIS — Z87.39 STATUS POST HAMMERTOE CORRECTION: Primary | ICD-10-CM

## 2023-09-22 DIAGNOSIS — Z98.890 STATUS POST HAMMERTOE CORRECTION: Primary | ICD-10-CM

## 2023-09-22 PROCEDURE — 99999 PR PBB SHADOW E&M-EST. PATIENT-LVL IV: ICD-10-PCS | Mod: PBBFAC,,, | Performed by: PODIATRIST

## 2023-09-22 PROCEDURE — 99999 PR PBB SHADOW E&M-EST. PATIENT-LVL IV: CPT | Mod: PBBFAC,,, | Performed by: PODIATRIST

## 2023-09-22 PROCEDURE — 99024 PR POST-OP FOLLOW-UP VISIT: ICD-10-PCS | Mod: POP,,, | Performed by: PODIATRIST

## 2023-09-22 PROCEDURE — 99214 OFFICE O/P EST MOD 30 MIN: CPT | Mod: PBBFAC,PO | Performed by: PODIATRIST

## 2023-09-22 PROCEDURE — 99024 POSTOP FOLLOW-UP VISIT: CPT | Mod: POP,,, | Performed by: PODIATRIST

## 2023-09-23 NOTE — PROGRESS NOTES
Subjective:      Patient ID: Kateryna Desai is a 68 y.o. female.    Chief Complaint: Post-op Evaluation  Patient presents to clinic 7 weeks S/P hammertoe correction of the Rt. 2nd toe.  Patient notes continued swelling to the surgically corrected digit.  States that it still abuts the adjacent toes and has a tendency to override the 3rd digit.  Also, she notes occasional paresthesias to the digit while at rest.  In response, she has been applying silicone spacers between the toes to mitigate this issue.  Denies noticing any signs of skin breakdown. Denies any additional pedal complaints.        Hemoglobin A1C   Date Value Ref Range Status   07/06/2023 6.3 (H) 4.5 - 6.2 % Final     Comment:     According to ADA guidelines, hemoglobin A1C <7.0% represents  optimal control in non-pregnant diabetic patients.  Different  metrics may apply to specific populations.   Standards of Medical Care in Diabetes - 2016.    For the purpose of screening for the presence of diabetes:  <5.7%     Consistent with the absence of diabetes  5.7-6.4%  Consistent with increasing risk for diabetes   (prediabetes)  >or=6.5%  Consistent with diabetes    Currently no consensus exists for use of hemoglobin A1C  for diagnosis of diabetes for children.     06/01/2023 6.2 4.5 - 6.2 % Final     Comment:     According to ADA guidelines, hemoglobin A1C <7.0% represents  optimal control in non-pregnant diabetic patients.  Different  metrics may apply to specific populations.   Standards of Medical Care in Diabetes - 2016.    For the purpose of screening for the presence of diabetes:  <5.7%     Consistent with the absence of diabetes  5.7-6.4%  Consistent with increasing risk for diabetes   (prediabetes)  >or=6.5%  Consistent with diabetes    Currently no consensus exists for use of hemoglobin A1C  for diagnosis of diabetes for children.     01/17/2023 6.0 4.5 - 6.2 % Final     Comment:     According to ADA guidelines, hemoglobin A1C  <7.0% represents  optimal control in non-pregnant diabetic patients.  Different  metrics may apply to specific populations.   Standards of Medical Care in Diabetes - 2016.    For the purpose of screening for the presence of diabetes:  <5.7%     Consistent with the absence of diabetes  5.7-6.4%  Consistent with increasing risk for diabetes   (prediabetes)  >or=6.5%  Consistent with diabetes    Currently no consensus exists for use of hemoglobin A1C  for diagnosis of diabetes for children.             Past Medical History:   Diagnosis Date    Allergy     Arthritis     degnerative disease low back,muscle spasms, shoulders    Asian flu type A 2017    Bunionette 2012    Diabetes mellitus type I     since age 13    Diabetic gastroparesis     takes Cytotec    Diabetic retinopathy of both eyes     gets periodic laser treatments    Difficult intubation     as a child had sore throat after a procedure    Encounter for blood transfusion     GERD (gastroesophageal reflux disease)     Hallux abducto valgus 3/31/2014    Headache(784.0)     Hiatal hernia     with GERD    Hyperlipidemia     Hypertension     Infection of the upper respiratory tract 2012    Lumbar spinal stenosis     Osteopenia     Rosacea     Seizures     Pt states during pgy with stroke    Stroke ?    while pregnant    Tachycardia     asymptomatic with Toprol    Thyroid disease     hypothyroidism    Venous insufficiency of leg     improved since EVLT       Past Surgical History:   Procedure Laterality Date    BUNIONECTOMY Right 2012    CARDIAC CATHETERIZATION      no stents     SECTION      COLONOSCOPY  2007    Dr. Rose, 10 year recheck    CORRECTION OF HAMMER TOE Right 8/3/2023    Procedure: CORRECTION, HAMMER TOE Right second toe;  Surgeon: Lei Rhoades DPM;  [facility];  Service: Podiatry;  Laterality: Right;  right 2nd toe    EXOSTECTOMY  12    left foot, local anesthesia, in office    EYE SURGERY      has  "had many laser procedures for diabetic retinopathy    VASCULAR SURGERY      VEIN SURGERY  April 2012    EVLT right greater saphenous, IV sedation       Family History   Problem Relation Age of Onset    Cancer Maternal Aunt         breast    Breast cancer Maternal Aunt     Diabetes Maternal Grandmother     Breast cancer Maternal Grandmother 38    Cancer Maternal Grandfather         prostate    Diabetes Maternal Grandfather     Diabetes Cousin     Arthritis Mother     Cancer Mother     Melanoma Mother     Heart disease Father     Stroke Father     No Known Problems Sister     No Known Problems Brother     No Known Problems Daughter     Alzheimer's disease Maternal Uncle     Alcohol abuse Maternal Uncle         x2    Heart disease Maternal Uncle     No Known Problems Paternal Aunt     Heart disease Paternal Uncle        Social History     Socioeconomic History    Marital status:    Tobacco Use    Smoking status: Never    Smokeless tobacco: Never   Substance and Sexual Activity    Alcohol use: Yes     Comment: rarely; 1 drink/year    Drug use: No    Sexual activity: Yes     Partners: Male       Current Outpatient Medications   Medication Sig Dispense Refill    ACZONE 5 % topical gel Apply 1 application topically 2 (two) times daily as needed (roseacea).  0    alpha lipoic acid 600 mg Cap Take 2 capsules by mouth once daily.      ALPHAGAN P 0.1 % Drop Place 1 drop into both eyes 3 (three) times daily.   6    ascorbic acid, vitamin C, (VITAMIN C) 100 MG tablet Take 100 mg by mouth 2 (two) times daily.      aspirin (ECOTRIN) 81 MG EC tablet Take 81 mg by mouth once daily.      azelastine (ASTELIN) 137 mcg (0.1 %) nasal spray USE ONE SPRAY IN EACH NOSTRIL TWICE A DAY (Patient taking differently: 1 spray by Nasal route 2 (two) times daily.) 30 mL 3    BD VEO INSULIN SYRINGE UF 1/2 mL 31 gauge x 15/64" Syrg   4    calcium citrate-vitamin D3 315-200 mg (CITRACAL+D) 315-200 mg-unit per tablet Take 1 tablet by mouth 2 " "(two) times daily.      cinnamon bark 500 mg capsule Take 500 mg by mouth 2 (two) times daily.      CRESTOR 40 mg Tab Take 40 mg by mouth once daily.       DOCOSAHEXANOIC ACID/EPA (FISH OIL ORAL) Take 1,200 mg by mouth once daily.      doxycycline (VIBRAMYCIN) 50 MG capsule Take 50 mg by mouth 2 (two) times daily.      ESTRACE 0.01 % (0.1 mg/gram) vaginal cream Place vaginally twice a week. Tuesday, Saturday  0    estradiol 10 mcg Tab Place 1 tablet vaginally twice a week. Tuesday, Saturday      ezetimibe (ZETIA) 10 mg tablet Take 10 mg by mouth once daily.      fluticasone propionate (FLONASE) 50 mcg/actuation nasal spray USE 1 SPRAY INTO THE EACH NOSTRIL ONCE A DAY Strength: 50 mcg/actuation 32 g 3    furosemide (LASIX) 20 MG tablet Take 1 tablet (20 mg total) by mouth 2 (two) times a day. 180 tablet 4    GLUCAGON EMERGENCY 1 mg injection Take 1 mg by mouth as needed.      GVOKE HYPOPEN 2-PACK 1 mg/0.2 mL AtIn USE AS DIRECTED TO TREAT PROFOUND HYPOGLYCEMIA      HUMALOG 100 unit/mL injection Inject 10-17 Units into the skin 3 (three) times daily before meals. 10-17 units tid sliding scale  1    HYDROcodone-acetaminophen (NORCO) 5-325 mg per tablet Take 1 tablet by mouth every 6 (six) hours as needed for Pain. 28 tablet 0    metoprolol succinate (TOPROL-XL) 25 MG 24 hr tablet TAKE 1 TABLET BY MOUTH ONCE A DAY 90 tablet 3    montelukast (SINGULAIR) 10 mg tablet TAKE 1 TABLET BY MOUTH EVERY EVENING 90 tablet 3    NOVOFINE 32 32 gauge x 1/4" Ndle       ondansetron (ZOFRAN-ODT) 4 MG TbDL Take 1 tablet (4 mg total) by mouth every 8 (eight) hours as needed (Nausea). 60 tablet 1    ONETOUCH ULTRA BLUE TEST STRIP Strp   11    pantoprazole (PROTONIX) 40 MG tablet TAKE 1 TABLET BY MOUTH TWICE A  tablet 3    potassium chloride (K-TAB) 20 mEq Take 40 mEq by mouth 2 (two) times a day.  6    promethazine (PHENERGAN) 25 MG tablet Take 1 tablet (25 mg total) by mouth every 6 (six) hours as needed for Nausea. 10 tablet 0    " SOOLANTRA 1 % Crea Apply 1 application topically once daily.      SYNTHROID 75 mcg tablet Take 75 mcg by mouth every Mon, Wed, Fri.      SYNTHROID 88 mcg tablet Take 88 mcg by mouth every Tuesday, Thursday, Saturday, Sunday.  0    TOUJEO SOLOSTAR U-300 INSULIN 300 unit/mL (1.5 mL) InPn pen Inject 34 Units into the skin once daily.      VITAMIN B COMPLEX VIT C NO.4 (SUPER B COMPLEX + C ORAL) Take 1 tablet by mouth once daily.      baclofen (LIORESAL) 10 MG tablet Take 1 tablet (10 mg total) by mouth 2 (two) times daily as needed (muscle pain). 20 tablet 0     No current facility-administered medications for this visit.     Facility-Administered Medications Ordered in Other Visits   Medication Dose Route Frequency Provider Last Rate Last Admin    HYDROmorphone (PF) injection 0.2 mg  0.2 mg Intravenous Q5 Min PRN Naomi Abdullahi MD        lactated ringers infusion   Intravenous Continuous Naomi Abdullahi MD 10 mL/hr at 08/03/23 1332 Restarted at 08/03/23 1442    LIDOcaine (PF) 10 mg/ml (1%) injection 10 mg  1 mL Intradermal Once Naomi Abdullahi MD        oxyCODONE immediate release tablet 5 mg  5 mg Oral Q3H PRN Naomi Abdullahi MD           Review of patient's allergies indicates:   Allergen Reactions    Bactrim [sulfamethoxazole-trimethoprim] Rash     Patient experienced on 12/3/14 redness to face and rash to chest and arms/ with no SOB or airway obstruction    Percocet [oxycodone-acetaminophen] Nausea And Vomiting     Also caused dizziness and passed out    Iodinated contrast media - iv dye Swelling and Rash     Says topical iodine OK    Norco [hydrocodone-acetaminophen] Itching, Swelling and Rash     Can tolerate if she takes Benadryl with it    Niaspan extended-release [niacin] Itching and Other (See Comments)     Skin flushing and redness         Review of Systems   Constitutional: Negative for chills, decreased appetite, diaphoresis and fever.   Cardiovascular:  Negative for claudication  and leg swelling.   Skin:  Negative for color change, dry skin, flushing, itching and nail changes.   Musculoskeletal:  Positive for joint pain. Negative for arthritis, back pain, falls, gout, joint swelling and myalgias.   Gastrointestinal:  Negative for nausea and vomiting.   Neurological:  Positive for numbness and paresthesias.   Psychiatric/Behavioral:  Negative for altered mental status.            Objective:      Physical Exam  Constitutional:       General: She is not in acute distress.     Appearance: She is well-developed. She is not diaphoretic.   Cardiovascular:      Pulses:           Dorsalis pedis pulses are 2+ on the right side and 2+ on the left side.        Posterior tibial pulses are 2+ on the right side and 2+ on the left side.      Comments: CFT < 3 seconds bilateral.  Pedal hair growth decreased bilateral.  Varicosities noted to bilateral lower extremity. Mild localized edema noted to the Rt. 2nd toe. Toes are cool to touch bilateral.      Musculoskeletal:         General: Swelling and tenderness present. Normal range of motion.      Comments: Muscle strength 5/5 in all muscle groups bilateral.  Mild pain from the operative site of the Rt. 2nd toe.   More reducible contracture of the Rt. 2nd toe.  Bilateral pes planus foot type.  Bilateral hallux abducto valgus.  Bilateral semi-rigid contracture of toes 2-5 with adductovarus rotation of the 5th.     Skin:     General: Skin is warm and dry.      Coloration: Skin is not pale.      Findings: No abrasion, bruising, burn, ecchymosis, erythema, laceration, lesion, petechiae or rash.      Nails: There is no clubbing.      Comments: Incisions to the Rt. 2nd toe remain healed.  Continued increase in turgor noted to said digit.  No obvious sign of skin shearing noted to the adjacent digits.            Neurological:      Mental Status: She is alert and oriented to person, place, and time.      Sensory: Sensory deficit present.      Motor: No weakness or  atrophy.      Comments: Protective sensation per [place] monofilament decreased bilateral.    Light touch intact bilateral.               Assessment:       Encounter Diagnoses   Name Primary?    Status post hammertoe correction Yes    Controlled type 1 diabetes mellitus with diabetic polyneuropathy          Plan:       Kateryna was seen today for post-op evaluation.    Diagnoses and all orders for this visit:    Status post hammertoe correction    Controlled type 1 diabetes mellitus with diabetic polyneuropathy      I counseled the patient on her conditions, their implications and medical management.    Overall, satisfactory postoperative results.  Incisions remain well healed with successful reduction of the prior contracture of the PIP joint.     She continues to note paresthesias to the digit in conjunction with swelling.  Both may take at least another two months to resolve.      In the meantime, she is to continue applying spacers between the digits along with use of accommodating shoe gear.    To inspect the adjacent skin daily for signs of shearing.      RTC in 4 weeks for a follow up.     Lei Rhoades DPM

## 2023-10-26 DIAGNOSIS — Z12.31 ENCOUNTER FOR SCREENING MAMMOGRAM FOR MALIGNANT NEOPLASM OF BREAST: Primary | ICD-10-CM

## 2023-11-01 DIAGNOSIS — Z78.0 MENOPAUSE: ICD-10-CM

## 2023-11-02 ENCOUNTER — OFFICE VISIT (OUTPATIENT)
Dept: PODIATRY | Facility: CLINIC | Age: 68
End: 2023-11-02
Payer: MEDICARE

## 2023-11-02 VITALS — BODY MASS INDEX: 30.54 KG/M2 | WEIGHT: 190.06 LBS | HEIGHT: 66 IN

## 2023-11-02 DIAGNOSIS — M77.41 METATARSALGIA OF RIGHT FOOT: ICD-10-CM

## 2023-11-02 DIAGNOSIS — L90.9 PLANTAR FAT PAD ATROPHY: ICD-10-CM

## 2023-11-02 DIAGNOSIS — Z98.890 STATUS POST HAMMERTOE CORRECTION: ICD-10-CM

## 2023-11-02 DIAGNOSIS — E10.42 CONTROLLED TYPE 1 DIABETES MELLITUS WITH DIABETIC POLYNEUROPATHY: ICD-10-CM

## 2023-11-02 DIAGNOSIS — T81.89XA SUTURE REACTION, INITIAL ENCOUNTER: Primary | ICD-10-CM

## 2023-11-02 DIAGNOSIS — Z87.39 STATUS POST HAMMERTOE CORRECTION: ICD-10-CM

## 2023-11-02 PROCEDURE — 99214 OFFICE O/P EST MOD 30 MIN: CPT | Mod: S$PBB,,, | Performed by: PODIATRIST

## 2023-11-02 PROCEDURE — 99999 PR PBB SHADOW E&M-EST. PATIENT-LVL IV: ICD-10-PCS | Mod: PBBFAC,,, | Performed by: PODIATRIST

## 2023-11-02 PROCEDURE — 99999 PR PBB SHADOW E&M-EST. PATIENT-LVL IV: CPT | Mod: PBBFAC,,, | Performed by: PODIATRIST

## 2023-11-02 PROCEDURE — 99214 PR OFFICE/OUTPT VISIT, EST, LEVL IV, 30-39 MIN: ICD-10-PCS | Mod: S$PBB,,, | Performed by: PODIATRIST

## 2023-11-02 PROCEDURE — 99214 OFFICE O/P EST MOD 30 MIN: CPT | Mod: PBBFAC,PO | Performed by: PODIATRIST

## 2023-11-02 RX ORDER — MUPIROCIN 20 MG/G
OINTMENT TOPICAL 3 TIMES DAILY
Qty: 30 G | Refills: 1 | Status: SHIPPED | OUTPATIENT
Start: 2023-11-02 | End: 2024-01-30

## 2023-11-02 NOTE — PROGRESS NOTES
"Subjective:      Patient ID: Kateryna Desai is a 68 y.o. female.    Chief Complaint: Diabetic Foot Exam (Follow up, blister r middle toe, callus)  Patient presents to clinic roughly 12 weeks S/P hammertoe correction of the Rt. 2nd toe.  Notes swelling of the digit has improved over time.  She is concerned by what she believes is a "callus" overlying the dorsum of the PIP joint.  Denies this overtly being a source of pain but is concerned by the potential for rubbing against shoe gear.  She has been treating by wearing shoe gear that limits pressure to the site.  Also, she is experiencing pain in the Rt. Forefoot at the base of the toes.  States this has been present for several weeks.  Describes pain as sharp and rates as a 6/10.  Symptoms are aggravated with all weight bearing and alleviated with rest.  Denies an inciting event or trauma to said limb.  Denies any additional pedal complaints.        Hemoglobin A1C   Date Value Ref Range Status   07/06/2023 6.3 (H) 4.5 - 6.2 % Final     Comment:     According to ADA guidelines, hemoglobin A1C <7.0% represents  optimal control in non-pregnant diabetic patients.  Different  metrics may apply to specific populations.   Standards of Medical Care in Diabetes - 2016.    For the purpose of screening for the presence of diabetes:  <5.7%     Consistent with the absence of diabetes  5.7-6.4%  Consistent with increasing risk for diabetes   (prediabetes)  >or=6.5%  Consistent with diabetes    Currently no consensus exists for use of hemoglobin A1C  for diagnosis of diabetes for children.     06/01/2023 6.2 4.5 - 6.2 % Final     Comment:     According to ADA guidelines, hemoglobin A1C <7.0% represents  optimal control in non-pregnant diabetic patients.  Different  metrics may apply to specific populations.   Standards of Medical Care in Diabetes - 2016.    For the purpose of screening for the presence of diabetes:  <5.7%     Consistent with the absence of " diabetes  5.7-6.4%  Consistent with increasing risk for diabetes   (prediabetes)  >or=6.5%  Consistent with diabetes    Currently no consensus exists for use of hemoglobin A1C  for diagnosis of diabetes for children.     2023 6.0 4.5 - 6.2 % Final     Comment:     According to ADA guidelines, hemoglobin A1C <7.0% represents  optimal control in non-pregnant diabetic patients.  Different  metrics may apply to specific populations.   Standards of Medical Care in Diabetes - 2016.    For the purpose of screening for the presence of diabetes:  <5.7%     Consistent with the absence of diabetes  5.7-6.4%  Consistent with increasing risk for diabetes   (prediabetes)  >or=6.5%  Consistent with diabetes    Currently no consensus exists for use of hemoglobin A1C  for diagnosis of diabetes for children.             Past Medical History:   Diagnosis Date    Allergy     Arthritis     degnerative disease low back,muscle spasms, shoulders    Asian flu type A 2017    Bunionette 2012    Diabetes mellitus type I     since age 13    Diabetic gastroparesis     takes Cytotec    Diabetic retinopathy of both eyes     gets periodic laser treatments    Difficult intubation     as a child had sore throat after a procedure    Encounter for blood transfusion     GERD (gastroesophageal reflux disease)     Hallux abducto valgus 3/31/2014    Headache(784.0)     Hiatal hernia     with GERD    Hyperlipidemia     Hypertension     Infection of the upper respiratory tract 2012    Lumbar spinal stenosis     Osteopenia     Rosacea     Seizures     Pt states during pgy with stroke    Stroke ?    while pregnant    Tachycardia     asymptomatic with Toprol    Thyroid disease     hypothyroidism    Venous insufficiency of leg     improved since EVLT       Past Surgical History:   Procedure Laterality Date    BUNIONECTOMY Right 2012    CARDIAC CATHETERIZATION  2008    no stents     SECTION      COLONOSCOPY  2007      Rose, 10 year recheck    CORRECTION OF HAMMER TOE Right 8/3/2023    Procedure: CORRECTION, HAMMER TOE Right second toe;  Surgeon: Lei Rhoades DPM;  Location: [facility] OR;  Service: Podiatry;  Laterality: Right;  right 2nd toe    EXOSTECTOMY  8/16/12    left foot, local anesthesia, in office    EYE SURGERY      has had many laser procedures for diabetic retinopathy    VASCULAR SURGERY      VEIN SURGERY  April 2012    EVLT right greater saphenous, IV sedation       Family History   Problem Relation Age of Onset    Cancer Maternal Aunt         breast    Breast cancer Maternal Aunt     Diabetes Maternal Grandmother     Breast cancer Maternal Grandmother 38    Cancer Maternal Grandfather         prostate    Diabetes Maternal Grandfather     Diabetes Cousin     Arthritis Mother     Cancer Mother     Melanoma Mother     Heart disease Father     Stroke Father     No Known Problems Sister     No Known Problems Brother     No Known Problems Daughter     Alzheimer's disease Maternal Uncle     Alcohol abuse Maternal Uncle         x2    Heart disease Maternal Uncle     No Known Problems Paternal Aunt     Heart disease Paternal Uncle        Social History     Socioeconomic History    Marital status:    Tobacco Use    Smoking status: Never    Smokeless tobacco: Never   Substance and Sexual Activity    Alcohol use: Yes     Comment: rarely; 1 drink/year    Drug use: No    Sexual activity: Yes     Partners: Male       Current Outpatient Medications   Medication Sig Dispense Refill    ACZONE 5 % topical gel Apply 1 application topically 2 (two) times daily as needed (roseacea).  0    alpha lipoic acid 600 mg Cap Take 2 capsules by mouth once daily.      ALPHAGAN P 0.1 % Drop Place 1 drop into both eyes 3 (three) times daily.   6    ascorbic acid, vitamin C, (VITAMIN C) 100 MG tablet Take 100 mg by mouth 2 (two) times daily.      aspirin (ECOTRIN) 81 MG EC tablet Take 81 mg by mouth once daily.      azelastine (ASTELIN) 137  "mcg (0.1 %) nasal spray USE ONE SPRAY IN EACH NOSTRIL TWICE A DAY (Patient taking differently: 1 spray by Nasal route 2 (two) times daily.) 30 mL 3    BD VEO INSULIN SYRINGE UF 1/2 mL 31 gauge x 15/64" Syrg   4    calcium citrate-vitamin D3 315-200 mg (CITRACAL+D) 315-200 mg-unit per tablet Take 1 tablet by mouth 2 (two) times daily.      cinnamon bark 500 mg capsule Take 500 mg by mouth 2 (two) times daily.      CRESTOR 40 mg Tab Take 40 mg by mouth once daily.       DOCOSAHEXANOIC ACID/EPA (FISH OIL ORAL) Take 1,200 mg by mouth once daily.      doxycycline (VIBRAMYCIN) 50 MG capsule Take 50 mg by mouth 2 (two) times daily.      ESTRACE 0.01 % (0.1 mg/gram) vaginal cream Place vaginally twice a week. Tuesday, Saturday  0    estradiol 10 mcg Tab Place 1 tablet vaginally twice a week. Tuesday, Saturday      ezetimibe (ZETIA) 10 mg tablet Take 10 mg by mouth once daily.      fluticasone propionate (FLONASE) 50 mcg/actuation nasal spray USE 1 SPRAY INTO THE EACH NOSTRIL ONCE A DAY Strength: 50 mcg/actuation 32 g 3    furosemide (LASIX) 20 MG tablet Take 1 tablet (20 mg total) by mouth 2 (two) times a day. 180 tablet 4    GLUCAGON EMERGENCY 1 mg injection Take 1 mg by mouth as needed.      GVOKE HYPOPEN 2-PACK 1 mg/0.2 mL AtIn USE AS DIRECTED TO TREAT PROFOUND HYPOGLYCEMIA      HUMALOG 100 unit/mL injection Inject 10-17 Units into the skin 3 (three) times daily before meals. 10-17 units tid sliding scale  1    HYDROcodone-acetaminophen (NORCO) 5-325 mg per tablet Take 1 tablet by mouth every 6 (six) hours as needed for Pain. 28 tablet 0    metoprolol succinate (TOPROL-XL) 25 MG 24 hr tablet TAKE 1 TABLET BY MOUTH ONCE A DAY 90 tablet 3    montelukast (SINGULAIR) 10 mg tablet TAKE 1 TABLET BY MOUTH EVERY EVENING 90 tablet 3    NOVOFINE 32 32 gauge x 1/4" Ndle       ondansetron (ZOFRAN-ODT) 4 MG TbDL Take 1 tablet (4 mg total) by mouth every 8 (eight) hours as needed (Nausea). 60 tablet 1    ONETOUCH ULTRA BLUE TEST STRIP " Strp   11    pantoprazole (PROTONIX) 40 MG tablet TAKE 1 TABLET BY MOUTH TWICE A  tablet 3    potassium chloride (K-TAB) 20 mEq Take 40 mEq by mouth 2 (two) times a day.  6    promethazine (PHENERGAN) 25 MG tablet Take 1 tablet (25 mg total) by mouth every 6 (six) hours as needed for Nausea. 10 tablet 0    SOOLANTRA 1 % Crea Apply 1 application topically once daily.      SYNTHROID 75 mcg tablet Take 75 mcg by mouth every Mon, Wed, Fri.      SYNTHROID 88 mcg tablet Take 88 mcg by mouth every Tuesday, Thursday, Saturday, Sunday.  0    TOUJEO SOLOSTAR U-300 INSULIN 300 unit/mL (1.5 mL) InPn pen Inject 34 Units into the skin once daily.      VITAMIN B COMPLEX VIT C NO.4 (SUPER B COMPLEX + C ORAL) Take 1 tablet by mouth once daily.      baclofen (LIORESAL) 10 MG tablet Take 1 tablet (10 mg total) by mouth 2 (two) times daily as needed (muscle pain). 20 tablet 0    mupirocin (BACTROBAN) 2 % ointment Apply topically 3 (three) times daily. 30 g 1     No current facility-administered medications for this visit.     Facility-Administered Medications Ordered in Other Visits   Medication Dose Route Frequency Provider Last Rate Last Admin    HYDROmorphone (PF) injection 0.2 mg  0.2 mg Intravenous Q5 Min PRN Naomi Abdullahi MD        lactated ringers infusion   Intravenous Continuous Naomi Abdullahi MD 10 mL/hr at 08/03/23 1332 Restarted at 08/03/23 1442    LIDOcaine (PF) 10 mg/ml (1%) injection 10 mg  1 mL Intradermal Once Naomi Abdullahi MD        oxyCODONE immediate release tablet 5 mg  5 mg Oral Q3H PRN Naomi Abdullahi MD           Review of patient's allergies indicates:   Allergen Reactions    Bactrim [sulfamethoxazole-trimethoprim] Rash     Patient experienced on 12/3/14 redness to face and rash to chest and arms/ with no SOB or airway obstruction    Percocet [oxycodone-acetaminophen] Nausea And Vomiting     Also caused dizziness and passed out    Iodinated contrast media - iv dye Swelling  and Rash     Says topical iodine OK    Norco [hydrocodone-acetaminophen] Itching, Swelling and Rash     Can tolerate if she takes Benadryl with it    Niaspan extended-release [niacin] Itching and Other (See Comments)     Skin flushing and redness         Review of Systems   Constitutional: Negative for chills, decreased appetite, diaphoresis and fever.   Cardiovascular:  Negative for claudication and leg swelling.   Skin:  Negative for color change, dry skin, flushing, itching and nail changes.   Musculoskeletal:  Positive for joint pain. Negative for arthritis, back pain, falls, gout, joint swelling and myalgias.   Gastrointestinal:  Negative for nausea and vomiting.   Neurological:  Positive for numbness and paresthesias.   Psychiatric/Behavioral:  Negative for altered mental status.            Objective:      Physical Exam  Constitutional:       General: She is not in acute distress.     Appearance: She is well-developed. She is not diaphoretic.   Cardiovascular:      Pulses:           Dorsalis pedis pulses are 2+ on the right side and 2+ on the left side.        Posterior tibial pulses are 2+ on the right side and 2+ on the left side.      Comments: CFT < 3 seconds bilateral.  Pedal hair growth decreased bilateral.  Varicosities noted to bilateral lower extremity. Less localized edema noted to the Rt. 2nd toe. Toes are cool to touch bilateral.      Musculoskeletal:         General: Swelling and tenderness present. Normal range of motion.      Comments: Muscle strength 5/5 in all muscle groups bilateral.  Mild pain from the operative site of the Rt. 2nd toe.   More reducible contracture of the Rt. 2nd toe.  Bilateral pes planus foot type.  Bilateral hallux abducto valgus.  Bilateral semi-rigid contracture of toes 2-5 with adductovarus rotation of the 5th.  Bilateral forefoot fat pad atrophy.  Pain with palpation to the Rt. Sub 2nd and 3rd met heads.  (-) Lachman sign noted.     Skin:     General: Skin is warm and  dry.      Coloration: Skin is not pale.      Findings: No abrasion, bruising, burn, ecchymosis, erythema, laceration, lesion, petechiae or rash.      Nails: There is no clubbing.      Comments: Incisions to the Rt. 2nd toe remains healed.  Continued increase in turgor noted to said digit.  There are several small nodules oriented along the incision line consistent with a suture reaction.  No break noted in skin integrity.          Neurological:      Mental Status: She is alert and oriented to person, place, and time.      Sensory: Sensory deficit present.      Motor: No weakness or atrophy.      Comments: Protective sensation per [place]-Abigail monofilament decreased bilateral.    Light touch intact bilateral.               Assessment:       Encounter Diagnoses   Name Primary?    Suture reaction, initial encounter Yes    Status post hammertoe correction     Metatarsalgia of right foot     Plantar fat pad atrophy     Controlled type 1 diabetes mellitus with diabetic polyneuropathy            Plan:       Kateryna was seen today for diabetic foot exam.    Diagnoses and all orders for this visit:    Suture reaction, initial encounter  -     mupirocin (BACTROBAN) 2 % ointment; Apply topically 3 (three) times daily.    Status post hammertoe correction    Metatarsalgia of right foot  -     DIABETIC SHOES FOR HOME USE    Plantar fat pad atrophy  -     DIABETIC SHOES FOR HOME USE    Controlled type 1 diabetes mellitus with diabetic polyneuropathy  -     DIABETIC SHOES FOR HOME USE        I counseled the patient on her conditions, their implications and medical management.    Incisions remain well healed, however, a suture reaction is noted along the dorsal most incision.    Advised to apply betadine to said area BID x 2 weeks.    Recommend soaking the affected extremity in epsom salt and warm water up to 20 minutes daily x 2 weeks.     Advised to call should there be a break in skin integrity.    To continue wearing current  shoe gear, as this places minimal pressure to the area.    Fitted and dispensed metatarsal pads to offload the Rt. Forefoot.    Rx written for diabetic shoes and custom orthotics with said modification applied.    RTC in 3 weeks for a follow up.     Lei Rhoades DPM

## 2023-11-06 ENCOUNTER — LAB VISIT (OUTPATIENT)
Dept: LAB | Facility: HOSPITAL | Age: 68
End: 2023-11-06
Attending: INTERNAL MEDICINE
Payer: MEDICARE

## 2023-11-06 DIAGNOSIS — E10.42 CONTROLLED TYPE 1 DIABETES MELLITUS WITH DIABETIC POLYNEUROPATHY, WITH LONG-TERM CURRENT USE OF INSULIN: ICD-10-CM

## 2023-11-06 DIAGNOSIS — I51.9 MYXEDEMA HEART DISEASE: ICD-10-CM

## 2023-11-06 DIAGNOSIS — E03.9 MYXEDEMA HEART DISEASE: ICD-10-CM

## 2023-11-06 DIAGNOSIS — Z79.899 ENCOUNTER FOR LONG-TERM (CURRENT) USE OF OTHER MEDICATIONS: Primary | ICD-10-CM

## 2023-11-06 DIAGNOSIS — E11.9 DIABETES MELLITUS WITHOUT COMPLICATION: ICD-10-CM

## 2023-11-06 LAB
ANION GAP SERPL CALC-SCNC: 5 MMOL/L (ref 8–16)
BUN SERPL-MCNC: 9 MG/DL (ref 8–23)
CALCIUM SERPL-MCNC: 9.2 MG/DL (ref 8.7–10.5)
CHLORIDE SERPL-SCNC: 103 MMOL/L (ref 95–110)
CHOLEST SERPL-MCNC: 148 MG/DL (ref 120–199)
CHOLEST/HDLC SERPL: 3.1 {RATIO} (ref 2–5)
CO2 SERPL-SCNC: 33 MMOL/L (ref 23–29)
CREAT SERPL-MCNC: 1 MG/DL (ref 0.5–1.4)
EST. GFR  (NO RACE VARIABLE): >60 ML/MIN/1.73 M^2
ESTIMATED AVG GLUCOSE: 123 MG/DL (ref 68–131)
GLUCOSE SERPL-MCNC: 175 MG/DL (ref 70–110)
HBA1C MFR BLD: 5.9 % (ref 4.5–6.2)
HDLC SERPL-MCNC: 47 MG/DL (ref 40–75)
HDLC SERPL: 31.8 % (ref 20–50)
LDLC SERPL CALC-MCNC: 80.2 MG/DL (ref 63–159)
NONHDLC SERPL-MCNC: 101 MG/DL
POTASSIUM SERPL-SCNC: 4.2 MMOL/L (ref 3.5–5.1)
SODIUM SERPL-SCNC: 141 MMOL/L (ref 136–145)
TRIGL SERPL-MCNC: 104 MG/DL (ref 30–150)
TSH SERPL DL<=0.005 MIU/L-ACNC: 3.47 UIU/ML (ref 0.34–5.6)

## 2023-11-06 PROCEDURE — 83036 HEMOGLOBIN GLYCOSYLATED A1C: CPT | Performed by: INTERNAL MEDICINE

## 2023-11-06 PROCEDURE — 80048 BASIC METABOLIC PNL TOTAL CA: CPT | Performed by: INTERNAL MEDICINE

## 2023-11-06 PROCEDURE — 84443 ASSAY THYROID STIM HORMONE: CPT | Performed by: INTERNAL MEDICINE

## 2023-11-06 PROCEDURE — 36415 COLL VENOUS BLD VENIPUNCTURE: CPT | Performed by: INTERNAL MEDICINE

## 2023-11-06 PROCEDURE — 80061 LIPID PANEL: CPT | Performed by: INTERNAL MEDICINE

## 2023-11-13 DIAGNOSIS — M85.89 DISAPPEARING BONE DISEASE: Primary | ICD-10-CM

## 2023-11-29 ENCOUNTER — OFFICE VISIT (OUTPATIENT)
Dept: FAMILY MEDICINE | Facility: CLINIC | Age: 68
End: 2023-11-29
Payer: MEDICARE

## 2023-11-29 VITALS
DIASTOLIC BLOOD PRESSURE: 70 MMHG | OXYGEN SATURATION: 99 % | RESPIRATION RATE: 18 BRPM | WEIGHT: 190.25 LBS | SYSTOLIC BLOOD PRESSURE: 120 MMHG | HEART RATE: 89 BPM | BODY MASS INDEX: 30.58 KG/M2 | HEIGHT: 66 IN
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
## 2023-11-29 DIAGNOSIS — I70.0 AORTIC ATHEROSCLEROSIS: ICD-10-CM

## 2023-11-29 DIAGNOSIS — Z78.0 ASYMPTOMATIC MENOPAUSAL STATE: ICD-10-CM

## 2023-11-29 DIAGNOSIS — I35.0 AORTIC VALVE STENOSIS, ETIOLOGY OF CARDIAC VALVE DISEASE UNSPECIFIED: ICD-10-CM

## 2023-11-29 DIAGNOSIS — Z00.00 ROUTINE GENERAL MEDICAL EXAMINATION AT A HEALTH CARE FACILITY: Primary | ICD-10-CM

## 2023-11-29 PROCEDURE — 99214 PR OFFICE/OUTPT VISIT, EST, LEVL IV, 30-39 MIN: ICD-10-PCS | Mod: S$PBB,,, | Performed by: STUDENT IN AN ORGANIZED HEALTH CARE EDUCATION/TRAINING PROGRAM

## 2023-11-29 PROCEDURE — 99999 PR PBB SHADOW E&M-EST. PATIENT-LVL V: ICD-10-PCS | Mod: PBBFAC,,, | Performed by: STUDENT IN AN ORGANIZED HEALTH CARE EDUCATION/TRAINING PROGRAM

## 2023-11-29 PROCEDURE — 99214 OFFICE O/P EST MOD 30 MIN: CPT | Mod: S$PBB,,, | Performed by: STUDENT IN AN ORGANIZED HEALTH CARE EDUCATION/TRAINING PROGRAM

## 2023-11-29 PROCEDURE — 99999 PR PBB SHADOW E&M-EST. PATIENT-LVL V: CPT | Mod: PBBFAC,,, | Performed by: STUDENT IN AN ORGANIZED HEALTH CARE EDUCATION/TRAINING PROGRAM

## 2023-11-29 PROCEDURE — 99215 OFFICE O/P EST HI 40 MIN: CPT | Mod: PBBFAC,PO | Performed by: STUDENT IN AN ORGANIZED HEALTH CARE EDUCATION/TRAINING PROGRAM

## 2023-11-29 RX ORDER — AMOXICILLIN AND CLAVULANATE POTASSIUM 875; 125 MG/1; MG/1
1 TABLET, FILM COATED ORAL 2 TIMES DAILY
Qty: 6 TABLET | Refills: 0 | Status: SHIPPED | OUTPATIENT
Start: 2023-11-29 | End: 2023-12-02

## 2023-11-29 RX ORDER — RSV VACC, PREF A AND PREF B/PF 120MCG/0.5
VIAL (EA) INTRAMUSCULAR
COMMUNITY
Start: 2023-10-21

## 2023-11-29 NOTE — PROGRESS NOTES
"Notify Dr of... heavy bleeding, passing clots, foul odor to your discharge, temperature above 100.4, burning when urinating, gapping or drainage from incision or episiotomy, or for pain not relieved by taking pain medication, redness or streaking in breasts, pain or redness in legs.    Take all medications as prescribed.  Continue taking iron or prenatal vitamin while breastfeeding or until you run out.  Take rest periods several times during the day.  \"Baby Blues\" are normal and may be present around the 3rd-4th day after delivery. If they last longer than 2-3 days, please let your Dr know.  Pelvic rest for 6 weeks. No douching, tampons, or intercourse  No driving for 2 weeks  No lifting anything heavier than the baby  Wear a good supportive bra 24 hours/day to prevent engorgement  " "[facility] CLIN[facility] NOTE    Patient Name: Kateryna Desai  YOB: 1955    PRESENTING HISTORY     History of Present Illness:  Ms. Kateryna Desai is a 68 y.o. female     Cat scratch right arm. Cleaned with peroxide immediately afterward.   Sunday.   Has been using bactroban, washing in shower.   Her personal cat.     T1DM- A1c 5.9. Occ lows. Dr. Padilla lowering long acting.     Due for DEXA.     ROS      OBJECTIVE:   Vital Signs:  Vitals:    11/29/23 0932   BP: 120/70   Pulse: 89   Resp: 18   SpO2: 99%   Weight: 86.3 kg (190 lb 4.1 oz)   Height: 5' 6" (1.676 m)       Physical Exam  Vitals and nursing note reviewed.   Constitutional:       Appearance: She is not diaphoretic.   HENT:      Head: Normocephalic and atraumatic.      Right Ear: External ear normal.      Left Ear: External ear normal.   Eyes:      General: No scleral icterus.     Conjunctiva/sclera: Conjunctivae normal.      Pupils: Pupils are equal, round, and reactive to light.   Neck:      Thyroid: No thyromegaly.   Cardiovascular:      Rate and Rhythm: Normal rate and regular rhythm.      Heart sounds: Normal heart sounds. No murmur heard.  Pulmonary:      Effort: Pulmonary effort is normal. No respiratory distress.      Breath sounds: Normal breath sounds. No wheezing or rales.   Musculoskeletal:         General: No tenderness or deformity. Normal range of motion.      Cervical back: Normal range of motion and neck supple.      Right lower leg: No edema.      Left lower leg: No edema.   Lymphadenopathy:      Cervical: No cervical adenopathy.   Skin:     Comments: Linear scabbed laceration approx 7cm to right wrist vertically. Mild surrounding erythema.    Neurological:      Mental Status: She is alert and oriented to person, place, and time.      Gait: Gait is intact.   Psychiatric:         Mood and Affect: Mood and affect normal.         Cognition and Memory: Memory normal.         Judgment: " Judgment normal.         ASSESSMENT & PLAN:     Routine general medical examination at a health care facility    Cat scratch  -     amoxicillin-clavulanate 875-125mg (AUGMENTIN) 875-125 mg per tablet; Take 1 tablet by mouth 2 (two) times daily. for 3 days  Dispense: 6 tablet; Refill: 0    Asymptomatic menopausal state  -     DXA Bone Density Axial Skeleton 1 or more sites; Future; Expected date: 11/29/2023    Aortic valve stenosis, etiology of cardiac valve disease unspecified  Well compensated. Observe    Aortic atherosclerosis  Incidental imaging finding.   Continue statin, aspirin.   Good glycemic control.            Guido Chow  Internal Medicine

## 2023-11-30 ENCOUNTER — OFFICE VISIT (OUTPATIENT)
Dept: PODIATRY | Facility: CLINIC | Age: 68
End: 2023-11-30
Payer: MEDICARE

## 2023-11-30 VITALS — HEIGHT: 66 IN | BODY MASS INDEX: 30.58 KG/M2 | WEIGHT: 190.25 LBS

## 2023-11-30 DIAGNOSIS — E10.42 CONTROLLED TYPE 1 DIABETES MELLITUS WITH DIABETIC POLYNEUROPATHY: ICD-10-CM

## 2023-11-30 DIAGNOSIS — Z98.890 STATUS POST HAMMERTOE CORRECTION: ICD-10-CM

## 2023-11-30 DIAGNOSIS — T81.89XD SUTURE REACTION, SUBSEQUENT ENCOUNTER: Primary | ICD-10-CM

## 2023-11-30 DIAGNOSIS — Z87.39 STATUS POST HAMMERTOE CORRECTION: ICD-10-CM

## 2023-11-30 PROCEDURE — 99212 OFFICE O/P EST SF 10 MIN: CPT | Mod: S$PBB,,, | Performed by: PODIATRIST

## 2023-11-30 PROCEDURE — 99999 PR PBB SHADOW E&M-EST. PATIENT-LVL IV: CPT | Mod: PBBFAC,,, | Performed by: PODIATRIST

## 2023-11-30 PROCEDURE — 99214 OFFICE O/P EST MOD 30 MIN: CPT | Mod: PBBFAC,PO | Performed by: PODIATRIST

## 2023-11-30 PROCEDURE — 99212 PR OFFICE/OUTPT VISIT, EST, LEVL II, 10-19 MIN: ICD-10-PCS | Mod: S$PBB,,, | Performed by: PODIATRIST

## 2023-11-30 PROCEDURE — 99999 PR PBB SHADOW E&M-EST. PATIENT-LVL IV: ICD-10-PCS | Mod: PBBFAC,,, | Performed by: PODIATRIST

## 2023-12-03 NOTE — PROGRESS NOTES
"Subjective:      Patient ID: Kateryna Desai is a 68 y.o. female.    Chief Complaint: Wound Check  Patient presents to clinic 16 weeks S/P hammertoe correction of the Rt. 2nd toe.  Notes a decrease in swelling to the digit since our last exam.  States the prior "lumps" under the skin remain but are not a source of pressure with use of closed toe shoes nor emits pain.  She continues wearing spacers to provide space between the digits.  Denies neuropathy pain to the affected forefoot.  Denies any additional pedal complaints.        Hemoglobin A1C   Date Value Ref Range Status   11/06/2023 5.9 4.5 - 6.2 % Final     Comment:     According to ADA guidelines, hemoglobin A1C <7.0% represents  optimal control in non-pregnant diabetic patients.  Different  metrics may apply to specific populations.   Standards of Medical Care in Diabetes - 2016.    For the purpose of screening for the presence of diabetes:  <5.7%     Consistent with the absence of diabetes  5.7-6.4%  Consistent with increasing risk for diabetes   (prediabetes)  >or=6.5%  Consistent with diabetes    Currently no consensus exists for use of hemoglobin A1C  for diagnosis of diabetes for children.     07/06/2023 6.3 (H) 4.5 - 6.2 % Final     Comment:     According to ADA guidelines, hemoglobin A1C <7.0% represents  optimal control in non-pregnant diabetic patients.  Different  metrics may apply to specific populations.   Standards of Medical Care in Diabetes - 2016.    For the purpose of screening for the presence of diabetes:  <5.7%     Consistent with the absence of diabetes  5.7-6.4%  Consistent with increasing risk for diabetes   (prediabetes)  >or=6.5%  Consistent with diabetes    Currently no consensus exists for use of hemoglobin A1C  for diagnosis of diabetes for children.     06/01/2023 6.2 4.5 - 6.2 % Final     Comment:     According to ADA guidelines, hemoglobin A1C <7.0% represents  optimal control in non-pregnant diabetic patients.  " Different  metrics may apply to specific populations.   Standards of Medical Care in Diabetes - 2016.    For the purpose of screening for the presence of diabetes:  <5.7%     Consistent with the absence of diabetes  5.7-6.4%  Consistent with increasing risk for diabetes   (prediabetes)  >or=6.5%  Consistent with diabetes    Currently no consensus exists for use of hemoglobin A1C  for diagnosis of diabetes for children.             Past Medical History:   Diagnosis Date    Allergy     Arthritis     degnerative disease low back,muscle spasms, shoulders    Asian flu type A 2017    Bunionette 2012    Diabetes mellitus type I     since age 13    Diabetic gastroparesis     takes Cytotec    Diabetic retinopathy of both eyes     gets periodic laser treatments    Difficult intubation     as a child had sore throat after a procedure    Encounter for blood transfusion     GERD (gastroesophageal reflux disease)     Hallux abducto valgus 3/31/2014    Headache(784.0)     Hiatal hernia     with GERD    Hyperlipidemia     Hypertension     Infection of the upper respiratory tract 2012    Lumbar spinal stenosis     Osteopenia     Rosacea     Seizures     Pt states during pgy with stroke    Stroke ?    while pregnant    Tachycardia     asymptomatic with Toprol    Thyroid disease     hypothyroidism    Venous insufficiency of leg     improved since EVLT       Past Surgical History:   Procedure Laterality Date    BUNIONECTOMY Right 2012    CARDIAC CATHETERIZATION      no stents     SECTION      COLONOSCOPY  2007    Dr. Rose, 10 year recheck    CORRECTION OF HAMMER TOE Right 8/3/2023    Procedure: CORRECTION, HAMMER TOE Right second toe;  Surgeon: Lei Rhoades DPM;  Location: [facility] OR;  Service: Podiatry;  Laterality: Right;  right 2nd toe    EXOSTECTOMY  12    left foot, local anesthesia, in office    EYE SURGERY      has had many laser procedures for diabetic retinopathy    VASCULAR SURGERY  "     VEIN SURGERY  April 2012    EVLT right greater saphenous, IV sedation       Family History   Problem Relation Age of Onset    Cancer Maternal Aunt         breast    Breast cancer Maternal Aunt     Diabetes Maternal Grandmother     Breast cancer Maternal Grandmother 38    Cancer Maternal Grandfather         prostate    Diabetes Maternal Grandfather     Diabetes Cousin     Arthritis Mother     Cancer Mother     Melanoma Mother     Heart disease Father     Stroke Father     No Known Problems Sister     No Known Problems Brother     No Known Problems Daughter     Alzheimer's disease Maternal Uncle     Alcohol abuse Maternal Uncle         x2    Heart disease Maternal Uncle     No Known Problems Paternal Aunt     Heart disease Paternal Uncle        Social History     Socioeconomic History    Marital status:    Tobacco Use    Smoking status: Never    Smokeless tobacco: Never   Substance and Sexual Activity    Alcohol use: Yes     Comment: rarely; 1 drink/year    Drug use: No    Sexual activity: Yes     Partners: Male       Current Outpatient Medications   Medication Sig Dispense Refill    ABRYSVO 120 mcg/0.5 mL SolR vaccine       ACZONE 5 % topical gel Apply 1 application topically 2 (two) times daily as needed (roseacea).  0    alpha lipoic acid 600 mg Cap Take 2 capsules by mouth once daily.      ALPHAGAN P 0.1 % Drop Place 1 drop into both eyes 3 (three) times daily.   6    ascorbic acid, vitamin C, (VITAMIN C) 100 MG tablet Take 100 mg by mouth 2 (two) times daily.      aspirin (ECOTRIN) 81 MG EC tablet Take 81 mg by mouth once daily.      azelastine (ASTELIN) 137 mcg (0.1 %) nasal spray USE ONE SPRAY IN EACH NOSTRIL TWICE A DAY (Patient taking differently: 1 spray by Nasal route 2 (two) times daily.) 30 mL 3    BD VEO INSULIN SYRINGE UF 1/2 mL 31 gauge x 15/64" Syrg   4    calcium citrate-vitamin D3 315-200 mg (CITRACAL+D) 315-200 mg-unit per tablet Take 1 tablet by mouth 2 (two) times daily.      cinnamon " "bark 500 mg capsule Take 500 mg by mouth 2 (two) times daily.      CRESTOR 40 mg Tab Take 40 mg by mouth once daily.       DOCOSAHEXANOIC ACID/EPA (FISH OIL ORAL) Take 1,200 mg by mouth once daily.      doxycycline (VIBRAMYCIN) 50 MG capsule Take 50 mg by mouth 2 (two) times daily.      ESTRACE 0.01 % (0.1 mg/gram) vaginal cream Place vaginally twice a week. Tuesday, Saturday  0    estradiol 10 mcg Tab Place 1 tablet vaginally twice a week. Tuesday, Saturday      ezetimibe (ZETIA) 10 mg tablet Take 10 mg by mouth once daily.      fluticasone propionate (FLONASE) 50 mcg/actuation nasal spray USE 1 SPRAY INTO THE EACH NOSTRIL ONCE A DAY Strength: 50 mcg/actuation 32 g 3    furosemide (LASIX) 20 MG tablet Take 1 tablet (20 mg total) by mouth 2 (two) times a day. 180 tablet 4    GLUCAGON EMERGENCY 1 mg injection Take 1 mg by mouth as needed.      GVOKE HYPOPEN 2-PACK 1 mg/0.2 mL AtIn USE AS DIRECTED TO TREAT PROFOUND HYPOGLYCEMIA      HUMALOG 100 unit/mL injection Inject 10-17 Units into the skin 3 (three) times daily before meals. 10-17 units tid sliding scale  1    metoprolol succinate (TOPROL-XL) 25 MG 24 hr tablet TAKE 1 TABLET BY MOUTH ONCE A DAY 90 tablet 3    montelukast (SINGULAIR) 10 mg tablet TAKE 1 TABLET BY MOUTH EVERY EVENING 90 tablet 3    mupirocin (BACTROBAN) 2 % ointment Apply topically 3 (three) times daily. 30 g 1    NOVOFINE 32 32 gauge x 1/4" Ndle       ondansetron (ZOFRAN-ODT) 4 MG TbDL Take 1 tablet (4 mg total) by mouth every 8 (eight) hours as needed (Nausea). 60 tablet 1    ONETOUCH ULTRA BLUE TEST STRIP Strp   11    pantoprazole (PROTONIX) 40 MG tablet TAKE 1 TABLET BY MOUTH TWICE A  tablet 3    potassium chloride (K-TAB) 20 mEq Take 40 mEq by mouth 2 (two) times a day.  6    promethazine (PHENERGAN) 25 MG tablet Take 1 tablet (25 mg total) by mouth every 6 (six) hours as needed for Nausea. 10 tablet 0    SOOLANTRA 1 % Crea Apply 1 application topically once daily.      SYNTHROID 75 mcg " tablet Take 75 mcg by mouth every Mon, Wed, Fri.      SYNTHROID 88 mcg tablet Take 88 mcg by mouth every Tuesday, Thursday, Saturday, Sunday.  0    TOUJEO SOLOSTAR U-300 INSULIN 300 unit/mL (1.5 mL) InPn pen Inject 34 Units into the skin once daily.      VITAMIN B COMPLEX VIT C NO.4 (SUPER B COMPLEX + C ORAL) Take 1 tablet by mouth once daily.      baclofen (LIORESAL) 10 MG tablet Take 1 tablet (10 mg total) by mouth 2 (two) times daily as needed (muscle pain). 20 tablet 0     No current facility-administered medications for this visit.     Facility-Administered Medications Ordered in Other Visits   Medication Dose Route Frequency Provider Last Rate Last Admin    HYDROmorphone (PF) injection 0.2 mg  0.2 mg Intravenous Q5 Min PRN Naomi Abdullahi MD        lactated ringers infusion   Intravenous Continuous Naomi Abdullahi MD 10 mL/hr at 08/03/23 1332 Restarted at 08/03/23 1442    LIDOcaine (PF) 10 mg/ml (1%) injection 10 mg  1 mL Intradermal Once Naomi Abdullahi MD        oxyCODONE immediate release tablet 5 mg  5 mg Oral Q3H PRN Naomi Abdullahi MD           Review of patient's allergies indicates:   Allergen Reactions    Bactrim [sulfamethoxazole-trimethoprim] Rash     Patient experienced on 12/3/14 redness to face and rash to chest and arms/ with no SOB or airway obstruction    Percocet [oxycodone-acetaminophen] Nausea And Vomiting     Also caused dizziness and passed out    Iodinated contrast media - iv dye Swelling and Rash     Says topical iodine OK    Norco [hydrocodone-acetaminophen] Itching, Swelling and Rash     Can tolerate if she takes Benadryl with it    Niaspan extended-release [niacin] Itching and Other (See Comments)     Skin flushing and redness         Review of Systems   Constitutional: Negative for chills, decreased appetite, diaphoresis and fever.   Cardiovascular:  Negative for claudication and leg swelling.   Skin:  Negative for color change, dry skin, flushing, itching  and nail changes.   Musculoskeletal:  Positive for joint pain. Negative for arthritis, back pain, falls, gout, joint swelling and myalgias.   Gastrointestinal:  Negative for nausea and vomiting.   Neurological:  Positive for numbness and paresthesias.   Psychiatric/Behavioral:  Negative for altered mental status.            Objective:      Physical Exam  Constitutional:       General: She is not in acute distress.     Appearance: She is well-developed. She is not diaphoretic.   Cardiovascular:      Pulses:           Dorsalis pedis pulses are 2+ on the right side and 2+ on the left side.        Posterior tibial pulses are 2+ on the right side and 2+ on the left side.      Comments: CFT < 3 seconds bilateral.  Pedal hair growth decreased bilateral.  Varicosities noted to bilateral lower extremity. Less localized edema noted to the Rt. 2nd toe. Toes are cool to touch bilateral.      Musculoskeletal:         General: Swelling present. No tenderness. Normal range of motion.      Comments: Muscle strength 5/5 in all muscle groups bilateral.  Mild pain from the operative site of the Rt. 2nd toe.   More reducible contracture of the Rt. 2nd toe.  Bilateral pes planus foot type.  Bilateral hallux abducto valgus.  Bilateral semi-rigid contracture of toes 2-5 with adductovarus rotation of the 5th.  Bilateral forefoot fat pad atrophy.  (-) for pain with palpation to the Rt. Sub 2nd and 3rd met heads.  (-) Lachman sign noted.     Skin:     General: Skin is warm and dry.      Coloration: Skin is not pale.      Findings: No abrasion, bruising, burn, ecchymosis, erythema, laceration, lesion, petechiae or rash.      Nails: There is no clubbing.      Comments: Incisions to the Rt. 2nd toe remains healed.  Continued increase in turgor noted to said digit.  Continues presence of small nodules oriented along the incision line consistent with a suture reaction.  No break noted in skin integrity.          Neurological:      Mental Status:  She is alert and oriented to person, place, and time.      Sensory: Sensory deficit present.      Motor: No weakness or atrophy.      Comments: Protective sensation per Pillsbury-Abigail monofilament decreased bilateral.    Light touch intact bilateral.               Assessment:       Encounter Diagnoses   Name Primary?    Suture reaction, subsequent encounter Yes    Status post hammertoe correction     Controlled type 1 diabetes mellitus with diabetic polyneuropathy              Plan:       Kateryna was seen today for wound check.    Diagnoses and all orders for this visit:    Suture reaction, subsequent encounter    Status post hammertoe correction    Controlled type 1 diabetes mellitus with diabetic polyneuropathy          I counseled the patient on her conditions, their implications and medical management.    Incisions remain well healed.  No skin breakdown is appreciated at the prior site of suture reactions.     Advised to call should there be a break in skin integrity.    To continue wearing current shoe gear, as this places minimal pressure to the site of suture reaction.    RTC in 3 months for a routine diabetic foot exam.     Lei Rhoades DPM

## 2023-12-05 ENCOUNTER — HOSPITAL ENCOUNTER (OUTPATIENT)
Dept: RADIOLOGY | Facility: CLINIC | Age: 68
Discharge: HOME | End: 2023-12-05
Attending: OBSTETRICS & GYNECOLOGY
Payer: MEDICARE

## 2023-12-05 DIAGNOSIS — Z12.31 ENCOUNTER FOR SCREENING MAMMOGRAM FOR MALIGNANT NEOPLASM OF BREAST: ICD-10-CM

## 2023-12-05 PROCEDURE — 77067 SCR MAMMO BI INCL CAD: CPT | Mod: 26,,, | Performed by: RADIOLOGY

## 2023-12-05 PROCEDURE — 77063 MAMMO DIGITAL SCREENING BILAT WITH TOMO: ICD-10-PCS | Mod: 26,,, | Performed by: RADIOLOGY

## 2023-12-05 PROCEDURE — 77067 SCR MAMMO BI INCL CAD: CPT | Mod: TC,PO

## 2023-12-05 PROCEDURE — 77063 BREAST TOMOSYNTHESIS BI: CPT | Mod: 26,,, | Performed by: RADIOLOGY

## 2023-12-05 PROCEDURE — 77067 MAMMO DIGITAL SCREENING BILAT WITH TOMO: ICD-10-PCS | Mod: 26,,, | Performed by: RADIOLOGY

## 2023-12-07 ENCOUNTER — HOSPITAL ENCOUNTER (OUTPATIENT)
Dept: RADIOLOGY | Facility: HOSPITAL | Age: 68
Discharge: HOME | End: 2023-12-07
Attending: STUDENT IN AN ORGANIZED HEALTH CARE EDUCATION/TRAINING PROGRAM
Payer: MEDICARE

## 2023-12-07 DIAGNOSIS — Z78.0 ASYMPTOMATIC MENOPAUSAL STATE: ICD-10-CM

## 2023-12-07 PROCEDURE — 77080 DXA BONE DENSITY AXIAL: CPT | Mod: TC,PO

## 2024-01-10 ENCOUNTER — HOSPITAL ENCOUNTER (EMERGENCY)
Facility: HOSPITAL | Age: 69
Discharge: HOME | End: 2024-01-10
Attending: STUDENT IN AN ORGANIZED HEALTH CARE EDUCATION/TRAINING PROGRAM
Payer: MEDICARE

## 2024-01-10 ENCOUNTER — OFFICE VISIT (OUTPATIENT)
Dept: URGENT CARE | Facility: CLINIC | Age: 69
End: 2024-01-10
Payer: MEDICARE

## 2024-01-10 ENCOUNTER — NURSE TRIAGE (OUTPATIENT)
Dept: ADMINISTRATIVE | Facility: CLINIC | Age: 69
End: 2024-01-10
Payer: MEDICARE

## 2024-01-10 ENCOUNTER — TELEPHONE (OUTPATIENT)
Dept: FAMILY MEDICINE | Facility: CLINIC | Age: 69
End: 2024-01-10
Payer: MEDICARE

## 2024-01-10 VITALS
OXYGEN SATURATION: 97 % | BODY MASS INDEX: 30.37 KG/M2 | HEART RATE: 99 BPM | HEIGHT: 66 IN | SYSTOLIC BLOOD PRESSURE: 142 MMHG | TEMPERATURE: 98 F | DIASTOLIC BLOOD PRESSURE: 82 MMHG | WEIGHT: 189 LBS | RESPIRATION RATE: 18 BRPM

## 2024-01-10 VITALS
WEIGHT: 188 LBS | HEART RATE: 91 BPM | SYSTOLIC BLOOD PRESSURE: 125 MMHG | DIASTOLIC BLOOD PRESSURE: 60 MMHG | RESPIRATION RATE: 16 BRPM | OXYGEN SATURATION: 98 % | BODY MASS INDEX: 30.34 KG/M2 | TEMPERATURE: 98 F

## 2024-01-10 DIAGNOSIS — Z20.822 COVID-19 VIRUS NOT DETECTED: ICD-10-CM

## 2024-01-10 DIAGNOSIS — R53.1 WEAKNESS: ICD-10-CM

## 2024-01-10 DIAGNOSIS — Z86.39 HISTORY OF TYPE 1 DIABETES MELLITUS: ICD-10-CM

## 2024-01-10 DIAGNOSIS — R11.2 NAUSEA AND VOMITING, UNSPECIFIED VOMITING TYPE: Primary | ICD-10-CM

## 2024-01-10 DIAGNOSIS — R73.9 HYPERGLYCEMIA: ICD-10-CM

## 2024-01-10 LAB
ALBUMIN SERPL BCP-MCNC: 4.2 G/DL (ref 3.5–5.2)
ALLENS TEST: ABNORMAL
ALP SERPL-CCNC: 72 U/L (ref 55–135)
ALT SERPL W/O P-5'-P-CCNC: 17 U/L (ref 10–44)
ANION GAP SERPL CALC-SCNC: 9 MMOL/L (ref 8–16)
AST SERPL-CCNC: 23 U/L (ref 10–40)
B-OH-BUTYR BLD STRIP-SCNC: 0.9 MMOL/L (ref 0–0.5)
BACTERIA #/AREA URNS HPF: NEGATIVE /HPF
BASOPHILS # BLD AUTO: 0.02 K/UL (ref 0–0.2)
BASOPHILS NFR BLD: 0.3 % (ref 0–1.9)
BILIRUB SERPL-MCNC: 0.6 MG/DL (ref 0.1–1)
BILIRUB UR QL STRIP: NEGATIVE
BILIRUB UR QL STRIP: NEGATIVE
BUN SERPL-MCNC: 13 MG/DL (ref 8–23)
CALCIUM SERPL-MCNC: 9.7 MG/DL (ref 8.7–10.5)
CHLORIDE SERPL-SCNC: 99 MMOL/L (ref 95–110)
CLARITY UR: CLEAR
CO2 SERPL-SCNC: 29 MMOL/L (ref 23–29)
COLOR UR: YELLOW
CREAT SERPL-MCNC: 1.1 MG/DL (ref 0.5–1.4)
CTP QC/QA: YES
DELSYS: ABNORMAL
DIFFERENTIAL METHOD BLD: ABNORMAL
EOSINOPHIL # BLD AUTO: 0 K/UL (ref 0–0.5)
EOSINOPHIL NFR BLD: 0.2 % (ref 0–8)
ERYTHROCYTE [DISTWIDTH] IN BLOOD BY AUTOMATED COUNT: 14.3 % (ref 11.5–14.5)
EST. GFR  (NO RACE VARIABLE): 55.4 ML/MIN/1.73 M^2
GLUCOSE SERPL-MCNC: 170 MG/DL (ref 70–110)
GLUCOSE SERPL-MCNC: 245 MG/DL (ref 70–110)
GLUCOSE SERPL-MCNC: 294 MG/DL (ref 70–110)
GLUCOSE SERPL-MCNC: 318 MG/DL (ref 70–110)
GLUCOSE SERPL-MCNC: 336 MG/DL (ref 70–110)
GLUCOSE UR QL STRIP: ABNORMAL
GLUCOSE UR QL STRIP: NEGATIVE
HCO3 UR-SCNC: 31.8 MMOL/L (ref 24–28)
HCT VFR BLD AUTO: 41.2 % (ref 37–48.5)
HGB BLD-MCNC: 13 G/DL (ref 12–16)
HGB UR QL STRIP: NEGATIVE
HYALINE CASTS #/AREA URNS LPF: 15 /LPF
IMM GRANULOCYTES # BLD AUTO: 0.01 K/UL (ref 0–0.04)
IMM GRANULOCYTES NFR BLD AUTO: 0.2 % (ref 0–0.5)
KETONES UR QL STRIP: ABNORMAL
KETONES UR QL STRIP: POSITIVE
LEUKOCYTE ESTERASE UR QL STRIP: NEGATIVE
LEUKOCYTE ESTERASE UR QL STRIP: NEGATIVE
LIPASE SERPL-CCNC: 5 U/L (ref 4–60)
LYMPHOCYTES # BLD AUTO: 0.6 K/UL (ref 1–4.8)
LYMPHOCYTES NFR BLD: 8.9 % (ref 18–48)
MCH RBC QN AUTO: 30.4 PG (ref 27–31)
MCHC RBC AUTO-ENTMCNC: 31.6 G/DL (ref 32–36)
MCV RBC AUTO: 97 FL (ref 82–98)
MICROSCOPIC COMMENT: ABNORMAL
MONOCYTES # BLD AUTO: 0.2 K/UL (ref 0.3–1)
MONOCYTES NFR BLD: 2.5 % (ref 4–15)
NEUTROPHILS # BLD AUTO: 5.5 K/UL (ref 1.8–7.7)
NEUTROPHILS NFR BLD: 87.9 % (ref 38–73)
NITRITE UR QL STRIP: NEGATIVE
NRBC BLD-RTO: 0 /100 WBC
PCO2 BLDA: 62.2 MMHG (ref 35–45)
PH SMN: 7.32 [PH] (ref 7.35–7.45)
PH UR STRIP: 6 [PH] (ref 5–8)
PH, POC UA: 6
PLATELET # BLD AUTO: 179 K/UL (ref 150–450)
PMV BLD AUTO: 11.2 FL (ref 9.2–12.9)
PO2 BLDA: 23 MMHG (ref 40–60)
POC BE: 6 MMOL/L
POC BLOOD, URINE: NEGATIVE
POC NITRATES, URINE: NEGATIVE
POC SATURATED O2: 34 % (ref 95–100)
POC TCO2: 34 MMOL/L (ref 24–29)
POTASSIUM SERPL-SCNC: 4.4 MMOL/L (ref 3.5–5.1)
PROT SERPL-MCNC: 6.9 G/DL (ref 6–8.4)
PROT UR QL STRIP: ABNORMAL
PROT UR QL STRIP: POSITIVE
RBC # BLD AUTO: 4.27 M/UL (ref 4–5.4)
RBC #/AREA URNS HPF: 3 /HPF (ref 0–4)
SAMPLE: ABNORMAL
SARS-COV-2 AG RESP QL IA.RAPID: NEGATIVE
SITE: ABNORMAL
SODIUM SERPL-SCNC: 137 MMOL/L (ref 136–145)
SP GR UR STRIP: 1.03 (ref 1–1.03)
SP GR UR STRIP: 1.03 (ref 1–1.03)
SQUAMOUS #/AREA URNS HPF: 4 /HPF
URN SPEC COLLECT METH UR: ABNORMAL
UROBILINOGEN UR STRIP-ACNC: 0.2 (ref 0.1–1.1)
UROBILINOGEN UR STRIP-ACNC: NEGATIVE EU/DL
WBC # BLD AUTO: 6.28 K/UL (ref 3.9–12.7)
WBC #/AREA URNS HPF: 2 /HPF (ref 0–5)
YEAST URNS QL MICRO: ABNORMAL

## 2024-01-10 PROCEDURE — 99285 EMERGENCY DEPT VISIT HI MDM: CPT | Mod: 25

## 2024-01-10 PROCEDURE — 63600175 PHARM REV CODE 636 W HCPCS: Performed by: STUDENT IN AN ORGANIZED HEALTH CARE EDUCATION/TRAINING PROGRAM

## 2024-01-10 PROCEDURE — 99900035 HC TECH TIME PER 15 MIN (STAT)

## 2024-01-10 PROCEDURE — 99214 OFFICE O/P EST MOD 30 MIN: CPT | Mod: S$GLB,,,

## 2024-01-10 PROCEDURE — 87811 SARS-COV-2 COVID19 W/OPTIC: CPT | Mod: QW,S$GLB,,

## 2024-01-10 PROCEDURE — 82962 GLUCOSE BLOOD TEST: CPT | Mod: ,,,

## 2024-01-10 PROCEDURE — 80053 COMPREHEN METABOLIC PANEL: CPT | Performed by: PHYSICIAN ASSISTANT

## 2024-01-10 PROCEDURE — 96375 TX/PRO/DX INJ NEW DRUG ADDON: CPT

## 2024-01-10 PROCEDURE — 81001 URINALYSIS AUTO W/SCOPE: CPT | Performed by: PHYSICIAN ASSISTANT

## 2024-01-10 PROCEDURE — 96361 HYDRATE IV INFUSION ADD-ON: CPT

## 2024-01-10 PROCEDURE — 82010 KETONE BODYS QUAN: CPT | Performed by: PHYSICIAN ASSISTANT

## 2024-01-10 PROCEDURE — 83690 ASSAY OF LIPASE: CPT | Performed by: PHYSICIAN ASSISTANT

## 2024-01-10 PROCEDURE — 82803 BLOOD GASES ANY COMBINATION: CPT

## 2024-01-10 PROCEDURE — 96374 THER/PROPH/DIAG INJ IV PUSH: CPT

## 2024-01-10 PROCEDURE — 82962 GLUCOSE BLOOD TEST: CPT

## 2024-01-10 PROCEDURE — 85025 COMPLETE CBC W/AUTO DIFF WBC: CPT | Performed by: PHYSICIAN ASSISTANT

## 2024-01-10 PROCEDURE — 81003 URINALYSIS AUTO W/O SCOPE: CPT | Mod: QW,S$GLB,,

## 2024-01-10 PROCEDURE — 25000003 PHARM REV CODE 250: Performed by: STUDENT IN AN ORGANIZED HEALTH CARE EDUCATION/TRAINING PROGRAM

## 2024-01-10 RX ORDER — PROMETHAZINE HYDROCHLORIDE 25 MG/1
25 SUPPOSITORY RECTAL EVERY 6 HOURS PRN
Qty: 10 SUPPOSITORY | Refills: 0 | Status: SHIPPED | OUTPATIENT
Start: 2024-01-10

## 2024-01-10 RX ORDER — ONDANSETRON 8 MG/1
8 TABLET, ORALLY DISINTEGRATING ORAL
Status: COMPLETED | OUTPATIENT
Start: 2024-01-10 | End: 2024-01-10

## 2024-01-10 RX ORDER — ONDANSETRON HYDROCHLORIDE 2 MG/ML
4 INJECTION, SOLUTION INTRAVENOUS
Status: COMPLETED | OUTPATIENT
Start: 2024-01-10 | End: 2024-01-10

## 2024-01-10 RX ORDER — SODIUM CHLORIDE 9 MG/ML
1000 INJECTION, SOLUTION INTRAVENOUS
Status: DISCONTINUED | OUTPATIENT
Start: 2024-01-10 | End: 2024-01-10

## 2024-01-10 RX ADMIN — ONDANSETRON 8 MG: 8 TABLET, ORALLY DISINTEGRATING ORAL at 06:01

## 2024-01-10 RX ADMIN — SODIUM CHLORIDE 1000 ML: 9 INJECTION, SOLUTION INTRAVENOUS at 09:01

## 2024-01-10 RX ADMIN — INSULIN HUMAN 5 UNITS: 100 INJECTION, SOLUTION PARENTERAL at 09:01

## 2024-01-10 RX ADMIN — ONDANSETRON 4 MG: 2 INJECTION INTRAMUSCULAR; INTRAVENOUS at 09:01

## 2024-01-10 NOTE — TELEPHONE ENCOUNTER
First available appointment in office noted on tomorrow's date. Recommended for patient to be seen at [facility] today. Patient verbalized understanding.

## 2024-01-10 NOTE — TELEPHONE ENCOUNTER
OOC NT return call -  Pt reports Nausea Vomit x 3. Has taken 1 dose of zofran @1pm. Current CBG=99. Light headed - Vomiting protocol followed and pt advised High Priority message will be sent to PCP.  Pt instructed to go to UC or ED if no return call by 530 pm.  Encounter routed to PCP   Reason for Disposition   MODERATE vomiting (e.g., 3 - 5 times/day) and age > 60 years    Additional Information   Negative: Shock suspected (e.g., cold/pale/clammy skin, too weak to stand, low BP, rapid pulse)   Negative: Difficult to awaken or acting confused (e.g., disoriented, slurred speech)   Negative: Sounds like a life-threatening emergency to the triager   Negative: Vomiting red blood or black (coffee ground) material   Negative: Vomiting and hernia is more painful or swollen than usual   Negative: Recent head injury (within 3 days)   Negative: Recent abdominal injury (within 7 days)   Negative: Insulin-dependent diabetes and glucose > 240 mg/dL (13 mmol/L)   Negative: Severe pain in one eye   Negative: SEVERE vomiting (e.g., 6 or more times/day)  (Exception: Patient sounds well, is drinking liquids, does not sound dehydrated, and vomiting has lasted less than 24 hours.)    Protocols used: Vomiting-A-OH

## 2024-01-10 NOTE — TELEPHONE ENCOUNTER
Vani Rojas Staff     ----- Message from Vani Rojas sent at 1/10/2024  3:42 PM CST -----  Contact: pt [phone]  Type: Needs Medical Advice  Who Called:  Pt     Best Call Back Number: [phone]     Additional Information: Pls call back and advise   Symptom: Vomiting Outcome: Schedule an urgent appointment (within 4 hours) or talk to a nurse or provider within 30 minutes. Reason: Vomited at least once in the past 8 hours

## 2024-01-11 NOTE — PROGRESS NOTES
"Subjective:      Patient ID: Kateryna Desai is a 68 y.o. female.    Vitals:  height is 5' 6" (1.676 m) and weight is 85.7 kg (189 lb). Her oral temperature is 97.5 °F (36.4 °C). Her blood pressure is 142/82 (abnormal) and her pulse is 99. Her respiration is 18 and oxygen saturation is 97%.     Chief Complaint: Emesis    In clinic with a chief complaint of nausea and vomiting since 12/30.  She has had 2 episodes of nonbilious nonbloody emesis.  No abdominal pain.  No recent travel.  Patient does report history of type 1 diabetes.  Glucose monitor home readings as high as 190.  She denies feeling sensation of being acidotic.  She is nontoxic-appearing.  Also reporting sinus congestion that has been ongoing for 2 weeks.  Denies fever, polydipsia, polyphagia or polyuria      Emesis   This is a new problem. The current episode started today. The problem occurs 2 to 4 times per day. The problem has been unchanged. The emesis has an appearance of stomach contents. There has been no fever. Associated symptoms include headaches and URI. Pertinent negatives include no abdominal pain, chills, coughing or fever. Associated symptoms comments: Dehydrated, weakness. She has tried anti-emetic (Hot shower) for the symptoms. The treatment provided no relief.       Constitution: Positive for fatigue. Negative for chills and fever.   HENT:  Positive for congestion. Negative for sore throat.    Neck: neck negative.   Cardiovascular: Negative.    Respiratory:  Negative for cough and sputum production.    Gastrointestinal:  Positive for vomiting. Negative for abdominal pain.   Endocrine: cold intolerance, heat intolerance, excessive thirst and excessive urination.   Genitourinary:  Positive for frequency.   Musculoskeletal: Negative.    Skin: Negative.    Allergic/Immunologic: Positive for immunizations up-to-date and flu shot.   Neurological:  Positive for headaches.   Hematologic/Lymphatic: Negative.  "   Psychiatric/Behavioral: Negative.        Objective:     Physical Exam   Constitutional: She is oriented to person, place, and time. She appears well-developed. She is cooperative.  Non-toxic appearance. She does not appear ill. No distress. obesity  HENT:   Head: Normocephalic and atraumatic.   Ears:   Right Ear: Hearing, tympanic membrane, external ear and ear canal normal.   Left Ear: Hearing, tympanic membrane, external ear and ear canal normal.   Nose: Nose normal. No mucosal edema or nasal deformity. No epistaxis. Right sinus exhibits no maxillary sinus tenderness and no frontal sinus tenderness. Left sinus exhibits no maxillary sinus tenderness and no frontal sinus tenderness.   Mouth/Throat: Uvula is midline, oropharynx is clear and moist and mucous membranes are normal. Mucous membranes are moist. No trismus in the jaw. Normal dentition. No uvula swelling. No oropharyngeal exudate or posterior oropharyngeal erythema. Oropharynx is clear.   Eyes: Conjunctivae and lids are normal. Pupils are equal, round, and reactive to light. Extraocular movement intact   Neck: Trachea normal and phonation normal. Neck supple.   Cardiovascular: Normal rate, regular rhythm, normal heart sounds and normal pulses.   Pulmonary/Chest: Effort normal and breath sounds normal.   Abdominal: Normal appearance. Soft. flat abdomen There is no abdominal tenderness.   Musculoskeletal: Normal range of motion.         General: Normal range of motion.   Neurological: no focal deficit. She is alert, oriented to person, place, and time and at baseline. She exhibits normal muscle tone.   Skin: Skin is warm, dry, intact and not diaphoretic. Capillary refill takes 2 to 3 seconds.   Psychiatric: Her speech is normal and behavior is normal. Mood, judgment and thought content normal.   Nursing note and vitals reviewed.    4 COVID risk score    Assessment:     1. History of type 1 diabetes mellitus    2. Weakness    3. Nausea and vomiting,  unspecified vomiting type        Plan:       History of type 1 diabetes mellitus  -     POCT Glucose, Hand-Held Device  -     POCT Urinalysis, Dipstick, Automated, W/O Scope    Weakness  -     POCT Glucose, Hand-Held Device    Nausea and vomiting, unspecified vomiting type  -     SARS Coronavirus 2 Antigen, POCT Manual Read  -     ondansetron disintegrating tablet 8 mg          Medical Decision Making:   Clinical Tests:   Lab Tests: Ordered and Reviewed  The following lab test(s) were unremarkable: Urinalysis       <> Summary of Lab: , COVID negative, urinalysis  Urgent Care Management:  Urgent evaluation nausea and vomiting with dizziness that started at 12:30 p.m..  History of type 1 diabetes.  Glucose mildly elevated off her baseline per patient.  She denies testing urine for ketones at home.  Denies feeling acidotic.  No Kussmaul respirations.  No tachycardia.  No abdominal pain.  Do not suspect gastroenteritis.  Has a history of gastroparesis.  Takes Protonix for management.  She is non toxic-appearing.  Zofran given in clinic, with p.o. challenge.  Tolerating p.o..  Urine with trace ketones.  Trace protein.  Negative leukocytes/ nitrites.  Patient was sent to emergency room for further management evaluation.    Release signed by patient.

## 2024-01-11 NOTE — ED PROVIDER NOTES
Encounter Date: 1/10/2024       History     Chief Complaint   Patient presents with    Hyperglycemia     Went to urgent care for N/V, CBG was 177 and urine had ketones. Sent by .     Nausea    Vomiting     68-year-old female with history of type 1 diabetes, insulin-dependent presents now for nausea and vomiting starting around noon, lasting throughout the afternoon.  She did not give herself insulin because she was not tolerating p.o. intake.  She went to urgent Care, blood glucose 177 and she would ketones in her urine so they sent her here for DKA evaluation.  Patient reports no abdominal pain, no fevers or chills, no diarrhea.  She has not sure why she started having the emesis.  She attempted Zofran without relief prior to arrival.      Review of patient's allergies indicates:   Allergen Reactions    Sulfamethoxazole-trimethoprim Rash and Hives     Patient experienced on 12/3/14 redness to face and rash to chest and arms/ with no SOB or airway obstruction    Zinc-25 Hives and Swelling     Aching pains in the knees     Heparin analogues     Iodinated contrast media Swelling and Rash     Says topical iodine OK     Past Medical History:   Diagnosis Date    Allergy     Arthritis     degnerative disease low back,muscle spasms, shoulders    Asian flu type A 12/22/2017    Bunionette 7/26/2012    Diabetes mellitus type I     since age 13    Diabetic gastroparesis     takes Cytotec    Diabetic retinopathy of both eyes     gets periodic laser treatments    Difficult intubation     as a child had sore throat after a procedure    Encounter for blood transfusion     GERD (gastroesophageal reflux disease)     Hallux abducto valgus 3/31/2014    Headache(784.0)     Hiatal hernia     with GERD    Hyperlipidemia     Hypertension     Infection of the upper respiratory tract June 2012    Lumbar spinal stenosis     Osteopenia     Rosacea     Seizures     Pt states during pgy with stroke    Stroke 1985?    while pregnant     Tachycardia     asymptomatic with Toprol    Thyroid disease     hypothyroidism    Venous insufficiency of leg     improved since EVLT     Past Surgical History:   Procedure Laterality Date    BUNIONECTOMY Right 2012    CARDIAC CATHETERIZATION      no stents     SECTION      COLONOSCOPY  2007    Dr. Rose, 10 year recheck    CORRECTION OF HAMMER TOE Right 8/3/2023    Procedure: CORRECTION, HAMMER TOE Right second toe;  Surgeon: Lei Rhoades DPM;  Location: [facility] OR;  Service: Podiatry;  Laterality: Right;  right 2nd toe    EXOSTECTOMY  12    left foot, local anesthesia, in office    EYE SURGERY      has had many laser procedures for diabetic retinopathy    VASCULAR SURGERY      VEIN SURGERY  2012    EVLT right greater saphenous, IV sedation     Family History   Problem Relation Age of Onset    Cancer Maternal Aunt         breast    Breast cancer Maternal Aunt     Diabetes Maternal Grandmother     Breast cancer Maternal Grandmother 38    Cancer Maternal Grandfather         prostate    Diabetes Maternal Grandfather     Diabetes Cousin     Arthritis Mother     Cancer Mother     Melanoma Mother     Heart disease Father     Stroke Father     No Known Problems Sister     No Known Problems Brother     No Known Problems Daughter     Alzheimer's disease Maternal Uncle     Alcohol abuse Maternal Uncle         x2    Heart disease Maternal Uncle     No Known Problems Paternal Aunt     Heart disease Paternal Uncle      Social History     Tobacco Use    Smoking status: Never    Smokeless tobacco: Never   Substance Use Topics    Alcohol use: Yes     Comment: rarely; 1 drink/year    Drug use: No     Review of Systems   Constitutional:  Negative for activity change, appetite change, chills, fever and unexpected weight change.   HENT:  Negative for dental problem and drooling.    Eyes:  Negative for discharge and itching.   Respiratory:  Negative for cough, chest tightness, shortness of breath, wheezing  and stridor.    Cardiovascular:  Negative for chest pain, palpitations and leg swelling.   Gastrointestinal:  Positive for nausea and vomiting. Negative for abdominal distention, abdominal pain and diarrhea.   Genitourinary:  Negative for difficulty urinating, dysuria, frequency and urgency.   Musculoskeletal:  Negative for back pain, gait problem and joint swelling.   Neurological:  Negative for dizziness, syncope, numbness and headaches.   Psychiatric/Behavioral:  Negative for agitation, behavioral problems and confusion.        Physical Exam     Initial Vitals [01/10/24 1935]   BP Pulse Resp Temp SpO2   (!) 142/68 88 18 97.8 °F (36.6 °C) 98 %      MAP       --         Physical Exam    Nursing note and vitals reviewed.  Constitutional: She appears well-developed and well-nourished. She is not diaphoretic.   HENT:   Head: Normocephalic and atraumatic.   Mouth/Throat: Oropharynx is clear and moist.   Eyes: EOM are normal. Pupils are equal, round, and reactive to light. Right eye exhibits no discharge. Left eye exhibits no discharge.   Neck: No tracheal deviation present.   Normal range of motion.  Cardiovascular:  Normal rate, regular rhythm and intact distal pulses.           Pulmonary/Chest: No respiratory distress. She has no wheezes. She exhibits no tenderness.   Abdominal: Abdomen is soft. She exhibits no distension. There is no abdominal tenderness.   Musculoskeletal:         General: No tenderness or edema. Normal range of motion.      Cervical back: Normal range of motion.     Neurological: She is alert and oriented to person, place, and time. She has normal strength. No cranial nerve deficit or sensory deficit. GCS eye subscore is 4. GCS verbal subscore is 5. GCS motor subscore is 6.   Skin: Skin is warm and dry. No rash noted.   Psychiatric: She has a normal mood and affect. Her behavior is normal. Thought content normal.         ED Course   Critical Care    Date/Time: 1/11/2024 1:49 AM    Performed by:  Nathaniel Velez MD  Authorized by: Nathaniel Velez MD  Direct patient critical care time: 5 minutes  Total critical care time (exclusive of procedural time) : 5 minutes  Critical care was necessary to treat or prevent imminent or life-threatening deterioration of the following conditions: endocrine crisis.  Critical care was time spent personally by me on the following activities: examination of patient, obtaining history from patient or surrogate, ordering and performing treatments and interventions, ordering and review of laboratory studies, ordering and review of radiographic studies, pulse oximetry and re-evaluation of patient's condition.        Labs Reviewed   CBC W/ AUTO DIFFERENTIAL - Abnormal; Notable for the following components:       Result Value    MCHC 31.6 (*)     Lymph # 0.6 (*)     Mono # 0.2 (*)     Gran % 87.9 (*)     Lymph % 8.9 (*)     Mono % 2.5 (*)     All other components within normal limits   COMPREHENSIVE METABOLIC PANEL - Abnormal; Notable for the following components:    Glucose 318 (*)     eGFR 55.4 (*)     All other components within normal limits   URINALYSIS, REFLEX TO URINE CULTURE - Abnormal; Notable for the following components:    Protein, UA Trace (*)     Glucose, UA 4+ (*)     Ketones, UA 2+ (*)     All other components within normal limits    Narrative:     Specimen Source->Urine   BETA - HYDROXYBUTYRATE, SERUM - Abnormal; Notable for the following components:    Beta-Hydroxybutyrate 0.9 (*)     All other components within normal limits    Narrative:     Recoll. [id number] by MS1 at 01/10/2024 20:58, reason: SPUN   URINALYSIS MICROSCOPIC - Abnormal; Notable for the following components:    Hyaline Casts, UA 15 (*)     All other components within normal limits    Narrative:     Specimen Source->Urine   POCT GLUCOSE - Abnormal; Notable for the following components:    POC Glucose 245 (*)     All other components within normal limits   ISTAT PROCEDURE - Abnormal; Notable  for the following components:    POC PH 7.317 (*)     POC PCO2 62.2 (*)     POC PO2 23 (*)     POC HCO3 31.8 (*)     POC BE 6 (*)     POC TCO2 34 (*)     All other components within normal limits   POCT GLUCOSE - Abnormal; Notable for the following components:    POC Glucose 336 (*)     All other components within normal limits   POCT GLUCOSE - Abnormal; Notable for the following components:    POC Glucose 294 (*)     All other components within normal limits   LIPASE   POCT GLUCOSE MONITORING CONTINUOUS          Imaging Results    None          Medications   sodium chloride 0.9% bolus 1,000 mL 1,000 mL (0 mLs Intravenous Stopped 1/10/24 2241)   insulin regular injection 5 Units 0.05 mL (5 Units Intravenous Given 1/10/24 2150)   ondansetron injection 4 mg (4 mg Intravenous Given 1/10/24 2149)     Medical Decision Making  Differential diagnosis includes hyperglycemia, DKA, gastroparesis, food poisoning, cholecystitis, pancreatitis    Risk  OTC drugs.  Prescription drug management.               ED Course as of 01/11/24 0149   u Jan 11, 2024   0147 POCT glucose(!) [BS]   0147 Urinalysis Microscopic(!) [BS]   0147 CBC auto differential(!) [BS]   0147 Comprehensive metabolic panel(!) [BS]   0147 Lipase [BS]   0147 ISTAT PROCEDURE(!!) [BS]   0147 Beta - Hydroxybutyrate, Serum(!) [BS]   0147 Urinalysis, Reflex to Urine Culture Urine, Clean Catch(!) [BS]   0148 Patient overall well-appearing, no acute distress.  She was mildly tachycardic, treated with IV fluids and nausea treated with Zofran with improvement in symptoms.  Patient has a soft nontender abdomen without tenderness, rebound or guarding, doubt acute intra-abdominal infection.  CMP without evidence of anion gap acidosis, notable for mild hyperglycemia.  CBC without leukocytosis or anemia.  UA without evidence of UTI.  Lipase within normal limits, doubt pancreatitis.  Patient does have mild elevation beta hydroxybutyrate to 0.9, treated here with IV fluids and  5 units insulin with appropriate response in glucose down trending.  Patient subsequently tolerating p.o. intake.  Her symptoms have resolved.  Unclear etiology but doubt life-threatening cause this time.  She is stable for discharge with antiemetics, outpatient follow-up and return precautions.  She is comfortable with the plan. [BS]      ED Course User Index  [BS] Nathaniel Velez MD                             Clinical Impression:  Final diagnoses:  [R11.2] Nausea and vomiting, unspecified vomiting type (Primary)  [R73.9] Hyperglycemia          ED Disposition Condition    Discharge Stable          ED Prescriptions       Medication Sig Dispense Start Date End Date Auth. Provider    promethazine (PHENERGAN) 25 MG suppository Place 1 suppository (25 mg total) rectally every 6 (six) hours as needed for Nausea. 10 suppository 1/10/2024 -- Nathaniel Velez MD          Follow-up Information       Follow up With Specialties Details Why Contact Info    Guido Chow MD Family Medicine Call in 1 day To recheck today's symptoms, To set up a follow-up appointment [address] [place] [id number]  [phone]               Nathaniel Velez MD  01/11/24 7435

## 2024-01-11 NOTE — ED NOTES
Bed: 12  Expected date:   Expected time:   Means of arrival:   Comments:  JOSY Ulloa when clean   DISCHARGE

## 2024-01-11 NOTE — DISCHARGE INSTRUCTIONS

## 2024-01-11 NOTE — FIRST PROVIDER EVALUATION
Emergency Department TeleTriage Encounter Note      CHIEF COMPLAINT    Chief Complaint   Patient presents with    Hyperglycemia     Went to urgent care for N/V, CBG was 177 and urine had ketones. Sent by .     Nausea    Vomiting       VITAL SIGNS   Initial Vitals [01/10/24 1935]   BP Pulse Resp Temp SpO2   (!) 142/68 88 18 97.8 °F (36.6 °C) 98 %      MAP       --            ALLERGIES    Review of patient's allergies indicates:   Allergen Reactions    Sulfamethoxazole-trimethoprim Rash and Hives     Patient experienced on 12/3/14 redness to face and rash to chest and arms/ with no SOB or airway obstruction    Zinc-25 Hives and Swelling     Aching pains in the knees     Heparin analogues     Iodinated contrast media Swelling and Rash     Says topical iodine OK       PROVIDER TRIAGE NOTE  Patient presents with nausea and vomiting. She is type 1 diabetic and was advised by urgent care to come to the ED because of ketones in urine.       ORDERS  Labs Reviewed   POCT GLUCOSE - Abnormal; Notable for the following components:       Result Value    POC Glucose 245 (*)     All other components within normal limits       ED Orders (720h ago, onward)      Start Ordered     Status Ordering Provider    01/10/24 1938 01/10/24 1938  POCT glucose  Once         Final result EMERGENCY, DEPT PHYSICIAN              Virtual Visit Note: The provider triage portion of this emergency department evaluation and documentation was performed via [employer], a HIPAA-compliant telemedicine application, in concert with a tele-presenter in the room. A face to face patient evaluation with one of my colleagues will occur once the patient is placed in an emergency department room.      DISCLAIMER: This note was prepared with [employer] voice recognition transcription software. Garbled syntax, mangled pronouns, and other bizarre constructions may be attributed to that software system.

## 2024-01-25 ENCOUNTER — HOSPITAL ENCOUNTER (OUTPATIENT)
Dept: CARDIOLOGY | Facility: HOSPITAL | Age: 69
Discharge: HOME | End: 2024-01-25
Attending: INTERNAL MEDICINE
Payer: MEDICARE

## 2024-01-25 VITALS — WEIGHT: 189 LBS | BODY MASS INDEX: 30.37 KG/M2 | HEIGHT: 66 IN

## 2024-01-25 DIAGNOSIS — I87.2 VENOUS INSUFFICIENCY OF BOTH LOWER EXTREMITIES: ICD-10-CM

## 2024-01-25 DIAGNOSIS — E78.2 MIXED HYPERLIPIDEMIA: ICD-10-CM

## 2024-01-25 DIAGNOSIS — I10 PRIMARY HYPERTENSION: ICD-10-CM

## 2024-01-25 DIAGNOSIS — I35.0 NONRHEUMATIC AORTIC VALVE STENOSIS: ICD-10-CM

## 2024-01-25 DIAGNOSIS — E10.3593 TYPE 1 DIABETES MELLITUS WITH PROLIFERATIVE DIABETIC RETINOPATHY WITHOUT MACULAR EDEMA, BILATERAL: ICD-10-CM

## 2024-01-25 PROCEDURE — 93306 TTE W/DOPPLER COMPLETE: CPT | Mod: PO

## 2024-01-25 PROCEDURE — 93306 TTE W/DOPPLER COMPLETE: CPT | Mod: 26,,, | Performed by: INTERNAL MEDICINE

## 2024-01-28 LAB
ASCENDING AORTA: 2.54 CM
AV INDEX (PROSTH): 0.3
AV MEAN GRADIENT: 24 MMHG
AV PEAK GRADIENT: 36 MMHG
AV VALVE AREA BY VELOCITY RATIO: 1.13 CM²
AV VALVE AREA: 1.05 CM²
AV VELOCITY RATIO: 0.33
BSA FOR ECHO PROCEDURE: 2 M2
CV ECHO LV RWT: 0.48 CM
DOP CALC AO PEAK VEL: 3.01 M/S
DOP CALC AO VTI: 79.1 CM
DOP CALC LVOT AREA: 3.5 CM2
DOP CALC LVOT DIAMETER: 2.1 CM
DOP CALC LVOT PEAK VEL: 0.98 M/S
DOP CALC LVOT STROKE VOLUME: 83.08 CM3
DOP CALCLVOT PEAK VEL VTI: 24 CM
E WAVE DECELERATION TIME: 164.71 MSEC
E/A RATIO: 0.93
E/E' RATIO: 13.38 M/S
ECHO LV POSTERIOR WALL: 1.12 CM (ref 0.6–1.1)
EJECTION FRACTION: 65 %
FRACTIONAL SHORTENING: 43 % (ref 28–44)
INTERVENTRICULAR SEPTUM: 0.86 CM (ref 0.6–1.1)
IVRT: 47.57 MSEC
LEFT ATRIUM SIZE: 4.24 CM
LEFT ATRIUM VOLUME INDEX MOD: 23.1 ML/M2
LEFT ATRIUM VOLUME MOD: 44.97 CM3
LEFT INTERNAL DIMENSION IN SYSTOLE: 2.67 CM (ref 2.1–4)
LEFT VENTRICLE DIASTOLIC VOLUME INDEX: 52.84 ML/M2
LEFT VENTRICLE DIASTOLIC VOLUME: 103.04 ML
LEFT VENTRICLE MASS INDEX: 83 G/M2
LEFT VENTRICLE SYSTOLIC VOLUME INDEX: 13.4 ML/M2
LEFT VENTRICLE SYSTOLIC VOLUME: 26.2 ML
LEFT VENTRICULAR INTERNAL DIMENSION IN DIASTOLE: 4.71 CM (ref 3.5–6)
LEFT VENTRICULAR MASS: 162.78 G
LV LATERAL E/E' RATIO: 11.89 M/S
LV SEPTAL E/E' RATIO: 15.29 M/S
LVOT MG: 2.07 MMHG
LVOT MV: 0.67 CM/S
MV PEAK A VEL: 1.15 M/S
MV PEAK E VEL: 1.07 M/S
MV STENOSIS PRESSURE HALF TIME: 47.76 MS
MV VALVE AREA P 1/2 METHOD: 4.61 CM2
PISA TR MAX VEL: 2.19 M/S
PULM VEIN S/D RATIO: 1.21
PV PEAK D VEL: 0.7 M/S
PV PEAK S VEL: 0.85 M/S
RA MAJOR: 4.62 CM
RA PRESSURE ESTIMATED: 3 MMHG
RA WIDTH: 3.8 CM
RIGHT VENTRICULAR END-DIASTOLIC DIMENSION: 3.6 CM
RIGHT VENTRICULAR LENGTH IN DIASTOLE (APICAL 4-CHAMBER VIEW): 5.9 CM
RV MID DIAMA: 2.58 CM
RV TB RVSP: 5 MMHG
RV TISSUE DOPPLER FREE WALL SYSTOLIC VELOCITY 1 (APICAL 4 CHAMBER VIEW): 13.3 CM/S
SINUS: 2.54 CM
STJ: 2.21 CM
TDI LATERAL: 0.09 M/S
TDI SEPTAL: 0.07 M/S
TDI: 0.08 M/S
TR MAX PG: 19 MMHG
TRICUSPID ANNULAR PLANE SYSTOLIC EXCURSION: 2.17 CM
TV REST PULMONARY ARTERY PRESSURE: 22 MMHG
Z-SCORE OF LEFT VENTRICULAR DIMENSION IN END DIASTOLE: -1.66
Z-SCORE OF LEFT VENTRICULAR DIMENSION IN END SYSTOLE: -1.96

## 2024-01-29 DIAGNOSIS — T81.89XA SUTURE REACTION, INITIAL ENCOUNTER: ICD-10-CM

## 2024-01-30 RX ORDER — MUPIROCIN 20 MG/G
OINTMENT TOPICAL 3 TIMES DAILY
Qty: 30 G | Refills: 1 | Status: SHIPPED | OUTPATIENT
Start: 2024-01-30

## 2024-02-01 ENCOUNTER — OFFICE VISIT (OUTPATIENT)
Dept: CARDIOLOGY | Facility: CLINIC | Age: 69
End: 2024-02-01
Payer: MEDICARE

## 2024-02-01 VITALS
BODY MASS INDEX: 30.47 KG/M2 | DIASTOLIC BLOOD PRESSURE: 73 MMHG | SYSTOLIC BLOOD PRESSURE: 113 MMHG | WEIGHT: 189.63 LBS | HEIGHT: 66 IN | HEART RATE: 84 BPM

## 2024-02-01 DIAGNOSIS — K31.84 DIABETIC GASTROPARESIS: ICD-10-CM

## 2024-02-01 DIAGNOSIS — I35.0 NONRHEUMATIC AORTIC VALVE STENOSIS: ICD-10-CM

## 2024-02-01 DIAGNOSIS — N18.30 STAGE 3 CHRONIC KIDNEY DISEASE DUE TO TYPE 1 DIABETES MELLITUS: ICD-10-CM

## 2024-02-01 DIAGNOSIS — I83.893 VARICOSE VEINS OF LOWER EXTREMITY WITH EDEMA, BILATERAL: ICD-10-CM

## 2024-02-01 DIAGNOSIS — M48.061 SPINAL STENOSIS OF LUMBAR REGION, UNSPECIFIED WHETHER NEUROGENIC CLAUDICATION PRESENT: Primary | ICD-10-CM

## 2024-02-01 DIAGNOSIS — I10 PRIMARY HYPERTENSION: ICD-10-CM

## 2024-02-01 DIAGNOSIS — I87.2 VENOUS INSUFFICIENCY OF BOTH LOWER EXTREMITIES: ICD-10-CM

## 2024-02-01 DIAGNOSIS — E10.42 CONTROLLED TYPE 1 DIABETES MELLITUS WITH DIABETIC POLYNEUROPATHY, WITH LONG-TERM CURRENT USE OF INSULIN: ICD-10-CM

## 2024-02-01 DIAGNOSIS — E11.43 DIABETIC GASTROPARESIS: ICD-10-CM

## 2024-02-01 DIAGNOSIS — I70.0 AORTIC ATHEROSCLEROSIS: ICD-10-CM

## 2024-02-01 DIAGNOSIS — E78.2 MIXED HYPERLIPIDEMIA: ICD-10-CM

## 2024-02-01 DIAGNOSIS — E10.22 STAGE 3 CHRONIC KIDNEY DISEASE DUE TO TYPE 1 DIABETES MELLITUS: ICD-10-CM

## 2024-02-01 PROCEDURE — 99214 OFFICE O/P EST MOD 30 MIN: CPT | Mod: PBBFAC,PO | Performed by: INTERNAL MEDICINE

## 2024-02-01 PROCEDURE — 99999 PR PBB SHADOW E&M-EST. PATIENT-LVL IV: CPT | Mod: PBBFAC,,, | Performed by: INTERNAL MEDICINE

## 2024-02-01 PROCEDURE — 99214 OFFICE O/P EST MOD 30 MIN: CPT | Mod: S$PBB,,, | Performed by: INTERNAL MEDICINE

## 2024-02-01 NOTE — PROGRESS NOTES
Subjective:    Patient ID:  Kateryna Desai is a 69 y.o. female patient here for evaluation No chief complaint on file.      History of Present Illness:  Cardiology follow-up, valvular heart disease.  Echo 01/2024 with moderate AS, mild MR preserved ejection fraction.  Overall no change.  Patient has underlying mild chronic kidney disease, stable over the years.  History of diabetes mellitus present about 55 years.  Mild neuropathy.  No known peripheral arterial disease cardiac assessment both invasive and noninvasive negative for ischemic heart disease     Works around the house active without complaints.  No angina.  No significant dyspnea.  Past history of dependent edema resolved.        Review of patient's allergies indicates:   Allergen Reactions    Sulfamethoxazole-trimethoprim Rash and Hives     Patient experienced on 12/3/14 redness to face and rash to chest and arms/ with no SOB or airway obstruction    Zinc-25 Hives and Swelling     Aching pains in the knees     Heparin analogues     Iodinated contrast media Swelling and Rash     Says topical iodine OK       Past Medical History:   Diagnosis Date    Allergy     Arthritis     degnerative disease low back,muscle spasms, shoulders    Asian flu type A 12/22/2017    Bunionette 7/26/2012    Diabetes mellitus type I     since age 13    Diabetic gastroparesis     takes Cytotec    Diabetic retinopathy of both eyes     gets periodic laser treatments    Difficult intubation     as a child had sore throat after a procedure    Encounter for blood transfusion     GERD (gastroesophageal reflux disease)     Hallux abducto valgus 3/31/2014    Headache(784.0)     Hiatal hernia     with GERD    Hyperlipidemia     Hypertension     Infection of the upper respiratory tract June 2012    Lumbar spinal stenosis     Osteopenia     Rosacea     Seizures     Pt states during pgy with stroke    Stroke 1985?    while pregnant    Tachycardia     asymptomatic with Toprol     Thyroid disease     hypothyroidism    Venous insufficiency of leg     improved since EVLT     Past Surgical History:   Procedure Laterality Date    BUNIONECTOMY Right 2012    CARDIAC CATHETERIZATION      no stents     SECTION      COLONOSCOPY  2007    Dr. Rose, 10 year recheck    CORRECTION OF HAMMER TOE Right 8/3/2023    Procedure: CORRECTION, HAMMER TOE Right second toe;  Surgeon: Lie Rhoades DPM;  Location: [facility];  Service: Podiatry;  Laterality: Right;  right 2nd toe    EXOSTECTOMY  12    left foot, local anesthesia, in office    EYE SURGERY      has had many laser procedures for diabetic retinopathy    VASCULAR SURGERY      VEIN SURGERY  2012    EVLT right greater saphenous, IV sedation     Social History     Tobacco Use    Smoking status: Never    Smokeless tobacco: Never   Substance Use Topics    Alcohol use: Yes     Comment: rarely; 1 drink/year    Drug use: No        Review of Systems:    As noted in HPI in addition      REVIEW OF SYSTEMS  Review of Systems   Constitutional: Negative for decreased appetite, diaphoresis, night sweats, weight gain and weight loss.   HENT:  Negative for nosebleeds and odynophagia.    Eyes:  Negative for double vision and photophobia.   Cardiovascular:  Negative for chest pain, claudication, cyanosis, dyspnea on exertion, irregular heartbeat, leg swelling, near-syncope, orthopnea, palpitations, paroxysmal nocturnal dyspnea and syncope.   Respiratory:  Negative for cough, hemoptysis, shortness of breath and wheezing.    Hematologic/Lymphatic: Negative for adenopathy.   Skin:  Negative for flushing, skin cancer and suspicious lesions.   Musculoskeletal:  Negative for gout, myalgias and neck pain.   Gastrointestinal:  Negative for abdominal pain, heartburn, hematemesis and hematochezia.   Genitourinary:  Negative for bladder incontinence, hesitancy and nocturia.   Neurological:  Negative for focal weakness, headaches, light-headedness and  paresthesias.   Psychiatric/Behavioral:  Negative for memory loss and substance abuse.               Objective:        Vitals:    02/01/24 1137   BP: 113/73   Pulse: 84       Lab Results   Component Value Date    WBC 6.28 01/10/2024    HGB 13.0 01/10/2024    HCT 41.2 01/10/2024     01/10/2024    CHOL 148 11/06/2023    TRIG 104 11/06/2023    HDL 47 11/06/2023    ALT 17 01/10/2024    AST 23 01/10/2024     01/10/2024    K 4.4 01/10/2024    CL 99 01/10/2024    CREATININE 1.1 01/10/2024    BUN 13 01/10/2024    CO2 29 01/10/2024    TSH 3.474 11/06/2023    INR 1.1 02/26/2020    HGBA1C 5.9 11/06/2023        ECHOCARDIOGRAM RESULTS  Results for orders placed during the hospital encounter of 01/25/24    Echo    Interpretation Summary    Left Ventricle: The left ventricle is normal in size. Normal wall thickness. There is concentric remodeling. Normal wall motion. There is normal systolic function. Ejection fraction by visual approximation is 65%. There is normal diastolic function.    Right Ventricle: Normal right ventricular cavity size. Wall thickness is normal. Right ventricle wall motion  is normal. Systolic function is normal.    Aortic Valve: There is moderate aortic valve sclerosis. Moderately restricted motion. There is moderate stenosis. Aortic valve area by VTI is 1.05 cm². Aortic valve peak velocity is 3.01 m/s. Mean gradient is 24 mmHg. The dimensionless index is 0.30.    Mitral Valve: There is moderate mitral annular calcification present.    Pulmonary Artery: The estimated pulmonary artery systolic pressure is 22 mmHg.    IVC/SVC: Normal venous pressure at 3 mmHg.        CURRENT/PREVIOUS VISIT EKG  Results for orders placed or performed in visit on 07/13/23   EKG 12-lead    Collection Time: 07/13/23  2:30 PM    Narrative    Test Reason : Z01.818,    Vent. Rate : 081 BPM     Atrial Rate : 081 BPM     P-R Int : 172 ms          QRS Dur : 096 ms      QT Int : 406 ms       P-R-T Axes : 058 012 026  "degrees     QTc Int : 471 ms    Normal sinus rhythm  Normal ECG  When compared with ECG of 26-JAN-2023 17:44,  Questionable change in The axis  Confirmed by Amor White MD ([id number]) on 7/14/2023 12:35:26 PM    Referred By: ZAYRA SHIRLEY           Confirmed By:Amor White MD     No valid procedures specified.   No results found for this or any previous visit.    No valid procedures specified.    PHYSICAL EXAM  CONSTITUTIONAL: Well built, well nourished in no apparent distress  NECK: no carotid bruit, no JVD  LUNGS: CTA  CHEST WALL: no tenderness,  HEART: regular rate and rhythm, S1, S2 normal, 2/6 crescendo decrescendo murmur aortic area.  ABDOMEN: soft, non-tender; bowel sounds normal; no masses,  no organomegaly  EXTREMITIES: Extremities normal, no edema, no calf tenderness noted.  Mildly decreased popliteal pedal pulses.  NEURO: AAO X 3    I HAVE REVIEWED :    The vital signs, nurses notes, and all the pertinent radiology and labs.         Current Outpatient Medications   Medication Instructions    ABRYSVO 120 mcg/0.5 mL SolR vaccine     ACZONE 5 % topical gel 1 application , Topical (Top), 2 times daily PRN    alpha lipoic acid 600 mg Cap 2 capsules, Oral, Daily    ALPHAGAN P 0.1 % Drop 1 drop, Both Eyes, 3 times daily    ascorbic acid (vitamin C) (VITAMIN C) 100 mg, Oral, 2 times daily    aspirin (ECOTRIN) 81 mg, Oral, Daily    azelastine (ASTELIN) 137 mcg (0.1 %) nasal spray USE ONE SPRAY IN EACH NOSTRIL TWICE A DAY    baclofen (LIORESAL) 10 mg, Oral, 2 times daily PRN    BD VEO INSULIN SYRINGE UF 1/2 mL 31 gauge x 15/64" Syrg No dose, route, or frequency recorded.    calcium citrate-vitamin D3 315-200 mg (CITRACAL+D) 315-200 mg-unit per tablet 1 tablet, Oral, 2 times daily    cinnamon bark 500 mg, Oral, 2 times daily    CRESTOR 40 mg, Oral, Daily    DOCOSAHEXANOIC ACID/EPA (FISH OIL ORAL) 1,200 mg, Oral, Daily    doxycycline 50 mg, Oral, 2 times daily    ESTRACE 0.01 % (0.1 mg/gram) vaginal cream Vaginal, Twice weekly, " "Tuesday, Saturday    estradiol 10 mcg Tab 1 tablet, Vaginal, Twice weekly, Tuesday, Saturday    ezetimibe (ZETIA) 10 mg, Oral, Daily    fluticasone propionate (FLONASE) 50 mcg/actuation nasal spray USE 1 SPRAY INTO THE EACH NOSTRIL ONCE A DAY Strength: 50 mcg/actuation    furosemide (LASIX) 20 mg, Oral, 2 times daily    GLUCAGON EMERGENCY KIT (HUMAN) 1 mg, Oral, As needed (PRN)    GVOKE HYPOPEN 2-PACK 1 mg/0.2 mL AtIn USE AS DIRECTED TO TREAT PROFOUND HYPOGLYCEMIA    HumaLOG U-100 Insulin 10-17 Units, Subcutaneous, 3 times daily before meals, 10-17 units tid sliding scale    metoprolol succinate (TOPROL-XL) 25 MG 24 hr tablet TAKE 1 TABLET BY MOUTH ONCE A DAY    montelukast (SINGULAIR) 10 mg tablet TAKE 1 TABLET BY MOUTH EVERY EVENING    mupirocin (BACTROBAN) 2 % ointment 3 times daily, Apply to affected area    NOVOFINE 32 32 gauge x 1/4" Ndle No dose, route, or frequency recorded.    ondansetron (ZOFRAN-ODT) 4 mg, Oral, Every 8 hours PRN    ONETOUCH ULTRA BLUE TEST STRIP Strp No dose, route, or frequency recorded.    pantoprazole (PROTONIX) 40 MG tablet TAKE 1 TABLET BY MOUTH TWICE A DAY    potassium chloride (K-TAB) 20 mEq 40 mEq, Oral, 2 times daily    promethazine (PHENERGAN) 25 mg, Oral, Every 6 hours PRN    promethazine (PHENERGAN) 25 mg, Rectal, Every 6 hours PRN    SOOLANTRA 1 % Crea 1 application , Topical (Top), Daily    SYNTHROID 88 mcg, Oral, Every Tues, Thurs, Sat, Sun    SYNTHROID 75 mcg, Oral, Every Mon, Wed, Fri    [facility] SOL[facility]AR [facility]-300 INSULIN 34 Units, Subcutaneous, Daily    VITAMIN B COMPLEX VIT C NO.4 (SUPER B COMPLEX + C ORAL) 1 tablet, Oral, Daily          Assessment:   Longstanding history of diabetes mellitus.  Dyslipidemia.  Hypertension.    Positive family history heart disease.    Valvular heart disease, echo stable 01/2024 with moderate AS, mild MR, preserved EF.  Left heart catheterization remote no significant high-grade disease.  Negative nuclear study 2018.    Chronic kidney " disease.        Plan:   Meds reviewed and reconciled.  Recommend no changes.  Labs follow-up primary care.  Echo stable 01/2024.  Six-month.          No follow-ups on file.

## 2024-02-29 ENCOUNTER — OFFICE VISIT (OUTPATIENT)
Dept: PODIATRY | Facility: CLINIC | Age: 69
End: 2024-02-29
Payer: MEDICARE

## 2024-02-29 VITALS — BODY MASS INDEX: 30.47 KG/M2 | HEIGHT: 66 IN | WEIGHT: 189.63 LBS

## 2024-02-29 DIAGNOSIS — E10.42 CONTROLLED TYPE 1 DIABETES MELLITUS WITH DIABETIC POLYNEUROPATHY: Primary | ICD-10-CM

## 2024-02-29 DIAGNOSIS — L84 CORN OR CALLUS: ICD-10-CM

## 2024-02-29 PROCEDURE — 99212 OFFICE O/P EST SF 10 MIN: CPT | Mod: PBBFAC,PO | Performed by: PODIATRIST

## 2024-02-29 PROCEDURE — 99213 OFFICE O/P EST LOW 20 MIN: CPT | Mod: 25,S$PBB,, | Performed by: PODIATRIST

## 2024-02-29 PROCEDURE — 99999 PR PBB SHADOW E&M-EST. PATIENT-LVL II: CPT | Mod: PBBFAC,,, | Performed by: PODIATRIST

## 2024-02-29 PROCEDURE — 11055 PARING/CUTG B9 HYPRKER LES 1: CPT | Mod: Q9,S$PBB,, | Performed by: PODIATRIST

## 2024-02-29 PROCEDURE — 11055 PARING/CUTG B9 HYPRKER LES 1: CPT | Mod: Q9,PBBFAC,PO | Performed by: PODIATRIST

## 2024-03-02 NOTE — PROGRESS NOTES
Subjective:      Patient ID: Kateryna Desai is a 69 y.o. female.    Chief Complaint: Foot Pain    Kateryna is a 69 y.o. female who presents to the clinic for routine evaluation and treatment of diabetic feet. Kateryna has a past medical history of Allergy, Arthritis, Asian flu type A (12/22/2017), Bunionette (7/26/2012), Diabetes mellitus type I, Diabetic gastroparesis, Diabetic retinopathy of both eyes, Difficult intubation, Encounter for blood transfusion, GERD (gastroesophageal reflux disease), Hallux abducto valgus (3/31/2014), Headache(784.0), Hiatal hernia, Hyperlipidemia, Hypertension, Infection of the upper respiratory tract (June 2012), Lumbar spinal stenosis, Osteopenia, Rosacea, Seizures, Stroke (1985?), Tachycardia, Thyroid disease, and Venous insufficiency of leg. Patient relates the usual callus build up to the Lt. Heel.  Denies pain to the site and continues with application of moisturizer to the site.  She has been experiencing more nocturnal paresthesias, however, denies this being overtly painful.  Will consider treatment option should symptoms intensify.  Continues to note excellent control over her blood glucose.  Denies any additional pedal complaints.      PCP: Guido Chow MD    Date Last Seen by PCP: 11/23      Hemoglobin A1C   Date Value Ref Range Status   11/06/2023 5.9 4.5 - 6.2 % Final     Comment:     According to ADA guidelines, hemoglobin A1C <7.0% represents  optimal control in non-pregnant diabetic patients.  Different  metrics may apply to specific populations.   Standards of Medical Care in Diabetes - 2016.    For the purpose of screening for the presence of diabetes:  <5.7%     Consistent with the absence of diabetes  5.7-6.4%  Consistent with increasing risk for diabetes   (prediabetes)  >or=6.5%  Consistent with diabetes    Currently no consensus exists for use of hemoglobin A1C  for diagnosis of diabetes for children.     07/06/2023 6.3 (H) 4.5 - 6.2 %  Final     Comment:     According to ADA guidelines, hemoglobin A1C <7.0% represents  optimal control in non-pregnant diabetic patients.  Different  metrics may apply to specific populations.   Standards of Medical Care in Diabetes - 2016.    For the purpose of screening for the presence of diabetes:  <5.7%     Consistent with the absence of diabetes  5.7-6.4%  Consistent with increasing risk for diabetes   (prediabetes)  >or=6.5%  Consistent with diabetes    Currently no consensus exists for use of hemoglobin A1C  for diagnosis of diabetes for children.     06/01/2023 6.2 4.5 - 6.2 % Final     Comment:     According to ADA guidelines, hemoglobin A1C <7.0% represents  optimal control in non-pregnant diabetic patients.  Different  metrics may apply to specific populations.   Standards of Medical Care in Diabetes - 2016.    For the purpose of screening for the presence of diabetes:  <5.7%     Consistent with the absence of diabetes  5.7-6.4%  Consistent with increasing risk for diabetes   (prediabetes)  >or=6.5%  Consistent with diabetes    Currently no consensus exists for use of hemoglobin A1C  for diagnosis of diabetes for children.             Past Medical History:   Diagnosis Date    Allergy     Arthritis     degnerative disease low back,muscle spasms, shoulders    Asian flu type A 12/22/2017    Bunionette 7/26/2012    Diabetes mellitus type I     since age 13    Diabetic gastroparesis     takes Cytotec    Diabetic retinopathy of both eyes     gets periodic laser treatments    Difficult intubation     as a child had sore throat after a procedure    Encounter for blood transfusion     GERD (gastroesophageal reflux disease)     Hallux abducto valgus 3/31/2014    Headache(784.0)     Hiatal hernia     with GERD    Hyperlipidemia     Hypertension     Infection of the upper respiratory tract June 2012    Lumbar spinal stenosis     Osteopenia     Rosacea     Seizures     Pt states during pgy with stroke    Stroke 1985?     while pregnant    Tachycardia     asymptomatic with Toprol    Thyroid disease     hypothyroidism    Venous insufficiency of leg     improved since EVLT       Past Surgical History:   Procedure Laterality Date    BUNIONECTOMY Right 2012    CARDIAC CATHETERIZATION      no stents     SECTION      COLONOSCOPY  2007    Dr. Rose, 10 year recheck    CORRECTION OF HAMMER TOE Right 8/3/2023    Procedure: CORRECTION, HAMMER TOE Right second toe;  Surgeon: Lei Rhoades DPM;  Location: [facility] OR;  Service: Podiatry;  Laterality: Right;  right 2nd toe    EXOSTECTOMY  12    left foot, local anesthesia, in office    EYE SURGERY      has had many laser procedures for diabetic retinopathy    VASCULAR SURGERY      VEIN SURGERY  2012    EVLT right greater saphenous, IV sedation       Family History   Problem Relation Age of Onset    Cancer Maternal Aunt         breast    Breast cancer Maternal Aunt     Diabetes Maternal Grandmother     Breast cancer Maternal Grandmother 38    Cancer Maternal Grandfather         prostate    Diabetes Maternal Grandfather     Diabetes Cousin     Arthritis Mother     Cancer Mother     Melanoma Mother     Heart disease Father     Stroke Father     No Known Problems Sister     No Known Problems Brother     No Known Problems Daughter     Alzheimer's disease Maternal Uncle     Alcohol abuse Maternal Uncle         x2    Heart disease Maternal Uncle     No Known Problems Paternal Aunt     Heart disease Paternal Uncle        Social History     Socioeconomic History    Marital status:    Tobacco Use    Smoking status: Never    Smokeless tobacco: Never   Substance and Sexual Activity    Alcohol use: Yes     Comment: rarely; 1 drink/year    Drug use: No    Sexual activity: Yes     Partners: Male       Current Outpatient Medications   Medication Sig Dispense Refill    ABRYSVO 120 mcg/0.5 mL SolR vaccine       ACZONE 5 % topical gel Apply 1 application topically 2 (two) times  "daily as needed (roseacea).  0    alpha lipoic acid 600 mg Cap Take 2 capsules by mouth once daily.      ALPHAGAN P 0.1 % Drop Place 1 drop into both eyes 3 (three) times daily.   6    ascorbic acid, vitamin C, (VITAMIN C) 100 MG tablet Take 100 mg by mouth 2 (two) times daily.      aspirin (ECOTRIN) 81 MG EC tablet Take 81 mg by mouth once daily.      azelastine (ASTELIN) 137 mcg (0.1 %) nasal spray USE ONE SPRAY IN EACH NOSTRIL TWICE A DAY 30 mL 3    baclofen (LIORESAL) 10 MG tablet Take 1 tablet (10 mg total) by mouth 2 (two) times daily as needed (muscle pain). 20 tablet 0    BD VEO INSULIN SYRINGE UF 1/2 mL 31 gauge x 15/64" Syrg   4    calcium citrate-vitamin D3 315-200 mg (CITRACAL+D) 315-200 mg-unit per tablet Take 1 tablet by mouth 2 (two) times daily.      cinnamon bark 500 mg capsule Take 500 mg by mouth 2 (two) times daily.      CRESTOR 40 mg Tab Take 40 mg by mouth once daily.       DOCOSAHEXANOIC ACID/EPA (FISH OIL ORAL) Take 1,200 mg by mouth once daily.      doxycycline (VIBRAMYCIN) 50 MG capsule Take 50 mg by mouth 2 (two) times daily.      ESTRACE 0.01 % (0.1 mg/gram) vaginal cream Place vaginally twice a week. Tuesday, Saturday  0    estradiol 10 mcg Tab Place 1 tablet vaginally twice a week. Tuesday, Saturday      ezetimibe (ZETIA) 10 mg tablet Take 10 mg by mouth once daily.      fluticasone propionate (FLONASE) 50 mcg/actuation nasal spray USE 1 SPRAY INTO THE EACH NOSTRIL ONCE A DAY Strength: 50 mcg/actuation 32 g 3    furosemide (LASIX) 20 MG tablet Take 1 tablet (20 mg total) by mouth 2 (two) times a day. 180 tablet 4    GLUCAGON EMERGENCY 1 mg injection Take 1 mg by mouth as needed.      GVOKE HYPOPEN 2-PACK 1 mg/0.2 mL AtIn USE AS DIRECTED TO TREAT PROFOUND HYPOGLYCEMIA      HUMALOG 100 unit/mL injection Inject 10-17 Units into the skin 3 (three) times daily before meals. 10-17 units tid sliding scale  1    metoprolol succinate (TOPROL-XL) 25 MG 24 hr tablet TAKE 1 TABLET BY MOUTH ONCE A " "DAY 90 tablet 3    montelukast (SINGULAIR) 10 mg tablet TAKE 1 TABLET BY MOUTH EVERY EVENING 90 tablet 3    mupirocin (BACTROBAN) 2 % ointment APPLY TOPICALLY THREE TIMES A DAY 30 g 1    NOVOFINE 32 32 gauge x 1/4" Ndle       ondansetron (ZOFRAN-ODT) 4 MG TbDL Take 1 tablet (4 mg total) by mouth every 8 (eight) hours as needed (Nausea). 60 tablet 1    ONETOUCH ULTRA BLUE TEST STRIP Strp   11    pantoprazole (PROTONIX) 40 MG tablet TAKE 1 TABLET BY MOUTH TWICE A DAY (Patient not taking: Reported on 2/1/2024) 180 tablet 3    potassium chloride (K-TAB) 20 mEq Take 40 mEq by mouth 2 (two) times a day.  6    promethazine (PHENERGAN) 25 MG suppository Place 1 suppository (25 mg total) rectally every 6 (six) hours as needed for Nausea. 10 suppository 0    promethazine (PHENERGAN) 25 MG tablet Take 1 tablet (25 mg total) by mouth every 6 (six) hours as needed for Nausea. 10 tablet 0    SOOLANTRA 1 % Crea Apply 1 application topically once daily.      SYNTHROID 75 mcg tablet Take 75 mcg by mouth every Mon, Wed, Fri.      SYNTHROID 88 mcg tablet Take 88 mcg by mouth every Tuesday, Thursday, Saturday, Sunday.  0    TOUJEO SOLOSTAR U-300 INSULIN 300 unit/mL (1.5 mL) InPn pen Inject 34 Units into the skin once daily.      VITAMIN B COMPLEX VIT C NO.4 (SUPER B COMPLEX + C ORAL) Take 1 tablet by mouth once daily.       No current facility-administered medications for this visit.     Facility-Administered Medications Ordered in Other Visits   Medication Dose Route Frequency Provider Last Rate Last Admin    HYDROmorphone (PF) injection 0.2 mg  0.2 mg Intravenous Q5 Min PRN Naomi Abdullahi MD        lactated ringers infusion   Intravenous Continuous Naomi Abdullahi MD 10 mL/hr at 08/03/23 1332 Restarted at 08/03/23 1442    LIDOcaine (PF) 10 mg/ml (1%) injection 10 mg  1 mL Intradermal Once Naomi Abdullahi MD        oxyCODONE immediate release tablet 5 mg  5 mg Oral Q3H PRN Naomi Abdullahi MD           Review " of patient's allergies indicates:   Allergen Reactions    Bactrim [sulfamethoxazole-trimethoprim] Rash     Patient experienced on 12/3/14 redness to face and rash to chest and arms/ with no SOB or airway obstruction    Percocet [oxycodone-acetaminophen] Nausea And Vomiting     Also caused dizziness and passed out    Iodinated contrast media - iv dye Swelling and Rash     Says topical iodine OK    Norco [hydrocodone-acetaminophen] Itching, Swelling and Rash     Can tolerate if she takes Benadryl with it    Niaspan extended-release [niacin] Itching and Other (See Comments)     Skin flushing and redness         Review of Systems   Constitutional: Negative for chills, decreased appetite, diaphoresis and fever.   Cardiovascular:  Negative for claudication and leg swelling.   Skin:  Positive for dry skin. Negative for color change, flushing, itching and nail changes.   Musculoskeletal:  Negative for arthritis, back pain, falls, gout, joint pain, joint swelling and myalgias.   Gastrointestinal:  Negative for nausea and vomiting.   Neurological:  Positive for numbness and paresthesias.   Psychiatric/Behavioral:  Negative for altered mental status.            Objective:      Physical Exam  Constitutional:       General: She is not in acute distress.     Appearance: She is well-developed. She is not diaphoretic.   Cardiovascular:      Pulses:           Dorsalis pedis pulses are 2+ on the right side and 2+ on the left side.        Posterior tibial pulses are 2+ on the right side and 2+ on the left side.      Comments: CFT < 3 seconds bilateral.  Pedal hair growth decreased bilateral.  Varicosities noted to bilateral lower extremity. Mild nonpitting edema noted to bilateral lower extremity.  Toes are cool to touch bilateral.      Musculoskeletal:         General: No tenderness. Normal range of motion.      Comments: Muscle strength 5/5 in all muscle groups bilateral.  (-) for pain with active and passive ROM of bilateral foot  and ankle.  (-) for pain with palpation of bilateral foot and ankle.  Bilateral pes planus foot type.  Bilateral hallux abducto valgus.  Bilateral semi-rigid contracture of toes 3-5 with adductovarus rotation of the 5th.  Rectus alignment of the Rt. 2nd toe S/P hammertoe correction.     Skin:     General: Skin is warm and dry.      Coloration: Skin is not pale.      Findings: Lesion present. No abrasion, bruising, burn, ecchymosis, erythema, laceration, petechiae or rash.      Nails: There is no clubbing.      Comments: Toenails x 10 appear dystrophic but well maintained. No open wounds, interdigital maceration noted bilateral.  Cicatrix of the Lt. Posterior heel is well healed with no adjacent break in skin integrity.  Hyperkeratotic lesion noted to the Lt. Posterior heel.         Neurological:      Mental Status: She is alert and oriented to person, place, and time.      Sensory: Sensory deficit present.      Motor: No weakness or atrophy.      Comments: Protective sensation per [place] monofilament decreased bilateral.    Light touch intact bilateral.               Assessment:       Encounter Diagnoses   Name Primary?    Controlled type 1 diabetes mellitus with diabetic polyneuropathy Yes    Corn or callus          Plan:       Kateryna was seen today for foot pain.    Diagnoses and all orders for this visit:    Controlled type 1 diabetes mellitus with diabetic polyneuropathy    Corn or callus      I counseled the patient on her conditions, their implications and medical management.    - Advised to continue wearing shoe gear that accommodates for digital deformities.  To inspect daily for excessive signs of shearing consistent with pressure ulceration as well as the development of pre-ulceration.      - Shoe inspection. Diabetic Foot Education. Patient reminded of the importance of good nutrition and blood sugar control to help prevent podiatric complications of diabetes. Patient instructed on proper foot  hygeine. We discussed wearing proper shoe gear, daily foot inspections, never walking without protective shoe gear, never putting sharp instruments to feet    - With patient's permission, a sterile #15 scalpel was used to trim lesion x 1 down to smooth appearing skin without incident.  Patient relates relief following the procedure. She will continue to monitor the areas daily, inspect her feet, wear protective shoe gear when ambulatory, moisturizer to maintain skin integrity and follow in this office in approximately 3 months, sooner p.r.n.      Lei Rhoades DPM

## 2024-03-06 ENCOUNTER — LAB VISIT (OUTPATIENT)
Dept: LAB | Facility: HOSPITAL | Age: 69
End: 2024-03-06
Attending: INTERNAL MEDICINE
Payer: MEDICARE

## 2024-03-06 DIAGNOSIS — E21.0 PRIMARY HYPERPARATHYROIDISM: ICD-10-CM

## 2024-03-06 DIAGNOSIS — N18.2 CHRONIC KIDNEY DISEASE, STAGE II (MILD): Primary | ICD-10-CM

## 2024-03-06 LAB
25(OH)D3+25(OH)D2 SERPL-MCNC: 59 NG/ML (ref 30–96)
ALBUMIN SERPL BCP-MCNC: 4.1 G/DL (ref 3.5–5.2)
ANION GAP SERPL CALC-SCNC: 7 MMOL/L (ref 8–16)
BACTERIA #/AREA URNS HPF: NORMAL /HPF
BILIRUB UR QL STRIP: NEGATIVE
BUN SERPL-MCNC: 10 MG/DL (ref 8–23)
CALCIUM SERPL-MCNC: 9.1 MG/DL (ref 8.7–10.5)
CHLORIDE SERPL-SCNC: 103 MMOL/L (ref 95–110)
CLARITY UR: ABNORMAL
CO2 SERPL-SCNC: 29 MMOL/L (ref 23–29)
COLOR UR: YELLOW
CREAT SERPL-MCNC: 1 MG/DL (ref 0.5–1.4)
EST. GFR  (NO RACE VARIABLE): >60 ML/MIN/1.73 M^2
GLUCOSE SERPL-MCNC: 84 MG/DL (ref 70–110)
GLUCOSE UR QL STRIP: NEGATIVE
HGB UR QL STRIP: NEGATIVE
KETONES UR QL STRIP: NEGATIVE
LEUKOCYTE ESTERASE UR QL STRIP: NEGATIVE
MICROSCOPIC COMMENT: NORMAL
NITRITE UR QL STRIP: NEGATIVE
PH UR STRIP: 5 [PH] (ref 5–8)
PHOSPHATE SERPL-MCNC: 3.6 MG/DL (ref 2.7–4.5)
POTASSIUM SERPL-SCNC: 3.9 MMOL/L (ref 3.5–5.1)
PROT UR QL STRIP: NEGATIVE
PROT UR-MCNC: <4 MG/DL (ref 6–15)
PTH-INTACT SERPL-MCNC: 179.9 PG/ML (ref 9–77)
RBC #/AREA URNS HPF: 0 /HPF (ref 0–4)
SODIUM SERPL-SCNC: 139 MMOL/L (ref 136–145)
SP GR UR STRIP: 1.01 (ref 1–1.03)
SQUAMOUS #/AREA URNS HPF: 5 /HPF
URN SPEC COLLECT METH UR: ABNORMAL
UROBILINOGEN UR STRIP-ACNC: NEGATIVE EU/DL
WBC #/AREA URNS HPF: 0 /HPF (ref 0–5)

## 2024-03-06 PROCEDURE — 36415 COLL VENOUS BLD VENIPUNCTURE: CPT | Performed by: INTERNAL MEDICINE

## 2024-03-06 PROCEDURE — 82306 VITAMIN D 25 HYDROXY: CPT | Performed by: INTERNAL MEDICINE

## 2024-03-06 PROCEDURE — 81001 URINALYSIS AUTO W/SCOPE: CPT | Performed by: INTERNAL MEDICINE

## 2024-03-06 PROCEDURE — 81003 URINALYSIS AUTO W/O SCOPE: CPT | Mod: 59 | Performed by: INTERNAL MEDICINE

## 2024-03-06 PROCEDURE — 84156 ASSAY OF PROTEIN URINE: CPT | Performed by: INTERNAL MEDICINE

## 2024-03-06 PROCEDURE — 80069 RENAL FUNCTION PANEL: CPT | Performed by: INTERNAL MEDICINE

## 2024-03-06 PROCEDURE — 83970 ASSAY OF PARATHORMONE: CPT | Performed by: INTERNAL MEDICINE

## 2024-03-13 ENCOUNTER — LAB VISIT (OUTPATIENT)
Dept: LAB | Facility: HOSPITAL | Age: 69
End: 2024-03-13
Attending: INTERNAL MEDICINE
Payer: MEDICARE

## 2024-03-13 DIAGNOSIS — E10.9 DIABETES MELLITUS TYPE I: Primary | ICD-10-CM

## 2024-03-13 DIAGNOSIS — E03.9 HYPOTHYROIDISM: ICD-10-CM

## 2024-03-13 DIAGNOSIS — Z79.899 ENCOUNTER FOR LONG-TERM (CURRENT) USE OF OTHER MEDICATIONS: ICD-10-CM

## 2024-03-13 LAB
ALBUMIN SERPL BCP-MCNC: 3.9 G/DL (ref 3.5–5.2)
ALP SERPL-CCNC: 63 U/L (ref 55–135)
ALT SERPL W/O P-5'-P-CCNC: 16 U/L (ref 10–44)
ANION GAP SERPL CALC-SCNC: 6 MMOL/L (ref 8–16)
AST SERPL-CCNC: 21 U/L (ref 10–40)
BILIRUB SERPL-MCNC: 0.5 MG/DL (ref 0.1–1)
BUN SERPL-MCNC: 7 MG/DL (ref 8–23)
CALCIUM SERPL-MCNC: 9 MG/DL (ref 8.7–10.5)
CHLORIDE SERPL-SCNC: 103 MMOL/L (ref 95–110)
CHOLEST SERPL-MCNC: 153 MG/DL (ref 120–199)
CHOLEST/HDLC SERPL: 3.2 {RATIO} (ref 2–5)
CO2 SERPL-SCNC: 30 MMOL/L (ref 23–29)
CREAT SERPL-MCNC: 0.9 MG/DL (ref 0.5–1.4)
EST. GFR  (NO RACE VARIABLE): >60 ML/MIN/1.73 M^2
ESTIMATED AVG GLUCOSE: 126 MG/DL (ref 68–131)
GLUCOSE SERPL-MCNC: 178 MG/DL (ref 70–110)
HBA1C MFR BLD: 6 % (ref 4.5–6.2)
HDLC SERPL-MCNC: 48 MG/DL (ref 40–75)
HDLC SERPL: 31.4 % (ref 20–50)
LDLC SERPL CALC-MCNC: 85.6 MG/DL (ref 63–159)
NONHDLC SERPL-MCNC: 105 MG/DL
POTASSIUM SERPL-SCNC: 4.1 MMOL/L (ref 3.5–5.1)
PROT SERPL-MCNC: 6.5 G/DL (ref 6–8.4)
SODIUM SERPL-SCNC: 139 MMOL/L (ref 136–145)
T4 FREE SERPL-MCNC: 0.92 NG/DL (ref 0.71–1.51)
TRIGL SERPL-MCNC: 97 MG/DL (ref 30–150)
TSH SERPL DL<=0.005 MIU/L-ACNC: 3.23 UIU/ML (ref 0.34–5.6)

## 2024-03-13 PROCEDURE — 36415 COLL VENOUS BLD VENIPUNCTURE: CPT | Performed by: INTERNAL MEDICINE

## 2024-03-13 PROCEDURE — 84439 ASSAY OF FREE THYROXINE: CPT | Performed by: INTERNAL MEDICINE

## 2024-03-13 PROCEDURE — 84443 ASSAY THYROID STIM HORMONE: CPT | Performed by: INTERNAL MEDICINE

## 2024-03-13 PROCEDURE — 80053 COMPREHEN METABOLIC PANEL: CPT | Performed by: INTERNAL MEDICINE

## 2024-03-13 PROCEDURE — 80061 LIPID PANEL: CPT | Performed by: INTERNAL MEDICINE

## 2024-03-13 PROCEDURE — 83036 HEMOGLOBIN GLYCOSYLATED A1C: CPT | Performed by: INTERNAL MEDICINE

## 2024-06-14 DIAGNOSIS — K21.9 GASTROESOPHAGEAL REFLUX DISEASE WITHOUT ESOPHAGITIS: ICD-10-CM

## 2024-06-14 NOTE — TELEPHONE ENCOUNTER
No care due was identified.  [facility] Embedded Care Due Messages. Reference number: [id number].   6/14/2024 2:32:21 PM CDT

## 2024-06-15 NOTE — TELEPHONE ENCOUNTER
Refill Routing Note   Medication(s) are not appropriate for processing by [facility] for the following reason(s):        Outside of protocol  ED/Hospital Visit since last OV with provider    [facility] action(s):  Route      Medication Therapy Plan: ED: 1/10/24-Nausea and vomiting; Pantoprazole TDD 80 mg/day outside of [facility] protocol      Appointments  past 12m or future 3m with PCP    Date Provider   Last Visit   11/29/2023 Guido Chow MD   Next Visit   Visit date not found Guido Chow MD   ED visits in past 90 days: 0        Note composed:9:19 PM 06/14/2024

## 2024-06-17 RX ORDER — PANTOPRAZOLE SODIUM 40 MG/1
TABLET, DELAYED RELEASE ORAL
Qty: 180 TABLET | Refills: 1 | Status: SHIPPED | OUTPATIENT
Start: 2024-06-17

## 2024-06-25 LAB
LEFT EYE DM RETINOPATHY: POSITIVE
RIGHT EYE DM RETINOPATHY: POSITIVE

## 2024-07-17 ENCOUNTER — TELEPHONE (OUTPATIENT)
Dept: PODIATRY | Facility: CLINIC | Age: 69
End: 2024-07-17
Payer: MEDICARE

## 2024-07-17 DIAGNOSIS — E11.9 TYPE 2 DIABETES MELLITUS WITHOUT COMPLICATION: ICD-10-CM

## 2024-07-18 ENCOUNTER — TELEPHONE (OUTPATIENT)
Dept: PODIATRY | Facility: CLINIC | Age: 69
End: 2024-07-18
Payer: MEDICARE

## 2024-07-18 NOTE — TELEPHONE ENCOUNTER
----- Message from Feng De Leon sent at 7/18/2024  2:57 PM CDT -----  Type: Needs Medical Advice  Who Called:  pt  Best Call Back Number: [phone]    Additional Information: Pt is calling the office needs to be seen for cancelled appt.Please call back and advise.

## 2024-07-24 ENCOUNTER — LAB VISIT (OUTPATIENT)
Dept: LAB | Facility: HOSPITAL | Age: 69
End: 2024-07-24
Attending: INTERNAL MEDICINE
Payer: MEDICARE

## 2024-07-24 DIAGNOSIS — E10.9 DIABETES MELLITUS TYPE I: Primary | ICD-10-CM

## 2024-07-24 DIAGNOSIS — Z79.899 ENCOUNTER FOR LONG-TERM (CURRENT) USE OF OTHER MEDICATIONS: ICD-10-CM

## 2024-07-24 DIAGNOSIS — E03.9 MYXEDEMA HEART DISEASE: ICD-10-CM

## 2024-07-24 DIAGNOSIS — M85.9 LOW BONE DENSITY FOR AGE: ICD-10-CM

## 2024-07-24 DIAGNOSIS — I51.9 MYXEDEMA HEART DISEASE: ICD-10-CM

## 2024-07-24 LAB
25(OH)D3+25(OH)D2 SERPL-MCNC: 57 NG/ML (ref 30–96)
ALBUMIN SERPL BCP-MCNC: 4 G/DL (ref 3.5–5.2)
ALBUMIN/CREAT UR: NORMAL UG/MG (ref 0–30)
ALP SERPL-CCNC: 65 U/L (ref 55–135)
ALT SERPL W/O P-5'-P-CCNC: 19 U/L (ref 10–44)
ANION GAP SERPL CALC-SCNC: 6 MMOL/L (ref 8–16)
AST SERPL-CCNC: 31 U/L (ref 10–40)
BILIRUB SERPL-MCNC: 0.6 MG/DL (ref 0.1–1)
BUN SERPL-MCNC: 6 MG/DL (ref 8–23)
CALCIUM SERPL-MCNC: 9 MG/DL (ref 8.7–10.5)
CHLORIDE SERPL-SCNC: 103 MMOL/L (ref 95–110)
CHOLEST SERPL-MCNC: 167 MG/DL (ref 120–199)
CHOLEST/HDLC SERPL: 3.2 {RATIO} (ref 2–5)
CO2 SERPL-SCNC: 33 MMOL/L (ref 23–29)
CREAT SERPL-MCNC: 1 MG/DL (ref 0.5–1.4)
CREAT UR-MCNC: 177.9 MG/DL (ref 15–325)
EST. GFR  (NO RACE VARIABLE): >60 ML/MIN/1.73 M^2
ESTIMATED AVG GLUCOSE: 131 MG/DL (ref 68–131)
GLUCOSE SERPL-MCNC: 75 MG/DL (ref 70–110)
HBA1C MFR BLD: 6.2 % (ref 4.5–6.2)
HDLC SERPL-MCNC: 52 MG/DL (ref 40–75)
HDLC SERPL: 31.1 % (ref 20–50)
LDLC SERPL CALC-MCNC: 96.2 MG/DL (ref 63–159)
MICROALBUMIN UR DL<=1MG/L-MCNC: <7 UG/ML
NONHDLC SERPL-MCNC: 115 MG/DL
POTASSIUM SERPL-SCNC: 4.1 MMOL/L (ref 3.5–5.1)
PROT SERPL-MCNC: 6.7 G/DL (ref 6–8.4)
PTH-INTACT SERPL-MCNC: 83.8 PG/ML (ref 9–77)
SODIUM SERPL-SCNC: 142 MMOL/L (ref 136–145)
T4 FREE SERPL-MCNC: 0.89 NG/DL (ref 0.71–1.51)
TRIGL SERPL-MCNC: 94 MG/DL (ref 30–150)
TSH SERPL DL<=0.005 MIU/L-ACNC: 8.55 UIU/ML (ref 0.34–5.6)

## 2024-07-24 PROCEDURE — 80053 COMPREHEN METABOLIC PANEL: CPT | Performed by: INTERNAL MEDICINE

## 2024-07-24 PROCEDURE — 83036 HEMOGLOBIN GLYCOSYLATED A1C: CPT | Performed by: INTERNAL MEDICINE

## 2024-07-24 PROCEDURE — 84443 ASSAY THYROID STIM HORMONE: CPT | Performed by: INTERNAL MEDICINE

## 2024-07-24 PROCEDURE — 82570 ASSAY OF URINE CREATININE: CPT | Performed by: INTERNAL MEDICINE

## 2024-07-24 PROCEDURE — 84439 ASSAY OF FREE THYROXINE: CPT | Performed by: INTERNAL MEDICINE

## 2024-07-24 PROCEDURE — 80061 LIPID PANEL: CPT | Performed by: INTERNAL MEDICINE

## 2024-07-24 PROCEDURE — 36415 COLL VENOUS BLD VENIPUNCTURE: CPT | Performed by: INTERNAL MEDICINE

## 2024-07-24 PROCEDURE — 82306 VITAMIN D 25 HYDROXY: CPT | Performed by: INTERNAL MEDICINE

## 2024-07-24 PROCEDURE — 83970 ASSAY OF PARATHORMONE: CPT | Performed by: INTERNAL MEDICINE

## 2024-07-24 PROCEDURE — 82043 UR ALBUMIN QUANTITATIVE: CPT | Performed by: INTERNAL MEDICINE

## 2024-07-26 ENCOUNTER — HOSPITAL ENCOUNTER (OUTPATIENT)
Dept: RADIOLOGY | Facility: CLINIC | Age: 69
Discharge: HOME | End: 2024-07-26
Attending: STUDENT IN AN ORGANIZED HEALTH CARE EDUCATION/TRAINING PROGRAM
Payer: MEDICARE

## 2024-07-26 ENCOUNTER — PATIENT OUTREACH (OUTPATIENT)
Dept: ADMINISTRATIVE | Facility: HOSPITAL | Age: 69
End: 2024-07-26
Payer: MEDICARE

## 2024-07-26 ENCOUNTER — OFFICE VISIT (OUTPATIENT)
Dept: FAMILY MEDICINE | Facility: CLINIC | Age: 69
End: 2024-07-26
Payer: MEDICARE

## 2024-07-26 VITALS
HEIGHT: 66 IN | DIASTOLIC BLOOD PRESSURE: 64 MMHG | OXYGEN SATURATION: 97 % | WEIGHT: 194.88 LBS | HEART RATE: 85 BPM | RESPIRATION RATE: 18 BRPM | BODY MASS INDEX: 31.32 KG/M2 | SYSTOLIC BLOOD PRESSURE: 112 MMHG

## 2024-07-26 DIAGNOSIS — E10.42 CONTROLLED TYPE 1 DIABETES MELLITUS WITH DIABETIC POLYNEUROPATHY, WITH LONG-TERM CURRENT USE OF INSULIN: ICD-10-CM

## 2024-07-26 DIAGNOSIS — J30.2 CHRONIC SEASONAL ALLERGIC RHINITIS: ICD-10-CM

## 2024-07-26 DIAGNOSIS — L84 FOOT CALLUS: Primary | ICD-10-CM

## 2024-07-26 DIAGNOSIS — L84 FOOT CALLUS: ICD-10-CM

## 2024-07-26 PROCEDURE — 99213 OFFICE O/P EST LOW 20 MIN: CPT | Mod: S$PBB,,, | Performed by: STUDENT IN AN ORGANIZED HEALTH CARE EDUCATION/TRAINING PROGRAM

## 2024-07-26 PROCEDURE — G2211 COMPLEX E/M VISIT ADD ON: HCPCS | Mod: S$PBB,,, | Performed by: STUDENT IN AN ORGANIZED HEALTH CARE EDUCATION/TRAINING PROGRAM

## 2024-07-26 PROCEDURE — 99215 OFFICE O/P EST HI 40 MIN: CPT | Mod: PBBFAC,25,PO | Performed by: STUDENT IN AN ORGANIZED HEALTH CARE EDUCATION/TRAINING PROGRAM

## 2024-07-26 PROCEDURE — 73630 X-RAY EXAM OF FOOT: CPT | Mod: 26,RT,S$GLB, | Performed by: RADIOLOGY

## 2024-07-26 PROCEDURE — 73630 X-RAY EXAM OF FOOT: CPT | Mod: TC,FY,PO,RT

## 2024-07-26 PROCEDURE — 99999 PR PBB SHADOW E&M-EST. PATIENT-LVL V: CPT | Mod: PBBFAC,,, | Performed by: STUDENT IN AN ORGANIZED HEALTH CARE EDUCATION/TRAINING PROGRAM

## 2024-07-26 NOTE — PROGRESS NOTES
Population Health Chart Review & Patient Outreach Details      Additional Pop Health Notes:      Requested from Dr. Sofia  HM Claims  Dr. Tohmas per pcp         Updates Requested / Reviewed:      Updated Care Coordination Note, Care Everywhere, , and Care Team Updated         Health Maintenance Topics Overdue:      VBHM Score: 2     Colon Cancer Screening  Eye Exam                       Health Maintenance Topic(s) Outreach Outcomes & Actions Taken:    Eye Exam - Outreach Outcomes & Actions Taken  : External Records Requested & Care Team Updated if Applicable

## 2024-07-26 NOTE — LETTER
AUTHORIZATION FOR RELEASE OF   CONFIDENTIAL INFORMATION    Kylie Sofia OD    We are seeing Kateryna Desai, date of birth 1955, in the clinic at [facility]. Guido Chow MD is the patient's PCP. Kateryna Desai has an outstanding lab/procedure at the time we reviewed her chart. In order to help keep her health information updated, she has authorized us to request the following medical record(s):       Diabetic EYE EXAM           Significant Findings:  ________ No Diabetic Retinopathy  ________ Minimal Background Diabetic Retinopathy  ________ Moderate to Severe Background Diabetic Retinopathy  ________ Clinically Significant Macular Edema  ________ Proliferative Diabetic Retinopathy                 Please fax records to [facility], Naccari, Craig P, MD,  Please fax records to [facility], Naccari, Craig P, MD, [phone]     If you have any questions, please contact Emilia Rousseau, Care Coordinator   at [phone].            Patient Name: Kateryna Desai  : 1955  Patient Phone #: [phone]

## 2024-07-26 NOTE — PATIENT INSTRUCTIONS
If the xray and blood test look fine, no changes. See the podiatrist as scheduled.       You could try holding the aspirin and see if nasal symptoms get better.

## 2024-07-26 NOTE — LETTER
AUTHORIZATION FOR RELEASE OF   CONFIDENTIAL INFORMATION    Claus Thomas MD    We are seeing Kateryna Desai, date of birth 1955, in the clinic at [facility]. Guido Chow MD is the patient's PCP. Kateryna Desai has an outstanding lab/procedure at the time we reviewed her chart. In order to help keep her health information updated, she has authorized us to request the following medical record(s):       Diabetic EYE EXAM           Significant Findings:  ________ No Diabetic Retinopathy  ________ Minimal Background Diabetic Retinopathy  ________ Moderate to Severe Background Diabetic Retinopathy  ________ Clinically Significant Macular Edema  ________ Proliferative Diabetic Retinopathy                 Please fax records to [facility], Naccari, Craig P, MD,  Please fax records to [facility], Naccari, Craig P, MD, [phone]     If you have any questions, please contact Emilia Rousseau, Care Coordinator   at [phone].          Patient Name: Kateryna Desai  : 1955  Patient Phone #: [phone]

## 2024-07-26 NOTE — PROGRESS NOTES
"[facility] MEDICINE CLINIC NOTE    Patient Name: Kateryna Desai  YOB: 1955    PRESENTING HISTORY     History of Present Illness:  Ms. Kateryna Desai is a 69 y.o. female here for routine f/u.     C/o sinus congestion.   Coughing.   Robittusin with minimal relief.   Chronic, continuous.   Worsened at beginning of the summer May/June.   Already on nasal steroid/astelin, antihistamine, singulair. Also on PPI.     Sees Dr. Thomas for eye exams.     Discussed CRC screening    DM control has been good.     She has a painful corn on the lateral aspect of her right foot. Has changed shoes. Has appt with podiatry upcoming.     ROS      OBJECTIVE:   Vital Signs:  Vitals:    07/26/24 0924   BP: 112/64   Pulse: 85   Resp: 18   SpO2: 97%   Weight: 88.4 kg (194 lb 14.2 oz)   Height: 5' 6" (1.676 m)         Physical Exam  Vitals and nursing note reviewed.   Constitutional:       Appearance: She is not ill-appearing, toxic-appearing or diaphoretic.   HENT:      Head: Normocephalic and atraumatic.      Right Ear: Tympanic membrane and external ear normal.      Left Ear: Tympanic membrane and external ear normal.      Nose:      Comments: No sinus tenderness  Eyes:      General: No scleral icterus.     Conjunctiva/sclera: Conjunctivae normal.      Pupils: Pupils are equal, round, and reactive to light.   Neck:      Thyroid: No thyromegaly.   Cardiovascular:      Rate and Rhythm: Normal rate and regular rhythm.      Heart sounds: Normal heart sounds. No murmur heard.  Pulmonary:      Effort: Pulmonary effort is normal. No respiratory distress.      Breath sounds: Normal breath sounds. No wheezing or rales.   Musculoskeletal:         General: No tenderness or deformity. Normal range of motion.      Cervical back: Normal range of motion and neck supple.   Lymphadenopathy:      Cervical: No cervical adenopathy.   Skin:     Comments: Small corn overlying lateral aspect of right foot " near MTP. Moderately tender.    Neurological:      Mental Status: She is alert and oriented to person, place, and time.      Gait: Gait is intact.   Psychiatric:         Mood and Affect: Mood and affect normal.         Cognition and Memory: Memory normal.         Judgment: Judgment normal.         ASSESSMENT & PLAN:     Foot callus  -     X-Ray Foot Complete Right; Future; Expected date: 07/26/2024  -     C-REACTIVE PROTEIN; Future; Expected date: 07/26/2024    Chronic seasonal allergic rhinitis    Controlled type 1 diabetes mellitus with diabetic polyneuropathy, with long-term current use of insulin    Well controlled chronic conditions.     Chronic rhitis- we cold try holding aspirin or seeing allergy as next step.      Given comorbidities and tenderness, xray, CRP to evaluate foot. Keep podiatry appt.     Guido Chow  Internal Medicine    Visit complexity inherent to evaluation and management associated with medical care services that serve as the continuing focal point for all needed health care services and/or with medical care services that are part of ongoing care related to a patient's single, serious condition or a complex condition provided today

## 2024-07-29 ENCOUNTER — PATIENT MESSAGE (OUTPATIENT)
Dept: FAMILY MEDICINE | Facility: CLINIC | Age: 69
End: 2024-07-29
Payer: MEDICARE

## 2024-07-29 ENCOUNTER — PATIENT OUTREACH (OUTPATIENT)
Dept: ADMINISTRATIVE | Facility: HOSPITAL | Age: 69
End: 2024-07-29
Payer: MEDICARE

## 2024-07-29 NOTE — PROGRESS NOTES
Population Health Chart Review & Patient Outreach Details      Additional Pop Health Notes:               Updates Requested / Reviewed:      Updated Care Coordination Note, , and Care Team Updated         Health Maintenance Topics Overdue:      VB Score: 2     Colon Cancer Screening  Eye Exam                       Health Maintenance Topic(s) Outreach Outcomes & Actions Taken:    Eye Exam - Outreach Outcomes & Actions Taken  : Diabetic Eye External Records Uploaded, Care Team & History Updated if Applicable

## 2024-07-29 NOTE — LETTER
April 7, 2017      Arvind Pino MD  [address]  [facility] [address]           [address] [address]  Phone: [phone]          Patient: Kateryna Desai   MR Number: [id number]   YOB: 1955   Date of Visit: 4/7/2017       Dear Dr. Arvind Pino:    Thank you for referring Kateryna Desai to me for evaluation. Attached you will find relevant portions of my assessment and plan of care.    If you have questions, please do not hesitate to call me. I look forward to following Kateryna Desai along with you.    Sincerely,    Eric Iqbal MD    Enclosure  CC:  No Recipients    If you would like to receive this communication electronically, please contact [email or web address] or [phone] to request more information on [employer] access.    For providers and/or their staff who would like to refer a patient to [facility], please contact us through our one-stop-shop provider referral line, [facility] , at [phone].    If you feel you have received this communication in error or would no longer like to receive these types of communications, please e-mail [email or web address]          Patient's daughter, Rhoda Al, updated via phone call. Patient's current status and treatment plan discussed in detail. Per CC attending's note over the weekend, patient's family was considering possible code status changes but they wanted more time to decide as a family. When asked, Rhoda stated they want to be sure they are making the right decision for her mom and they would like to have the conversation in person with the medical team tomorrow at 10:30 am. She is agreeable to a palliative care consult as well. Will continue full code for now pending family meeting tomorrow morning.    Milagro Burns MD  Pulmonary and Critical Care Fellow, PGY-6  [facility]

## 2024-08-08 ENCOUNTER — OFFICE VISIT (OUTPATIENT)
Dept: CARDIOLOGY | Facility: CLINIC | Age: 69
End: 2024-08-08
Payer: MEDICARE

## 2024-08-08 VITALS
BODY MASS INDEX: 31.18 KG/M2 | HEART RATE: 81 BPM | SYSTOLIC BLOOD PRESSURE: 108 MMHG | HEIGHT: 66 IN | DIASTOLIC BLOOD PRESSURE: 68 MMHG | WEIGHT: 194 LBS

## 2024-08-08 DIAGNOSIS — E10.319 DIABETIC RETINOPATHY OF BOTH EYES ASSOCIATED WITH TYPE 1 DIABETES MELLITUS, MACULAR EDEMA PRESENCE UNSPECIFIED, UNSPECIFIED RETINOPATHY SEVERITY: ICD-10-CM

## 2024-08-08 DIAGNOSIS — I70.0 AORTIC ATHEROSCLEROSIS: ICD-10-CM

## 2024-08-08 DIAGNOSIS — I35.0 NONRHEUMATIC AORTIC VALVE STENOSIS: ICD-10-CM

## 2024-08-08 DIAGNOSIS — E11.43 DIABETIC GASTROPARESIS: Primary | ICD-10-CM

## 2024-08-08 DIAGNOSIS — I10 PRIMARY HYPERTENSION: ICD-10-CM

## 2024-08-08 DIAGNOSIS — I87.2 VENOUS INSUFFICIENCY OF LEG: ICD-10-CM

## 2024-08-08 DIAGNOSIS — E10.22 STAGE 3 CHRONIC KIDNEY DISEASE DUE TO TYPE 1 DIABETES MELLITUS: ICD-10-CM

## 2024-08-08 DIAGNOSIS — E78.2 MIXED HYPERLIPIDEMIA: ICD-10-CM

## 2024-08-08 DIAGNOSIS — N18.30 STAGE 3 CHRONIC KIDNEY DISEASE DUE TO TYPE 1 DIABETES MELLITUS: ICD-10-CM

## 2024-08-08 DIAGNOSIS — K31.84 DIABETIC GASTROPARESIS: Primary | ICD-10-CM

## 2024-08-08 DIAGNOSIS — Z86.73 HISTORY OF STROKE: ICD-10-CM

## 2024-08-08 PROCEDURE — 99214 OFFICE O/P EST MOD 30 MIN: CPT | Mod: S$PBB,,, | Performed by: INTERNAL MEDICINE

## 2024-08-08 PROCEDURE — 99999 PR PBB SHADOW E&M-EST. PATIENT-LVL IV: CPT | Mod: PBBFAC,,, | Performed by: INTERNAL MEDICINE

## 2024-08-08 PROCEDURE — 99214 OFFICE O/P EST MOD 30 MIN: CPT | Mod: PBBFAC,PO | Performed by: INTERNAL MEDICINE

## 2024-08-16 ENCOUNTER — OFFICE VISIT (OUTPATIENT)
Dept: PODIATRY | Facility: CLINIC | Age: 69
End: 2024-08-16
Payer: MEDICARE

## 2024-08-16 VITALS — BODY MASS INDEX: 31.18 KG/M2 | HEIGHT: 66 IN | WEIGHT: 194 LBS

## 2024-08-16 DIAGNOSIS — L84 CORN OR CALLUS: ICD-10-CM

## 2024-08-16 DIAGNOSIS — E10.42 CONTROLLED TYPE 1 DIABETES MELLITUS WITH DIABETIC POLYNEUROPATHY: Primary | ICD-10-CM

## 2024-08-16 PROCEDURE — 99214 OFFICE O/P EST MOD 30 MIN: CPT | Mod: PBBFAC,PO | Performed by: PODIATRIST

## 2024-08-16 PROCEDURE — 99999 PR PBB SHADOW E&M-EST. PATIENT-LVL IV: CPT | Mod: PBBFAC,,, | Performed by: PODIATRIST

## 2024-08-16 NOTE — PROGRESS NOTES
Subjective:      Patient ID: Kateryna Desai is a 69 y.o. female.    Chief Complaint: Foot Pain    Kateryna is a 69 y.o. female who presents to the clinic for routine evaluation and treatment of diabetic feet. Kateryna has a past medical history of Allergy, Arthritis, Asian flu type A (12/22/2017), Bunionette (07/26/2012), Diabetes mellitus type I, Diabetic gastroparesis, Diabetic retinopathy of both eyes, Difficult intubation, Encounter for blood transfusion, GERD (gastroesophageal reflux disease), Hallux abducto valgus (03/31/2014), Headache(784.0), Hiatal hernia, Hyperlipidemia, Hypertension, Hypertensive retinopathy of both eyes (06/25/2024), Infection of the upper respiratory tract (06/2012), Lumbar spinal stenosis, Osteopenia, Proliferative diabetic retinopathy of both eyes (06/25/2024), Rosacea, Seizures, Stroke (1985?), Tachycardia, Thyroid disease, and Venous insufficiency of leg. Patient relates some sensitivity from a callus to the Lt. Posterior heel and along the lateral Rt. 5th mtp joint.  Describes pain from the lesion as sharp and rates as a 6/10.  Symptoms were exacerbated with applied pressure from a pair of yard shoes.  She has been applying moisturizer but was afraid to file the area.  Denies noticing signs of infection to the site.   Notes great control over her blood glucose.  Denies any additional pedal complaints.      PCP: Guido Chow MD    Date Last Seen by PCP: 7/24      Hemoglobin A1C   Date Value Ref Range Status   07/24/2024 6.2 4.5 - 6.2 % Final     Comment:     According to ADA guidelines, hemoglobin A1C <7.0% represents  optimal control in non-pregnant diabetic patients.  Different  metrics may apply to specific populations.   Standards of Medical Care in Diabetes - 2016.    For the purpose of screening for the presence of diabetes:  <5.7%     Consistent with the absence of diabetes  5.7-6.4%  Consistent with increasing risk for diabetes   (prediabetes)  >or=6.5%   Consistent with diabetes    Currently no consensus exists for use of hemoglobin A1C  for diagnosis of diabetes for children.     03/13/2024 6.0 4.5 - 6.2 % Corrected     Comment:     According to ADA guidelines, hemoglobin A1C <7.0% represents  optimal control in non-pregnant diabetic patients.  Different  metrics may apply to specific populations.   Standards of Medical Care in Diabetes - 2016.    For the purpose of screening for the presence of diabetes:  <5.7%     Consistent with the absence of diabetes  5.7-6.4%  Consistent with increasing risk for diabetes   (prediabetes)  >or=6.5%  Consistent with diabetes    Currently no consensus exists for use of hemoglobin A1C  for diagnosis of diabetes for children.  sent to [facility]  CORRECTED RESULT; previously reported as @sc on 03/13/2024 at 09:31.     11/06/2023 5.9 4.5 - 6.2 % Final     Comment:     According to ADA guidelines, hemoglobin A1C <7.0% represents  optimal control in non-pregnant diabetic patients.  Different  metrics may apply to specific populations.   Standards of Medical Care in Diabetes - 2016.    For the purpose of screening for the presence of diabetes:  <5.7%     Consistent with the absence of diabetes  5.7-6.4%  Consistent with increasing risk for diabetes   (prediabetes)  >or=6.5%  Consistent with diabetes    Currently no consensus exists for use of hemoglobin A1C  for diagnosis of diabetes for children.             Past Medical History:   Diagnosis Date    Allergy     Arthritis     degnerative disease low back,muscle spasms, shoulders    Asian flu type A 12/22/2017    Bunionette 07/26/2012    Diabetes mellitus type I     since age 13    Diabetic gastroparesis     takes Cytotec    Diabetic retinopathy of both eyes     gets periodic laser treatments    Difficult intubation     as a child had sore throat after a procedure    Encounter for blood transfusion     GERD (gastroesophageal reflux disease)     Hallux abducto valgus 03/31/2014     Headache(784.0)     Hiatal hernia     with GERD    Hyperlipidemia     Hypertension     Hypertensive retinopathy of both eyes 2024    Infection of the upper respiratory tract 2012    Lumbar spinal stenosis     Osteopenia     Proliferative diabetic retinopathy of both eyes 2024    Rosacea     Seizures     Pt states during pgy with stroke    Stroke ?    while pregnant    Tachycardia     asymptomatic with Toprol    Thyroid disease     hypothyroidism    Venous insufficiency of leg     improved since EVLT       Past Surgical History:   Procedure Laterality Date    BUNIONECTOMY Right 2012    CARDIAC CATHETERIZATION      no stents     SECTION      COLONOSCOPY  2007    Dr. Rose, 10 year recheck    CORRECTION OF HAMMER TOE Right 8/3/2023    Procedure: CORRECTION, HAMMER TOE Right second toe;  Surgeon: Lei Rhoades DPM;  Location: [facility] OR;  Service: Podiatry;  Laterality: Right;  right 2nd toe    EXOSTECTOMY  12    left foot, local anesthesia, in office    EYE SURGERY      has had many laser procedures for diabetic retinopathy    VASCULAR SURGERY      VEIN SURGERY  2012    EVLT right greater saphenous, IV sedation       Family History   Problem Relation Name Age of Onset    Cancer Maternal Aunt          breast    Breast cancer Maternal Aunt      Diabetes Maternal Grandmother      Breast cancer Maternal Grandmother  38    Cancer Maternal Grandfather          prostate    Diabetes Maternal Grandfather      Diabetes Cousin      Arthritis Mother      Cancer Mother      Melanoma Mother      Heart disease Father      Stroke Father      No Known Problems Sister      No Known Problems Brother      No Known Problems Daughter 1     Alzheimer's disease Maternal Uncle      Alcohol abuse Maternal Uncle          x2    Heart disease Maternal Uncle      No Known Problems Paternal Aunt      Heart disease Paternal Uncle         Social History     Socioeconomic History    Marital status:  "   Tobacco Use    Smoking status: Never    Smokeless tobacco: Never   Substance and Sexual Activity    Alcohol use: Yes     Comment: rarely; 1 drink/year    Drug use: No    Sexual activity: Yes     Partners: Male       Current Outpatient Medications   Medication Sig Dispense Refill    ABRYSVO 120 mcg/0.5 mL SolR vaccine       ACZONE 5 % topical gel Apply 1 application topically 2 (two) times daily as needed (roseacea).  0    alpha lipoic acid 600 mg Cap Take 2 capsules by mouth once daily.      ALPHAGAN P 0.1 % Drop Place 1 drop into both eyes 3 (three) times daily.   6    ascorbic acid, vitamin C, (VITAMIN C) 100 MG tablet Take 100 mg by mouth 2 (two) times daily.      BD VEO INSULIN SYRINGE UF 1/2 mL 31 gauge x 15/64" Syrg   4    calcium citrate-vitamin D3 315-200 mg (CITRACAL+D) 315-200 mg-unit per tablet Take 1 tablet by mouth 2 (two) times daily.      cinnamon bark 500 mg capsule Take 500 mg by mouth 2 (two) times daily.      CRESTOR 40 mg Tab Take 40 mg by mouth once daily.       doxycycline (VIBRAMYCIN) 50 MG capsule Take 50 mg by mouth 2 (two) times daily.      ESTRACE 0.01 % (0.1 mg/gram) vaginal cream Place vaginally twice a week. Tuesday, Saturday  0    estradiol 10 mcg Tab Place 1 tablet vaginally twice a week. Tuesday, Saturday      ezetimibe (ZETIA) 10 mg tablet Take 10 mg by mouth once daily.      fluticasone propionate (FLONASE) 50 mcg/actuation nasal spray USE 1 SPRAY INTO THE EACH NOSTRIL ONCE A DAY Strength: 50 mcg/actuation 32 g 3    furosemide (LASIX) 20 MG tablet Take 1 tablet (20 mg total) by mouth 2 (two) times a day. 180 tablet 4    glucagon (GVOKE HYPOPEN 2-PACK) 1 mg/0.2 mL AtIn USE AS DIRECTED TO TREAT PROFOUND HYPOGLYCEMIA 0.4 mL 1    GLUCAGON EMERGENCY 1 mg injection Take 1 mg by mouth as needed.      GVOKE HYPOPEN 2-PACK 1 mg/0.2 mL AtIn       HUMALOG 100 unit/mL injection Inject 10-17 Units into the skin 3 (three) times daily before meals. 10-17 units tid sliding scale  1    " "metoprolol succinate (TOPROL-XL) 25 MG 24 hr tablet TAKE 1 TABLET BY MOUTH ONCE A DAY 90 tablet 3    montelukast (SINGULAIR) 10 mg tablet TAKE 1 TABLET BY MOUTH EVERY EVENING 90 tablet 3    mupirocin (BACTROBAN) 2 % ointment APPLY TOPICALLY THREE TIMES A DAY 30 g 1    NOVOFINE 32 32 gauge x 1/4" Ndle       ondansetron (ZOFRAN-ODT) 4 MG TbDL Take 1 tablet (4 mg total) by mouth every 8 (eight) hours as needed (Nausea). 60 tablet 1    pantoprazole (PROTONIX) 40 MG tablet TAKE 1 TABLET BY MOUTH TWICE A  tablet 1    potassium chloride (K-TAB) 20 mEq Take 40 mEq by mouth 2 (two) times a day.  6    promethazine (PHENERGAN) 25 MG suppository Place 1 suppository (25 mg total) rectally every 6 (six) hours as needed for Nausea. 10 suppository 0    promethazine (PHENERGAN) 25 MG tablet Take 1 tablet (25 mg total) by mouth every 6 (six) hours as needed for Nausea. 10 tablet 0    SOOLANTRA 1 % Crea Apply 1 application topically once daily.      SYNTHROID 88 mcg tablet Take 88 mcg by mouth every Tuesday, Thursday, Saturday, Sunday.  0    TOUJEO SOLOSTAR U-300 INSULIN 300 unit/mL (1.5 mL) InPn pen Inject 34 Units into the skin once daily.      VITAMIN B COMPLEX VIT C NO.4 (SUPER B COMPLEX + C ORAL) Take 1 tablet by mouth once daily.      baclofen (LIORESAL) 10 MG tablet Take 1 tablet (10 mg total) by mouth 2 (two) times daily as needed (muscle pain). 20 tablet 0     No current facility-administered medications for this visit.       Review of patient's allergies indicates:   Allergen Reactions    Bactrim [sulfamethoxazole-trimethoprim] Rash     Patient experienced on 12/3/14 redness to face and rash to chest and arms/ with no SOB or airway obstruction    Percocet [oxycodone-acetaminophen] Nausea And Vomiting     Also caused dizziness and passed out    Iodinated contrast media - iv dye Swelling and Rash     Says topical iodine OK    Norco [hydrocodone-acetaminophen] Itching, Swelling and Rash     Can tolerate if she takes " Benadryl with it    Niaspan extended-release [niacin] Itching and Other (See Comments)     Skin flushing and redness         Review of Systems   Constitutional: Negative for chills, decreased appetite, diaphoresis and fever.   Cardiovascular:  Negative for claudication and leg swelling.   Skin:  Positive for dry skin. Negative for color change, flushing, itching and nail changes.   Musculoskeletal:  Negative for arthritis, back pain, falls, gout, joint pain, joint swelling and myalgias.   Gastrointestinal:  Negative for nausea and vomiting.   Neurological:  Positive for numbness and paresthesias.   Psychiatric/Behavioral:  Negative for altered mental status.            Objective:      Physical Exam  Constitutional:       General: She is not in acute distress.     Appearance: She is well-developed. She is not diaphoretic.   Cardiovascular:      Pulses:           Dorsalis pedis pulses are 2+ on the right side and 2+ on the left side.        Posterior tibial pulses are 2+ on the right side and 2+ on the left side.      Comments: CFT < 3 seconds bilateral.  Pedal hair growth decreased bilateral.  Varicosities noted to bilateral lower extremity. Mild nonpitting edema noted to bilateral lower extremity.  Toes are cool to touch bilateral.      Musculoskeletal:         General: Tenderness present. Normal range of motion.      Comments: Muscle strength 5/5 in all muscle groups bilateral.  (-) for pain with active and passive ROM of bilateral foot and ankle. Pain with palpation to the Rt. Lateral 5th mtp joint and Lt. Posterior heel lesions.   Bilateral pes planus foot type.  Bilateral hallux abducto valgus.  Bilateral semi-rigid contracture of toes 3-5 with adductovarus rotation of the 5th.  Rectus alignment of the Rt. 2nd toe S/P hammertoe correction.     Skin:     General: Skin is warm and dry.      Coloration: Skin is not pale.      Findings: Lesion present. No abrasion, bruising, burn, ecchymosis, erythema, laceration,  petechiae or rash.      Nails: There is no clubbing.      Comments: Toenails x 10 appear dystrophic but well maintained. No open wounds, interdigital maceration noted bilateral.  Cicatrix of the Lt. Posterior heel is well healed with no adjacent break in skin integrity.  Hyperkeratotic lesion noted to the Lt. Posterior heel and lateral Rt. 5th mtp joint.         Neurological:      Mental Status: She is alert and oriented to person, place, and time.      Sensory: Sensory deficit present.      Motor: No weakness or atrophy.      Comments: Protective sensation per [place] monofilament decreased bilateral.    Light touch intact bilateral.               Assessment:       Encounter Diagnoses   Name Primary?    Controlled type 1 diabetes mellitus with diabetic polyneuropathy Yes    Corn or callus          Plan:       Kateryna was seen today for foot pain.    Diagnoses and all orders for this visit:    Controlled type 1 diabetes mellitus with diabetic polyneuropathy    Corn or callus      I counseled the patient on her conditions, their implications and medical management.    - Advised to apply vaseline in the form of a occlusive dressing to areas of callus build up QD x 1 month.    - May gently file said lesions with a pumice stone or yaneth board every 2-3 days.    - Advised to continue wearing shoe gear that accommodates for digital deformities.  To inspect daily for excessive signs of shearing consistent with pressure ulceration as well as the development of pre-ulceration.      - Shoe inspection. Diabetic Foot Education. Patient reminded of the importance of good nutrition and blood sugar control to help prevent podiatric complications of diabetes. Patient instructed on proper foot hygeine. We discussed wearing proper shoe gear, daily foot inspections, never walking without protective shoe gear, never putting sharp instruments to feet    - With patient's permission, a sterile #15 scalpel was used to trim lesions  x 2 down to smooth appearing skin without incident.  Patient relates relief following the procedure. She will continue to monitor the areas daily, inspect her feet, wear protective shoe gear when ambulatory, moisturizer to maintain skin integrity and follow in this office in approximately 4 months, sooner p.r.n.      Lei Rhoades DPM

## 2024-08-23 DIAGNOSIS — J30.2 CHRONIC SEASONAL ALLERGIC RHINITIS: ICD-10-CM

## 2024-08-23 RX ORDER — FLUTICASONE PROPIONATE 50 MCG
SPRAY, SUSPENSION (ML) NASAL
Qty: 32 G | Refills: 3 | Status: SHIPPED | OUTPATIENT
Start: 2024-08-23

## 2024-08-23 NOTE — TELEPHONE ENCOUNTER
No care due was identified.  [facility] Embedded Care Due Messages. Reference number: [id number].   8/23/2024 8:46:52 AM CDT

## 2024-08-23 NOTE — TELEPHONE ENCOUNTER
Refill Decision Note   Kateryna Desai  is requesting a refill authorization.  Brief Assessment and Rationale for Refill:  Approve     Medication Therapy Plan:       Medication Reconciliation Completed: No   Comments:     No Care Gaps recommended.     Note composed:3:33 PM 08/23/2024

## 2024-09-04 ENCOUNTER — LAB VISIT (OUTPATIENT)
Dept: LAB | Facility: HOSPITAL | Age: 69
End: 2024-09-04
Attending: INTERNAL MEDICINE
Payer: MEDICARE

## 2024-09-04 DIAGNOSIS — N18.2 CHRONIC KIDNEY DISEASE, STAGE II (MILD): ICD-10-CM

## 2024-09-04 DIAGNOSIS — E21.0 PRIMARY HYPERPARATHYROIDISM: Primary | ICD-10-CM

## 2024-09-04 LAB
25(OH)D3+25(OH)D2 SERPL-MCNC: 58 NG/ML (ref 30–96)
ALBUMIN SERPL BCP-MCNC: 4 G/DL (ref 3.5–5.2)
ANION GAP SERPL CALC-SCNC: 7 MMOL/L (ref 8–16)
BILIRUB UR QL STRIP: NEGATIVE
BUN SERPL-MCNC: 6 MG/DL (ref 8–23)
CALCIUM SERPL-MCNC: 9.2 MG/DL (ref 8.7–10.5)
CHLORIDE SERPL-SCNC: 104 MMOL/L (ref 95–110)
CLARITY UR: ABNORMAL
CO2 SERPL-SCNC: 30 MMOL/L (ref 23–29)
COLOR UR: YELLOW
CREAT SERPL-MCNC: 0.9 MG/DL (ref 0.5–1.4)
CREAT UR-MCNC: 75.8 MG/DL (ref 15–325)
EST. GFR  (NO RACE VARIABLE): >60 ML/MIN/1.73 M^2
GLUCOSE SERPL-MCNC: 81 MG/DL (ref 70–110)
GLUCOSE UR QL STRIP: NEGATIVE
HGB UR QL STRIP: NEGATIVE
HYALINE CASTS #/AREA URNS LPF: 3 /LPF
KETONES UR QL STRIP: NEGATIVE
LEUKOCYTE ESTERASE UR QL STRIP: ABNORMAL
MICROSCOPIC COMMENT: ABNORMAL
NITRITE UR QL STRIP: NEGATIVE
PH UR STRIP: 6 [PH] (ref 5–8)
PHOSPHATE SERPL-MCNC: 3.1 MG/DL (ref 2.7–4.5)
POTASSIUM SERPL-SCNC: 3.9 MMOL/L (ref 3.5–5.1)
PROT UR QL STRIP: NEGATIVE
PROT UR-MCNC: 7 MG/DL (ref 6–15)
PTH-INTACT SERPL-MCNC: 103.3 PG/ML (ref 9–77)
RBC #/AREA URNS HPF: 7 /HPF (ref 0–4)
SODIUM SERPL-SCNC: 141 MMOL/L (ref 136–145)
SP GR UR STRIP: 1.01 (ref 1–1.03)
SQUAMOUS #/AREA URNS HPF: 5 /HPF
URN SPEC COLLECT METH UR: ABNORMAL
UROBILINOGEN UR STRIP-ACNC: NEGATIVE EU/DL
WBC #/AREA URNS HPF: 8 /HPF (ref 0–5)

## 2024-09-04 PROCEDURE — 36415 COLL VENOUS BLD VENIPUNCTURE: CPT | Performed by: INTERNAL MEDICINE

## 2024-09-04 PROCEDURE — 81001 URINALYSIS AUTO W/SCOPE: CPT | Performed by: INTERNAL MEDICINE

## 2024-09-04 PROCEDURE — 80069 RENAL FUNCTION PANEL: CPT | Performed by: INTERNAL MEDICINE

## 2024-09-04 PROCEDURE — 84156 ASSAY OF PROTEIN URINE: CPT | Performed by: INTERNAL MEDICINE

## 2024-09-04 PROCEDURE — 83970 ASSAY OF PARATHORMONE: CPT | Performed by: INTERNAL MEDICINE

## 2024-09-04 PROCEDURE — 82306 VITAMIN D 25 HYDROXY: CPT | Performed by: INTERNAL MEDICINE

## 2024-09-04 PROCEDURE — 82570 ASSAY OF URINE CREATININE: CPT | Performed by: INTERNAL MEDICINE

## 2024-10-02 DIAGNOSIS — J30.2 CHRONIC SEASONAL ALLERGIC RHINITIS: ICD-10-CM

## 2024-10-02 DIAGNOSIS — I83.893 VARICOSE VEINS OF LOWER EXTREMITY WITH EDEMA, BILATERAL: ICD-10-CM

## 2024-10-02 DIAGNOSIS — I10 ESSENTIAL HYPERTENSION: ICD-10-CM

## 2024-10-02 RX ORDER — MONTELUKAST SODIUM 10 MG/1
TABLET ORAL
Qty: 90 TABLET | Refills: 3 | Status: SHIPPED | OUTPATIENT
Start: 2024-10-02

## 2024-10-02 RX ORDER — METOPROLOL SUCCINATE 25 MG/1
TABLET, EXTENDED RELEASE ORAL
Qty: 90 TABLET | Refills: 3 | Status: SHIPPED | OUTPATIENT
Start: 2024-10-02

## 2024-10-02 RX ORDER — FUROSEMIDE 20 MG/1
20 TABLET ORAL 2 TIMES DAILY
Qty: 180 TABLET | Refills: 3 | Status: SHIPPED | OUTPATIENT
Start: 2024-10-02

## 2024-10-02 NOTE — TELEPHONE ENCOUNTER
Refill Decision Note   Kateryna Desai  is requesting a refill authorization.  Brief Assessment and Rationale for Refill:  Approve     Medication Therapy Plan:         Comments:     Note composed:4:42 PM 10/02/2024

## 2024-10-02 NOTE — TELEPHONE ENCOUNTER
No care due was identified.  [facility] Embedded Care Due Messages. Reference number: [id number].   10/02/2024 9:52:52 AM CDT

## 2024-11-05 ENCOUNTER — HOSPITAL ENCOUNTER (EMERGENCY)
Facility: HOSPITAL | Age: 69
Discharge: HOME | End: 2024-11-05
Attending: EMERGENCY MEDICINE
Payer: MEDICARE

## 2024-11-05 VITALS
DIASTOLIC BLOOD PRESSURE: 59 MMHG | RESPIRATION RATE: 21 BRPM | WEIGHT: 197.75 LBS | BODY MASS INDEX: 31.78 KG/M2 | TEMPERATURE: 96 F | OXYGEN SATURATION: 98 % | SYSTOLIC BLOOD PRESSURE: 121 MMHG | HEIGHT: 66 IN | HEART RATE: 92 BPM

## 2024-11-05 DIAGNOSIS — R11.10 VOMITING: ICD-10-CM

## 2024-11-05 DIAGNOSIS — E16.2 HYPOGLYCEMIA: Primary | ICD-10-CM

## 2024-11-05 LAB
ALBUMIN SERPL BCP-MCNC: 4.4 G/DL (ref 3.5–5.2)
ALP SERPL-CCNC: 64 U/L (ref 55–135)
ALT SERPL W/O P-5'-P-CCNC: 21 U/L (ref 10–44)
ANION GAP SERPL CALC-SCNC: 8 MMOL/L (ref 8–16)
AST SERPL-CCNC: 25 U/L (ref 10–40)
BASOPHILS # BLD AUTO: 0.03 K/UL (ref 0–0.2)
BASOPHILS NFR BLD: 0.3 % (ref 0–1.9)
BILIRUB SERPL-MCNC: 0.5 MG/DL (ref 0.1–1)
BILIRUB UR QL STRIP: NEGATIVE
BUN SERPL-MCNC: 16 MG/DL (ref 8–23)
CALCIUM SERPL-MCNC: 9.8 MG/DL (ref 8.7–10.5)
CHLORIDE SERPL-SCNC: 105 MMOL/L (ref 95–110)
CLARITY UR: CLEAR
CO2 SERPL-SCNC: 28 MMOL/L (ref 23–29)
COLOR UR: YELLOW
CREAT SERPL-MCNC: 1.1 MG/DL (ref 0.5–1.4)
DIFFERENTIAL METHOD BLD: ABNORMAL
EOSINOPHIL # BLD AUTO: 0.1 K/UL (ref 0–0.5)
EOSINOPHIL NFR BLD: 1.2 % (ref 0–8)
ERYTHROCYTE [DISTWIDTH] IN BLOOD BY AUTOMATED COUNT: 14.3 % (ref 11.5–14.5)
EST. GFR  (NO RACE VARIABLE): 55.1 ML/MIN/1.73 M^2
GLUCOSE SERPL-MCNC: 37 MG/DL (ref 70–110)
GLUCOSE UR QL STRIP: NEGATIVE
HCT VFR BLD AUTO: 39.2 % (ref 37–48.5)
HGB BLD-MCNC: 12.8 G/DL (ref 12–16)
HGB UR QL STRIP: NEGATIVE
IMM GRANULOCYTES # BLD AUTO: 0.02 K/UL (ref 0–0.04)
IMM GRANULOCYTES NFR BLD AUTO: 0.2 % (ref 0–0.5)
KETONES UR QL STRIP: NEGATIVE
LEUKOCYTE ESTERASE UR QL STRIP: ABNORMAL
LYMPHOCYTES # BLD AUTO: 2.7 K/UL (ref 1–4.8)
LYMPHOCYTES NFR BLD: 27.4 % (ref 18–48)
MCH RBC QN AUTO: 31.1 PG (ref 27–31)
MCHC RBC AUTO-ENTMCNC: 32.7 G/DL (ref 32–36)
MCV RBC AUTO: 95 FL (ref 82–98)
MICROSCOPIC COMMENT: NORMAL
MONOCYTES # BLD AUTO: 1.1 K/UL (ref 0.3–1)
MONOCYTES NFR BLD: 11 % (ref 4–15)
NEUTROPHILS # BLD AUTO: 5.9 K/UL (ref 1.8–7.7)
NEUTROPHILS NFR BLD: 59.9 % (ref 38–73)
NITRITE UR QL STRIP: NEGATIVE
NRBC BLD-RTO: 0 /100 WBC
PH UR STRIP: 6 [PH] (ref 5–8)
PLATELET # BLD AUTO: 210 K/UL (ref 150–450)
PMV BLD AUTO: 10.7 FL (ref 9.2–12.9)
POCT GLUCOSE: 100 MG/DL (ref 70–110)
POCT GLUCOSE: 49 MG/DL (ref 70–110)
POTASSIUM SERPL-SCNC: 3.3 MMOL/L (ref 3.5–5.1)
PROT SERPL-MCNC: 7.3 G/DL (ref 6–8.4)
PROT UR QL STRIP: NEGATIVE
RBC # BLD AUTO: 4.12 M/UL (ref 4–5.4)
RBC #/AREA URNS HPF: 2 /HPF (ref 0–4)
SODIUM SERPL-SCNC: 141 MMOL/L (ref 136–145)
SP GR UR STRIP: 1.01 (ref 1–1.03)
SQUAMOUS #/AREA URNS HPF: 4 /HPF
TROPONIN I SERPL HS-MCNC: 3 PG/ML (ref 0–14.9)
URN SPEC COLLECT METH UR: ABNORMAL
UROBILINOGEN UR STRIP-ACNC: NEGATIVE EU/DL
WBC # BLD AUTO: 9.79 K/UL (ref 3.9–12.7)
WBC #/AREA URNS HPF: 3 /HPF (ref 0–5)

## 2024-11-05 PROCEDURE — 99284 EMERGENCY DEPT VISIT MOD MDM: CPT | Mod: 25

## 2024-11-05 PROCEDURE — 25000003 PHARM REV CODE 250: Performed by: EMERGENCY MEDICINE

## 2024-11-05 PROCEDURE — 80053 COMPREHEN METABOLIC PANEL: CPT | Performed by: EMERGENCY MEDICINE

## 2024-11-05 PROCEDURE — 93010 ELECTROCARDIOGRAM REPORT: CPT | Mod: ,,, | Performed by: INTERNAL MEDICINE

## 2024-11-05 PROCEDURE — 96360 HYDRATION IV INFUSION INIT: CPT

## 2024-11-05 PROCEDURE — 25000003 PHARM REV CODE 250

## 2024-11-05 PROCEDURE — 93005 ELECTROCARDIOGRAM TRACING: CPT | Performed by: INTERNAL MEDICINE

## 2024-11-05 PROCEDURE — 84484 ASSAY OF TROPONIN QUANT: CPT | Performed by: EMERGENCY MEDICINE

## 2024-11-05 PROCEDURE — 85025 COMPLETE CBC W/AUTO DIFF WBC: CPT | Performed by: EMERGENCY MEDICINE

## 2024-11-05 PROCEDURE — 82962 GLUCOSE BLOOD TEST: CPT

## 2024-11-05 PROCEDURE — 81001 URINALYSIS AUTO W/SCOPE: CPT | Performed by: EMERGENCY MEDICINE

## 2024-11-05 RX ORDER — ONDANSETRON 4 MG/1
4 TABLET, ORALLY DISINTEGRATING ORAL EVERY 6 HOURS PRN
Qty: 30 TABLET | Refills: 0 | Status: SHIPPED | OUTPATIENT
Start: 2024-11-05

## 2024-11-05 RX ADMIN — DEXTROSE MONOHYDRATE 50 ML: 25 INJECTION, SOLUTION INTRAVENOUS at 04:11

## 2024-11-05 RX ADMIN — SODIUM CHLORIDE 1000 ML: 9 INJECTION, SOLUTION INTRAVENOUS at 04:11

## 2024-11-05 NOTE — ED PROVIDER NOTES
Encounter Date: 11/5/2024       History     Chief Complaint   Patient presents with    Hypoglycemia     Pt has been vomiting since last night. Her blood sugars have been going down in to the 40's      69-year-old female with type 1 diabetes presents to the ER with hypoglycemia and vomiting since yesterday.  Blood sugar was in the 40s last night.   gave her glucagon.  Sugar was up into the 70s earlier today.  She took her long-acting insulin this morning.  She has continued to vomit.  Blood sugar was in the 40s again at home so they came to the ER.  Multiple episodes of vomiting.   states similar symptoms in the past when she has been dehydrated.  Patient denies chest pain denies abdominal pain denies dysuria fevers chills.   has not been sick.    The history is provided by a relative and the patient.     Review of patient's allergies indicates:   Allergen Reactions    Sulfamethoxazole-trimethoprim Rash and Hives     Patient experienced on 12/3/14 redness to face and rash to chest and arms/ with no SOB or airway obstruction    Zinc-25 Hives and Swelling     Aching pains in the knees     Heparin analogues     Iodinated contrast media Swelling and Rash     Says topical iodine OK     Past Medical History:   Diagnosis Date    Allergy     Arthritis     degnerative disease low back,muscle spasms, shoulders    Asian flu type A 12/22/2017    Bunionette 07/26/2012    Diabetes mellitus type I     since age 13    Diabetic gastroparesis     takes Cytotec    Diabetic retinopathy of both eyes     gets periodic laser treatments    Difficult intubation     as a child had sore throat after a procedure    Encounter for blood transfusion     GERD (gastroesophageal reflux disease)     Hallux abducto valgus 03/31/2014    Headache(784.0)     Hiatal hernia     with GERD    Hyperlipidemia     Hypertension     Hypertensive retinopathy of both eyes 06/25/2024    Infection of the upper respiratory tract 06/2012    Lumbar  spinal stenosis     Osteopenia     Proliferative diabetic retinopathy of both eyes 2024    Rosacea     Seizures     Pt states during pgy with stroke    Stroke ?    while pregnant    Tachycardia     asymptomatic with Toprol    Thyroid disease     hypothyroidism    Venous insufficiency of leg     improved since EVLT     Past Surgical History:   Procedure Laterality Date    BUNIONECTOMY Right 2012    CARDIAC CATHETERIZATION      no stents     SECTION      COLONOSCOPY  2007    Dr. Rose, 10 year recheck    CORRECTION OF HAMMER TOE Right 8/3/2023    Procedure: CORRECTION, HAMMER TOE Right second toe;  Surgeon: Lei Rhoades DPM;  Location: [facility] OR;  Service: Podiatry;  Laterality: Right;  right 2nd toe    EXOSTECTOMY  12    left foot, local anesthesia, in office    EYE SURGERY      has had many laser procedures for diabetic retinopathy    VASCULAR SURGERY      VEIN SURGERY  2012    EVLT right greater saphenous, IV sedation     Family History   Problem Relation Name Age of Onset    Cancer Maternal Aunt          breast    Breast cancer Maternal Aunt      Diabetes Maternal Grandmother      Breast cancer Maternal Grandmother  38    Cancer Maternal Grandfather          prostate    Diabetes Maternal Grandfather      Diabetes Cousin      Arthritis Mother      Cancer Mother      Melanoma Mother      Heart disease Father      Stroke Father      No Known Problems Sister      No Known Problems Brother      No Known Problems Daughter 1     Alzheimer's disease Maternal Uncle      Alcohol abuse Maternal Uncle          x2    Heart disease Maternal Uncle      No Known Problems Paternal Aunt      Heart disease Paternal Uncle       Social History     Tobacco Use    Smoking status: Never    Smokeless tobacco: Never   Substance Use Topics    Alcohol use: Yes     Comment: rarely; 1 drink/year    Drug use: No     Review of Systems   Constitutional:  Negative for fever.   HENT:  Negative for sore  throat.    Respiratory:  Negative for shortness of breath.    Cardiovascular:  Negative for chest pain.   Gastrointestinal:  Positive for nausea and vomiting.   Genitourinary:  Negative for dysuria.   Musculoskeletal:  Negative for back pain.   Skin:  Negative for rash.   Neurological:  Negative for weakness.   Hematological:  Does not bruise/bleed easily.       Physical Exam     Initial Vitals [11/05/24 1557]   BP Pulse Resp Temp SpO2   126/63 89 19 96.1 °F (35.6 °C) 99 %      MAP       --         Physical Exam    Nursing note and vitals reviewed.  Constitutional: She appears well-developed and well-nourished. She is diaphoretic.  Non-toxic appearance. She does not have a sickly appearance. She appears ill. No distress.   HENT:   Head: Normocephalic and atraumatic.   Eyes: EOM are normal.   Neck: Neck supple.   Normal range of motion.  Cardiovascular:  Normal rate, regular rhythm and normal heart sounds.     Exam reveals no gallop and no friction rub.       No murmur heard.  Pulmonary/Chest: Breath sounds normal. No respiratory distress. She has no wheezes. She has no rhonchi. She has no rales.   Abdominal: She exhibits no distension. There is no abdominal tenderness. There is no rebound.   Musculoskeletal:         General: Normal range of motion.      Cervical back: Normal range of motion and neck supple.     Neurological: She is alert and oriented to person, place, and time.   Skin: Skin is warm.   Psychiatric: She has a normal mood and affect. Her behavior is normal. Judgment and thought content normal.         ED Course   Critical Care    Date/Time: 11/6/2024 9:13 AM    Performed by: Isra Shaw MD  Authorized by: Isra Shaw MD  Direct patient critical care time: 55 minutes  Additional history critical care time: 20 minutes  Ordering / reviewing critical care time: 10 minutes  Documentation critical care time: 8 minutes  Consult with family critical care time: 5 minutes  Total critical care time  (exclusive of procedural time) : 98 minutes  Critical care was necessary to treat or prevent imminent or life-threatening deterioration of the following conditions: metabolic crisis (Hypoglycemia requiring IV D50).  Critical care was time spent personally by me on the following activities: evaluation of patient's response to treatment, examination of patient, obtaining history from patient or surrogate, ordering and performing treatments and interventions, ordering and review of laboratory studies, pulse oximetry, re-evaluation of patient's condition and review of old charts.        Labs Reviewed   POCT GLUCOSE - Abnormal       Result Value    POCT Glucose 49 (*)           Imaging Results    None          Medications   dextrose 50% injection (has no administration in time range)   sodium chloride 0.9% bolus 1,000 mL 1,000 mL (has no administration in time range)     Medical Decision Making  2044 69-year-old female insulin-dependent diabetes presents with hypoglycemia since last night associated with vomiting.  She has not vomited in the emergency room, she is tolerating p.o..  Despite vomiting this morning she did take her long-acting insulin.  Her blood sugar has remained normal after initial hypoglycemia in the ER.  She will be discharged home.  She is tolerating p.o. without any difficulty.  She is well-appearing and asymptomatic at this time no indication for further observation or hospitalization in my opinion.      Amount and/or Complexity of Data Reviewed  Independent Historian: spouse  External Data Reviewed: labs and notes.  Labs: ordered. Decision-making details documented in ED Course.    Risk  Prescription drug management.               ED Course as of 11/06/24 0913   Tue Nov 05, 2024   1603 POCT Glucose(!!): 49 [EF]   1603 BP: 126/63 [EF]   1603 Temp: 96.1 °F (35.6 °C) [EF]   1603 Temp Source: Axillary [EF]   1603 Resp: 19 [EF]   1603 SpO2: 99 % [EF]   1641 WBC: 9.79 [EF]   1641 Hemoglobin: 12.8 [EF]    1641 Platelet Count: 210 [EF]   1654 Troponin I High Sensitivity: 3.0 [EF]   1709 POCT Glucose: 100 [EF]   1742 Feels much better at this time [EF]   1752 Glucose(!!): 37  Glucose on metabolic panel very low but this was taken at arrival [EF]   1915 Sinus rhythm 92 beats per minute normal axis no ST elevation or depression or T-wave inversion in contiguous leads T-wave is inverted in lead 3 independently interpreted [EF]   2040 Leukocyte Esterase, UA(!): Trace [EF]   2040 NITRITE UA: Negative [EF]   2040 WBC, UA: 3 [EF]          ED Course User Index  [EF] Isra Shaw MD                           Clinical Impression:  Final diagnoses:  [R11.10] Vomiting                 Isra Shaw MD  11/06/24 0915

## 2024-11-06 ENCOUNTER — PATIENT MESSAGE (OUTPATIENT)
Dept: CARDIOLOGY | Facility: CLINIC | Age: 69
End: 2024-11-06
Payer: MEDICARE

## 2024-11-06 LAB
OHS QRS DURATION: 90 MS
OHS QTC CALCULATION: 492 MS

## 2024-11-06 NOTE — ED NOTES
Pt provided PO liquids for PO challenge and passed. Provided pt with sandwich tray as ordered by md to evaluated pt ability to eat and keep food down.

## 2024-11-07 ENCOUNTER — PATIENT OUTREACH (OUTPATIENT)
Dept: EMERGENCY MEDICINE | Facility: HOSPITAL | Age: 69
End: 2024-11-07

## 2024-12-09 ENCOUNTER — LAB VISIT (OUTPATIENT)
Dept: LAB | Facility: HOSPITAL | Age: 69
End: 2024-12-09
Attending: INTERNAL MEDICINE
Payer: MEDICARE

## 2024-12-09 DIAGNOSIS — I51.9 MYXEDEMA HEART DISEASE: ICD-10-CM

## 2024-12-09 DIAGNOSIS — E03.9 MYXEDEMA HEART DISEASE: ICD-10-CM

## 2024-12-09 DIAGNOSIS — E10.9 DIABETES MELLITUS TYPE I: Primary | ICD-10-CM

## 2024-12-09 DIAGNOSIS — Z79.899 NEED FOR PROPHYLACTIC CHEMOTHERAPY: ICD-10-CM

## 2024-12-09 LAB
ALBUMIN SERPL BCP-MCNC: 3.9 G/DL (ref 3.5–5.2)
ALP SERPL-CCNC: 60 U/L (ref 55–135)
ALT SERPL W/O P-5'-P-CCNC: 19 U/L (ref 10–44)
ANION GAP SERPL CALC-SCNC: 6 MMOL/L (ref 8–16)
AST SERPL-CCNC: 24 U/L (ref 10–40)
BILIRUB SERPL-MCNC: 0.6 MG/DL (ref 0.1–1)
BUN SERPL-MCNC: 9 MG/DL (ref 8–23)
CALCIUM SERPL-MCNC: 8.8 MG/DL (ref 8.7–10.5)
CHLORIDE SERPL-SCNC: 105 MMOL/L (ref 95–110)
CHOLEST SERPL-MCNC: 167 MG/DL (ref 120–199)
CHOLEST/HDLC SERPL: 3.3 {RATIO} (ref 2–5)
CO2 SERPL-SCNC: 31 MMOL/L (ref 23–29)
CREAT SERPL-MCNC: 1.1 MG/DL (ref 0.5–1.4)
EST. GFR  (NO RACE VARIABLE): 55.1 ML/MIN/1.73 M^2
ESTIMATED AVG GLUCOSE: 128 MG/DL (ref 68–131)
GLUCOSE SERPL-MCNC: 128 MG/DL (ref 70–110)
HBA1C MFR BLD: 6.1 % (ref 4.5–6.2)
HDLC SERPL-MCNC: 50 MG/DL (ref 40–75)
HDLC SERPL: 29.9 % (ref 20–50)
LDLC SERPL CALC-MCNC: 90.6 MG/DL (ref 63–159)
NONHDLC SERPL-MCNC: 117 MG/DL
POTASSIUM SERPL-SCNC: 4.1 MMOL/L (ref 3.5–5.1)
PROT SERPL-MCNC: 6.7 G/DL (ref 6–8.4)
SODIUM SERPL-SCNC: 142 MMOL/L (ref 136–145)
T4 FREE SERPL-MCNC: 0.9 NG/DL (ref 0.71–1.51)
TRIGL SERPL-MCNC: 132 MG/DL (ref 30–150)
TSH SERPL DL<=0.005 MIU/L-ACNC: 1.97 UIU/ML (ref 0.34–5.6)

## 2024-12-09 PROCEDURE — 80053 COMPREHEN METABOLIC PANEL: CPT | Performed by: INTERNAL MEDICINE

## 2024-12-09 PROCEDURE — 36415 COLL VENOUS BLD VENIPUNCTURE: CPT | Performed by: INTERNAL MEDICINE

## 2024-12-09 PROCEDURE — 83036 HEMOGLOBIN GLYCOSYLATED A1C: CPT | Performed by: INTERNAL MEDICINE

## 2024-12-09 PROCEDURE — 84439 ASSAY OF FREE THYROXINE: CPT | Performed by: INTERNAL MEDICINE

## 2024-12-09 PROCEDURE — 84443 ASSAY THYROID STIM HORMONE: CPT | Performed by: INTERNAL MEDICINE

## 2024-12-09 PROCEDURE — 80061 LIPID PANEL: CPT | Performed by: INTERNAL MEDICINE

## 2024-12-14 ENCOUNTER — PATIENT OUTREACH (OUTPATIENT)
Dept: EMERGENCY MEDICINE | Facility: HOSPITAL | Age: 69
End: 2024-12-14

## 2024-12-16 ENCOUNTER — OFFICE VISIT (OUTPATIENT)
Dept: PODIATRY | Facility: CLINIC | Age: 69
End: 2024-12-16
Payer: MEDICARE

## 2024-12-16 VITALS — WEIGHT: 197.75 LBS | BODY MASS INDEX: 31.78 KG/M2 | HEIGHT: 66 IN

## 2024-12-16 DIAGNOSIS — E10.42 CONTROLLED TYPE 1 DIABETES MELLITUS WITH DIABETIC POLYNEUROPATHY: Primary | ICD-10-CM

## 2024-12-16 DIAGNOSIS — L84 CORN OR CALLUS: ICD-10-CM

## 2024-12-16 DIAGNOSIS — M20.41 HAMMER TOES OF BOTH FEET: ICD-10-CM

## 2024-12-16 DIAGNOSIS — M20.42 HAMMER TOES OF BOTH FEET: ICD-10-CM

## 2024-12-16 DIAGNOSIS — L90.9 PLANTAR FAT PAD ATROPHY: ICD-10-CM

## 2024-12-16 PROCEDURE — 99214 OFFICE O/P EST MOD 30 MIN: CPT | Mod: PBBFAC,PO | Performed by: PODIATRIST

## 2024-12-16 PROCEDURE — 99999 PR PBB SHADOW E&M-EST. PATIENT-LVL IV: CPT | Mod: PBBFAC,,, | Performed by: PODIATRIST

## 2024-12-16 PROCEDURE — 11056 PARNG/CUTG B9 HYPRKR LES 2-4: CPT | Mod: PBBFAC,PO | Performed by: PODIATRIST

## 2024-12-16 RX ORDER — CYCLOSPORINE 0.5 MG/ML
1 EMULSION OPHTHALMIC 2 TIMES DAILY
COMMUNITY
Start: 2024-12-06

## 2024-12-17 NOTE — PROGRESS NOTES
Subjective:      Patient ID: Kateryna Desai is a 69 y.o. female.    Chief Complaint: Diabetes Mellitus and Nail Care (DM 3 month f/u)    Kateryna is a 69 y.o. female who presents to the clinic for routine evaluation and treatment of diabetic feet. Kateryna has a past medical history of Allergy, Arthritis, Asian flu type A (12/22/2017), Bunionette (07/26/2012), Diabetes mellitus type I, Diabetic gastroparesis, Diabetic retinopathy of both eyes, Difficult intubation, Encounter for blood transfusion, GERD (gastroesophageal reflux disease), Hallux abducto valgus (03/31/2014), Headache(784.0), Hiatal hernia, Hyperlipidemia, Hypertension, Hypertensive retinopathy of both eyes (06/25/2024), Infection of the upper respiratory tract (06/2012), Lumbar spinal stenosis, Osteopenia, Proliferative diabetic retinopathy of both eyes (06/25/2024), Rosacea, Seizures, Stroke (1985?), Tachycardia, Thyroid disease, and Venous insufficiency of leg. Patient relates an increase in pain from the corn localized to the lateral Rt. 5th mtp joint.  Describes pain from the lesion as sharp and rates as a 4/10.  Symptoms were exacerbated with applied pressure from shoe gear and alleviated upon removal.  She has been applying moisturizer to the area with limited improvement.  Suspects this to be a shoe related issue.  Continues with excellent control over her blood glucose.  Denies any additional pedal complaints.      PCP: Guido Chow MD    Date Last Seen by PCP: 7/24      Hemoglobin A1C   Date Value Ref Range Status   12/09/2024 6.1 4.5 - 6.2 % Final     Comment:     According to ADA guidelines, hemoglobin A1C <7.0% represents  optimal control in non-pregnant diabetic patients.  Different  metrics may apply to specific populations.   Standards of Medical Care in Diabetes - 2016.    For the purpose of screening for the presence of diabetes:  <5.7%     Consistent with the absence of diabetes  5.7-6.4%  Consistent with  increasing risk for diabetes   (prediabetes)  >or=6.5%  Consistent with diabetes    Currently no consensus exists for use of hemoglobin A1C  for diagnosis of diabetes for children.     07/24/2024 6.2 4.5 - 6.2 % Final     Comment:     According to ADA guidelines, hemoglobin A1C <7.0% represents  optimal control in non-pregnant diabetic patients.  Different  metrics may apply to specific populations.   Standards of Medical Care in Diabetes - 2016.    For the purpose of screening for the presence of diabetes:  <5.7%     Consistent with the absence of diabetes  5.7-6.4%  Consistent with increasing risk for diabetes   (prediabetes)  >or=6.5%  Consistent with diabetes    Currently no consensus exists for use of hemoglobin A1C  for diagnosis of diabetes for children.     03/13/2024 6.0 4.5 - 6.2 % Corrected     Comment:     According to ADA guidelines, hemoglobin A1C <7.0% represents  optimal control in non-pregnant diabetic patients.  Different  metrics may apply to specific populations.   Standards of Medical Care in Diabetes - 2016.    For the purpose of screening for the presence of diabetes:  <5.7%     Consistent with the absence of diabetes  5.7-6.4%  Consistent with increasing risk for diabetes   (prediabetes)  >or=6.5%  Consistent with diabetes    Currently no consensus exists for use of hemoglobin A1C  for diagnosis of diabetes for children.  sent to [employer]  CORRECTED RESULT; previously reported as @sc on 03/13/2024 at 09:31.             Past Medical History:   Diagnosis Date    Allergy     Arthritis     degnerative disease low back,muscle spasms, shoulders    Asian flu type A 12/22/2017    Bunionette 07/26/2012    Diabetes mellitus type I     since age 13    Diabetic gastroparesis     takes Cytotec    Diabetic retinopathy of both eyes     gets periodic laser treatments    Difficult intubation     as a child had sore throat after a procedure    Encounter for blood transfusion     GERD (gastroesophageal reflux  disease)     Hallux abducto valgus 2014    Headache(784.0)     Hiatal hernia     with GERD    Hyperlipidemia     Hypertension     Hypertensive retinopathy of both eyes 2024    Infection of the upper respiratory tract 2012    Lumbar spinal stenosis     Osteopenia     Proliferative diabetic retinopathy of both eyes 2024    Rosacea     Seizures     Pt states during pgy with stroke    Stroke ?    while pregnant    Tachycardia     asymptomatic with Toprol    Thyroid disease     hypothyroidism    Venous insufficiency of leg     improved since EVLT       Past Surgical History:   Procedure Laterality Date    BUNIONECTOMY Right 2012    CARDIAC CATHETERIZATION      no stents     SECTION      COLONOSCOPY  2007    Dr. Rose, 10 year recheck    CORRECTION OF HAMMER TOE Right 8/3/2023    Procedure: CORRECTION, HAMMER TOE Right second toe;  Surgeon: Lei Rhoades DPM;  Location: [facility] OR;  Service: Podiatry;  Laterality: Right;  right 2nd toe    EXOSTECTOMY  12    left foot, local anesthesia, in office    EYE SURGERY      has had many laser procedures for diabetic retinopathy    VASCULAR SURGERY      VEIN SURGERY  2012    EVLT right greater saphenous, IV sedation       Family History   Problem Relation Name Age of Onset    Cancer Maternal Aunt          breast    Breast cancer Maternal Aunt      Diabetes Maternal Grandmother      Breast cancer Maternal Grandmother  38    Cancer Maternal Grandfather          prostate    Diabetes Maternal Grandfather      Diabetes Cousin      Arthritis Mother      Cancer Mother      Melanoma Mother      Heart disease Father      Stroke Father      No Known Problems Sister      No Known Problems Brother      No Known Problems Daughter 1     Alzheimer's disease Maternal Uncle      Alcohol abuse Maternal Uncle          x2    Heart disease Maternal Uncle      No Known Problems Paternal Aunt      Heart disease Paternal Uncle         Social History  "    Socioeconomic History    Marital status:    Tobacco Use    Smoking status: Never    Smokeless tobacco: Never   Substance and Sexual Activity    Alcohol use: Yes     Comment: rarely; 1 drink/year    Drug use: No    Sexual activity: Yes     Partners: Male     Social Drivers of Health     Financial Resource Strain: Low Risk  (11/8/2024)    Overall Financial Resource Strain (CARDIA)     Difficulty of Paying Living Expenses: Not hard at all   Food Insecurity: No Food Insecurity (11/8/2024)    Hunger Vital Sign     Worried About Running Out of Food in the Last Year: Never true     Ran Out of Food in the Last Year: Never true   Transportation Needs: No Transportation Needs (11/8/2024)    TRANSPORTATION NEEDS     Transportation : No   Stress: No Stress Concern Present (11/8/2024)    [employer] - Occupational Stress Questionnaire     Feeling of Stress : Not at all   Housing Stability: Low Risk  (11/8/2024)    Housing Stability Vital Sign     Unable to Pay for Housing in the Last Year: No     Homeless in the Last Year: No       Current Outpatient Medications   Medication Sig Dispense Refill    ABRYSVO 120 mcg/0.5 mL SolR vaccine       ACZONE 5 % topical gel Apply 1 application topically 2 (two) times daily as needed (roseacea).  0    alpha lipoic acid 600 mg Cap Take 2 capsules by mouth once daily.      ALPHAGAN P 0.1 % Drop Place 1 drop into both eyes 3 (three) times daily.   6    ascorbic acid, vitamin C, (VITAMIN C) 100 MG tablet Take 100 mg by mouth 2 (two) times daily.      BD VEO INSULIN SYRINGE UF 1/2 mL 31 gauge x 15/64" Syrg   4    calcium citrate-vitamin D3 315-200 mg (CITRACAL+D) 315-200 mg-unit per tablet Take 1 tablet by mouth 2 (two) times daily.      cinnamon bark 500 mg capsule Take 500 mg by mouth 2 (two) times daily.      CRESTOR 40 mg Tab Take 40 mg by mouth once daily.       doxycycline (VIBRAMYCIN) 50 MG capsule Take 50 mg by mouth 2 (two) times daily.      ESTRACE " "0.01 % (0.1 mg/gram) vaginal cream Place vaginally twice a week. Tuesday, Saturday  0    estradiol 10 mcg Tab Place 1 tablet vaginally twice a week. Tuesday, Saturday      ezetimibe (ZETIA) 10 mg tablet Take 10 mg by mouth once daily.      fluticasone propionate (FLONASE) 50 mcg/actuation nasal spray USE 1 SPRAY INTO THE EACH NOSTRIL ONCE A DAY 32 g 3    furosemide (LASIX) 20 MG tablet TAKE 1 TABLET BY MOUTH TWICE A  tablet 3    glucagon (GVOKE HYPOPEN 2-PACK) 1 mg/0.2 mL AtIn USE AS DIRECTED TO TREAT PROFOUND HYPOGLYCEMIA 0.4 mL 1    GLUCAGON EMERGENCY 1 mg injection Take 1 mg by mouth as needed.      GVOKE HYPOPEN 2-PACK 1 mg/0.2 mL AtIn       HUMALOG 100 unit/mL injection Inject 10-17 Units into the skin 3 (three) times daily before meals. 10-17 units tid sliding scale  1    metoprolol succinate (TOPROL-XL) 25 MG 24 hr tablet TAKE 1 TABLET BY MOUTH ONCE A DAY 90 tablet 3    montelukast (SINGULAIR) 10 mg tablet TAKE 1 TABLET BY MOUTH EVERY EVENING 90 tablet 3    mupirocin (BACTROBAN) 2 % ointment APPLY TOPICALLY THREE TIMES A DAY 30 g 1    NOVOFINE 32 32 gauge x 1/4" Ndle       ondansetron (ZOFRAN-ODT) 4 MG TbDL Take 1 tablet (4 mg total) by mouth every 8 (eight) hours as needed (Nausea). 60 tablet 1    ondansetron (ZOFRAN-ODT) 4 MG TbDL Take 1 tablet (4 mg total) by mouth every 6 (six) hours as needed (vomiting). 30 tablet 0    pantoprazole (PROTONIX) 40 MG tablet TAKE 1 TABLET BY MOUTH TWICE A  tablet 1    potassium chloride (K-TAB) 20 mEq Take 40 mEq by mouth 2 (two) times a day.  6    promethazine (PHENERGAN) 25 MG suppository Place 1 suppository (25 mg total) rectally every 6 (six) hours as needed for Nausea. 10 suppository 0    promethazine (PHENERGAN) 25 MG tablet Take 1 tablet (25 mg total) by mouth every 6 (six) hours as needed for Nausea. 10 tablet 0    RESTASIS 0.05 % ophthalmic emulsion Place 1 drop into both eyes 2 (two) times daily.      SOOLANTRA 1 % Crea Apply 1 application topically " once daily.      SYNTHROID 88 mcg tablet Take 88 mcg by mouth every Tuesday, Thursday, Saturday, Sunday.  0    TOUJEO SOLOSTAR U-300 INSULIN 300 unit/mL (1.5 mL) InPn pen Inject 34 Units into the skin once daily.      VITAMIN B COMPLEX VIT C NO.4 (SUPER B COMPLEX + C ORAL) Take 1 tablet by mouth once daily.      baclofen (LIORESAL) 10 MG tablet Take 1 tablet (10 mg total) by mouth 2 (two) times daily as needed (muscle pain). 20 tablet 0     No current facility-administered medications for this visit.       Review of patient's allergies indicates:   Allergen Reactions    Bactrim [sulfamethoxazole-trimethoprim] Rash     Patient experienced on 12/3/14 redness to face and rash to chest and arms/ with no SOB or airway obstruction    Percocet [oxycodone-acetaminophen] Nausea And Vomiting     Also caused dizziness and passed out    Iodinated contrast media - iv dye Swelling and Rash     Says topical iodine OK    Norco [hydrocodone-acetaminophen] Itching, Swelling and Rash     Can tolerate if she takes Benadryl with it    Niaspan extended-release [niacin] Itching and Other (See Comments)     Skin flushing and redness         Review of Systems   Constitutional: Negative for chills, decreased appetite, diaphoresis and fever.   Cardiovascular:  Negative for claudication and leg swelling.   Skin:  Positive for dry skin. Negative for color change, flushing, itching and nail changes.   Musculoskeletal:  Negative for arthritis, back pain, falls, gout, joint pain, joint swelling and myalgias.   Gastrointestinal:  Negative for nausea and vomiting.   Neurological:  Positive for numbness and paresthesias.   Psychiatric/Behavioral:  Negative for altered mental status.            Objective:      Physical Exam  Constitutional:       General: She is not in acute distress.     Appearance: She is well-developed. She is not diaphoretic.   Cardiovascular:      Pulses:           Dorsalis pedis pulses are 2+ on the right side and 2+ on the left  side.        Posterior tibial pulses are 2+ on the right side and 2+ on the left side.      Comments: CFT < 3 seconds bilateral.  Pedal hair growth decreased bilateral.  Varicosities noted to bilateral lower extremity. Mild nonpitting edema noted to bilateral lower extremity.  Toes are cool to touch bilateral.      Musculoskeletal:         General: Tenderness present. Normal range of motion.      Comments: Muscle strength 5/5 in all muscle groups bilateral.  (-) for pain with active and passive ROM of bilateral foot and ankle. Pain with palpation to the Rt. Lateral 5th mtp joint and Lt. Posterior heel lesions.   Bilateral pes planus foot type.  Bilateral hallux abducto valgus.  Bilateral semi-rigid contracture of toes 3-5 with adductovarus rotation of the 5th.  Rectus alignment of the Rt. 2nd toe S/P hammertoe correction.     Skin:     General: Skin is warm and dry.      Coloration: Skin is not pale.      Findings: Lesion and wound present. No abrasion, bruising, burn, ecchymosis, erythema, laceration, petechiae or rash.      Nails: There is no clubbing.      Comments: Toenails x 10 appear dystrophic but well maintained. No open wounds, interdigital maceration noted bilateral.  Cicatrix of the Lt. Posterior heel is well healed with no adjacent break in skin integrity.  Hyperkeratotic lesion noted to the Lt. Posterior heel and lateral Rt. 5th mtp joint.  Partial thickness wound noted to the Lt. Posterior leg.  Wound base appears comprised of healthy dermis and devoid of localized signs of infection.         Neurological:      Mental Status: She is alert and oriented to person, place, and time.      Sensory: Sensory deficit present.      Motor: No weakness or atrophy.      Comments: Protective sensation per Grandy-Abigail monofilament decreased bilateral.    Light touch intact bilateral.               Assessment:       Encounter Diagnoses   Name Primary?    Controlled type 1 diabetes mellitus with diabetic  polyneuropathy Yes    Corn or callus     Hammer toes of both feet     Plantar fat pad atrophy          Plan:       Kateryna was seen today for diabetes mellitus and nail care.    Diagnoses and all orders for this visit:    Controlled type 1 diabetes mellitus with diabetic polyneuropathy    Corn or callus    Hammer toes of both feet    Plantar fat pad atrophy      I counseled the patient on her conditions, their implications and medical management.    - Advised to apply vaseline in the form of a occlusive dressing to areas of callus build up QD x 1 month.  Also, to continue applying to the abrasion of the Lt. Lower leg QD until healed.    - May gently file said lesions with a pumice stone or [employer] board every 2-3 days.    - Advised to continue wearing shoe gear that accommodates for digital deformities.  To inspect daily for excessive signs of shearing consistent with pressure ulceration as well as the development of pre-ulceration.   Recommend cutting a cross pattern along the lateral toe box of the Rt. Shoe to minimize callus build up in this region.    - Shoe inspection. Diabetic Foot Education. Patient reminded of the importance of good nutrition and blood sugar control to help prevent podiatric complications of diabetes. Patient instructed on proper foot hygeine. We discussed wearing proper shoe gear, daily foot inspections, never walking without protective shoe gear, never putting sharp instruments to feet    - With patient's permission, a sterile #15 scalpel was used to trim lesions x 2 down to smooth appearing skin without incident.  Patient relates relief following the procedure. She will continue to monitor the areas daily, inspect her feet, wear protective shoe gear when ambulatory, moisturizer to maintain skin integrity and follow in this office in approximately 4 months, sooner p.r.n.      Lei Rhoades DPM

## 2025-01-07 LAB
LEFT EYE DM RETINOPATHY: POSITIVE
RIGHT EYE DM RETINOPATHY: POSITIVE

## 2025-01-13 DIAGNOSIS — Z00.00 ENCOUNTER FOR MEDICARE ANNUAL WELLNESS EXAM: ICD-10-CM

## 2025-01-14 DIAGNOSIS — Z12.31 ENCOUNTER FOR SCREENING MAMMOGRAM FOR MALIGNANT NEOPLASM OF BREAST: Primary | ICD-10-CM

## 2025-01-17 ENCOUNTER — HOSPITAL ENCOUNTER (OUTPATIENT)
Dept: RADIOLOGY | Facility: CLINIC | Age: 70
Discharge: HOME | End: 2025-01-17
Attending: OBSTETRICS & GYNECOLOGY
Payer: MEDICARE

## 2025-01-17 DIAGNOSIS — Z12.31 ENCOUNTER FOR SCREENING MAMMOGRAM FOR MALIGNANT NEOPLASM OF BREAST: ICD-10-CM

## 2025-01-17 PROCEDURE — 77063 BREAST TOMOSYNTHESIS BI: CPT | Mod: TC,PO

## 2025-01-17 PROCEDURE — 77063 BREAST TOMOSYNTHESIS BI: CPT | Mod: 26,,, | Performed by: RADIOLOGY

## 2025-01-17 PROCEDURE — 77067 SCR MAMMO BI INCL CAD: CPT | Mod: 26,,, | Performed by: RADIOLOGY

## 2025-02-27 ENCOUNTER — OFFICE VISIT (OUTPATIENT)
Dept: CARDIOLOGY | Facility: CLINIC | Age: 70
End: 2025-02-27
Payer: MEDICARE

## 2025-02-27 VITALS
SYSTOLIC BLOOD PRESSURE: 113 MMHG | BODY MASS INDEX: 31.82 KG/M2 | WEIGHT: 198 LBS | HEIGHT: 66 IN | DIASTOLIC BLOOD PRESSURE: 75 MMHG | HEART RATE: 82 BPM

## 2025-02-27 DIAGNOSIS — I87.2 VENOUS INSUFFICIENCY OF LEG: ICD-10-CM

## 2025-02-27 DIAGNOSIS — I35.0 NONRHEUMATIC AORTIC VALVE STENOSIS: ICD-10-CM

## 2025-02-27 DIAGNOSIS — N18.30 STAGE 3 CHRONIC KIDNEY DISEASE DUE TO TYPE 1 DIABETES MELLITUS: ICD-10-CM

## 2025-02-27 DIAGNOSIS — I70.0 AORTIC ATHEROSCLEROSIS: ICD-10-CM

## 2025-02-27 DIAGNOSIS — I10 PRIMARY HYPERTENSION: ICD-10-CM

## 2025-02-27 DIAGNOSIS — E10.22 STAGE 3 CHRONIC KIDNEY DISEASE DUE TO TYPE 1 DIABETES MELLITUS: ICD-10-CM

## 2025-02-27 DIAGNOSIS — E78.2 MIXED HYPERLIPIDEMIA: Primary | ICD-10-CM

## 2025-02-27 PROCEDURE — 99214 OFFICE O/P EST MOD 30 MIN: CPT | Mod: S$PBB,,, | Performed by: INTERNAL MEDICINE

## 2025-02-27 PROCEDURE — 99215 OFFICE O/P EST HI 40 MIN: CPT | Mod: PBBFAC,PO | Performed by: INTERNAL MEDICINE

## 2025-02-27 PROCEDURE — 99999 PR PBB SHADOW E&M-EST. PATIENT-LVL V: CPT | Mod: PBBFAC,,, | Performed by: INTERNAL MEDICINE

## 2025-02-27 NOTE — PROGRESS NOTES
Subjective:    Patient ID:  Kateryna Desai is a 70 y.o. female patient here for evaluation Follow-up      History of Present Illness:  Follow-up visit valvular heart disease.  Echo 01/2024 moderate AS, moderate MR.  Preserved EF remote left heart catheterization, nonobstructive CAD.  Nuclear study -20 18.  Overall remains asymptomatic.  No dyspnea, angina, syncope/presyncope.  No lower extremity edema no PND orthopnea.    Mellitus management.  Last hemoglobin A1c 6.1.  History of chronic kidney disease, follows with Nephrology             Review of patient's allergies indicates:   Allergen Reactions    Sulfamethoxazole-trimethoprim Rash and Hives     Patient experienced on 12/3/14 redness to face and rash to chest and arms/ with no SOB or airway obstruction    Zinc-25 Hives and Swelling     Aching pains in the knees     Heparin analogues     Iodinated contrast media Swelling and Rash     Says topical iodine OK       Past Medical History:   Diagnosis Date    Allergy     Arthritis     degnerative disease low back,muscle spasms, shoulders    Asian flu type A 12/22/2017    Bunionette 07/26/2012    Diabetes mellitus type I     since age 13    Diabetic gastroparesis     takes Cytotec    Diabetic retinopathy of both eyes     gets periodic laser treatments    Difficult intubation     as a child had sore throat after a procedure    Encounter for blood transfusion     GERD (gastroesophageal reflux disease)     Hallux abducto valgus 03/31/2014    Headache(784.0)     Hiatal hernia     with GERD    Hyperlipidemia     Hypertension     Hypertensive retinopathy of both eyes 06/25/2024    Infection of the upper respiratory tract 06/2012    Lumbar spinal stenosis     Osteopenia     Proliferative diabetic retinopathy of both eyes 06/25/2024    Rosacea     Seizures     Pt states during pgy with stroke    Stroke 1985?    while pregnant    Tachycardia     asymptomatic with Toprol    Thyroid disease     hypothyroidism     Venous insufficiency of leg     improved since EVLT     Past Surgical History:   Procedure Laterality Date    BUNIONECTOMY Right 2012    CARDIAC CATHETERIZATION      no stents     SECTION      COLONOSCOPY  2007    Dr. Rose, 10 year recheck    CORRECTION OF HAMMER TOE Right 8/3/2023    Procedure: CORRECTION, HAMMER TOE Right second toe;  Surgeon: Lei Rhoades DPM;  Location: [facility];  Service: Podiatry;  Laterality: Right;  right 2nd toe    EXOSTECTOMY  12    left foot, local anesthesia, in office    EYE SURGERY      has had many laser procedures for diabetic retinopathy    VASCULAR SURGERY      VEIN SURGERY  2012    EVLT right greater saphenous, IV sedation     Social History[1]     Review of Systems:    As noted in HPI in addition      REVIEW OF SYSTEMS  Review of Systems   Constitutional: Negative for decreased appetite, diaphoresis, night sweats, weight gain and weight loss.   HENT:  Negative for nosebleeds and odynophagia.    Eyes:  Negative for double vision and photophobia.   Cardiovascular:  Negative for chest pain, claudication, cyanosis, dyspnea on exertion, irregular heartbeat, leg swelling, near-syncope, orthopnea, palpitations, paroxysmal nocturnal dyspnea and syncope.   Respiratory:  Negative for cough, hemoptysis, shortness of breath and wheezing.    Hematologic/Lymphatic: Negative for adenopathy.   Skin:  Negative for flushing, skin cancer and suspicious lesions.   Musculoskeletal:  Negative for gout, myalgias and neck pain.   Gastrointestinal:  Negative for abdominal pain, heartburn, hematemesis and hematochezia.   Genitourinary:  Negative for bladder incontinence, hesitancy and nocturia.   Neurological:  Negative for focal weakness, headaches, light-headedness and paresthesias.   Psychiatric/Behavioral:  Negative for memory loss and substance abuse.               Objective:        Vitals:    25 0945   BP: 113/75   Pulse: 82       Lab Results   Component Value  Date    WBC 9.79 11/05/2024    HGB 12.8 11/05/2024    HCT 39.2 11/05/2024     11/05/2024    CHOL 167 12/09/2024    TRIG 132 12/09/2024    HDL 50 12/09/2024    ALT 19 12/09/2024    AST 24 12/09/2024     12/09/2024    K 4.1 12/09/2024     12/09/2024    CREATININE 1.1 12/09/2024    BUN 9 12/09/2024    CO2 31 (H) 12/09/2024    TSH 1.968 12/09/2024    INR 1.1 02/26/2020    HGBA1C 6.1 12/09/2024        ECHOCARDIOGRAM RESULTS  Results for orders placed during the hospital encounter of 01/25/24    Echo    Interpretation Summary    Left Ventricle: The left ventricle is normal in size. Normal wall thickness. There is concentric remodeling. Normal wall motion. There is normal systolic function. Ejection fraction by visual approximation is 65%. There is normal diastolic function.    Right Ventricle: Normal right ventricular cavity size. Wall thickness is normal. Right ventricle wall motion  is normal. Systolic function is normal.    Aortic Valve: There is moderate aortic valve sclerosis. Moderately restricted motion. There is moderate stenosis. Aortic valve area by VTI is 1.05 cm². Aortic valve peak velocity is 3.01 m/s. Mean gradient is 24 mmHg. The dimensionless index is 0.30.    Mitral Valve: There is moderate mitral annular calcification present.    Pulmonary Artery: The estimated pulmonary artery systolic pressure is 22 mmHg.    IVC/SVC: Normal venous pressure at 3 mmHg.    No results found for this or any previous visit.          CURRENT/PREVIOUS VISIT EKG  Results for orders placed or performed during the hospital encounter of 11/05/24   EKG 12-lead    Collection Time: 11/05/24  7:07 PM   Result Value Ref Range    QRS Duration 90 ms    [facility] QTC Calculation 492 ms    Narrative    Test Reason : R11.10,    Vent. Rate : 092 BPM     Atrial Rate : 092 BPM     P-R Int : 186 ms          QRS Dur : 090 ms      QT Int : 398 ms       P-R-T Axes : 056 030 029 degrees     QTc Int : 492 ms    Normal sinus  rhythm  Possible Left atrial enlargement  Prolonged QT  Abnormal ECG  Confirmed by Silvia COBB, Jessica Morrow ([id number]) on 11/6/2024 8:01:16 PM    Referred By: ROZ   SELF           Confirmed By:Jessica Vega MD     No valid procedures specified.   No results found for this or any previous visit.    No valid procedures specified.    PHYSICAL EXAM  GENERAL: well built, well nourished, well-developed in no apparent distress alert and oriented.   HEENT: Normocephalic. Pupils normal and conjunctivae normal.  Mucous membranes normal, no cyanosis or icterus, trachea central,no pallor or icterus is noted..   NECK: No JVD. No bruit..   THYROID: Thyroid not enlarged. No nodules present..   CARDIAC:  Normal S1-S2.  Grade 1-2/6 crescendo decrescendo murmur aortic area.  PMI poorly localized.     LUNGS: Clear to auscultation. No wheezing or rhonchi..   ABDOMEN: Soft no masses or organomegaly.  No abdomen pulsations or bruits.  Normal bowel sounds. No pulsations and no masses felt, No guarding or rebound.   URINARY: No knutson catheter   EXTREMITIES: No cyanosis, clubbing or edema noted at this time., no calf tenderness bilaterally.   PERIPHERAL VASCULAR SYSTEM: Good palpable distal pulses.  2+ femoral, popliteal and mildly diminished pedal pulses.  No bruits    CENTRAL NERVOUS SYSTEM: No focal motor or sensory deficits noted.   SKIN: Skin without lesions, moist, well perfused.   MUSCLE STRENGTH & TONE: No noteable weakness, atrophy or abnormal movement    I HAVE REVIEWED :    The vital signs, nurses notes, and all the pertinent radiology and labs.         Current Outpatient Medications   Medication Instructions    ABRYSVO 120 mcg/0.5 mL SolR vaccine     ACZONE 5 % topical gel 1 application , 2 times daily PRN    alpha lipoic acid 600 mg Cap 2 capsules, Daily    ALPHAGAN P 0.1 % Drop 1 drop, 3 times daily    ascorbic acid (vitamin C) (VITAMIN C) 100 mg, 2 times daily    baclofen (LIORESAL) 10 mg, Oral, 2 times daily PRN    BD  "VEO INSULIN SYRINGE UF 1/2 mL 31 gauge x 15/64" Syrg No dose, route, or frequency recorded.    calcium citrate-vitamin D3 315-200 mg (CITRACAL+D) 315-200 mg-unit per tablet 1 tablet, 2 times daily    cinnamon bark 500 mg, 2 times daily    CRESTOR 40 mg, Daily    doxycycline 50 mg, 2 times daily    ESTRACE 0.01 % (0.1 mg/gram) vaginal cream Twice weekly    estradiol 10 mcg Tab 1 tablet, Twice weekly    ezetimibe (ZETIA) 10 mg, Daily    fluticasone propionate (FLONASE) 50 mcg/actuation nasal spray USE 1 SPRAY INTO THE EACH NOSTRIL ONCE A DAY    furosemide (LASIX) 20 mg, Oral, 2 times daily    glucagon (GVOKE HYPOPEN 2-PACK) 1 mg/0.2 mL AtIn USE AS DIRECTED TO TREAT PROFOUND HYPOGLYCEMIA    GLUCAGON EMERGENCY KIT (HUMAN) 1 mg, As needed (PRN)    GVOKE HYPOPEN 2-PACK 1 mg/0.2 mL AtIn     HumaLOG U-100 Insulin 10-17 Units, 3 times daily before meals    metoprolol succinate (TOPROL-XL) 25 MG 24 hr tablet TAKE 1 TABLET BY MOUTH ONCE A DAY    montelukast (SINGULAIR) 10 mg tablet TAKE 1 TABLET BY MOUTH EVERY EVENING    mupirocin (BACTROBAN) 2 % ointment 3 times daily, Apply to affected area    NOVOFINE 32 32 gauge x 1/4" Ndle No dose, route, or frequency recorded.    ondansetron (ZOFRAN-ODT) 4 mg, Oral, Every 8 hours PRN    ondansetron (ZOFRAN-ODT) 4 mg, Oral, Every 6 hours PRN    pantoprazole (PROTONIX) 40 MG tablet TAKE 1 TABLET BY MOUTH TWICE A DAY    potassium chloride (K-TAB) 20 mEq 40 mEq, 2 times daily    promethazine (PHENERGAN) 25 mg, Oral, Every 6 hours PRN    promethazine (PHENERGAN) 25 mg, Rectal, Every 6 hours PRN    RESTASIS 0.05 % ophthalmic emulsion 1 drop, 2 times daily    SOOLANTRA 1 % Crea 1 application , Daily    SYNTHROID 88 mcg, Every Tues, Thurs, Sat, Sun    TOUJEO SOLOSTAR U-300 INSULIN 34 Units, Daily    VITAMIN B COMPLEX VIT C NO.4 (SUPER B COMPLEX + C ORAL) 1 tablet, Daily          Assessment:   Valvular heart disease with moderate AS.  Mitral annulus calcification.  Preserved EF.  Past invasive " noninvasive cardiac assessment for ischemic heart disease negative.  Exam review of systems normal.    Diabetes mellitus, dyslipidemia    Chronic kidney disease mellitus with Nephrology.        Plan:   Labs and follow up with Endocrinology.  Cardiac exam review systems otherwise stable.  See me again in about 12 month with echo          No follow-ups on file.            [1]   Social History  Tobacco Use    Smoking status: Never    Smokeless tobacco: Never   Substance Use Topics    Alcohol use: Yes     Comment: rarely; 1 drink/year    Drug use: No

## 2025-03-05 ENCOUNTER — LAB VISIT (OUTPATIENT)
Dept: LAB | Facility: HOSPITAL | Age: 70
End: 2025-03-05
Attending: INTERNAL MEDICINE
Payer: MEDICARE

## 2025-03-05 DIAGNOSIS — E21.0 PRIMARY HYPERPARATHYROIDISM: Primary | ICD-10-CM

## 2025-03-05 DIAGNOSIS — N18.2 CHRONIC KIDNEY DISEASE, STAGE II (MILD): ICD-10-CM

## 2025-03-05 LAB
25(OH)D3+25(OH)D2 SERPL-MCNC: 54 NG/ML (ref 30–96)
ALBUMIN SERPL BCP-MCNC: 3.8 G/DL (ref 3.5–5.2)
ANION GAP SERPL CALC-SCNC: 6 MMOL/L (ref 8–16)
BACTERIA #/AREA URNS HPF: NORMAL /HPF
BILIRUB UR QL STRIP: NEGATIVE
BUN SERPL-MCNC: 8 MG/DL (ref 8–23)
CALCIUM SERPL-MCNC: 8.5 MG/DL (ref 8.7–10.5)
CHLORIDE SERPL-SCNC: 103 MMOL/L (ref 95–110)
CLARITY UR: ABNORMAL
CO2 SERPL-SCNC: 30 MMOL/L (ref 23–29)
COLOR UR: YELLOW
CREAT SERPL-MCNC: 1 MG/DL (ref 0.5–1.4)
CREAT UR-MCNC: 251.3 MG/DL (ref 15–325)
EST. GFR  (NO RACE VARIABLE): >60 ML/MIN/1.73 M^2
GLUCOSE SERPL-MCNC: 124 MG/DL (ref 70–110)
GLUCOSE UR QL STRIP: NEGATIVE
HGB UR QL STRIP: NEGATIVE
HYALINE CASTS #/AREA URNS LPF: 1 /LPF
KETONES UR QL STRIP: NEGATIVE
LEUKOCYTE ESTERASE UR QL STRIP: ABNORMAL
MICROSCOPIC COMMENT: NORMAL
NITRITE UR QL STRIP: NEGATIVE
PH UR STRIP: 8 [PH] (ref 5–8)
PHOSPHATE SERPL-MCNC: 3.2 MG/DL (ref 2.7–4.5)
POTASSIUM SERPL-SCNC: 3.9 MMOL/L (ref 3.5–5.1)
PROT UR QL STRIP: ABNORMAL
PROT UR-MCNC: 18 MG/DL (ref 6–15)
PROT/CREAT UR: 0.07 MG/G{CREAT} (ref 0–0.2)
PTH-INTACT SERPL-MCNC: 179 PG/ML (ref 9–77)
RBC #/AREA URNS HPF: 2 /HPF (ref 0–4)
SODIUM SERPL-SCNC: 139 MMOL/L (ref 136–145)
SP GR UR STRIP: 1.02 (ref 1–1.03)
SQUAMOUS #/AREA URNS HPF: 8 /HPF
UNIDENT CRYS URNS QL MICRO: 2
URN SPEC COLLECT METH UR: ABNORMAL
UROBILINOGEN UR STRIP-ACNC: ABNORMAL EU/DL
WBC #/AREA URNS HPF: 5 /HPF (ref 0–5)

## 2025-03-05 PROCEDURE — 82306 VITAMIN D 25 HYDROXY: CPT | Performed by: INTERNAL MEDICINE

## 2025-03-05 PROCEDURE — 81001 URINALYSIS AUTO W/SCOPE: CPT | Performed by: INTERNAL MEDICINE

## 2025-03-05 PROCEDURE — 84156 ASSAY OF PROTEIN URINE: CPT | Performed by: INTERNAL MEDICINE

## 2025-03-05 PROCEDURE — 83970 ASSAY OF PARATHORMONE: CPT | Performed by: INTERNAL MEDICINE

## 2025-03-05 PROCEDURE — 36415 COLL VENOUS BLD VENIPUNCTURE: CPT | Performed by: INTERNAL MEDICINE

## 2025-03-05 PROCEDURE — 80069 RENAL FUNCTION PANEL: CPT | Performed by: INTERNAL MEDICINE

## 2025-03-08 ENCOUNTER — HOSPITAL ENCOUNTER (INPATIENT)
Facility: HOSPITAL | Age: 70
LOS: 1 days | Discharge: HOME | DRG: 074 | End: 2025-03-10
Attending: EMERGENCY MEDICINE | Admitting: HOSPITALIST
Payer: MEDICARE

## 2025-03-08 DIAGNOSIS — R11.2 NAUSEA & VOMITING: ICD-10-CM

## 2025-03-08 DIAGNOSIS — I95.9 HYPOTENSION: ICD-10-CM

## 2025-03-08 DIAGNOSIS — R07.9 CHEST PAIN: ICD-10-CM

## 2025-03-08 DIAGNOSIS — R11.0 NAUSEA: ICD-10-CM

## 2025-03-08 PROBLEM — E16.2 HYPOGLYCEMIA: Status: ACTIVE | Noted: 2025-03-08

## 2025-03-08 PROBLEM — E03.9 HYPOTHYROID: Status: ACTIVE | Noted: 2025-03-08

## 2025-03-08 LAB
ALBUMIN SERPL BCP-MCNC: 3.9 G/DL (ref 3.5–5.2)
ALLENS TEST: ABNORMAL
ALP SERPL-CCNC: 60 U/L (ref 55–135)
ALT SERPL W/O P-5'-P-CCNC: 24 U/L (ref 10–44)
ANION GAP SERPL CALC-SCNC: 6 MMOL/L (ref 8–16)
AST SERPL-CCNC: 42 U/L (ref 10–40)
B-OH-BUTYR BLD STRIP-SCNC: 0.3 MMOL/L (ref 0–0.5)
BASOPHILS # BLD AUTO: 0.03 K/UL (ref 0–0.2)
BASOPHILS NFR BLD: 0.5 % (ref 0–1.9)
BILIRUB SERPL-MCNC: 0.7 MG/DL (ref 0.1–1)
BILIRUB UR QL STRIP: NEGATIVE
BUN SERPL-MCNC: 11 MG/DL (ref 8–23)
CALCIUM SERPL-MCNC: 9.2 MG/DL (ref 8.7–10.5)
CHLORIDE SERPL-SCNC: 104 MMOL/L (ref 95–110)
CLARITY UR: ABNORMAL
CO2 SERPL-SCNC: 29 MMOL/L (ref 23–29)
COLOR UR: YELLOW
CREAT SERPL-MCNC: 1.2 MG/DL (ref 0.5–1.4)
DELSYS: ABNORMAL
DIFFERENTIAL METHOD BLD: ABNORMAL
EOSINOPHIL # BLD AUTO: 0 K/UL (ref 0–0.5)
EOSINOPHIL NFR BLD: 0.3 % (ref 0–8)
ERYTHROCYTE [DISTWIDTH] IN BLOOD BY AUTOMATED COUNT: 14.6 % (ref 11.5–14.5)
EST. GFR  (NO RACE VARIABLE): 49.3 ML/MIN/1.73 M^2
FIO2: 0.21
GLUCOSE SERPL-MCNC: 63 MG/DL (ref 70–110)
GLUCOSE UR QL STRIP: NEGATIVE
HCO3 UR-SCNC: 28.2 MMOL/L (ref 24–28)
HCT VFR BLD AUTO: 40.4 % (ref 37–48.5)
HGB BLD-MCNC: 12.8 G/DL (ref 12–16)
HGB UR QL STRIP: NEGATIVE
IMM GRANULOCYTES # BLD AUTO: 0.01 K/UL (ref 0–0.04)
IMM GRANULOCYTES NFR BLD AUTO: 0.2 % (ref 0–0.5)
KETONES UR QL STRIP: ABNORMAL
LEUKOCYTE ESTERASE UR QL STRIP: NEGATIVE
LIPASE SERPL-CCNC: 9 U/L (ref 4–60)
LYMPHOCYTES # BLD AUTO: 0.8 K/UL (ref 1–4.8)
LYMPHOCYTES NFR BLD: 12.7 % (ref 18–48)
MCH RBC QN AUTO: 30.3 PG (ref 27–31)
MCHC RBC AUTO-ENTMCNC: 31.7 G/DL (ref 32–36)
MCV RBC AUTO: 96 FL (ref 82–98)
MODE: ABNORMAL
MONOCYTES # BLD AUTO: 0.4 K/UL (ref 0.3–1)
MONOCYTES NFR BLD: 5.3 % (ref 4–15)
NEUTROPHILS # BLD AUTO: 5.4 K/UL (ref 1.8–7.7)
NEUTROPHILS NFR BLD: 81 % (ref 38–73)
NITRITE UR QL STRIP: NEGATIVE
NRBC BLD-RTO: 0 /100 WBC
OHS QRS DURATION: 88 MS
OHS QTC CALCULATION: 482 MS
PCO2 BLDA: 46.5 MMHG (ref 35–45)
PH SMN: 7.39 [PH] (ref 7.35–7.45)
PH UR STRIP: >8 [PH] (ref 5–8)
PLATELET # BLD AUTO: 175 K/UL (ref 150–450)
PMV BLD AUTO: 11.8 FL (ref 9.2–12.9)
PO2 BLDA: 29 MMHG (ref 40–60)
POC BE: 3 MMOL/L
POC SATURATED O2: 55 % (ref 95–100)
POC TCO2: 30 MMOL/L (ref 24–29)
POCT GLUCOSE: 75 MG/DL (ref 70–110)
POTASSIUM SERPL-SCNC: 4.6 MMOL/L (ref 3.5–5.1)
PROT SERPL-MCNC: 6.7 G/DL (ref 6–8.4)
PROT UR QL STRIP: ABNORMAL
RBC # BLD AUTO: 4.23 M/UL (ref 4–5.4)
SAMPLE: ABNORMAL
SITE: ABNORMAL
SODIUM SERPL-SCNC: 139 MMOL/L (ref 136–145)
SP GR UR STRIP: 1.02 (ref 1–1.03)
TROPONIN I SERPL HS-MCNC: 5.6 PG/ML (ref 0–14.9)
TROPONIN I SERPL HS-MCNC: 5.9 PG/ML (ref 0–14.9)
URN SPEC COLLECT METH UR: ABNORMAL
UROBILINOGEN UR STRIP-ACNC: ABNORMAL EU/DL
WBC # BLD AUTO: 6.62 K/UL (ref 3.9–12.7)

## 2025-03-08 PROCEDURE — 87040 BLOOD CULTURE FOR BACTERIA: CPT | Performed by: INTERNAL MEDICINE

## 2025-03-08 PROCEDURE — 93005 ELECTROCARDIOGRAM TRACING: CPT | Performed by: INTERNAL MEDICINE

## 2025-03-08 PROCEDURE — 84132 ASSAY OF SERUM POTASSIUM: CPT

## 2025-03-08 PROCEDURE — G0378 HOSPITAL OBSERVATION PER HR: HCPCS

## 2025-03-08 PROCEDURE — 99285 EMERGENCY DEPT VISIT HI MDM: CPT | Mod: 25

## 2025-03-08 PROCEDURE — 36415 COLL VENOUS BLD VENIPUNCTURE: CPT | Performed by: INTERNAL MEDICINE

## 2025-03-08 PROCEDURE — 82010 KETONE BODYS QUAN: CPT | Performed by: EMERGENCY MEDICINE

## 2025-03-08 PROCEDURE — 81003 URINALYSIS AUTO W/O SCOPE: CPT | Performed by: EMERGENCY MEDICINE

## 2025-03-08 PROCEDURE — 84295 ASSAY OF SERUM SODIUM: CPT

## 2025-03-08 PROCEDURE — 82803 BLOOD GASES ANY COMBINATION: CPT

## 2025-03-08 PROCEDURE — 93010 ELECTROCARDIOGRAM REPORT: CPT | Mod: ,,, | Performed by: INTERNAL MEDICINE

## 2025-03-08 PROCEDURE — 80053 COMPREHEN METABOLIC PANEL: CPT | Performed by: EMERGENCY MEDICINE

## 2025-03-08 PROCEDURE — 63600175 PHARM REV CODE 636 W HCPCS: Performed by: INTERNAL MEDICINE

## 2025-03-08 PROCEDURE — 25500020 PHARM REV CODE 255: Performed by: INTERNAL MEDICINE

## 2025-03-08 PROCEDURE — 63600175 PHARM REV CODE 636 W HCPCS: Performed by: EMERGENCY MEDICINE

## 2025-03-08 PROCEDURE — 25000003 PHARM REV CODE 250: Performed by: EMERGENCY MEDICINE

## 2025-03-08 PROCEDURE — 85014 HEMATOCRIT: CPT

## 2025-03-08 PROCEDURE — 25000003 PHARM REV CODE 250: Performed by: INTERNAL MEDICINE

## 2025-03-08 PROCEDURE — 99900035 HC TECH TIME PER 15 MIN (STAT)

## 2025-03-08 PROCEDURE — 83690 ASSAY OF LIPASE: CPT | Performed by: EMERGENCY MEDICINE

## 2025-03-08 PROCEDURE — 82330 ASSAY OF CALCIUM: CPT

## 2025-03-08 PROCEDURE — 85025 COMPLETE CBC W/AUTO DIFF WBC: CPT | Performed by: EMERGENCY MEDICINE

## 2025-03-08 PROCEDURE — 96374 THER/PROPH/DIAG INJ IV PUSH: CPT

## 2025-03-08 PROCEDURE — 82962 GLUCOSE BLOOD TEST: CPT

## 2025-03-08 PROCEDURE — 84484 ASSAY OF TROPONIN QUANT: CPT | Performed by: EMERGENCY MEDICINE

## 2025-03-08 PROCEDURE — 96361 HYDRATE IV INFUSION ADD-ON: CPT

## 2025-03-08 RX ORDER — TALC
6 POWDER (GRAM) TOPICAL NIGHTLY PRN
Status: DISCONTINUED | OUTPATIENT
Start: 2025-03-08 | End: 2025-03-10 | Stop reason: HOSPADM

## 2025-03-08 RX ORDER — ACETAMINOPHEN 325 MG/1
650 TABLET ORAL EVERY 4 HOURS PRN
Status: DISCONTINUED | OUTPATIENT
Start: 2025-03-08 | End: 2025-03-10 | Stop reason: HOSPADM

## 2025-03-08 RX ORDER — LEVOTHYROXINE SODIUM 88 UG/1
88 TABLET ORAL DAILY
Status: DISCONTINUED | OUTPATIENT
Start: 2025-03-09 | End: 2025-03-10 | Stop reason: HOSPADM

## 2025-03-08 RX ORDER — HYDROCODONE BITARTRATE AND ACETAMINOPHEN 5; 325 MG/1; MG/1
1 TABLET ORAL EVERY 6 HOURS PRN
Refills: 0 | Status: DISCONTINUED | OUTPATIENT
Start: 2025-03-08 | End: 2025-03-10 | Stop reason: HOSPADM

## 2025-03-08 RX ORDER — ALUMINUM HYDROXIDE, MAGNESIUM HYDROXIDE, AND SIMETHICONE 1200; 120; 1200 MG/30ML; MG/30ML; MG/30ML
30 SUSPENSION ORAL ONCE
Status: COMPLETED | OUTPATIENT
Start: 2025-03-08 | End: 2025-03-08

## 2025-03-08 RX ORDER — ALUMINUM HYDROXIDE, MAGNESIUM HYDROXIDE, AND SIMETHICONE 1200; 120; 1200 MG/30ML; MG/30ML; MG/30ML
30 SUSPENSION ORAL 4 TIMES DAILY PRN
Status: DISCONTINUED | OUTPATIENT
Start: 2025-03-08 | End: 2025-03-10 | Stop reason: HOSPADM

## 2025-03-08 RX ORDER — DIPHENHYDRAMINE HYDROCHLORIDE 50 MG/ML
25 INJECTION, SOLUTION INTRAMUSCULAR; INTRAVENOUS
Status: COMPLETED | OUTPATIENT
Start: 2025-03-08 | End: 2025-03-08

## 2025-03-08 RX ORDER — SODIUM CHLORIDE, SODIUM LACTATE, POTASSIUM CHLORIDE, CALCIUM CHLORIDE 600; 310; 30; 20 MG/100ML; MG/100ML; MG/100ML; MG/100ML
500 INJECTION, SOLUTION INTRAVENOUS
Status: COMPLETED | OUTPATIENT
Start: 2025-03-08 | End: 2025-03-08

## 2025-03-08 RX ORDER — PANTOPRAZOLE SODIUM 40 MG/10ML
40 INJECTION, POWDER, LYOPHILIZED, FOR SOLUTION INTRAVENOUS DAILY
Status: DISCONTINUED | OUTPATIENT
Start: 2025-03-08 | End: 2025-03-08

## 2025-03-08 RX ORDER — ONDANSETRON HYDROCHLORIDE 2 MG/ML
4 INJECTION, SOLUTION INTRAVENOUS EVERY 6 HOURS PRN
Status: DISCONTINUED | OUTPATIENT
Start: 2025-03-08 | End: 2025-03-10 | Stop reason: HOSPADM

## 2025-03-08 RX ORDER — BUTALBITAL, ACETAMINOPHEN AND CAFFEINE 50; 325; 40 MG/1; MG/1; MG/1
1 TABLET ORAL EVERY 4 HOURS PRN
Status: DISCONTINUED | OUTPATIENT
Start: 2025-03-08 | End: 2025-03-10 | Stop reason: HOSPADM

## 2025-03-08 RX ORDER — LIDOCAINE HYDROCHLORIDE 20 MG/ML
15 SOLUTION OROPHARYNGEAL ONCE
Status: COMPLETED | OUTPATIENT
Start: 2025-03-08 | End: 2025-03-08

## 2025-03-08 RX ORDER — IBUPROFEN 200 MG
24 TABLET ORAL
Status: DISCONTINUED | OUTPATIENT
Start: 2025-03-08 | End: 2025-03-10 | Stop reason: HOSPADM

## 2025-03-08 RX ORDER — PANTOPRAZOLE SODIUM 40 MG/10ML
40 INJECTION, POWDER, LYOPHILIZED, FOR SOLUTION INTRAVENOUS EVERY 12 HOURS
Status: DISCONTINUED | OUTPATIENT
Start: 2025-03-08 | End: 2025-03-10 | Stop reason: HOSPADM

## 2025-03-08 RX ORDER — LANOLIN ALCOHOL/MO/W.PET/CERES
800 CREAM (GRAM) TOPICAL
Status: DISCONTINUED | OUTPATIENT
Start: 2025-03-08 | End: 2025-03-10 | Stop reason: HOSPADM

## 2025-03-08 RX ORDER — METHYLPREDNISOLONE SOD SUCC 125 MG
125 VIAL (EA) INJECTION
Status: COMPLETED | OUTPATIENT
Start: 2025-03-08 | End: 2025-03-08

## 2025-03-08 RX ORDER — GLUCAGON 1 MG
1 KIT INJECTION
Status: DISCONTINUED | OUTPATIENT
Start: 2025-03-08 | End: 2025-03-10 | Stop reason: HOSPADM

## 2025-03-08 RX ORDER — INSULIN ASPART 100 [IU]/ML
0-10 INJECTION, SOLUTION INTRAVENOUS; SUBCUTANEOUS
Status: DISCONTINUED | OUTPATIENT
Start: 2025-03-08 | End: 2025-03-10 | Stop reason: HOSPADM

## 2025-03-08 RX ORDER — ONDANSETRON HYDROCHLORIDE 2 MG/ML
4 INJECTION, SOLUTION INTRAVENOUS
Status: COMPLETED | OUTPATIENT
Start: 2025-03-08 | End: 2025-03-08

## 2025-03-08 RX ORDER — SODIUM,POTASSIUM PHOSPHATES 280-250MG
2 POWDER IN PACKET (EA) ORAL
Status: DISCONTINUED | OUTPATIENT
Start: 2025-03-08 | End: 2025-03-10 | Stop reason: HOSPADM

## 2025-03-08 RX ORDER — IBUPROFEN 200 MG
16 TABLET ORAL
Status: DISCONTINUED | OUTPATIENT
Start: 2025-03-08 | End: 2025-03-10 | Stop reason: HOSPADM

## 2025-03-08 RX ORDER — PANTOPRAZOLE SODIUM 40 MG/1
40 TABLET, DELAYED RELEASE ORAL 2 TIMES DAILY
Status: DISCONTINUED | OUTPATIENT
Start: 2025-03-08 | End: 2025-03-08

## 2025-03-08 RX ORDER — NALOXONE HCL 0.4 MG/ML
0.02 VIAL (ML) INJECTION
Status: DISCONTINUED | OUTPATIENT
Start: 2025-03-08 | End: 2025-03-10 | Stop reason: HOSPADM

## 2025-03-08 RX ORDER — DEXTROSE, SODIUM CHLORIDE, SODIUM LACTATE, POTASSIUM CHLORIDE, AND CALCIUM CHLORIDE 5; .6; .31; .03; .02 G/100ML; G/100ML; G/100ML; G/100ML; G/100ML
INJECTION, SOLUTION INTRAVENOUS CONTINUOUS
Status: DISCONTINUED | OUTPATIENT
Start: 2025-03-08 | End: 2025-03-09

## 2025-03-08 RX ORDER — ACETAMINOPHEN 325 MG/1
650 TABLET ORAL EVERY 8 HOURS PRN
Status: DISCONTINUED | OUTPATIENT
Start: 2025-03-08 | End: 2025-03-10 | Stop reason: HOSPADM

## 2025-03-08 RX ADMIN — PANTOPRAZOLE SODIUM 40 MG: 40 TABLET, DELAYED RELEASE ORAL at 08:03

## 2025-03-08 RX ADMIN — SODIUM CHLORIDE, SODIUM LACTATE, POTASSIUM CHLORIDE, CALCIUM CHLORIDE AND DEXTROSE MONOHYDRATE: 5; 600; 310; 30; 20 INJECTION, SOLUTION INTRAVENOUS at 08:03

## 2025-03-08 RX ADMIN — PANTOPRAZOLE SODIUM 40 MG: 40 INJECTION, POWDER, FOR SOLUTION INTRAVENOUS at 09:03

## 2025-03-08 RX ADMIN — METHYLPREDNISOLONE SODIUM SUCCINATE 125 MG: 125 INJECTION, POWDER, FOR SOLUTION INTRAMUSCULAR; INTRAVENOUS at 06:03

## 2025-03-08 RX ADMIN — ALUMINUM HYDROXIDE, MAGNESIUM HYDROXIDE, AND SIMETHICONE 30 ML: 200; 200; 20 SUSPENSION ORAL at 05:03

## 2025-03-08 RX ADMIN — ONDANSETRON 4 MG: 2 INJECTION INTRAMUSCULAR; INTRAVENOUS at 05:03

## 2025-03-08 RX ADMIN — LIDOCAINE HYDROCHLORIDE 15 ML: 20 SOLUTION ORAL at 05:03

## 2025-03-08 RX ADMIN — IOHEXOL 100 ML: 350 INJECTION, SOLUTION INTRAVENOUS at 07:03

## 2025-03-08 RX ADMIN — SODIUM CHLORIDE, POTASSIUM CHLORIDE, SODIUM LACTATE AND CALCIUM CHLORIDE 500 ML: 600; 310; 30; 20 INJECTION, SOLUTION INTRAVENOUS at 12:03

## 2025-03-08 RX ADMIN — SODIUM CHLORIDE, POTASSIUM CHLORIDE, SODIUM LACTATE AND CALCIUM CHLORIDE 1000 ML: 600; 310; 30; 20 INJECTION, SOLUTION INTRAVENOUS at 05:03

## 2025-03-08 RX ADMIN — DIPHENHYDRAMINE HYDROCHLORIDE 25 MG: 50 INJECTION INTRAMUSCULAR; INTRAVENOUS at 06:03

## 2025-03-09 LAB
ALBUMIN SERPL BCP-MCNC: 3.5 G/DL (ref 3.5–5.2)
ALP SERPL-CCNC: 56 U/L (ref 55–135)
ALT SERPL W/O P-5'-P-CCNC: 19 U/L (ref 10–44)
ANION GAP SERPL CALC-SCNC: 7 MMOL/L (ref 8–16)
AST SERPL-CCNC: 26 U/L (ref 10–40)
B-OH-BUTYR BLD STRIP-SCNC: 0.9 MMOL/L (ref 0–0.5)
BASOPHILS # BLD AUTO: 0.01 K/UL (ref 0–0.2)
BASOPHILS NFR BLD: 0.2 % (ref 0–1.9)
BILIRUB SERPL-MCNC: 0.6 MG/DL (ref 0.1–1)
BUN SERPL-MCNC: 12 MG/DL (ref 8–23)
CALCIUM SERPL-MCNC: 8.7 MG/DL (ref 8.7–10.5)
CHLORIDE SERPL-SCNC: 104 MMOL/L (ref 95–110)
CO2 SERPL-SCNC: 25 MMOL/L (ref 23–29)
CREAT SERPL-MCNC: 1 MG/DL (ref 0.5–1.4)
DIFFERENTIAL METHOD BLD: ABNORMAL
EOSINOPHIL # BLD AUTO: 0 K/UL (ref 0–0.5)
EOSINOPHIL NFR BLD: 0 % (ref 0–8)
ERYTHROCYTE [DISTWIDTH] IN BLOOD BY AUTOMATED COUNT: 14.6 % (ref 11.5–14.5)
EST. GFR  (NO RACE VARIABLE): >60 ML/MIN/1.73 M^2
ESTIMATED AVG GLUCOSE: 126 MG/DL (ref 68–131)
GLUCOSE SERPL-MCNC: 327 MG/DL (ref 70–110)
HBA1C MFR BLD: 6 % (ref 4.5–6.2)
HCT VFR BLD AUTO: 36.4 % (ref 37–48.5)
HGB BLD-MCNC: 11.9 G/DL (ref 12–16)
IMM GRANULOCYTES # BLD AUTO: 0.02 K/UL (ref 0–0.04)
IMM GRANULOCYTES NFR BLD AUTO: 0.3 % (ref 0–0.5)
LYMPHOCYTES # BLD AUTO: 0.5 K/UL (ref 1–4.8)
LYMPHOCYTES NFR BLD: 9.1 % (ref 18–48)
MAGNESIUM SERPL-MCNC: 2 MG/DL (ref 1.6–2.6)
MCH RBC QN AUTO: 30.6 PG (ref 27–31)
MCHC RBC AUTO-ENTMCNC: 32.7 G/DL (ref 32–36)
MCV RBC AUTO: 94 FL (ref 82–98)
MONOCYTES # BLD AUTO: 0 K/UL (ref 0.3–1)
MONOCYTES NFR BLD: 0.7 % (ref 4–15)
NEUTROPHILS # BLD AUTO: 5.3 K/UL (ref 1.8–7.7)
NEUTROPHILS NFR BLD: 89.7 % (ref 38–73)
NRBC BLD-RTO: 0 /100 WBC
PLATELET # BLD AUTO: 146 K/UL (ref 150–450)
PMV BLD AUTO: 10.8 FL (ref 9.2–12.9)
POCT GLUCOSE: 202 MG/DL (ref 70–110)
POCT GLUCOSE: 253 MG/DL (ref 70–110)
POCT GLUCOSE: 294 MG/DL (ref 70–110)
POCT GLUCOSE: 307 MG/DL (ref 70–110)
POCT GLUCOSE: 340 MG/DL (ref 70–110)
POCT GLUCOSE: 444 MG/DL (ref 70–110)
POTASSIUM SERPL-SCNC: 4.4 MMOL/L (ref 3.5–5.1)
PROT SERPL-MCNC: 5.9 G/DL (ref 6–8.4)
RBC # BLD AUTO: 3.89 M/UL (ref 4–5.4)
SODIUM SERPL-SCNC: 136 MMOL/L (ref 136–145)
WBC # BLD AUTO: 5.93 K/UL (ref 3.9–12.7)

## 2025-03-09 PROCEDURE — 36415 COLL VENOUS BLD VENIPUNCTURE: CPT | Performed by: INTERNAL MEDICINE

## 2025-03-09 PROCEDURE — 82010 KETONE BODYS QUAN: CPT | Performed by: INTERNAL MEDICINE

## 2025-03-09 PROCEDURE — 83036 HEMOGLOBIN GLYCOSYLATED A1C: CPT | Performed by: INTERNAL MEDICINE

## 2025-03-09 PROCEDURE — 25000003 PHARM REV CODE 250: Performed by: INTERNAL MEDICINE

## 2025-03-09 PROCEDURE — 63600175 PHARM REV CODE 636 W HCPCS: Performed by: INTERNAL MEDICINE

## 2025-03-09 PROCEDURE — 12000002 HC ACUTE/MED SURGE SEMI-PRIVATE ROOM

## 2025-03-09 PROCEDURE — 25000003 PHARM REV CODE 250: Performed by: HOSPITALIST

## 2025-03-09 PROCEDURE — 63600175 PHARM REV CODE 636 W HCPCS: Performed by: HOSPITALIST

## 2025-03-09 PROCEDURE — 83735 ASSAY OF MAGNESIUM: CPT | Performed by: INTERNAL MEDICINE

## 2025-03-09 PROCEDURE — 80053 COMPREHEN METABOLIC PANEL: CPT | Performed by: INTERNAL MEDICINE

## 2025-03-09 PROCEDURE — 85025 COMPLETE CBC W/AUTO DIFF WBC: CPT | Performed by: INTERNAL MEDICINE

## 2025-03-09 RX ORDER — POLYETHYLENE GLYCOL 3350 17 G/17G
17 POWDER, FOR SOLUTION ORAL DAILY
Status: DISCONTINUED | OUTPATIENT
Start: 2025-03-09 | End: 2025-03-10 | Stop reason: HOSPADM

## 2025-03-09 RX ORDER — METOCLOPRAMIDE HYDROCHLORIDE 5 MG/ML
5 INJECTION INTRAMUSCULAR; INTRAVENOUS EVERY 6 HOURS
Status: DISCONTINUED | OUTPATIENT
Start: 2025-03-09 | End: 2025-03-10 | Stop reason: HOSPADM

## 2025-03-09 RX ORDER — GLYCERIN 1 G/1
1 SUPPOSITORY RECTAL ONCE
Status: COMPLETED | OUTPATIENT
Start: 2025-03-09 | End: 2025-03-09

## 2025-03-09 RX ORDER — DIPHENHYDRAMINE HYDROCHLORIDE 50 MG/ML
25 INJECTION, SOLUTION INTRAMUSCULAR; INTRAVENOUS ONCE
Status: COMPLETED | OUTPATIENT
Start: 2025-03-09 | End: 2025-03-09

## 2025-03-09 RX ADMIN — GLYCERIN 1 SUPPOSITORY: 2 SUPPOSITORY RECTAL at 01:03

## 2025-03-09 RX ADMIN — METOCLOPRAMIDE HYDROCHLORIDE 5 MG: 5 INJECTION INTRAMUSCULAR; INTRAVENOUS at 11:03

## 2025-03-09 RX ADMIN — ONDANSETRON 4 MG: 2 INJECTION INTRAMUSCULAR; INTRAVENOUS at 10:03

## 2025-03-09 RX ADMIN — INSULIN ASPART 10 UNITS: 100 INJECTION, SOLUTION INTRAVENOUS; SUBCUTANEOUS at 05:03

## 2025-03-09 RX ADMIN — INSULIN ASPART 3 UNITS: 100 INJECTION, SOLUTION INTRAVENOUS; SUBCUTANEOUS at 08:03

## 2025-03-09 RX ADMIN — LACTULOSE 20 G: 10 SOLUTION ORAL at 08:03

## 2025-03-09 RX ADMIN — PANTOPRAZOLE SODIUM 40 MG: 40 INJECTION, POWDER, FOR SOLUTION INTRAVENOUS at 08:03

## 2025-03-09 RX ADMIN — POLYETHYLENE GLYCOL 3350 17 G: 17 POWDER, FOR SOLUTION ORAL at 01:03

## 2025-03-09 RX ADMIN — HYDROCODONE BITARTRATE AND ACETAMINOPHEN 1 TABLET: 5; 325 TABLET ORAL at 07:03

## 2025-03-09 RX ADMIN — METOCLOPRAMIDE HYDROCHLORIDE 5 MG: 5 INJECTION INTRAMUSCULAR; INTRAVENOUS at 04:03

## 2025-03-09 RX ADMIN — PANTOPRAZOLE SODIUM 40 MG: 40 INJECTION, POWDER, FOR SOLUTION INTRAVENOUS at 09:03

## 2025-03-09 RX ADMIN — DIPHENHYDRAMINE HYDROCHLORIDE 25 MG: 50 INJECTION INTRAMUSCULAR; INTRAVENOUS at 01:03

## 2025-03-09 NOTE — ASSESSMENT & PLAN NOTE
Creatine stable for now. BMP reviewed- noted Estimated Creatinine Clearance: 50.4 mL/min (based on SCr of 1.2 mg/dL). according to latest data. Based on current GFR, CKD stage is stage 3 - GFR 30-59.  Monitor UOP and serial BMP and adjust therapy as needed. Renally dose meds. Avoid nephrotoxic medications and procedures.

## 2025-03-09 NOTE — PLAN OF CARE
Medicare Outpatient Observation Notice  Medicare Outpatient and Observation Notification regarding financial responsibility: (P) Signed/date by patient/caregiver     Date COLE was signed: (P) 03/09/25  Time COLE was signed: (P) 0849     03/09/25 1051   COLE Message   Medicare Outpatient and Observation Notification regarding financial responsibility Signed/date by patient/caregiver   Date COLE was signed 03/09/25   Time COLE was signed 0882

## 2025-03-09 NOTE — SUBJECTIVE & OBJECTIVE
Past Medical History:   Diagnosis Date    Allergy     Arthritis     degnerative disease low back,muscle spasms, shoulders    Asian flu type A 2017    Bunionette 2012    Diabetes mellitus type I     since age 13    Diabetic gastroparesis     takes Cytotec    Diabetic retinopathy of both eyes     gets periodic laser treatments    Difficult intubation     as a child had sore throat after a procedure    Encounter for blood transfusion     GERD (gastroesophageal reflux disease)     Hallux abducto valgus 2014    Headache(784.0)     Hiatal hernia     with GERD    Hyperlipidemia     Hypertension     Hypertensive retinopathy of both eyes 2024    Infection of the upper respiratory tract 2012    Lumbar spinal stenosis     Osteopenia     Proliferative diabetic retinopathy of both eyes 2024    Rosacea     Seizures     Pt states during pgy with stroke    Stroke ?    while pregnant    Tachycardia     asymptomatic with Toprol    Thyroid disease     hypothyroidism    Venous insufficiency of leg     improved since EVLT       Past Surgical History:   Procedure Laterality Date    BUNIONECTOMY Right 2012    CARDIAC CATHETERIZATION      no stents     SECTION      COLONOSCOPY  2007    Dr. Rose, 10 year recheck    CORRECTION OF HAMMER TOE Right 8/3/2023    Procedure: CORRECTION, HAMMER TOE Right second toe;  Surgeon: Lei Rhoades DPM;  Location: [facility];  Service: Podiatry;  Laterality: Right;  right 2nd toe    EXOSTECTOMY  12    left foot, local anesthesia, in office    EYE SURGERY      has had many laser procedures for diabetic retinopathy    VASCULAR SURGERY      VEIN SURGERY  2012    EVLT right greater saphenous, IV sedation       Review of patient's allergies indicates:   Allergen Reactions    Sulfamethoxazole-trimethoprim Rash and Hives     Patient experienced on 12/3/14 redness to face and rash to chest and arms/ with no SOB or airway obstruction    Zinc-25 Hives  and Swelling     Aching pains in the knees     Heparin analogues     Iodinated contrast media Swelling and Rash     Says topical iodine OK       No current facility-administered medications on file prior to encounter.     Current Outpatient Medications on File Prior to Encounter   Medication Sig    ACZONE 5 % topical gel Apply 1 application topically 2 (two) times daily as needed (roseacea).    alpha lipoic acid 600 mg Cap Take 2 capsules by mouth once daily.    ALPHAGAN P 0.1 % Drop Place 1 drop into both eyes 3 (three) times daily.     ascorbic acid, vitamin C, (VITAMIN C) 100 MG tablet Take 100 mg by mouth 2 (two) times daily.    calcium citrate-vitamin D3 315-200 mg (CITRACAL+D) 315-200 mg-unit per tablet Take 1 tablet by mouth 2 (two) times daily.    cinnamon bark 500 mg capsule Take 500 mg by mouth 2 (two) times daily.    CRESTOR 40 mg Tab Take 40 mg by mouth once daily.     doxycycline (VIBRAMYCIN) 50 MG capsule Take 50 mg by mouth 2 (two) times daily.    ESTRACE 0.01 % (0.1 mg/gram) vaginal cream Place 1 g vaginally twice a week. Tuesday, Saturday    estradiol 10 mcg Tab Place 1 tablet vaginally twice a week. Tuesday, Saturday    ezetimibe (ZETIA) 10 mg tablet Take 10 mg by mouth once daily.    fluticasone propionate (FLONASE) 50 mcg/actuation nasal spray USE 1 SPRAY INTO THE EACH NOSTRIL ONCE A DAY (Patient taking differently: 1 spray by Each Nostril route once daily. USE 1 SPRAY INTO THE EACH NOSTRIL ONCE A DAY)    furosemide (LASIX) 20 MG tablet TAKE 1 TABLET BY MOUTH TWICE A DAY    glucagon (GVOKE HYPOPEN 2-PACK) 1 mg/0.2 mL AtIn USE AS DIRECTED TO TREAT PROFOUND HYPOGLYCEMIA (Patient taking differently: 1 Dose by Other route as needed.)    HUMALOG 100 unit/mL injection Inject 10-17 Units into the skin 3 (three) times daily before meals. 10-17 units tid sliding scale    metoprolol succinate (TOPROL-XL) 25 MG 24 hr tablet TAKE 1 TABLET BY MOUTH ONCE A DAY (Patient taking differently: Take 25 mg by mouth  "once daily.)    montelukast (SINGULAIR) 10 mg tablet TAKE 1 TABLET BY MOUTH EVERY EVENING (Patient taking differently: Take 10 mg by mouth every evening.)    ondansetron (ZOFRAN-ODT) 4 MG TbDL Take 1 tablet (4 mg total) by mouth every 6 (six) hours as needed (vomiting).    pantoprazole (PROTONIX) 40 MG tablet TAKE 1 TABLET BY MOUTH TWICE A DAY (Patient taking differently: Take 40 mg by mouth 2 (two) times daily.)    potassium chloride (K-TAB) 20 mEq Take 40 mEq by mouth 2 (two) times a day.    promethazine (PHENERGAN) 25 MG suppository Place 1 suppository (25 mg total) rectally every 6 (six) hours as needed for Nausea.    RESTASIS 0.05 % ophthalmic emulsion Place 1 drop into both eyes 2 (two) times daily.    SOOLANTRA 1 % Crea Apply 1 application topically once daily.    SYNTHROID 88 mcg tablet Take 88 mcg by mouth once daily.    TOUJEO SOLOSTAR U-300 INSULIN 300 unit/mL (1.5 mL) InPn pen Inject 34 Units into the skin once daily.    VITAMIN B COMPLEX VIT C NO.4 (SUPER B COMPLEX + C ORAL) Take 1 tablet by mouth once daily.    BD VEO INSULIN SYRINGE UF 1/2 mL 31 gauge x 15/64" Syrg     GLUCAGON EMERGENCY 1 mg injection Take 1 mg by mouth as needed.    NOVOFINE 32 32 gauge x 1/4" Ndle     [DISCONTINUED] ABRYSVO 120 mcg/0.5 mL SolR vaccine     [DISCONTINUED] baclofen (LIORESAL) 10 MG tablet Take 1 tablet (10 mg total) by mouth 2 (two) times daily as needed (muscle pain).    [DISCONTINUED] GVOKE HYPOPEN 2-PACK 1 mg/0.2 mL AtIn     [DISCONTINUED] mupirocin (BACTROBAN) 2 % ointment APPLY TOPICALLY THREE TIMES A DAY    [DISCONTINUED] ondansetron (ZOFRAN-ODT) 4 MG TbDL Take 1 tablet (4 mg total) by mouth every 8 (eight) hours as needed (Nausea).    [DISCONTINUED] promethazine (PHENERGAN) 25 MG tablet Take 1 tablet (25 mg total) by mouth every 6 (six) hours as needed for Nausea. (Patient not taking: Reported on 2/27/2025)     Family History       Problem Relation (Age of Onset)    Alcohol abuse Maternal Uncle    Alzheimer's " disease Maternal Uncle    Arthritis Mother    Breast cancer Maternal Aunt, Maternal Grandmother (38)    Cancer Maternal Aunt, Maternal Grandfather, Mother    Diabetes Maternal Grandmother, Maternal Grandfather, Cousin    Heart disease Father, Maternal Uncle, Paternal Uncle    Melanoma Mother    No Known Problems Sister, Brother, Daughter, Paternal Aunt    Stroke Father          Tobacco Use    Smoking status: Never    Smokeless tobacco: Never   Substance and Sexual Activity    Alcohol use: Yes     Comment: rarely; 1 drink/year    Drug use: No    Sexual activity: Yes     Partners: Male     Review of Systems   Constitutional:  Negative for activity change and appetite change.   HENT:  Negative for congestion and dental problem.    Eyes:  Negative for discharge and itching.   Respiratory:  Negative for shortness of breath.    Cardiovascular:  Negative for chest pain.   Gastrointestinal:  Positive for nausea and vomiting. Negative for abdominal distention and abdominal pain.   Endocrine: Negative for cold intolerance.   Genitourinary:  Negative for difficulty urinating and dysuria.   Musculoskeletal:  Negative for arthralgias and back pain.   Skin:  Negative for color change.   Neurological:  Negative for dizziness and facial asymmetry.   Hematological:  Negative for adenopathy.   Psychiatric/Behavioral:  Negative for agitation and behavioral problems.      Objective:     Vital Signs (Most Recent):  Temp: 98.3 °F (36.8 °C) (03/08/25 2000)  Pulse: 92 (03/08/25 2000)  Resp: 18 (03/08/25 2000)  BP: 114/63 (03/08/25 2000)  SpO2: 96 % (03/08/25 2000) Vital Signs (24h Range):  Temp:  [98.3 °F (36.8 °C)-99 °F (37.2 °C)] 98.3 °F (36.8 °C)  Pulse:  [87-97] 92  Resp:  [18] 18  SpO2:  [95 %-98 %] 96 %  BP: ()/(46-72) 114/63     Weight: 88.8 kg (195 lb 11.2 oz)  Body mass index is 31.59 kg/m².     Physical Exam  Vitals and nursing note reviewed.   Constitutional:       General: She is not in acute distress.  HENT:      Head:  Atraumatic.      Right Ear: External ear normal.      Left Ear: External ear normal.      Nose: Nose normal.      Mouth/Throat:      Mouth: Mucous membranes are dry.   Eyes:      Extraocular Movements: Extraocular movements intact.   Cardiovascular:      Rate and Rhythm: Normal rate.   Pulmonary:      Effort: Pulmonary effort is normal.   Musculoskeletal:         General: Normal range of motion.      Cervical back: Normal range of motion.   Skin:     General: Skin is warm.   Neurological:      Mental Status: She is alert and oriented to person, place, and time.   Psychiatric:         Behavior: Behavior normal.                Significant Labs: All pertinent labs within the past 24 hours have been reviewed.  CBC:   Recent Labs   Lab 03/08/25  1218   WBC 6.62   HGB 12.8   HCT 40.4        CMP:   Recent Labs   Lab 03/08/25  1218      K 4.6      CO2 29   GLU 63*   BUN 11   CREATININE 1.2   CALCIUM 9.2   PROT 6.7   ALBUMIN 3.9   BILITOT 0.7   ALKPHOS 60   AST 42*   ALT 24   ANIONGAP 6*       Significant Imaging: I have reviewed all pertinent imaging results/findings within the past 24 hours.

## 2025-03-09 NOTE — ASSESSMENT & PLAN NOTE
Patient's FSGs are controlled on current medication regimen.  Last A1c reviewed-   Lab Results   Component Value Date    HGBA1C 6.0 03/09/2025     Most recent fingerstick glucose reviewed-   Recent Labs   Lab 03/08/25  1055 03/09/25  0001 03/09/25  0433 03/09/25  0745   POCTGLUCOSE 75 202* 294* 307*     Current correctional scale  Medium  Maintain anti-hyperglycemic dose as follows-   Antihyperglycemics (From admission, onward)      Start     Stop Route Frequency Ordered    03/08/25 1937  insulin aspart U-100 pen 0-10 Units         -- SubQ Before meals & nightly PRN 03/08/25 1838          Hold Oral hypoglycemics while patient is in the hospital.      Discontinue IV fluids.  Patient apparently developed hypoglycemia secondary to nausea vomiting and still on high-dose insulin at home.

## 2025-03-09 NOTE — ASSESSMENT & PLAN NOTE
Aware   Latest Reference Range & Units 11/06/23 08:03 03/13/24 08:40 07/24/24 08:11 12/09/24 08:16   Hemoglobin A1C External 4.5 - 6.2 % 5.9 6.0 (C) 6.2 6.1   (C): Corrected

## 2025-03-09 NOTE — H&P
[facility]  History & Physical    Patient Name: Kateryna Desai  MRN: [id number]  Patient Class: OP- Observation  Admission Date: 3/8/2025  Attending Physician: Ag Raman MD   Primary Care Provider: Guido Chow MD         Patient information was obtained from patient, past medical records, ER records, and ER MD  .     Subjective:     Principal Problem:Hypotension    Chief Complaint:   Chief Complaint   Patient presents with    Vomiting     This am after taking her morning insulin. Sugar was 249 and now around 100    Nausea        HPI: 70 year old pt getting admitted with hypotension/hypoglycemia  Pt started having N&V episodes today AM   Symptoms persisted and she came to ER and got admitted  Pt denied fever/sick contacts  She said she has Type 1 DM not Type 2 DM  Pt didn't took basal insulin today because of incessant N&V   CT scan ordered by ER team still pending     Past Medical History:   Diagnosis Date    Allergy     Arthritis     degnerative disease low back,muscle spasms, shoulders    Asian flu type A 12/22/2017    Bunionette 07/26/2012    Diabetes mellitus type I     since age 13    Diabetic gastroparesis     takes Cytotec    Diabetic retinopathy of both eyes     gets periodic laser treatments    Difficult intubation     as a child had sore throat after a procedure    Encounter for blood transfusion     GERD (gastroesophageal reflux disease)     Hallux abducto valgus 03/31/2014    Headache(784.0)     Hiatal hernia     with GERD    Hyperlipidemia     Hypertension     Hypertensive retinopathy of both eyes 06/25/2024    Infection of the upper respiratory tract 06/2012    Lumbar spinal stenosis     Osteopenia     Proliferative diabetic retinopathy of both eyes 06/25/2024    Rosacea     Seizures     Pt states during pgy with stroke    Stroke 1985?    while pregnant    Tachycardia     asymptomatic with Toprol    Thyroid disease     hypothyroidism    Venous  insufficiency of leg     improved since EVLT       Past Surgical History:   Procedure Laterality Date    BUNIONECTOMY Right 2012    CARDIAC CATHETERIZATION      no stents     SECTION      COLONOSCOPY  2007    Dr. Rose, 10 year recheck    CORRECTION OF HAMMER TOE Right 8/3/2023    Procedure: CORRECTION, HAMMER TOE Right second toe;  Surgeon: Lei Rhoades DPM;  Location: [facility] OR;  Service: Podiatry;  Laterality: Right;  right 2nd toe    EXOSTECTOMY  12    left foot, local anesthesia, in office    EYE SURGERY      has had many laser procedures for diabetic retinopathy    VASCULAR SURGERY      VEIN SURGERY  2012    EVLT right greater saphenous, IV sedation       Review of patient's allergies indicates:   Allergen Reactions    Sulfamethoxazole-trimethoprim Rash and Hives     Patient experienced on 12/3/14 redness to face and rash to chest and arms/ with no SOB or airway obstruction    Zinc-25 Hives and Swelling     Aching pains in the knees     Heparin analogues     Iodinated contrast media Swelling and Rash     Says topical iodine OK       No current facility-administered medications on file prior to encounter.     Current Outpatient Medications on File Prior to Encounter   Medication Sig    ACZONE 5 % topical gel Apply 1 application topically 2 (two) times daily as needed (roseacea).    alpha lipoic acid 600 mg Cap Take 2 capsules by mouth once daily.    ALPHAGAN P 0.1 % Drop Place 1 drop into both eyes 3 (three) times daily.     ascorbic acid, vitamin C, (VITAMIN C) 100 MG tablet Take 100 mg by mouth 2 (two) times daily.    calcium citrate-vitamin D3 315-200 mg (CITRACAL+D) 315-200 mg-unit per tablet Take 1 tablet by mouth 2 (two) times daily.    cinnamon bark 500 mg capsule Take 500 mg by mouth 2 (two) times daily.    CRESTOR 40 mg Tab Take 40 mg by mouth once daily.     doxycycline (VIBRAMYCIN) 50 MG capsule Take 50 mg by mouth 2 (two) times daily.    ESTRACE 0.01 % (0.1 mg/gram)  vaginal cream Place 1 g vaginally twice a week. Tuesday, Saturday    estradiol 10 mcg Tab Place 1 tablet vaginally twice a week. Tuesday, Saturday    ezetimibe (ZETIA) 10 mg tablet Take 10 mg by mouth once daily.    fluticasone propionate (FLONASE) 50 mcg/actuation nasal spray USE 1 SPRAY INTO THE EACH NOSTRIL ONCE A DAY (Patient taking differently: 1 spray by Each Nostril route once daily. USE 1 SPRAY INTO THE EACH NOSTRIL ONCE A DAY)    furosemide (LASIX) 20 MG tablet TAKE 1 TABLET BY MOUTH TWICE A DAY    glucagon (GVOKE HYPOPEN 2-PACK) 1 mg/0.2 mL AtIn USE AS DIRECTED TO TREAT PROFOUND HYPOGLYCEMIA (Patient taking differently: 1 Dose by Other route as needed.)    HUMALOG 100 unit/mL injection Inject 10-17 Units into the skin 3 (three) times daily before meals. 10-17 units tid sliding scale    metoprolol succinate (TOPROL-XL) 25 MG 24 hr tablet TAKE 1 TABLET BY MOUTH ONCE A DAY (Patient taking differently: Take 25 mg by mouth once daily.)    montelukast (SINGULAIR) 10 mg tablet TAKE 1 TABLET BY MOUTH EVERY EVENING (Patient taking differently: Take 10 mg by mouth every evening.)    ondansetron (ZOFRAN-ODT) 4 MG TbDL Take 1 tablet (4 mg total) by mouth every 6 (six) hours as needed (vomiting).    pantoprazole (PROTONIX) 40 MG tablet TAKE 1 TABLET BY MOUTH TWICE A DAY (Patient taking differently: Take 40 mg by mouth 2 (two) times daily.)    potassium chloride (K-TAB) 20 mEq Take 40 mEq by mouth 2 (two) times a day.    promethazine (PHENERGAN) 25 MG suppository Place 1 suppository (25 mg total) rectally every 6 (six) hours as needed for Nausea.    RESTASIS 0.05 % ophthalmic emulsion Place 1 drop into both eyes 2 (two) times daily.    SOOLANTRA 1 % Crea Apply 1 application topically once daily.    SYNTHROID 88 mcg tablet Take 88 mcg by mouth once daily.    TOUJEO SOLOSTAR U-300 INSULIN 300 unit/mL (1.5 mL) InPn pen Inject 34 Units into the skin once daily.    VITAMIN B COMPLEX VIT C NO.4 (SUPER B COMPLEX + C ORAL) Take  "1 tablet by mouth once daily.    BD VEO INSULIN SYRINGE UF 1/2 mL 31 gauge x 15/64" Syrg     GLUCAGON EMERGENCY 1 mg injection Take 1 mg by mouth as needed.    NOVOFINE 32 32 gauge x 1/4" Ndle     [DISCONTINUED] ABRYSVO 120 mcg/0.5 mL SolR vaccine     [DISCONTINUED] baclofen (LIORESAL) 10 MG tablet Take 1 tablet (10 mg total) by mouth 2 (two) times daily as needed (muscle pain).    [DISCONTINUED] GVOKE HYPOPEN 2-PACK 1 mg/0.2 mL AtIn     [DISCONTINUED] mupirocin (BACTROBAN) 2 % ointment APPLY TOPICALLY THREE TIMES A DAY    [DISCONTINUED] ondansetron (ZOFRAN-ODT) 4 MG TbDL Take 1 tablet (4 mg total) by mouth every 8 (eight) hours as needed (Nausea).    [DISCONTINUED] promethazine (PHENERGAN) 25 MG tablet Take 1 tablet (25 mg total) by mouth every 6 (six) hours as needed for Nausea. (Patient not taking: Reported on 2/27/2025)     Family History       Problem Relation (Age of Onset)    Alcohol abuse Maternal Uncle    Alzheimer's disease Maternal Uncle    Arthritis Mother    Breast cancer Maternal Aunt, Maternal Grandmother (38)    Cancer Maternal Aunt, Maternal Grandfather, Mother    Diabetes Maternal Grandmother, Maternal Grandfather, Cousin    Heart disease Father, Maternal Uncle, Paternal Uncle    Melanoma Mother    No Known Problems Sister, Brother, Daughter, Paternal Aunt    Stroke Father          Tobacco Use    Smoking status: Never    Smokeless tobacco: Never   Substance and Sexual Activity    Alcohol use: Yes     Comment: rarely; 1 drink/year    Drug use: No    Sexual activity: Yes     Partners: Male     Review of Systems   Constitutional:  Negative for activity change and appetite change.   HENT:  Negative for congestion and dental problem.    Eyes:  Negative for discharge and itching.   Respiratory:  Negative for shortness of breath.    Cardiovascular:  Negative for chest pain.   Gastrointestinal:  Positive for nausea and vomiting. Negative for abdominal distention and abdominal pain.   Endocrine: Negative " for cold intolerance.   Genitourinary:  Negative for difficulty urinating and dysuria.   Musculoskeletal:  Negative for arthralgias and back pain.   Skin:  Negative for color change.   Neurological:  Negative for dizziness and facial asymmetry.   Hematological:  Negative for adenopathy.   Psychiatric/Behavioral:  Negative for agitation and behavioral problems.      Objective:     Vital Signs (Most Recent):  Temp: 98.3 °F (36.8 °C) (03/08/25 2000)  Pulse: 92 (03/08/25 2000)  Resp: 18 (03/08/25 2000)  BP: 114/63 (03/08/25 2000)  SpO2: 96 % (03/08/25 2000) Vital Signs (24h Range):  Temp:  [98.3 °F (36.8 °C)-99 °F (37.2 °C)] 98.3 °F (36.8 °C)  Pulse:  [87-97] 92  Resp:  [18] 18  SpO2:  [95 %-98 %] 96 %  BP: ()/(46-72) 114/63     Weight: 88.8 kg (195 lb 11.2 oz)  Body mass index is 31.59 kg/m².     Physical Exam  Vitals and nursing note reviewed.   Constitutional:       General: She is not in acute distress.  HENT:      Head: Atraumatic.      Right Ear: External ear normal.      Left Ear: External ear normal.      Nose: Nose normal.      Mouth/Throat:      Mouth: Mucous membranes are dry.   Eyes:      Extraocular Movements: Extraocular movements intact.   Cardiovascular:      Rate and Rhythm: Normal rate.   Pulmonary:      Effort: Pulmonary effort is normal.   Musculoskeletal:         General: Normal range of motion.      Cervical back: Normal range of motion.   Skin:     General: Skin is warm.   Neurological:      Mental Status: She is alert and oriented to person, place, and time.   Psychiatric:         Behavior: Behavior normal.                Significant Labs: All pertinent labs within the past 24 hours have been reviewed.  CBC:   Recent Labs   Lab 03/08/25  1218   WBC 6.62   HGB 12.8   HCT 40.4        CMP:   Recent Labs   Lab 03/08/25  1218      K 4.6      CO2 29   GLU 63*   BUN 11   CREATININE 1.2   CALCIUM 9.2   PROT 6.7   ALBUMIN 3.9   BILITOT 0.7   ALKPHOS 60   AST 42*   ALT 24    ANIONGAP 6*       Significant Imaging: I have reviewed all pertinent imaging results/findings within the past 24 hours.  Assessment/Plan:     * Hypotension  Maintain iv fluids   Blood and urine cultures      Hypothyroid  Maintain PO thyroxine      Hypoglycemia  Maintain fluids ordered  Regular accuchecks  Hold basal insulin regime but SSI      Stage 3 chronic kidney disease due to type 1 diabetes mellitus  Creatine stable for now. BMP reviewed- noted Estimated Creatinine Clearance: 50.4 mL/min (based on SCr of 1.2 mg/dL). according to latest data. Based on current GFR, CKD stage is stage 3 - GFR 30-59.  Monitor UOP and serial BMP and adjust therapy as needed. Renally dose meds. Avoid nephrotoxic medications and procedures.    Overweight (BMI 25.0-29.9)  Need therapeutic life style measures      Controlled type 1 diabetes mellitus with diabetic polyneuropathy, with long-term current use of insulin  Aware   Latest Reference Range & Units 11/06/23 08:03 03/13/24 08:40 07/24/24 08:11 12/09/24 08:16   Hemoglobin A1C External 4.5 - 6.2 % 5.9 6.0 (C) 6.2 6.1   (C): Corrected        VTE Risk Mitigation (From admission, onward)           Ordered     Reason for no Mechanical VTE Prophylaxis  Once        Comments: Allergic to heparin   Question:  Reasons:  Answer:  Physician Provided (leave comment)    03/08/25 1838     IP VTE HIGH RISK PATIENT  Once         03/08/25 1838     Place sequential compression device  Until discontinued         03/08/25 1838                       On 03/08/2025, patient should be placed in hospital observation services under my care.             Ag Raman MD  Department of Hospital Medicine  [facility]

## 2025-03-09 NOTE — ASSESSMENT & PLAN NOTE
Most likely secondary to gastroparesis versus constipation.   Clear liquid diet.   Laxatives, give suppository.   Empiric IV Reglan

## 2025-03-09 NOTE — HPI
70 year old pt getting admitted with hypotension/hypoglycemia  Pt started having N&V episodes today AM   Symptoms persisted and she came to ER and got admitted  Pt denied fever/sick contacts  She said she has Type 1 DM not Type 2 DM  Pt didn't took basal insulin today because of incessant N&V   CT scan ordered by ER team still pending

## 2025-03-09 NOTE — ASSESSMENT & PLAN NOTE
Creatine stable for now. BMP reviewed- noted Estimated Creatinine Clearance: 60.4 mL/min (based on SCr of 1 mg/dL). according to latest data. Based on current GFR, CKD stage is stage 3 - GFR 30-59.  Monitor UOP and serial BMP and adjust therapy as needed. Renally dose meds. Avoid nephrotoxic medications and procedures.

## 2025-03-09 NOTE — CARE UPDATE
Per CT     1. Moderate stool in the right colon may reflect developing constipation, no high-grade obstruction.   2. Retained material within the gastric lumen could reflect sequela of delayed gastric emptying or outlet obstruction.  Correlation is needed.   3. Low attenuating lesions within the spleen, nonspecific.  Nonemergent ultrasound could be performed for further evaluation as warranted.   4. Please see above for several additional findings.       Suspected GOO  Presently symptom free and if continues to have Vomiting episodes NG tube is warranted  GI MD consulted   NPO  Iv PPI BID

## 2025-03-09 NOTE — PLAN OF CARE
Critical access hospital  Initial Discharge Assessment     Assessment completed at bedside with Pt , and all information on FaceSheet confirmed, including demographics, PCP, pharmacy and insurance.  Pt has not addressed advance directives. She currently lives Colin Desai [phone].  Her PCP is ,Guido Chow MD   and preferred pharmacy is [facility].   She denies any HH/HD/Blood Thinners.  For transportation to appointments, she usually has her  transport her to and from appointments .  For transportation at discharge, will provide.  Plan for discharge is to return back home. Case Management to continue to follow for discharge planning needs.      Primary Care Provider: Guido Chow MD    Admission Diagnosis: Hypotension [I95.9]    Admission Date: 3/8/2025  Expected Discharge Date:     Transition of Care Barriers: None    Payor: MEDICARE / Plan: MEDICARE PART A & B / Product Type: Government /     Extended Emergency Contact Information  Primary Emergency Contact: Colin Desai  Address: [address] [place] [address]           [place] [address] [facility]  Home Phone: [phone]  Mobile Phone: [phone]  Relation: Spouse  Preferred language: English   needed? No    Discharge Plan A: Home with family  Discharge Plan B: Home with family      [facility] [place] - [address]  [address] [place] [address]  Phone: [phone] Fax: [phone]      Initial Assessment (most recent)       Adult Discharge Assessment - 03/09/25 0845          Discharge Assessment    Assessment Type Discharge Planning Assessment     Confirmed/corrected address, phone number and insurance Yes     Confirmed Demographics Correct on Facesheet     Source of Information patient     Does patient/caregiver understand observation status Yes     Reason For Admission Hypotension     People in Home spouse     Facility Arrived From: home     Do you expect to return to your  current living situation? Yes     Do you have help at home or someone to help you manage your care at home? Yes     Who are your caregiver(s) and their phone number(s)? Colin Desai (Spouse)  [phone] (Mobile)     Prior to hospitilization cognitive status: Alert/Oriented     Current cognitive status: Alert/Oriented     Walking or Climbing Stairs Difficulty no     Dressing/Bathing Difficulty no     Home Accessibility wheelchair accessible     Home Layout Able to live on 1st floor     Equipment Currently Used at Home none     Readmission within 30 days? No     Patient currently being followed by outpatient case management? No     Do you currently have service(s) that help you manage your care at home? No     Do you take prescription medications? Yes     Do you have prescription coverage? Yes     Coverage MEDICARE - MEDICARE PART A & B -     Who is going to help you get home at discharge? Colin Desai (Spouse)  [phone] (Mobile)     How do you get to doctors appointments? family or friend will provide;car, drives self     Are you on dialysis? No     Do you take coumadin? No     Discharge Plan A Home with family     Discharge Plan B Home with family     DME Needed Upon Discharge  none     Discharge Plan discussed with: Patient     Transition of Care Barriers None

## 2025-03-09 NOTE — SUBJECTIVE & OBJECTIVE
Interval History:   Seen and examined multidisciplinary rounds, complaining of nausea vomiting epigastric pain.  Patient apparently constipated for while, no bowel movement for 2 or 3 days.  Never officially diagnosed gastroparesis.  CT reviewed by me, apparently constipated.     Review of Systems   Gastrointestinal:  Positive for abdominal pain, constipation, nausea and vomiting.     Objective:     Vital Signs (Most Recent):  Temp: 98.4 °F (36.9 °C) (03/09/25 0737)  Pulse: 104 (03/09/25 0737)  Resp: 20 (03/09/25 0736)  BP: 122/63 (03/09/25 0737)  SpO2: 95 % (03/09/25 0737) Vital Signs (24h Range):  Temp:  [98.2 °F (36.8 °C)-99 °F (37.2 °C)] 98.4 °F (36.9 °C)  Pulse:  [] 104  Resp:  [16-20] 20  SpO2:  [94 %-98 %] 95 %  BP: ()/(46-72) 122/63     Weight: 88.8 kg (195 lb 11.2 oz)  Body mass index is 31.59 kg/m².    Intake/Output Summary (Last 24 hours) at 3/9/2025 1016  Last data filed at 3/9/2025 0928  Gross per 24 hour   Intake 1499 ml   Output 200 ml   Net 1299 ml         Physical Exam  Constitutional:       Appearance: Normal appearance.   HENT:      Head: Normocephalic and atraumatic.      Nose: Nose normal.   Eyes:      Extraocular Movements: Extraocular movements intact.      Pupils: Pupils are equal, round, and reactive to light.   Cardiovascular:      Rate and Rhythm: Normal rate and regular rhythm.      Heart sounds: No murmur heard.  Pulmonary:      Effort: Pulmonary effort is normal.      Breath sounds: Normal breath sounds.   Abdominal:      General: Abdomen is flat.      Palpations: Abdomen is soft.      Tenderness: There is abdominal tenderness.   Musculoskeletal:         General: Normal range of motion.      Cervical back: Normal range of motion and neck supple.   Skin:     General: Skin is warm and dry.   Neurological:      General: No focal deficit present.      Mental Status: She is alert and oriented to person, place, and time.   Psychiatric:         Mood and Affect: Mood normal.                Significant Labs: All pertinent labs within the past 24 hours have been reviewed.  CBC:   Recent Labs   Lab 03/08/25  1218 03/09/25  0419   WBC 6.62 5.93   HGB 12.8 11.9*   HCT 40.4 36.4*    146*     CMP:   Recent Labs   Lab 03/08/25  1218 03/09/25  0418    136   K 4.6 4.4    104   CO2 29 25   GLU 63* 327*   BUN 11 12   CREATININE 1.2 1.0   CALCIUM 9.2 8.7   PROT 6.7 5.9*   ALBUMIN 3.9 3.5   BILITOT 0.7 0.6   ALKPHOS 60 56   AST 42* 26   ALT 24 19   ANIONGAP 6* 7*       Significant Imaging: I have reviewed all pertinent imaging results/findings within the past 24 hours.  I have reviewed and interpreted all pertinent imaging results/findings within the past 24 hours.    Scheduled Meds:   glycerin adult  1 suppository Rectal Once    lactulose  20 g Oral TID    levothyroxine  88 mcg Oral Daily    pantoprazole  40 mg Intravenous Q12H    polyethylene glycol  17 g Oral Daily     Continuous Infusions:   dextrose 5% lactated ringers   Intravenous Continuous 125 mL/hr at 03/08/25 2006 New Bag at 03/08/25 2006     PRN Meds:.  Current Facility-Administered Medications:     acetaminophen, 650 mg, Oral, Q8H PRN    acetaminophen, 650 mg, Oral, Q4H PRN    aluminum-magnesium hydroxide-simethicone, 30 mL, Oral, QID PRN    butalbital-acetaminophen-caffeine -40 mg, 1 tablet, Oral, Q4H PRN    dextrose 50%, 12.5 g, Intravenous, PRN    dextrose 50%, 25 g, Intravenous, PRN    glucagon (human recombinant), 1 mg, Intramuscular, PRN    glucose, 16 g, Oral, PRN    glucose, 24 g, Oral, PRN    HYDROcodone-acetaminophen, 1 tablet, Oral, Q6H PRN    insulin aspart U-100, 0-10 Units, Subcutaneous, QID (AC + HS) PRN    magnesium oxide, 800 mg, Oral, PRN    magnesium oxide, 800 mg, Oral, PRN    melatonin, 6 mg, Oral, Nightly PRN    naloxone, 0.02 mg, Intravenous, PRN    ondansetron, 4 mg, Intravenous, Q6H PRN    potassium bicarbonate, 35 mEq, Oral, PRN    potassium bicarbonate, 50 mEq, Oral, PRN    potassium  bicarbonate, 60 mEq, Oral, PRN    potassium, sodium phosphates, 2 packet, Oral, PRN    potassium, sodium phosphates, 2 packet, Oral, PRN    potassium, sodium phosphates, 2 packet, Oral, PRN    promethazine (PHENERGAN) 6.25 mg in 0.9% NaCl 50 mL IVPB, 6.25 mg, Intravenous, Q6H PRN    CT Abdomen Pelvis With IV Contrast NO Oral Contrast  Result Date: 3/8/2025  EXAMINATION: CT ABDOMEN PELVIS WITH IV CONTRAST CLINICAL HISTORY: Abdominal pain, acute, nonlocalized; TECHNIQUE: Low dose axial images, sagittal and coronal reformations were obtained from the lung bases to the pubic symphysis following the IV administration of 100 mL of Omnipaque 350 .  Oral contrast was not given. COMPARISON: CT 12/18/2017 FINDINGS: Images of the lower thorax are remarkable for bilateral dependent atelectasis. There is a low attenuating lesion within the inferior aspect of the right hepatic lobe measuring 3 cm, attenuation of which suggests cyst.  There are punctate low attenuating lesions within the hepatic parenchyma, too small for characterization.  There are a few scattered subcentimeter low attenuating lesions within the spleen, too small for characterization.  There is a 1.2 cm low attenuating lesion within the inferior aspect of the spleen, attenuation is higher than would be expected for a simple cyst.  There is atrophic change of the pancreas without pancreatic ductal dilation.  The adrenal glands are unremarkable.  The gallbladder is decompressed without wall thickening.  The stomach is distended with ingested content without gastric wall thickening.  The portal vein, splenic vein, SMV, celiac axis and SMA all are patent.  No significant abdominal lymphadenopathy. The kidneys enhance symmetrically without hydronephrosis or nephrolithiasis.  There is atrophic change involving the interpolar region of the left kidney.  The bilateral ureters are unremarkable without calculi seen.  The urinary bladder is distended without wall  thickening.  The uterus and adnexa are grossly unremarkable.  No significant free fluid in the pelvis. The large bowel is grossly unremarkable noting a few scattered colonic diverticula.  There is moderate stool in the right colon.  The terminal ileum and appendix are unremarkable.  The small bowel is grossly unremarkable.  There is atherosclerotic calcification of the aorta and its branches.  There are a few scattered shotty periaortic, pericaval, and mesenteric lymph nodes.  No focal organized pelvic fluid collection. There is osteopenia.  There are degenerative changes of the spine.  There is superior endplate height loss involving T12.  No significant inguinal lymphadenopathy.     1. Moderate stool in the right colon may reflect developing constipation, no high-grade obstruction. 2. Retained material within the gastric lumen could reflect sequela of delayed gastric emptying or outlet obstruction.  Correlation is needed. 3. Low attenuating lesions within the spleen, nonspecific.  Nonemergent ultrasound could be performed for further evaluation as warranted. 4. Please see above for several additional findings. Electronically signed by: Jack Miller MD Date:    03/08/2025 Time:    20:31  - pulls last radiology orders

## 2025-03-09 NOTE — PLAN OF CARE
Inpatient Upgrade Note     Kateryna Desai has warranted treatment spanning two or more midnights of hospital level care for the management of  Nausea and vomiting . She continues to require daily labs, monitoring of vital signs, and IV PPI bid . Her condition is also complicated by the following comorbidities: Hypertension, Diabetes, Chronic kidney disease, and hypothyroidism .

## 2025-03-09 NOTE — PROGRESS NOTES
[facility] Medicine  Progress Note    Patient Name: Kateryna Desai  MRN: [id number]  Patient Class: OP- Observation   Admission Date: 3/8/2025  Length of Stay: 0 days  Attending Physician: Antonio Chua MD  Primary Care Provider: Guido Chow MD        Subjective     Principal Problem:Nausea & vomiting        HPI:  70 year old pt getting admitted with hypotension/hypoglycemia  Pt started having N&V episodes today AM   Symptoms persisted and she came to ER and got admitted  Pt denied fever/sick contacts  She said she has Type 1 DM not Type 2 DM  Pt didn't took basal insulin today because of incessant N&V   CT scan ordered by ER team still pending     Overview/Hospital Course:  No notes on file    Interval History:   Seen and examined multidisciplinary rounds, complaining of nausea vomiting epigastric pain.  Patient apparently constipated for while, no bowel movement for 2 or 3 days.  Never officially diagnosed gastroparesis.  CT reviewed by me, apparently constipated.     Review of Systems   Gastrointestinal:  Positive for abdominal pain, constipation, nausea and vomiting.     Objective:     Vital Signs (Most Recent):  Temp: 98.4 °F (36.9 °C) (03/09/25 0737)  Pulse: 104 (03/09/25 0737)  Resp: 20 (03/09/25 0736)  BP: 122/63 (03/09/25 0737)  SpO2: 95 % (03/09/25 0737) Vital Signs (24h Range):  Temp:  [98.2 °F (36.8 °C)-99 °F (37.2 °C)] 98.4 °F (36.9 °C)  Pulse:  [] 104  Resp:  [16-20] 20  SpO2:  [94 %-98 %] 95 %  BP: ()/(46-72) 122/63     Weight: 88.8 kg (195 lb 11.2 oz)  Body mass index is 31.59 kg/m².    Intake/Output Summary (Last 24 hours) at 3/9/2025 1016  Last data filed at 3/9/2025 0928  Gross per 24 hour   Intake 1499 ml   Output 200 ml   Net 1299 ml         Physical Exam  Constitutional:       Appearance: Normal appearance.   HENT:      Head: Normocephalic and atraumatic.      Nose: Nose normal.   Eyes:      Extraocular Movements: Extraocular movements  intact.      Pupils: Pupils are equal, round, and reactive to light.   Cardiovascular:      Rate and Rhythm: Normal rate and regular rhythm.      Heart sounds: No murmur heard.  Pulmonary:      Effort: Pulmonary effort is normal.      Breath sounds: Normal breath sounds.   Abdominal:      General: Abdomen is flat.      Palpations: Abdomen is soft.      Tenderness: There is abdominal tenderness.   Musculoskeletal:         General: Normal range of motion.      Cervical back: Normal range of motion and neck supple.   Skin:     General: Skin is warm and dry.   Neurological:      General: No focal deficit present.      Mental Status: She is alert and oriented to person, place, and time.   Psychiatric:         Mood and Affect: Mood normal.               Significant Labs: All pertinent labs within the past 24 hours have been reviewed.  CBC:   Recent Labs   Lab 03/08/25  1218 03/09/25  0419   WBC 6.62 5.93   HGB 12.8 11.9*   HCT 40.4 36.4*    146*     CMP:   Recent Labs   Lab 03/08/25  1218 03/09/25  0418    136   K 4.6 4.4    104   CO2 29 25   GLU 63* 327*   BUN 11 12   CREATININE 1.2 1.0   CALCIUM 9.2 8.7   PROT 6.7 5.9*   ALBUMIN 3.9 3.5   BILITOT 0.7 0.6   ALKPHOS 60 56   AST 42* 26   ALT 24 19   ANIONGAP 6* 7*       Significant Imaging: I have reviewed all pertinent imaging results/findings within the past 24 hours.  I have reviewed and interpreted all pertinent imaging results/findings within the past 24 hours.    Scheduled Meds:   glycerin adult  1 suppository Rectal Once    lactulose  20 g Oral TID    levothyroxine  88 mcg Oral Daily    pantoprazole  40 mg Intravenous Q12H    polyethylene glycol  17 g Oral Daily     Continuous Infusions:   dextrose 5% lactated ringers   Intravenous Continuous 125 mL/hr at 03/08/25 2006 New Bag at 03/08/25 2006     PRN Meds:.  Current Facility-Administered Medications:     acetaminophen, 650 mg, Oral, Q8H PRN    acetaminophen, 650 mg, Oral, Q4H PRN     aluminum-magnesium hydroxide-simethicone, 30 mL, Oral, QID PRN    butalbital-acetaminophen-caffeine -40 mg, 1 tablet, Oral, Q4H PRN    dextrose 50%, 12.5 g, Intravenous, PRN    dextrose 50%, 25 g, Intravenous, PRN    glucagon (human recombinant), 1 mg, Intramuscular, PRN    glucose, 16 g, Oral, PRN    glucose, 24 g, Oral, PRN    HYDROcodone-acetaminophen, 1 tablet, Oral, Q6H PRN    insulin aspart U-100, 0-10 Units, Subcutaneous, QID (AC + HS) PRN    magnesium oxide, 800 mg, Oral, PRN    magnesium oxide, 800 mg, Oral, PRN    melatonin, 6 mg, Oral, Nightly PRN    naloxone, 0.02 mg, Intravenous, PRN    ondansetron, 4 mg, Intravenous, Q6H PRN    potassium bicarbonate, 35 mEq, Oral, PRN    potassium bicarbonate, 50 mEq, Oral, PRN    potassium bicarbonate, 60 mEq, Oral, PRN    potassium, sodium phosphates, 2 packet, Oral, PRN    potassium, sodium phosphates, 2 packet, Oral, PRN    potassium, sodium phosphates, 2 packet, Oral, PRN    promethazine (PHENERGAN) 6.25 mg in 0.9% NaCl 50 mL IVPB, 6.25 mg, Intravenous, Q6H PRN    CT Abdomen Pelvis With IV Contrast NO Oral Contrast  Result Date: 3/8/2025  EXAMINATION: CT ABDOMEN PELVIS WITH IV CONTRAST CLINICAL HISTORY: Abdominal pain, acute, nonlocalized; TECHNIQUE: Low dose axial images, sagittal and coronal reformations were obtained from the lung bases to the pubic symphysis following the IV administration of 100 mL of Omnipaque 350 .  Oral contrast was not given. COMPARISON: CT 12/18/2017 FINDINGS: Images of the lower thorax are remarkable for bilateral dependent atelectasis. There is a low attenuating lesion within the inferior aspect of the right hepatic lobe measuring 3 cm, attenuation of which suggests cyst.  There are punctate low attenuating lesions within the hepatic parenchyma, too small for characterization.  There are a few scattered subcentimeter low attenuating lesions within the spleen, too small for characterization.  There is a 1.2 cm low attenuating  lesion within the inferior aspect of the spleen, attenuation is higher than would be expected for a simple cyst.  There is atrophic change of the pancreas without pancreatic ductal dilation.  The adrenal glands are unremarkable.  The gallbladder is decompressed without wall thickening.  The stomach is distended with ingested content without gastric wall thickening.  The portal vein, splenic vein, SMV, celiac axis and SMA all are patent.  No significant abdominal lymphadenopathy. The kidneys enhance symmetrically without hydronephrosis or nephrolithiasis.  There is atrophic change involving the interpolar region of the left kidney.  The bilateral ureters are unremarkable without calculi seen.  The urinary bladder is distended without wall thickening.  The uterus and adnexa are grossly unremarkable.  No significant free fluid in the pelvis. The large bowel is grossly unremarkable noting a few scattered colonic diverticula.  There is moderate stool in the right colon.  The terminal ileum and appendix are unremarkable.  The small bowel is grossly unremarkable.  There is atherosclerotic calcification of the aorta and its branches.  There are a few scattered shotty periaortic, pericaval, and mesenteric lymph nodes.  No focal organized pelvic fluid collection. There is osteopenia.  There are degenerative changes of the spine.  There is superior endplate height loss involving T12.  No significant inguinal lymphadenopathy.     1. Moderate stool in the right colon may reflect developing constipation, no high-grade obstruction. 2. Retained material within the gastric lumen could reflect sequela of delayed gastric emptying or outlet obstruction.  Correlation is needed. 3. Low attenuating lesions within the spleen, nonspecific.  Nonemergent ultrasound could be performed for further evaluation as warranted. 4. Please see above for several additional findings. Electronically signed by: Jack Miller MD Date:    03/08/2025  Time:    20:31  - pulls last radiology orders        Assessment & Plan  Nausea & vomiting    Most likely secondary to gastroparesis versus constipation.   Clear liquid diet.   Laxatives, give suppository.   Empiric IV Reglan     Controlled type 1 diabetes mellitus with diabetic polyneuropathy, with long-term current use of insulin  Aware   Latest Reference Range & Units 11/06/23 08:03 03/13/24 08:40 07/24/24 08:11 12/09/24 08:16   Hemoglobin A1C External 4.5 - 6.2 % 5.9 6.0 (C) 6.2 6.1   (C): Corrected    Overweight (BMI 25.0-29.9)  Need therapeutic life style measures    Stage 3 chronic kidney disease due to type 1 diabetes mellitus  Creatine stable for now. BMP reviewed- noted Estimated Creatinine Clearance: 60.4 mL/min (based on SCr of 1 mg/dL). according to latest data. Based on current GFR, CKD stage is stage 3 - GFR 30-59.  Monitor UOP and serial BMP and adjust therapy as needed. Renally dose meds. Avoid nephrotoxic medications and procedures.  Hypotension  Resolved  Hypoglycemia  Patient's FSGs are controlled on current medication regimen.  Last A1c reviewed-   Lab Results   Component Value Date    HGBA1C 6.0 03/09/2025     Most recent fingerstick glucose reviewed-   Recent Labs   Lab 03/08/25  1055 03/09/25  0001 03/09/25  0433 03/09/25  0745   POCTGLUCOSE 75 202* 294* 307*     Current correctional scale  Medium  Maintain anti-hyperglycemic dose as follows-   Antihyperglycemics (From admission, onward)      Start     Stop Route Frequency Ordered    03/08/25 1937  insulin aspart U-100 pen 0-10 Units         -- SubQ Before meals & nightly PRN 03/08/25 1838          Hold Oral hypoglycemics while patient is in the hospital.      Discontinue IV fluids.  Patient apparently developed hypoglycemia secondary to nausea vomiting and still on high-dose insulin at home.   Hypothyroid  Maintain PO thyroxine    VTE Risk Mitigation (From admission, onward)           Ordered     Reason for no Mechanical VTE Prophylaxis  Once         Comments: Allergic to heparin   Question:  Reasons:  Answer:  Physician Provided (leave comment)    03/08/25 1838     IP VTE HIGH RISK PATIENT  Once         03/08/25 1838     Place sequential compression device  Until discontinued         03/08/25 1838                    Discharge Planning   JUDIE: 3/11/2025     Code Status: Full Code   Medical Readiness for Discharge Date:   Discharge Plan A: Home with family                        Antonio Chua MD  Department of Hospital Medicine   [facility]

## 2025-03-09 NOTE — ED PROVIDER NOTES
Encounter Date: 3/8/2025       History     Chief Complaint   Patient presents with    Vomiting     This am after taking her morning insulin. Sugar was 249 and now around 100    Nausea     HPI    Seen and evaluated.  Presents with a chief complaint of nausea.  She states that she also had some associated emesis.  She took her insulin this morning, and noted that she was unable to then tolerate oral intake.  She had mild abdominal discomfort.  She noted mostly dyspepsia and burning esophageal discomfort.  She had vomiting associated with this.    Review of patient's allergies indicates:   Allergen Reactions    Sulfamethoxazole-trimethoprim Rash and Hives     Patient experienced on 12/3/14 redness to face and rash to chest and arms/ with no SOB or airway obstruction    Zinc-25 Hives and Swelling     Aching pains in the knees     Heparin analogues     Iodinated contrast media Swelling and Rash     Says topical iodine OK     Past Medical History:   Diagnosis Date    Allergy     Arthritis     degnerative disease low back,muscle spasms, shoulders    Asian flu type A 12/22/2017    Bunionette 07/26/2012    Diabetes mellitus type I     since age 13    Diabetic gastroparesis     takes Cytotec    Diabetic retinopathy of both eyes     gets periodic laser treatments    Difficult intubation     as a child had sore throat after a procedure    Encounter for blood transfusion     GERD (gastroesophageal reflux disease)     Hallux abducto valgus 03/31/2014    Headache(784.0)     Hiatal hernia     with GERD    Hyperlipidemia     Hypertension     Hypertensive retinopathy of both eyes 06/25/2024    Infection of the upper respiratory tract 06/2012    Lumbar spinal stenosis     Osteopenia     Proliferative diabetic retinopathy of both eyes 06/25/2024    Rosacea     Seizures     Pt states during pgy with stroke    Stroke 1985?    while pregnant    Tachycardia     asymptomatic with Toprol    Thyroid disease     hypothyroidism    Venous  insufficiency of leg     improved since EVLT     Past Surgical History:   Procedure Laterality Date    BUNIONECTOMY Right 2012    CARDIAC CATHETERIZATION      no stents     SECTION      COLONOSCOPY  2007    Dr. Rose, 10 year recheck    CORRECTION OF HAMMER TOE Right 8/3/2023    Procedure: CORRECTION, HAMMER TOE Right second toe;  Surgeon: Lei Rhoades DPM;  Location: [facility];  Service: Podiatry;  Laterality: Right;  right 2nd toe    EXOSTECTOMY  12    left foot, local anesthesia, in office    EYE SURGERY      has had many laser procedures for diabetic retinopathy    VASCULAR SURGERY      VEIN SURGERY  2012    EVLT right greater saphenous, IV sedation     Family History   Problem Relation Name Age of Onset    Cancer Maternal Aunt          breast    Breast cancer Maternal Aunt      Diabetes Maternal Grandmother      Breast cancer Maternal Grandmother  38    Cancer Maternal Grandfather          prostate    Diabetes Maternal Grandfather      Diabetes Cousin      Arthritis Mother      Cancer Mother      Melanoma Mother      Heart disease Father      Stroke Father      No Known Problems Sister      No Known Problems Brother      No Known Problems Daughter 1     Alzheimer's disease Maternal Uncle      Alcohol abuse Maternal Uncle          x2    Heart disease Maternal Uncle      No Known Problems Paternal Aunt      Heart disease Paternal Uncle       Social History[1]  Review of Systems   Constitutional:  Negative for fever.   HENT:  Negative for sore throat.    Respiratory:  Negative for shortness of breath.    Cardiovascular:  Negative for chest pain.   Gastrointestinal:  Negative for nausea.   Genitourinary:  Negative for dysuria.   Musculoskeletal:  Negative for back pain.   Skin:  Negative for rash.   Neurological:  Negative for weakness.   Hematological:  Does not bruise/bleed easily.       Physical Exam     Initial Vitals [25 1047]   BP Pulse Resp Temp SpO2   102/66 97 18 99 °F  (37.2 °C) 97 %      MAP       --         Physical Exam    Nursing note and vitals reviewed.  Constitutional: She appears well-developed and well-nourished.   HENT:   Head: Normocephalic and atraumatic.   Eyes: Conjunctivae are normal.   Cardiovascular:  Normal rate and regular rhythm.           Abdominal: Abdomen is soft.   Musculoskeletal:         General: Normal range of motion.     Neurological: She is alert and oriented to person, place, and time.   Skin: Skin is warm and dry.   Psychiatric: She has a normal mood and affect. Her speech is normal.         ED Course   Procedures  Labs Reviewed   CBC W/ AUTO DIFFERENTIAL - Abnormal       Result Value    WBC 6.62      RBC 4.23      Hemoglobin 12.8      Hematocrit 40.4      MCV 96      MCH 30.3      MCHC 31.7 (*)     RDW 14.6 (*)     Platelets 175      MPV 11.8      Immature Granulocytes 0.2      Gran # (ANC) 5.4      Immature Grans (Abs) 0.01      Lymph # 0.8 (*)     Mono # 0.4      Eos # 0.0      Baso # 0.03      nRBC 0      Gran % 81.0 (*)     Lymph % 12.7 (*)     Mono % 5.3      Eosinophil % 0.3      Basophil % 0.5      Differential Method Automated     COMPREHENSIVE METABOLIC PANEL - Abnormal    Sodium 139      Potassium 4.6      Chloride 104      CO2 29      Glucose 63 (*)     BUN 11      Creatinine 1.2      Calcium 9.2      Total Protein 6.7      Albumin 3.9      Total Bilirubin 0.7      Alkaline Phosphatase 60      AST 42 (*)     ALT 24      eGFR 49.3 (*)     Anion Gap 6 (*)    URINALYSIS, REFLEX TO URINE CULTURE - Abnormal    Specimen UA Urine, Clean Catch      Color, UA Yellow      Appearance, UA Hazy (*)     pH, UA >8.0 (*)     Specific Gravity, UA 1.025      Protein, UA Trace (*)     Glucose, UA Negative      Ketones, UA Trace (*)     Bilirubin (UA) Negative      Occult Blood UA Negative      Nitrite, UA Negative      Urobilinogen, UA 2.0-3.0 (*)     Leukocytes, UA Negative      Narrative:     In and Out Cath as needed it patient unable to void  Specimen  Source->Urine   ISTAT PROCEDURE - Abnormal    POC PH 7.391      POC PCO2 46.5 (*)     POC PO2 29 (*)     POC HCO3 28.2 (*)     POC BE 3 (*)     POC SATURATED O2 55      POC TCO2 30 (*)     Sample VENOUS      Site Other      Allens Test N/A      DelSys Room Air      Mode SPONT      FiO2 0.21     LIPASE    Lipase 9     BETA - HYDROXYBUTYRATE, SERUM    Beta-Hydroxybutyrate 0.3     TROPONIN I HIGH SENSITIVITY   TROPONIN I HIGH SENSITIVITY    Troponin I High Sensitivity 5.6     TROPONIN I HIGH SENSITIVITY    Troponin I High Sensitivity 5.9     POCT GLUCOSE    POCT Glucose 75          ECG Results              EKG 12-lead (Final result)        Collection Time Result Time QRS Duration OHS QTC Calculation    03/08/25 10:42:34 03/08/25 13:08:28 88 482                     Final result by Interface, Lab In [facility] (03/08/25 13:08:37)                   Narrative:    Test Reason : R11.2,    Vent. Rate :  98 BPM     Atrial Rate :  98 BPM     P-R Int : 158 ms          QRS Dur :  88 ms      QT Int : 378 ms       P-R-T Axes :  72  54  31 degrees    QTcB Int : 482 ms    Normal sinus rhythm  Normal ECG  Confirmed by Jessica Vega ([id number]) on 3/8/2025 1:08:27 PM    Referred By: AAAREFERRAL SELF           Confirmed By: Jessica Vega                                  Imaging Results    None          Medications   butalbital-acetaminophen-caffeine -40 mg per tablet 1 tablet (has no administration in time range)   diphenhydrAMINE injection 25 mg (has no administration in time range)   methylPREDNISolone sodium succinate injection 125 mg (has no administration in time range)   lactated ringers infusion (0 mLs Intravenous Stopped 3/8/25 1300)   aluminum-magnesium hydroxide-simethicone 200-200-20 mg/5 mL suspension 30 mL (30 mLs Oral Given 3/8/25 1706)     And   LIDOcaine viscous HCl 2% oral solution 15 mL (15 mLs Oral Given 3/8/25 1706)   ondansetron injection 4 mg (4 mg Intravenous Given 3/8/25 1706)   lactated ringers  "bolus 1,000 mL (0 mLs Intravenous Stopped 3/8/25 1817)     Medical Decision Making  /61   Pulse 87   Temp 99 °F (37.2 °C) (Oral)   Resp 18   Ht 5' 6" (1.676 m)   Wt 86.6 kg (191 lb)   SpO2 96%   BMI 30.83 kg/m²     Seen and evaluated.  Presented with a chief complaint of nausea and vomiting.  Was worried she took her insulin, but is been unable to tolerate oral intake.  Multiple rounds of antiemetics.  Considered DKA.  No significant acidosis.  No significant gap.  Laboratory evaluation overall reassuring.  Intermittent hypotension that improved with IV fluid resuscitation.  Remained nauseated and unable to tolerate oral intake in spite of multiple treatments.  Will consult Hospital Medicine for further evaluation and treatment and consideration for admission    Amount and/or Complexity of Data Reviewed  Labs: ordered.  Radiology: ordered.    Risk  OTC drugs.  Prescription drug management.                                      Clinical Impression:  Final diagnoses:  [R11.0] Nausea          ED Disposition Condition    Observation Stable                    [1]   Social History  Tobacco Use    Smoking status: Never    Smokeless tobacco: Never   Substance Use Topics    Alcohol use: Yes     Comment: rarely; 1 drink/year    Drug use: No        Bernabe Honeycutt Jr., MD  03/08/25 [id number]    "

## 2025-03-10 ENCOUNTER — PATIENT MESSAGE (OUTPATIENT)
Dept: CASE MANAGEMENT | Facility: HOSPITAL | Age: 70
End: 2025-03-10

## 2025-03-10 VITALS
OXYGEN SATURATION: 98 % | HEIGHT: 66 IN | SYSTOLIC BLOOD PRESSURE: 117 MMHG | WEIGHT: 195.69 LBS | BODY MASS INDEX: 31.45 KG/M2 | TEMPERATURE: 98 F | HEART RATE: 95 BPM | DIASTOLIC BLOOD PRESSURE: 71 MMHG | RESPIRATION RATE: 16 BRPM

## 2025-03-10 LAB
ALBUMIN SERPL BCP-MCNC: 3.7 G/DL (ref 3.5–5.2)
ALP SERPL-CCNC: 54 U/L (ref 55–135)
ALT SERPL W/O P-5'-P-CCNC: 29 U/L (ref 10–44)
ANION GAP SERPL CALC-SCNC: 8 MMOL/L (ref 8–16)
AST SERPL-CCNC: 73 U/L (ref 10–40)
BASOPHILS # BLD AUTO: 0.02 K/UL (ref 0–0.2)
BASOPHILS NFR BLD: 0.3 % (ref 0–1.9)
BILIRUB SERPL-MCNC: 0.7 MG/DL (ref 0.1–1)
BUN SERPL-MCNC: 20 MG/DL (ref 8–23)
CALCIUM SERPL-MCNC: 9.1 MG/DL (ref 8.7–10.5)
CHLORIDE SERPL-SCNC: 103 MMOL/L (ref 95–110)
CO2 SERPL-SCNC: 26 MMOL/L (ref 23–29)
CREAT SERPL-MCNC: 1.2 MG/DL (ref 0.5–1.4)
DIFFERENTIAL METHOD BLD: ABNORMAL
EOSINOPHIL # BLD AUTO: 0 K/UL (ref 0–0.5)
EOSINOPHIL NFR BLD: 0.1 % (ref 0–8)
ERYTHROCYTE [DISTWIDTH] IN BLOOD BY AUTOMATED COUNT: 15 % (ref 11.5–14.5)
EST. GFR  (NO RACE VARIABLE): 49.3 ML/MIN/1.73 M^2
GLUCOSE SERPL-MCNC: 231 MG/DL (ref 70–110)
HCT VFR BLD AUTO: 35.5 % (ref 37–48.5)
HGB BLD-MCNC: 11.3 G/DL (ref 12–16)
IMM GRANULOCYTES # BLD AUTO: 0.03 K/UL (ref 0–0.04)
IMM GRANULOCYTES NFR BLD AUTO: 0.4 % (ref 0–0.5)
LYMPHOCYTES # BLD AUTO: 1 K/UL (ref 1–4.8)
LYMPHOCYTES NFR BLD: 13.2 % (ref 18–48)
MAGNESIUM SERPL-MCNC: 2.2 MG/DL (ref 1.6–2.6)
MCH RBC QN AUTO: 30.1 PG (ref 27–31)
MCHC RBC AUTO-ENTMCNC: 31.8 G/DL (ref 32–36)
MCV RBC AUTO: 95 FL (ref 82–98)
MONOCYTES # BLD AUTO: 0.6 K/UL (ref 0.3–1)
MONOCYTES NFR BLD: 8.1 % (ref 4–15)
NEUTROPHILS # BLD AUTO: 6 K/UL (ref 1.8–7.7)
NEUTROPHILS NFR BLD: 77.9 % (ref 38–73)
NRBC BLD-RTO: 0 /100 WBC
PHOSPHATE SERPL-MCNC: 2.9 MG/DL (ref 2.7–4.5)
PLATELET # BLD AUTO: 158 K/UL (ref 150–450)
PMV BLD AUTO: 10.7 FL (ref 9.2–12.9)
POCT GLUCOSE: 145 MG/DL (ref 70–110)
POCT GLUCOSE: 228 MG/DL (ref 70–110)
POCT GLUCOSE: 300 MG/DL (ref 70–110)
POTASSIUM SERPL-SCNC: 4 MMOL/L (ref 3.5–5.1)
PROT SERPL-MCNC: 6 G/DL (ref 6–8.4)
RBC # BLD AUTO: 3.75 M/UL (ref 4–5.4)
SODIUM SERPL-SCNC: 137 MMOL/L (ref 136–145)
WBC # BLD AUTO: 7.75 K/UL (ref 3.9–12.7)

## 2025-03-10 PROCEDURE — 36415 COLL VENOUS BLD VENIPUNCTURE: CPT | Performed by: INTERNAL MEDICINE

## 2025-03-10 PROCEDURE — 83735 ASSAY OF MAGNESIUM: CPT | Performed by: INTERNAL MEDICINE

## 2025-03-10 PROCEDURE — 63600175 PHARM REV CODE 636 W HCPCS: Performed by: INTERNAL MEDICINE

## 2025-03-10 PROCEDURE — 63600175 PHARM REV CODE 636 W HCPCS: Performed by: HOSPITALIST

## 2025-03-10 PROCEDURE — 25000003 PHARM REV CODE 250: Performed by: HOSPITALIST

## 2025-03-10 PROCEDURE — 85025 COMPLETE CBC W/AUTO DIFF WBC: CPT | Performed by: INTERNAL MEDICINE

## 2025-03-10 PROCEDURE — 80053 COMPREHEN METABOLIC PANEL: CPT | Performed by: INTERNAL MEDICINE

## 2025-03-10 PROCEDURE — 25000003 PHARM REV CODE 250: Performed by: INTERNAL MEDICINE

## 2025-03-10 PROCEDURE — 84100 ASSAY OF PHOSPHORUS: CPT | Performed by: HOSPITALIST

## 2025-03-10 RX ORDER — INSULIN GLARGINE 100 [IU]/ML
10 INJECTION, SOLUTION SUBCUTANEOUS NIGHTLY
Status: DISCONTINUED | OUTPATIENT
Start: 2025-03-10 | End: 2025-03-10 | Stop reason: HOSPADM

## 2025-03-10 RX ORDER — DIPHENHYDRAMINE HYDROCHLORIDE 50 MG/ML
25 INJECTION, SOLUTION INTRAMUSCULAR; INTRAVENOUS ONCE
Status: COMPLETED | OUTPATIENT
Start: 2025-03-10 | End: 2025-03-10

## 2025-03-10 RX ADMIN — INSULIN ASPART 6 UNITS: 100 INJECTION, SOLUTION INTRAVENOUS; SUBCUTANEOUS at 08:03

## 2025-03-10 RX ADMIN — DIPHENHYDRAMINE HYDROCHLORIDE 25 MG: 50 INJECTION INTRAMUSCULAR; INTRAVENOUS at 02:03

## 2025-03-10 RX ADMIN — PANTOPRAZOLE SODIUM 40 MG: 40 INJECTION, POWDER, FOR SOLUTION INTRAVENOUS at 08:03

## 2025-03-10 RX ADMIN — METOCLOPRAMIDE HYDROCHLORIDE 5 MG: 5 INJECTION INTRAMUSCULAR; INTRAVENOUS at 05:03

## 2025-03-10 RX ADMIN — LEVOTHYROXINE SODIUM 88 MCG: 0.09 TABLET ORAL at 05:03

## 2025-03-10 NOTE — PLAN OF CARE
Patient cleared for discharge from case management standpoint.    Follow up appointment scheduled with Discharge Clinic and added to AVS.      03/10/25 1035   Final Note   Assessment Type Final Discharge Note   Anticipated Discharge Disposition Home   Post-Acute Status   Discharge Delays None known at this time

## 2025-03-10 NOTE — HOSPITAL COURSE
Patient is admitted for further management, CT reviewed, patient apparently severe constipated and has large amount food in the gastric.  most likely secondary to gastroparesis versus constipation.  Patient was placed on empiric IV Reglan, lactulose/ MiraLax.  Patient has a multiple bowel movements, nausea vomiting resolved.  Tolerated diet well.  Cleared for discharge.  Patient does have MiraLax at home.

## 2025-03-10 NOTE — ASSESSMENT & PLAN NOTE
Patient's FSGs are controlled on current medication regimen.  Last A1c reviewed-   Lab Results   Component Value Date    HGBA1C 6.0 03/09/2025     Most recent fingerstick glucose reviewed-   Recent Labs   Lab 03/09/25  2017 03/10/25  0000 03/10/25  0408 03/10/25  0716   POCTGLUCOSE 253* 145* 228* 300*     Current correctional scale  Medium  Maintain anti-hyperglycemic dose as follows-   Antihyperglycemics (From admission, onward)      Start     Stop Route Frequency Ordered    03/10/25 2100  insulin glargine U-100 (Lantus) pen 10 Units         -- SubQ Nightly 03/10/25 0745    03/08/25 1937  insulin aspart U-100 pen 0-10 Units         -- SubQ Before meals & nightly PRN 03/08/25 1838          Hold Oral hypoglycemics while patient is in the hospital.      Discontinue IV fluids.  Patient apparently developed hypoglycemia secondary to nausea vomiting and still on high-dose insulin at home.

## 2025-03-10 NOTE — NURSING
3.10.25 0802:pt aao. Respirations even and unlabored. No c/o sob nor pain noted. Bed low locked. Fall precautions in place. Nad was noted further.   3.10.25 1116:pt tolerated food.   3.10.25 1135:pt discharged to go home via wheelchair. Discharge instructions and discharge paperwork was provided. Belongings with pt. Nad was noted.

## 2025-03-10 NOTE — DISCHARGE SUMMARY
[facility]  Discharge Summary      Patient Name: Kateryna Desai  MRN: [id number]  TERI: [id number]  Patient Class: IP- Inpatient  Admission Date: 3/8/2025  Hospital Length of Stay: 1 days  Discharge Date and Time: 3/10/2025 11:20 AM  Attending Physician: No att. providers found   Discharging Provider: Antonio Chua MD  Primary Care Provider: Guido Chow MD    Primary Care Team: Networked reference to record PCT     HPI:   70 year old pt getting admitted with hypotension/hypoglycemia  Pt started having N&V episodes today AM   Symptoms persisted and she came to ER and got admitted  Pt denied fever/sick contacts  She said she has Type 1 DM not Type 2 DM  Pt didn't took basal insulin today because of incessant N&V   CT scan ordered by ER team still pending     * No surgery found *      Hospital Course:   Patient is admitted for further management, CT reviewed, patient apparently severe constipated and has large amount food in the gastric.  most likely secondary to gastroparesis versus constipation.  Patient was placed on empiric IV Reglan, lactulose/ MiraLax.  Patient has a multiple bowel movements, nausea vomiting resolved.  Tolerated diet well.  Cleared for discharge.  Patient does have MiraLax at home.      Goals of Care Treatment Preferences:  Code Status: Full Code      SDOH Screening:  The patient was screened for utility difficulties, food insecurity, transport difficulties, housing insecurity, and interpersonal safety and there were no concerns identified this admission.     Consults:     Assessment & Plan  Nausea & vomiting    Most likely secondary to gastroparesis versus constipation.   Clear liquid diet.   Laxatives, give suppository.   Empiric IV Reglan     Controlled type 1 diabetes mellitus with diabetic polyneuropathy, with long-term current use of insulin  Aware   Latest Reference Range & Units 11/06/23 08:03 03/13/24 08:40 07/24/24 08:11 12/09/24  08:16   Hemoglobin A1C External 4.5 - 6.2 % 5.9 6.0 (C) 6.2 6.1   (C): Corrected    Overweight (BMI 25.0-29.9)  Need therapeutic life style measures    Stage 3 chronic kidney disease due to type 1 diabetes mellitus  Creatine stable for now. BMP reviewed- noted Estimated Creatinine Clearance: 50.4 mL/min (based on SCr of 1.2 mg/dL). according to latest data. Based on current GFR, CKD stage is stage 3 - GFR 30-59.  Monitor UOP and serial BMP and adjust therapy as needed. Renally dose meds. Avoid nephrotoxic medications and procedures.  Hypotension  Resolved  Hypoglycemia  Patient's FSGs are controlled on current medication regimen.  Last A1c reviewed-   Lab Results   Component Value Date    HGBA1C 6.0 03/09/2025     Most recent fingerstick glucose reviewed-   Recent Labs   Lab 03/09/25  2017 03/10/25  0000 03/10/25  0408 03/10/25  0716   POCTGLUCOSE 253* 145* 228* 300*     Current correctional scale  Medium  Maintain anti-hyperglycemic dose as follows-   Antihyperglycemics (From admission, onward)      Start     Stop Route Frequency Ordered    03/10/25 2100  insulin glargine U-100 (Lantus) pen 10 Units         -- SubQ Nightly 03/10/25 0745    03/08/25 1937  insulin aspart U-100 pen 0-10 Units         -- SubQ Before meals & nightly PRN 03/08/25 1838          Hold Oral hypoglycemics while patient is in the hospital.      Discontinue IV fluids.  Patient apparently developed hypoglycemia secondary to nausea vomiting and still on high-dose insulin at home.   Hypothyroid  Maintain PO thyroxine    Final Active Diagnoses:    Diagnosis Date Noted POA    PRINCIPAL PROBLEM:  Nausea & vomiting [R11.2] 01/03/2014 Yes    Hypotension [I95.9] 03/08/2025 Yes    Hypoglycemia [E16.2] 03/08/2025 Yes    Hypothyroid [E03.9] 03/08/2025 Yes    Overweight (BMI 25.0-29.9) [E66.3] 05/07/2019 Yes    Stage 3 chronic kidney disease due to type 1 diabetes mellitus [E10.22, N18.30] 05/07/2019 Yes    Controlled type 1 diabetes mellitus with diabetic  polyneuropathy, with long-term current use of insulin [E10.42] 10/20/2015 Yes      Problems Resolved During this Admission:       Discharged Condition: stable    Disposition: Home or Self Care    Follow Up:   Follow-up Information       Guido Chow MD. Schedule an appointment as soon as possible for a visit in 1 week(s).    Specialty: Family Medicine  Contact information:  [address]  [place] [address]  [phone]               Maehler, Jewel A., FNP. Go on 3/17/2025.    Specialty: Family Medicine  Why: Hospital follow up scheduled for 3:00 PM.  Contact information:  [address]  [address]  [place] [address]  [phone]                           Patient Instructions:   No discharge procedures on file.    Significant Diagnostic Studies: Labs: CMP   Recent Labs   Lab 03/09/25  0418 03/10/25  0404    137   K 4.4 4.0    103   CO2 25 26   * 231*   BUN 12 20   CREATININE 1.0 1.2   CALCIUM 8.7 9.1   PROT 5.9* 6.0   ALBUMIN 3.5 3.7   BILITOT 0.6 0.7   ALKPHOS 56 54*   AST 26 73*   ALT 19 29   ANIONGAP 7* 8    and CBC   Recent Labs   Lab 03/09/25  0419 03/10/25  0404   WBC 5.93 7.75   HGB 11.9* 11.3*   HCT 36.4* 35.5*   * 158       Pending Diagnostic Studies:       None           Medications:  Reconciled Home Medications:      Medication List        CHANGE how you take these medications      fluticasone propionate 50 mcg/actuation nasal spray  Commonly known as: FLONASE  USE 1 SPRAY INTO THE EACH NOSTRIL ONCE A DAY  What changed:   how much to take  how to take this  when to take this     GVOKE HYPOPEN 2-PACK 1 mg/0.2 mL Atin  Generic drug: glucagon  USE AS DIRECTED TO TREAT PROFOUND HYPOGLYCEMIA  What changed:   how much to take  how to take this  when to take this  reasons to take this     metoprolol succinate 25 MG 24 hr tablet  Commonly known as: TOPROL-XL  TAKE 1 TABLET BY MOUTH ONCE A DAY  What changed: when to take this            CONTINUE taking these medications      ACZONE  "5 % topical gel  Generic drug: dapsone  Apply 1 application topically 2 (two) times daily as needed (roseacea).     alpha lipoic acid 600 mg Cap  Take 2 capsules by mouth once daily.     ALPHAGAN P 0.1 % Drop  Generic drug: brimonidine 0.1%  Place 1 drop into both eyes 3 (three) times daily.     ascorbic acid (vitamin C) 100 MG tablet  Commonly known as: VITAMIN C  Take 100 mg by mouth 2 (two) times daily.     BD VEO INSULIN SYRINGE UF 1/2 mL 31 gauge x 15/64" Syrg  Generic drug: insulin syringe-needle U-100     calcium citrate-vitamin D3 315-200 mg 315 mg-5 mcg (200 unit) per tablet  Commonly known as: CITRACAL+D  Take 1 tablet by mouth 2 (two) times daily.     cinnamon bark 500 mg capsule  Take 500 mg by mouth 2 (two) times daily.     CRESTOR 40 mg Tab  Generic drug: rosuvastatin  Take 40 mg by mouth once daily.     * ESTRACE 0.01 % (0.1 mg/gram) vaginal cream  Generic drug: estradioL  Place 1 g vaginally twice a week. Tuesday, Saturday     * estradioL 10 mcg Tab  Commonly known as: VAGIFEM  Place 1 tablet vaginally twice a week. Tuesday, Saturday     ezetimibe 10 mg tablet  Commonly known as: ZETIA  Take 10 mg by mouth once daily.     furosemide 20 MG tablet  Commonly known as: LASIX  TAKE 1 TABLET BY MOUTH TWICE A DAY     GLUCAGON EMERGENCY KIT (HUMAN) 1 mg injection  Generic drug: glucagon  Take 1 mg by mouth as needed.     HumaLOG U-100 Insulin 100 unit/mL injection  Generic drug: insulin lispro  Inject 10-17 Units into the skin 3 (three) times daily before meals. 10-17 units tid sliding scale     montelukast 10 mg tablet  Commonly known as: SINGULAIR  TAKE 1 TABLET BY MOUTH EVERY EVENING     NOVOFINE 32 32 gauge x 1/4" Ndle  Generic drug: pen needle, diabetic     ondansetron 4 MG Tbdl  Commonly known as: ZOFRAN-ODT  Take 1 tablet (4 mg total) by mouth every 6 (six) hours as needed (vomiting).     pantoprazole 40 MG tablet  Commonly known as: PROTONIX  TAKE 1 TABLET BY MOUTH TWICE A DAY     potassium chloride " 20 mEq  Commonly known as: K-TAB  Take 40 mEq by mouth 2 (two) times a day.     promethazine 25 MG suppository  Commonly known as: PHENERGAN  Place 1 suppository (25 mg total) rectally every 6 (six) hours as needed for Nausea.     RESTASIS 0.05 % ophthalmic emulsion  Generic drug: cycloSPORINE  Place 1 drop into both eyes 2 (two) times daily.     SOOLANTRA 1 % Crea  Generic drug: ivermectin  Apply 1 application topically once daily.     SUPER B COMPLEX + C ORAL  Take 1 tablet by mouth once daily.     SYNTHROID 88 mcg tablet  Generic drug: levothyroxine  Take 88 mcg by mouth once daily.     TOUJEO SOLOSTAR U-300 INSULIN 300 unit/mL (1.5 mL) Inpn pen  Generic drug: insulin glargine (TOUJEO)  Inject 34 Units into the skin once daily.           * This list has 2 medication(s) that are the same as other medications prescribed for you. Read the directions carefully, and ask your doctor or other care provider to review them with you.                STOP taking these medications      doxycycline 50 MG capsule            CT Abdomen Pelvis With IV Contrast NO Oral Contrast  Result Date: 3/8/2025  EXAMINATION: CT ABDOMEN PELVIS WITH IV CONTRAST CLINICAL HISTORY: Abdominal pain, acute, nonlocalized; TECHNIQUE: Low dose axial images, sagittal and coronal reformations were obtained from the lung bases to the pubic symphysis following the IV administration of 100 mL of Omnipaque 350 .  Oral contrast was not given. COMPARISON: CT 12/18/2017 FINDINGS: Images of the lower thorax are remarkable for bilateral dependent atelectasis. There is a low attenuating lesion within the inferior aspect of the right hepatic lobe measuring 3 cm, attenuation of which suggests cyst.  There are punctate low attenuating lesions within the hepatic parenchyma, too small for characterization.  There are a few scattered subcentimeter low attenuating lesions within the spleen, too small for characterization.  There is a 1.2 cm low attenuating lesion within  the inferior aspect of the spleen, attenuation is higher than would be expected for a simple cyst.  There is atrophic change of the pancreas without pancreatic ductal dilation.  The adrenal glands are unremarkable.  The gallbladder is decompressed without wall thickening.  The stomach is distended with ingested content without gastric wall thickening.  The portal vein, splenic vein, SMV, celiac axis and SMA all are patent.  No significant abdominal lymphadenopathy. The kidneys enhance symmetrically without hydronephrosis or nephrolithiasis.  There is atrophic change involving the interpolar region of the left kidney.  The bilateral ureters are unremarkable without calculi seen.  The urinary bladder is distended without wall thickening.  The uterus and adnexa are grossly unremarkable.  No significant free fluid in the pelvis. The large bowel is grossly unremarkable noting a few scattered colonic diverticula.  There is moderate stool in the right colon.  The terminal ileum and appendix are unremarkable.  The small bowel is grossly unremarkable.  There is atherosclerotic calcification of the aorta and its branches.  There are a few scattered shotty periaortic, pericaval, and mesenteric lymph nodes.  No focal organized pelvic fluid collection. There is osteopenia.  There are degenerative changes of the spine.  There is superior endplate height loss involving T12.  No significant inguinal lymphadenopathy.     1. Moderate stool in the right colon may reflect developing constipation, no high-grade obstruction. 2. Retained material within the gastric lumen could reflect sequela of delayed gastric emptying or outlet obstruction.  Correlation is needed. 3. Low attenuating lesions within the spleen, nonspecific.  Nonemergent ultrasound could be performed for further evaluation as warranted. 4. Please see above for several additional findings. Electronically signed by: Jack Miller MD Date:    03/08/2025 Time:    20:31  -  pulls last radiology orders      Indwelling Lines/Drains at time of discharge:   Lines/Drains/Airways       None                   Time spent on the discharge of patient: 35 minutes         Antonio Chua MD  Department of Hospital Medicine  [facility]

## 2025-03-11 ENCOUNTER — PATIENT OUTREACH (OUTPATIENT)
Dept: ADMINISTRATIVE | Facility: CLINIC | Age: 70
End: 2025-03-11
Payer: MEDICARE

## 2025-03-11 NOTE — PROGRESS NOTES
C3 nurse attempted to contact Kateryna Desai for a TCC post hospital discharge follow up call. No answer. Left voicemail with callback information. The patient has a scheduled [facility] appointment with EDINSON Sinclair on 03/17/25 @ 1500.

## 2025-03-12 NOTE — PROGRESS NOTES
2nd attempt made to reach patient for TCC call. Left voicemail please call [phone] and leave first name, last name and  for Misha. I will return your call.

## 2025-03-12 NOTE — PROGRESS NOTES
C3 nurse spoke with Kateryna Desai for a TCC post hospital discharge follow up call. The patient has a scheduled [facility] appointment with EDINSON Sinclair on 03/17/25 @ 1500.

## 2025-03-13 LAB — BACTERIA BLD CULT: NORMAL

## 2025-03-14 ENCOUNTER — PATIENT MESSAGE (OUTPATIENT)
Dept: CASE MANAGEMENT | Facility: HOSPITAL | Age: 70
End: 2025-03-14

## 2025-03-17 ENCOUNTER — OFFICE VISIT (OUTPATIENT)
Facility: CLINIC | Age: 70
End: 2025-03-17
Payer: MEDICARE

## 2025-03-17 VITALS
SYSTOLIC BLOOD PRESSURE: 108 MMHG | TEMPERATURE: 98 F | BODY MASS INDEX: 30.47 KG/M2 | OXYGEN SATURATION: 96 % | WEIGHT: 189.63 LBS | HEART RATE: 83 BPM | DIASTOLIC BLOOD PRESSURE: 68 MMHG | HEIGHT: 66 IN

## 2025-03-17 DIAGNOSIS — N18.30 STAGE 3 CHRONIC KIDNEY DISEASE DUE TO TYPE 1 DIABETES MELLITUS: ICD-10-CM

## 2025-03-17 DIAGNOSIS — D73.89 LESION OF SPLEEN: ICD-10-CM

## 2025-03-17 DIAGNOSIS — I10 PRIMARY HYPERTENSION: ICD-10-CM

## 2025-03-17 DIAGNOSIS — R11.2 NAUSEA AND VOMITING, UNSPECIFIED VOMITING TYPE: Primary | ICD-10-CM

## 2025-03-17 DIAGNOSIS — R79.89 ELEVATED LFTS: ICD-10-CM

## 2025-03-17 DIAGNOSIS — E10.22 STAGE 3 CHRONIC KIDNEY DISEASE DUE TO TYPE 1 DIABETES MELLITUS: ICD-10-CM

## 2025-03-17 DIAGNOSIS — E10.42 CONTROLLED TYPE 1 DIABETES MELLITUS WITH DIABETIC POLYNEUROPATHY, WITH LONG-TERM CURRENT USE OF INSULIN: ICD-10-CM

## 2025-03-17 PROCEDURE — 99214 OFFICE O/P EST MOD 30 MIN: CPT | Mod: S$PBB,,,

## 2025-03-17 PROCEDURE — 99999 PR PBB SHADOW E&M-EST. PATIENT-LVL III: CPT | Mod: PBBFAC,,,

## 2025-03-17 PROCEDURE — 99213 OFFICE O/P EST LOW 20 MIN: CPT | Mod: PBBFAC,PN

## 2025-03-17 RX ORDER — NAPROXEN SODIUM 220 MG/1
81 TABLET, FILM COATED ORAL
COMMUNITY

## 2025-03-17 NOTE — PROGRESS NOTES
Subjective:  Chief Complaint   Patient presents with    Hospital Follow Up       History of Present Illness    Transitional Care Note    Family and/or Caretaker present at visit?  No.  Diagnostic tests reviewed/disposition: No diagnosic tests pending after this hospitalization.  Disease/illness education: Constipation prevention/ DM education  Home health/community services discussion/referrals: Patient does not have home health established from hospital visit.  They do not need home health.  If needed, we will set up home health for the patient.   Establishment or re-establishment of referral orders for community resources: No other necessary community resources.   Discussion with other health care providers: No discussion with other health care providers necessary.     This 70 y.o. presents today for complaint of hospital follow-up for nausea and vomiting 2/2 severe constipation vs gastroparesis.  She denies any further nausea/vomiting. Has been taking Miralax everyday since discharge. She reports she has been having loose bowel movements-about one daily.  She reports she is now taking Toujeo 30 units instead of 34 units. She administers Humalog insulin three times daily based on sliding scale. She wears CGM and reports her sugars have been well controlled.  She sees Dr Rodriguez Nephrologist regularly for CKD.    History of Present Illness    CHIEF COMPLAINT:  Patient presents for a follow-up visit after a recent hospitalization for severe constipation and to discuss ongoing GI issues.    HPI:  Patient was recently hospitalized for severe constipation. Since discharge, she reports improvement in her bowel movements. She did not have bowel movements for the first 2 days after returning home. On the 3rd day, she took an Ex-Lax and increased her water intake, which facilitated a bowel movement. She now takes Miralax daily with breakfast since hospital discharge and has been having loose stools daily. She describes  her stools as soft and liquidy, without discomfort or cramps, occurring daily.    She reports a history of slow gut motility, which she attributes to her diabetes. She mentions a previous colonoscopy where her colon did not clear despite following preparation instructions, which doctors also attributed to her diabetic gastroparesis.    Regarding her diabetes management, she has lowered her insulin doses since hospitalization due to weight loss. She uses Humalog on a sliding scale before meals, with doses ranging from 10 to 17 units based on her glucose levels. She has also reduced her long-acting insulin (Toujeo) dose. She uses a continuous glucose monitor (CGM), specifically the G7 model, but notes some inaccuracies compared to finger stick measurements.    She sees multiple specialists for diabetes-related complications. She was evaluated by Dr. Rodriguez (nephrologist) every 6 months, with her most recent visit occurring last week. She is also evaluated by Dr. Rhoades (podiatrist) for foot care, with her next appointment scheduled for April 17th. She reports a history of a severe wound on her left foot about 5 years ago, which has since healed but left a keloid scar. She has some numbness on the outside of her foot and uses specially modified shoes to relieve pressure.    For eye care, she is evaluated by Dr. Dean for glaucoma and Dr. Thomas for diabetic retinopathy. She reports having macular degeneration in one eye but states her vision is currently adequate. She uses eye drops 3 times daily for glaucoma and twice daily for dry eyes.    She also reports allergy symptoms, including post-nasal drip and coughing, particularly at night. She takes an off-brand antihistamine tablet, uses Flonase nasal spray, and occasionally uses Robitussin DM Max as needed.    She denies any recurrence of nausea or vomiting since hospital discharge.    MEDICATIONS:  Patient is on Humalog insulin with a sliding scale: 10 units for up  to 179, 13 units for 180-200, 15 units for 200-250, and 17 units for 250 and above, administered 3 times daily before meals for diabetes management. She is also on Toujeo insulin (long-acting) for diabetes management, with the dosage recently reduced. For constipation, she takes Miralax, 1 capful daily with breakfast. Patient uses eye drops 3 times daily for glaucoma and 2 times daily for   dry eyes. She takes Flonase (off-brand) in tablet form and uses Flonase nasal spray for allergies. Robitussin DM Max is taken as needed for nighttime coughing. Her Humalog insulin dose has been recently reduced from 34 units to 30 units.    MEDICAL HISTORY:  Patient has a history of diabetes, diagnosed 57 years ago. She also has diabetic gastroparesis, glaucoma, diabetic retinopathy, macular degeneration in one eye, and keloid scarring.    FAMILY HISTORY:  Family history is significant for brother with Type 2 diabetes who had gastric bypass surgery. Both her mother's and father's families have a history of diabetes.    TEST RESULTS:  During her recent hospitalization, One liver enzyme was found to be mildly elevated.     IMAGING:  During a recent hospital stay, a CT of the abdomen revealed low attenuating lesions on the patient's spleen.      ROS:  Eyes: +dry eyes  ENT: +post nasal drip  Gastrointestinal: -nausea, -vomiting, +diarrhea  Allergic: +seasonal allergies         Review of Systems   Constitutional:  Negative for chills and fever.   Cardiovascular:  Negative for chest pain.   Gastrointestinal:  Positive for diarrhea. Negative for abdominal pain, constipation, nausea and vomiting.       (D/C Summary)  Admission Date: 3/8/2025  Hospital Length of Stay: 1 days  Discharge Date and Time: 3/10/2025 11:20 AM  Attending Physician: No att. providers found   Discharging Provider: Antonio Chua MD  Primary Care Provider: Guido Chow MD     Primary Care Team: Networked reference to record PCT      HPI:   70 year old pt getting  "admitted with hypotension/hypoglycemia  Pt started having N&V episodes today AM   Symptoms persisted and she came to ER and got admitted  Pt denied fever/sick contacts  She said she has Type 1 DM not Type 2 DM  Pt didn't took basal insulin today because of incessant N&V   CT scan ordered by ER team still pending      * No surgery found *       Hospital Course:   Patient is admitted for further management, CT reviewed, patient apparently severe constipated and has large amount food in the gastric.  most likely secondary to gastroparesis versus constipation.  Patient was placed on empiric IV Reglan, lactulose/ MiraLax.  Patient has a multiple bowel movements, nausea vomiting resolved.  Tolerated diet well.  Cleared for discharge.  Patient does have MiraLax at home.         Objective:    /68 (BP Location: Right arm, Patient Position: Sitting)   Pulse 83   Temp 97.8 °F (36.6 °C) (Oral)   Ht 5' 6" (1.676 m)   Wt 86 kg (189 lb 9.5 oz)   SpO2 96%   BMI 30.60 kg/m²  Body mass index is 30.6 kg/m².    BP Readings from Last 3 Encounters:   03/17/25 108/68   03/10/25 117/71   02/27/25 113/75       Wt Readings from Last 3 Encounters:   03/17/25 86 kg (189 lb 9.5 oz)   03/08/25 88.8 kg (195 lb 11.2 oz)   02/27/25 89.8 kg (197 lb 15.6 oz)       Physical Exam  Constitutional:       General: She is not in acute distress.     Appearance: Normal appearance. She is not ill-appearing.   HENT:      Head: Normocephalic.   Cardiovascular:      Rate and Rhythm: Normal rate and regular rhythm.      Heart sounds: Murmur heard.   Pulmonary:      Effort: Pulmonary effort is normal. No respiratory distress.      Breath sounds: Normal breath sounds.   Abdominal:      General: Bowel sounds are normal. There is no distension.      Palpations: Abdomen is soft.      Tenderness: There is no abdominal tenderness. There is no guarding.   Skin:     General: Skin is warm and dry.   Neurological:      Mental Status: She is alert and oriented to " "person, place, and time.   Psychiatric:         Mood and Affect: Mood normal.         Behavior: Behavior normal.         Thought Content: Thought content normal.         Judgment: Judgment normal.           Assessment/Plan:  1. Nausea and vomiting, unspecified vomiting type  Assessment & Plan:  Recently hospitalized for gastroparesis vs severe constipation. Nausea/vomiting resolved per patient.   Now having loose stools daily with Miralax dosing daily.  Continue Miralax- Change dosing to every other day to prevent loose stools.  Be sure to hydrate adequately.   High fiber diet: fresh fruits, vegetables, whole grains.       2. Controlled type 1 diabetes mellitus with diabetic polyneuropathy, with long-term current use of insulin  Assessment & Plan:  A1c 6.0.  Continue current Insulin regimen.  Educated on signs/symptoms of hypoglycemia and use of fast acting carbohydrates.   Check blood glucose and keep log. Fasting AM blood glucose goal under 130.   Exercise 5 days weekly for at least 30 minutes.   Avoid foods with high sugar content such as sodas, juices, candies.   Well-balanced diet including non-starchy vegetables, lean proteins, and whole grains.    Follow-up with PCP.      3. Stage 3 chronic kidney disease due to type 1 diabetes mellitus  Assessment & Plan:  Creatinine stable at 1.2 per last labs.  CMP today, I will review and address results accordingly.  Avoid NSAIDs and other nephrotoxic medications.  Be sure to hydrate adequately.   Follow-up with Dr Rodriguez routinely.    Orders:  -     Comprehensive Metabolic Panel; Future; Expected date: 03/17/2025    4. Primary hypertension  Assessment & Plan:  Stable and controlled per vitals in clinic.  Continue current prescription medications.  Cardiac diet-low salt/low sodium.  Follow-up with PCP/Cardiology as scheduled.      5. Lesion of spleen  Assessment & Plan:  Incidental finding on recent CT abd/pelvis: "Low attenuating lesions within the spleen, " "nonspecific. Nonemergent ultrasound could be performed for further evaluation"  US ordered for further characterization per Radiology recommendations. I will review and address results accordingly.   -     US Abdomen Limited; Future; Expected date: 03/17/2025    6. Elevated LFTs  Assessment & Plan:  AST mildly elevated prior to hospital discharge. Unclear etiology, consider r/t nausea and vomiting. Repeat CMP, I will review and address results accordingly.   -     Comprehensive Metabolic Panel; Future; Expected date: 03/17/2025         Orders Placed This Encounter    US Abdomen Limited    Comprehensive Metabolic Panel       PCP apt follow up on 3/21/25  Podiatry follow-up on 4/17/25.    Follow up if symptoms worsen or fail to improve.    This note was generated with the assistance of ambient listening technology. Verbal consent was obtained by the patient and accompanying visitor(s) for the recording of patient appointment to facilitate this note. I attest to having reviewed and edited the generated note for accuracy, though some syntax or spelling errors may persist. Please contact the author of this note for any clarification.    "

## 2025-03-17 NOTE — ASSESSMENT & PLAN NOTE
Stable and controlled per vitals in clinic.  Continue current prescription medications.  Cardiac diet-low salt/low sodium.  Follow-up with PCP/Cardiology as scheduled.

## 2025-03-18 NOTE — ASSESSMENT & PLAN NOTE
Recently hospitalized for gastroparesis vs severe constipation. Nausea/vomiting resolved per patient.   Now having loose stools daily with Miralax dosing daily.  Continue Miralax- Change dosing to every other day to prevent loose stools.  Be sure to hydrate adequately.   High fiber diet: fresh fruits, vegetables, whole grains.

## 2025-03-18 NOTE — ASSESSMENT & PLAN NOTE
Creatinine stable at 1.2 per last labs.  CMP today, I will review and address results accordingly.  Avoid NSAIDs and other nephrotoxic medications.  Be sure to hydrate adequately.   Follow-up with Dr Rodriguez routinely.

## 2025-03-18 NOTE — ASSESSMENT & PLAN NOTE
A1c 6.0.  Continue current Insulin regimen.  Educated on signs/symptoms of hypoglycemia and use of fast acting carbohydrates.   Check blood glucose and keep log. Fasting AM blood glucose goal under 130.   Exercise 5 days weekly for at least 30 minutes.   Avoid foods with high sugar content such as sodas, juices, candies.   Well-balanced diet including non-starchy vegetables, lean proteins, and whole grains.    Follow-up with PCP.

## 2025-03-20 ENCOUNTER — HOSPITAL ENCOUNTER (OUTPATIENT)
Dept: RADIOLOGY | Facility: HOSPITAL | Age: 70
Discharge: HOME | End: 2025-03-20
Payer: MEDICARE

## 2025-03-20 ENCOUNTER — RESULTS FOLLOW-UP (OUTPATIENT)
Dept: FAMILY MEDICINE | Facility: CLINIC | Age: 70
End: 2025-03-20

## 2025-03-20 DIAGNOSIS — D73.89 LESION OF SPLEEN: ICD-10-CM

## 2025-03-20 PROCEDURE — 76705 ECHO EXAM OF ABDOMEN: CPT | Mod: 26,,, | Performed by: RADIOLOGY

## 2025-03-20 PROCEDURE — 76705 ECHO EXAM OF ABDOMEN: CPT | Mod: TC,PO

## 2025-03-21 ENCOUNTER — TELEPHONE (OUTPATIENT)
Dept: FAMILY MEDICINE | Facility: CLINIC | Age: 70
End: 2025-03-21

## 2025-03-21 ENCOUNTER — LAB VISIT (OUTPATIENT)
Dept: LAB | Facility: HOSPITAL | Age: 70
End: 2025-03-21
Payer: MEDICARE

## 2025-03-21 ENCOUNTER — OFFICE VISIT (OUTPATIENT)
Dept: FAMILY MEDICINE | Facility: CLINIC | Age: 70
End: 2025-03-21
Payer: MEDICARE

## 2025-03-21 ENCOUNTER — TELEPHONE (OUTPATIENT)
Dept: FAMILY MEDICINE | Facility: CLINIC | Age: 70
End: 2025-03-21
Payer: MEDICARE

## 2025-03-21 VITALS
BODY MASS INDEX: 30.9 KG/M2 | RESPIRATION RATE: 18 BRPM | HEART RATE: 85 BPM | OXYGEN SATURATION: 97 % | HEIGHT: 66 IN | DIASTOLIC BLOOD PRESSURE: 74 MMHG | WEIGHT: 192.25 LBS | SYSTOLIC BLOOD PRESSURE: 118 MMHG

## 2025-03-21 DIAGNOSIS — Z09 HOSPITAL DISCHARGE FOLLOW-UP: Primary | ICD-10-CM

## 2025-03-21 DIAGNOSIS — E10.22 STAGE 3 CHRONIC KIDNEY DISEASE DUE TO TYPE 1 DIABETES MELLITUS: ICD-10-CM

## 2025-03-21 DIAGNOSIS — R79.89 ELEVATED LFTS: ICD-10-CM

## 2025-03-21 DIAGNOSIS — N18.30 STAGE 3 CHRONIC KIDNEY DISEASE DUE TO TYPE 1 DIABETES MELLITUS: ICD-10-CM

## 2025-03-21 DIAGNOSIS — K59.09 CHRONIC CONSTIPATION: ICD-10-CM

## 2025-03-21 DIAGNOSIS — E21.0 PRIMARY HYPERPARATHYROIDISM: ICD-10-CM

## 2025-03-21 DIAGNOSIS — K31.84 GASTROPARESIS: ICD-10-CM

## 2025-03-21 LAB
ALBUMIN SERPL BCP-MCNC: 3.8 G/DL (ref 3.5–5.2)
ALP SERPL-CCNC: 64 U/L (ref 40–150)
ALT SERPL W/O P-5'-P-CCNC: 35 U/L (ref 10–44)
ANION GAP SERPL CALC-SCNC: 8 MMOL/L (ref 8–16)
AST SERPL-CCNC: 39 U/L (ref 10–40)
BILIRUB SERPL-MCNC: 0.7 MG/DL (ref 0.1–1)
BUN SERPL-MCNC: 7 MG/DL (ref 8–23)
CALCIUM SERPL-MCNC: 9.4 MG/DL (ref 8.7–10.5)
CHLORIDE SERPL-SCNC: 103 MMOL/L (ref 95–110)
CO2 SERPL-SCNC: 29 MMOL/L (ref 23–29)
CREAT SERPL-MCNC: 0.9 MG/DL (ref 0.5–1.4)
EST. GFR  (NO RACE VARIABLE): >60 ML/MIN/1.73 M^2
GLUCOSE SERPL-MCNC: 42 MG/DL (ref 70–110)
POTASSIUM SERPL-SCNC: 4 MMOL/L (ref 3.5–5.1)
PROT SERPL-MCNC: 6.8 G/DL (ref 6–8.4)
SODIUM SERPL-SCNC: 140 MMOL/L (ref 136–145)

## 2025-03-21 PROCEDURE — 99213 OFFICE O/P EST LOW 20 MIN: CPT | Mod: PBBFAC,PO | Performed by: STUDENT IN AN ORGANIZED HEALTH CARE EDUCATION/TRAINING PROGRAM

## 2025-03-21 PROCEDURE — 99999 PR PBB SHADOW E&M-EST. PATIENT-LVL III: CPT | Mod: PBBFAC,,, | Performed by: STUDENT IN AN ORGANIZED HEALTH CARE EDUCATION/TRAINING PROGRAM

## 2025-03-21 PROCEDURE — 80053 COMPREHEN METABOLIC PANEL: CPT

## 2025-03-21 PROCEDURE — 36415 COLL VENOUS BLD VENIPUNCTURE: CPT | Mod: PO

## 2025-03-21 RX ORDER — METOCLOPRAMIDE 10 MG/1
5 TABLET ORAL
Qty: 30 TABLET | Refills: 0 | Status: SHIPPED | OUTPATIENT
Start: 2025-03-21 | End: 2025-03-21

## 2025-03-21 RX ORDER — LACTULOSE 10 G/15ML
20 SOLUTION ORAL; RECTAL 3 TIMES DAILY
Qty: 2700 ML | Refills: 3 | Status: SHIPPED | OUTPATIENT
Start: 2025-03-21 | End: 2025-07-19

## 2025-03-21 RX ORDER — METOCLOPRAMIDE 5 MG/1
5 TABLET ORAL
Qty: 30 TABLET | Refills: 0 | Status: SHIPPED | OUTPATIENT
Start: 2025-03-21

## 2025-03-21 NOTE — TELEPHONE ENCOUNTER
Patient seen in clinic today for hospital discharge follow up.  Clinical notes not finalized as of this date and time (3/21/25-12:12 PM).  Will forward message to provider for further assistance confirming correct strength and dosage of Reglan.          Amalia Ordonez, Patient Care Assistant  MA[facility] Staff     ----- Message from Dara Holland sent at 3/21/2025 11:28 AM CDT -----  Type: Needs Medical Advice  Who Called:  Willi Cartagena Call Back Number: [phone] Best   medication name : metoclopramide HCl (REGLAN) 5 MG tablet  Best pharmacy name /; [facility] [place] - [place], [place] - [id number] [place] [address] Phone: [phone] Fax: [phone]   Additional Information: PHARMACY CALLING to  VERIFY WHICH MEDICATION STRENGTH  FOR PATIENT please call to further  assist thank you .

## 2025-03-21 NOTE — PATIENT INSTRUCTIONS
Lactulose to stay regular.     Once daily as needed. If not working, can escalate to as much as three times/day. Safe to do every day if needed.             Reglan for emergencies when you can't keep food down. Helps the food move through the stomach.

## 2025-03-21 NOTE — PROGRESS NOTES
Patient ID: Kateryna Desai is a 70 y.o. female.    Chief Complaint: Hospital Follow Up    History of Present Illness    CHIEF COMPLAINT:  Patient presents today for follow up after recent hospitalization for severe nausea and vomiting    HISTORY OF PRESENT ILLNESS:  She reports onset of vomiting on Friday afternoon which progressed to inability to keep anything down, including dry heaves by evening. She attempted to manage symptoms at home with Mepergan suppositories, which provided partial relief from nausea. Due to concerns about insulin management after taking insulin for dinner but being unable to eat, she went to the ER. She was unable to keep down glucose tablets, raising concerns for hypoglycemia. CT showed significant gastric backup. Currently, she reports no bowel movement for 3-4 days, compared to her baseline of every other day. MiraLax and Axelax have not improved her constipation.    MEDICAL HISTORY:  She has insulin-dependent diabetes mellitus. Last colonoscopy was performed 6 years ago with incomplete bowel preparation.    DIET:  Her typical breakfast consists of Cheerios with fruit or granola with berries. For lunch, she usually has a salad and half a sandwich without cheese.    ALLERGIES:  Her allergy symptoms are well-controlled with nasal spray, Singulair, and Flonase. She wears a mask when mowing the lawn for allergen prevention.      ROS:  General: -fever, -chills, -fatigue, -weight gain, -weight loss  Eyes: -vision changes, -redness, -discharge  ENT: -ear pain, -nasal congestion, -sore throat  Cardiovascular: -chest pain, -palpitations, -lower extremity edema  Respiratory: -cough, -shortness of breath  Gastrointestinal: +abdominal pain, +nausea, +vomiting, -diarrhea, +constipation, -blood in stool, +gagging  Genitourinary: -dysuria, -hematuria, -frequency  Musculoskeletal: -joint pain, -muscle pain  Skin: -rash, -lesion  Neurological: -headache, -dizziness, -numbness,  -tingling  Psychiatric: -anxiety, -depression, -sleep difficulty  Allergic: +seasonal allergies         Physical Exam    General: No acute distress. Well-developed. Well-nourished.  Eyes: EOMI. Sclerae anicteric.  HENT: Normocephalic. Atraumatic. Nares patent. Moist oral mucosa.  Cardiovascular: Regular rate. Regular rhythm. No murmurs. No rubs. No gallops. Normal S1, S2.  Respiratory: Normal respiratory effort. Clear to auscultation bilaterally. No rales. No rhonchi. No wheezing.  Abdomen: Soft. Mild ttp right lower quadrant Non-distended. Normoactive bowel sounds.  Musculoskeletal: No  obvious deformity.  Extremities: No lower extremity edema.  Neurological: Alert & oriented x3. No slurred speech. Normal gait.  Psychiatric: Normal mood. Normal affect. Good insight. Good judgment.  Skin: Warm. Dry. No rash.         Assessment & Plan    K31.84 Gastroparesis  K59.09 Chronic constipation  E10.42 Type 1 diabetes mellitus with diabetic polyneuropathy  K21.9 Gastro-esophageal reflux disease without esophagitis  J30.2 Other seasonal allergic rhinitis  E16.2 Hypoglycemia, unspecified    IMPRESSION:  - Assessed recent hospitalization for vomiting and constipation.  - Determined constipation as primary issue based on history and presentation.  - Opted for lactulose as primary treatment for constipation, considering its safety for long-term use.  - Chose to provide Reglan for emergencies, weighing benefits against potential side effects.  - Decided against dietary changes due to existing restrictions. Patient to continue current diet without changes.  - Reviewed need for colonoscopy, considering recent constipation. Decided on more aggressive prep plan for colonoscopy due to previous incomplete cleanout.  - Assessed abdominal tenderness during physical exam, correlating with colon anatomy.  - Discontinued daily use of Miralax. Can use occasionally, but not for everyday use.    GASTROPARESIS:  - Explained potential side  effects of Reglan, particularly tardive dyskinesia, emphasizing its irreversible nature if it occurs.  - Clarified that occasional use poses minimal risk.  - Prescribed Reglan 5 mg for emergencies, instructing the patient to use it when unable to eat or experiencing nausea.  - Advised to take Reglan for short periods only to minimize risk of side effects.  - Noted that the patient experienced severe vomiting and nausea, leading to ER visit.  - CT results revealed stomach backup almost to the beginning of the stomach, suggesting possible gastroparesis.  - Discussed treatment options for gastroparesis.    CHRONIC CONSTIPATION:  - Informed the patient about the turn in colon correlating with area of tenderness.  - Prescribed lactulose for constipation, with the goal of achieving bowel movement every day or every other day.  - Instructed on lactulose usage: Start with 1 dose, if no bowel movement, try again next day.  - Advised that lactulose can be escalated up to 3 times daily if needed and confirmed its safety for daily use if required.  - Referred the patient to gastroenterology clinic for colonoscopy prep planning and recommended undergoing colonoscopy after proper prep plan is established.    TYPE 1 DIABETES MELLITUS:  - Patient has been managing Type 1 diabetes for 57 years and is considering an insulin pump to eliminate daily injections.  - Identified hypoglycemia risk due to the patient's inability to eat after taking insulin.  - Acknowledged the patient's long-term management of diabetes and considered potential gastroparesis related to diabetes.    SEASONAL ALLERGIC RHINITIS:  - Confirmed that nasal spray, Singulair, and Flonase are working effectively for the patient's allergy symptoms.  - Advised the patient to continue using these medications for allergy management.  - Recommend wearing a mask when mowing the lawn to reduce allergen exposure.            No follow-ups on file.    This note was generated  with the assistance of ambient listening technology. Verbal consent was obtained by the patient and accompanying visitor(s) for the recording of patient appointment to facilitate this note. I attest to having reviewed and edited the generated note for accuracy, though some syntax or spelling errors may persist. Please contact the author of this note for any clarification.

## 2025-03-22 NOTE — TELEPHONE ENCOUNTER
Got a critical lab from lab for glucose 42 on CMP  It appears that this lab was actually ordered by EDINSON Sinclair on 3/17 and was completed on 3/21  She saw Dr. Chow today for a visit.  I called and checked in on the patient   She said after the visit, she got in the car and her dexcom alerted her of low sugar.  She ate snacks and made sure that her sugar level improved. Sugar improved and she made it home safe.  She is feeling well now.

## 2025-03-24 NOTE — PROGRESS NOTES
Subjective:       Patient ID: Kateryna Desai is a 70 y.o. female Body mass index is 31.14 kg/m².    Chief Complaint: Constipation (Chronic)    This patient is new to me.  Referring Provider: Dr. Guido Chow for chronic constipation and screening colon.     Miralax daily since 3/8 - at first didn't work but then has since started helping symptoms.  Drinks at least 4 16 oz bottles of water daily    Took ex-lax twice    Constipation  This is a chronic problem. The current episode started 1 to 4 weeks ago. The problem has been gradually improving since onset. The stool is described as firm and pellet like. The patient is not on a high fiber diet. She Does not exercise regularly. There has Been adequate water intake. Associated symptoms include abdominal pain. Pertinent negatives include no diarrhea, fever, nausea, rectal pain or vomiting. Risk factors include recent illness. She has tried laxatives for the symptoms. The treatment provided moderate relief. Her past medical history is significant for endocrine disease and metabolic disease.     Review of Systems   Constitutional:  Negative for activity change, appetite change, fatigue, fever and unexpected weight change.   HENT:  Negative for sore throat and trouble swallowing.    Respiratory:  Negative for cough and shortness of breath.    Cardiovascular:  Negative for chest pain.   Gastrointestinal:  Positive for abdominal pain and constipation. Negative for abdominal distention, anal bleeding, blood in stool, diarrhea, nausea, rectal pain and vomiting.       No LMP recorded. Patient is postmenopausal.  Past Medical History:   Diagnosis Date    Allergy     Arthritis     degnerative disease low back,muscle spasms, shoulders    Asian flu type A 12/22/2017    Bunionette 07/26/2012    Diabetes mellitus type I     since age 13    Diabetic gastroparesis     takes Cytotec    Diabetic retinopathy of both eyes     gets periodic laser treatments    Difficult  intubation     as a child had sore throat after a procedure    Encounter for blood transfusion     GERD (gastroesophageal reflux disease)     Hallux abducto valgus 2014    Headache(784.0)     Hiatal hernia     with GERD    Hyperlipidemia     Hypertension     Hypertensive retinopathy of both eyes 2024    Infection of the upper respiratory tract 2012    Lumbar spinal stenosis     Osteopenia     Proliferative diabetic retinopathy of both eyes 2024    Rosacea     Seizures     Pt states during pgy with stroke    Stroke ?    while pregnant    Tachycardia     asymptomatic with Toprol    Thyroid disease     hypothyroidism    Venous insufficiency of leg     improved since EVLT     Past Surgical History:   Procedure Laterality Date    BUNIONECTOMY Right 2012    CARDIAC CATHETERIZATION      no stents     SECTION      COLONOSCOPY  2007    Dr. Rose, 10 year recheck    CORRECTION OF HAMMER TOE Right 8/3/2023    Procedure: CORRECTION, HAMMER TOE Right second toe;  Surgeon: Lei Rhoades DPM;  Location: [facility] OR;  Service: Podiatry;  Laterality: Right;  right 2nd toe    EXOSTECTOMY  12    left foot, local anesthesia, in office    EYE SURGERY      has had many laser procedures for diabetic retinopathy    VASCULAR SURGERY      VEIN SURGERY  2012    EVLT right greater saphenous, IV sedation     Family History   Problem Relation Name Age of Onset    Cancer Maternal Aunt          breast    Breast cancer Maternal Aunt      Diabetes Maternal Grandmother      Breast cancer Maternal Grandmother  38    Cancer Maternal Grandfather          prostate    Diabetes Maternal Grandfather      Diabetes Cousin      Arthritis Mother      Cancer Mother      Melanoma Mother      Heart disease Father      Stroke Father      No Known Problems Sister      No Known Problems Brother      No Known Problems Daughter 1     Alzheimer's disease Maternal Uncle      Alcohol abuse Maternal Uncle          x2     Heart disease Maternal Uncle      No Known Problems Paternal Aunt      Heart disease Paternal Uncle       Social History[1]  Wt Readings from Last 10 Encounters:   03/26/25 87.5 kg (192 lb 14.4 oz)   03/21/25 87.2 kg (192 lb 3.9 oz)   03/17/25 86 kg (189 lb 9.5 oz)   03/08/25 88.8 kg (195 lb 11.2 oz)   02/27/25 89.8 kg (197 lb 15.6 oz)   12/16/24 89.7 kg (197 lb 12 oz)   11/05/24 89.7 kg (197 lb 12 oz)   08/16/24 88 kg (194 lb 0.1 oz)   08/08/24 88 kg (194 lb 0.1 oz)   07/26/24 88.4 kg (194 lb 14.2 oz)     Lab Results   Component Value Date    WBC 7.75 03/10/2025    HGB 11.3 (L) 03/10/2025    HCT 35.5 (L) 03/10/2025    MCV 95 03/10/2025     03/10/2025     CMP  Sodium   Date Value Ref Range Status   03/21/2025 140 136 - 145 mmol/L Final     Potassium   Date Value Ref Range Status   03/21/2025 4.0 3.5 - 5.1 mmol/L Final     Chloride   Date Value Ref Range Status   03/21/2025 103 95 - 110 mmol/L Final     CO2   Date Value Ref Range Status   03/21/2025 29 23 - 29 mmol/L Final     Glucose   Date Value Ref Range Status   03/21/2025 42 (LL) 70 - 110 mg/dL Final     Comment:     *Critical value notification by LT with confirmation of receipt to   DR. ILA DUFFY at  Date 3/21/2025 Time 8:30PM       BUN   Date Value Ref Range Status   03/21/2025 7 (L) 8 - 23 mg/dL Final     Creatinine   Date Value Ref Range Status   03/21/2025 0.9 0.5 - 1.4 mg/dL Final     Calcium   Date Value Ref Range Status   03/21/2025 9.4 8.7 - 10.5 mg/dL Final     Total Protein   Date Value Ref Range Status   03/21/2025 6.8 6.0 - 8.4 g/dL Final     Albumin   Date Value Ref Range Status   03/21/2025 3.8 3.5 - 5.2 g/dL Final     Total Bilirubin   Date Value Ref Range Status   03/21/2025 0.7 0.1 - 1.0 mg/dL Final     Comment:     For infants and newborns, interpretation of results should be based  on gestational age, weight and in agreement with clinical  observations.    Premature Infant recommended reference ranges:  Up to 24  "hours.............<8.0 mg/dL  Up to 48 hours............<12.0 mg/dL  3-5 days..................<15.0 mg/dL  6-29 days.................<15.0 mg/dL       Alkaline Phosphatase   Date Value Ref Range Status   03/21/2025 64 40 - 150 U/L Final     AST   Date Value Ref Range Status   03/21/2025 39 10 - 40 U/L Final     ALT   Date Value Ref Range Status   03/21/2025 35 10 - 44 U/L Final     Anion Gap   Date Value Ref Range Status   03/21/2025 8 8 - 16 mmol/L Final     eGFR if    Date Value Ref Range Status   05/05/2022 >60.0 >60 mL/min/1.73 m^2 Final     eGFR if non    Date Value Ref Range Status   05/05/2022 59.3 (A) >60 mL/min/1.73 m^2 Final     Comment:     Calculation used to obtain the estimated glomerular filtration  rate (eGFR) is the CKD-EPI equation.        Lab Results   Component Value Date    AMYLASE 33 02/26/2020     Lab Results   Component Value Date    LIPASE 9 03/08/2025     No results found for: "LIPASERES"  Lab Results   Component Value Date    TSH 1.968 12/09/2024       Reviewed prior medical records including radiology report of colonoscopies from [facility] 2018 x 3, CT A/P and Abdominal U/S 3/2025 & endoscopy history (see surgical history).    Objective:      Physical Exam  Vitals and nursing note reviewed.   Constitutional:       General: She is not in acute distress.     Appearance: She is not ill-appearing.   HENT:      Head: Normocephalic and atraumatic.      Mouth/Throat:      Mouth: Mucous membranes are moist.      Pharynx: Oropharynx is clear.   Eyes:      Conjunctiva/sclera: Conjunctivae normal.   Cardiovascular:      Rate and Rhythm: Normal rate and regular rhythm.      Pulses: Normal pulses.   Pulmonary:      Effort: Pulmonary effort is normal. No respiratory distress.   Abdominal:      General: Abdomen is flat. There is no distension.      Palpations: Abdomen is soft.      Tenderness: There is no abdominal tenderness.   Skin:     General: Skin is warm and " dry.      Capillary Refill: Capillary refill takes 2 to 3 seconds.   Neurological:      Mental Status: She is alert and oriented to person, place, and time.         Assessment:       1. Chronic constipation    2. History of colon polyps    3. Screening for colon cancer        Plan:       Chronic constipation  Recommend daily exercise as tolerated, adequate water intake (six 8-oz glasses of water daily), and high fiber diet. OTC fiber supplements are recommended if diet does not reach daily fiber goal (20-30 grams daily), such as Metamucil, Citrucel, or FiberCon (take as directed, separate from other oral medications by >2 hours).  -Recommend taking an OTC stool softener such as Colace as directed to avoid hard stools and straining with bowel movements PRN  -Recommend trying OTC MiraLax once daily (17g PO) as directed  - If no improvement with above recommendations, try intermittently dosed Dulcolax OTC as directed (every 3-4  days) PRN to facilitate bowel movements  -If still no improvement with these measures, call/follow-up    Continue miralax daily - can increase to twice daily if needed to ease constipation    Consider low dose prescription trial if miralax fails  -     Ambulatory referral/consult to Gastroenterology    History of colon polyps & Screening for colon cancer  Patient seen for colorectal cancer screening, high risk due to personal history of colon polyp, age appropriate, 3rd occurrence, last colonoscopy was in 2018: see surgical history, with no associated signs/symptoms (see ROS), and no alleviating/exacerbating factors. Denies family history of colon cancer/polyps.   - schedule Colonoscopy, discussed procedure with the patient, including risks and benefits, patient verbalized understanding        Follow up if symptoms worsen or fail to improve.      If no improvement in symptoms or symptoms worsen, call/follow-up at clinic or go to ER.       TEN Amos, FNP-C    Encounter includes face  to face time and non-face to face time preparing to see the patient (eg, review of tests), obtaining and/or reviewing separately obtained history, documenting clinical information in the electronic or other health record, independently interpreting results (not separately reported) and communicating results to the patient/family/caregiver, or care coordination (not separately reported).     A dictation software program was used for this note. Please expect some simple typographical errors in this note.         [1]   Social History  Tobacco Use    Smoking status: Never    Smokeless tobacco: Never   Substance Use Topics    Alcohol use: Yes     Comment: rarely; 1 drink/year    Drug use: No

## 2025-03-26 ENCOUNTER — OFFICE VISIT (OUTPATIENT)
Dept: GASTROENTEROLOGY | Facility: CLINIC | Age: 70
End: 2025-03-26
Payer: MEDICARE

## 2025-03-26 VITALS — WEIGHT: 192.88 LBS | HEIGHT: 66 IN | BODY MASS INDEX: 31 KG/M2

## 2025-03-26 DIAGNOSIS — Z86.0100 HISTORY OF COLON POLYPS: ICD-10-CM

## 2025-03-26 DIAGNOSIS — Z12.11 SCREENING FOR COLON CANCER: ICD-10-CM

## 2025-03-26 DIAGNOSIS — K59.09 CHRONIC CONSTIPATION: Primary | ICD-10-CM

## 2025-03-26 PROCEDURE — 99999 PR PBB SHADOW E&M-EST. PATIENT-LVL V: CPT | Mod: PBBFAC,,,

## 2025-03-26 PROCEDURE — 99215 OFFICE O/P EST HI 40 MIN: CPT | Mod: PBBFAC,PN

## 2025-04-03 ENCOUNTER — PATIENT MESSAGE (OUTPATIENT)
Dept: PODIATRY | Facility: CLINIC | Age: 70
End: 2025-04-03
Payer: MEDICARE

## 2025-04-03 DIAGNOSIS — R20.2 PARESTHESIA OF FOOT, BILATERAL: ICD-10-CM

## 2025-04-03 DIAGNOSIS — E10.42 CONTROLLED TYPE 1 DIABETES MELLITUS WITH DIABETIC POLYNEUROPATHY: Primary | ICD-10-CM

## 2025-04-18 ENCOUNTER — LAB VISIT (OUTPATIENT)
Dept: LAB | Facility: HOSPITAL | Age: 70
End: 2025-04-18
Attending: INTERNAL MEDICINE
Payer: MEDICARE

## 2025-04-18 DIAGNOSIS — Z79.899 POLYPHARMACY: ICD-10-CM

## 2025-04-18 DIAGNOSIS — E78.00 PURE HYPERCHOLESTEROLEMIA: ICD-10-CM

## 2025-04-18 DIAGNOSIS — E10.65 TYPE I DIABETES MELLITUS WITH HYPEROSMOLAR COMA: Primary | ICD-10-CM

## 2025-04-18 DIAGNOSIS — E10.69 TYPE I DIABETES MELLITUS WITH HYPEROSMOLAR COMA: Primary | ICD-10-CM

## 2025-04-18 DIAGNOSIS — E03.9 MYXEDEMA HEART DISEASE: ICD-10-CM

## 2025-04-18 DIAGNOSIS — I51.9 MYXEDEMA HEART DISEASE: ICD-10-CM

## 2025-04-18 DIAGNOSIS — E87.0 TYPE I DIABETES MELLITUS WITH HYPEROSMOLAR COMA: Primary | ICD-10-CM

## 2025-04-18 LAB
ANION GAP (SMH): 6 MMOL/L (ref 8–16)
BUN SERPL-MCNC: 10 MG/DL (ref 8–23)
CALCIUM SERPL-MCNC: 9.1 MG/DL (ref 8.7–10.5)
CHLORIDE SERPL-SCNC: 105 MMOL/L (ref 95–110)
CHOLEST SERPL-MCNC: 157 MG/DL (ref 120–199)
CHOLEST/HDLC SERPL: 2.9 {RATIO} (ref 2–5)
CO2 SERPL-SCNC: 31 MMOL/L (ref 23–29)
CREAT SERPL-MCNC: 1 MG/DL (ref 0.5–1.4)
EAG (SMH): 126 MG/DL (ref 68–131)
GFR SERPLBLD CREATININE-BSD FMLA CKD-EPI: >60 ML/MIN/1.73/M2
GLUCOSE SERPL-MCNC: 156 MG/DL (ref 70–110)
HBA1C MFR BLD: 6 % (ref 4.5–6.2)
HDLC SERPL-MCNC: 55 MG/DL (ref 40–75)
HDLC SERPL: 35 % (ref 20–50)
LDLC SERPL CALC-MCNC: 79 MG/DL (ref 63–159)
NONHDLC SERPL-MCNC: 102 MG/DL
POTASSIUM SERPL-SCNC: 4.1 MMOL/L (ref 3.5–5.1)
SODIUM SERPL-SCNC: 142 MMOL/L (ref 136–145)
T4 FREE SERPL-MCNC: 1.05 NG/DL (ref 0.71–1.51)
TRIGL SERPL-MCNC: 115 MG/DL (ref 30–150)
TSH SERPL-ACNC: 3.64 UIU/ML (ref 0.34–5.6)

## 2025-04-18 PROCEDURE — 84439 ASSAY OF FREE THYROXINE: CPT

## 2025-04-18 PROCEDURE — 83036 HEMOGLOBIN GLYCOSYLATED A1C: CPT

## 2025-04-18 PROCEDURE — 80061 LIPID PANEL: CPT

## 2025-04-18 PROCEDURE — 80048 BASIC METABOLIC PNL TOTAL CA: CPT

## 2025-04-18 PROCEDURE — 36415 COLL VENOUS BLD VENIPUNCTURE: CPT

## 2025-04-18 PROCEDURE — 84443 ASSAY THYROID STIM HORMONE: CPT

## 2025-05-21 DIAGNOSIS — K21.9 GASTROESOPHAGEAL REFLUX DISEASE WITHOUT ESOPHAGITIS: ICD-10-CM

## 2025-05-21 NOTE — TELEPHONE ENCOUNTER
No care due was identified.  [facility] Embedded Care Due Messages. Reference number: [id number].   5/21/2025 8:03:18 AM CDT

## 2025-05-22 RX ORDER — PANTOPRAZOLE SODIUM 40 MG/1
40 TABLET, DELAYED RELEASE ORAL 2 TIMES DAILY
Qty: 180 TABLET | Refills: 1 | Status: SHIPPED | OUTPATIENT
Start: 2025-05-22

## 2025-05-22 NOTE — TELEPHONE ENCOUNTER
Refill Routing Note   Medication(s) are not appropriate for processing by [facility] for the following reason(s):        Outside of protocol    [facility] action(s):  Route        Medication Therapy Plan: Total daily dose exceeds maintenance dosing for PPI      Appointments  past 12m or future 3m with PCP    Date Provider   Last Visit   3/21/2025 Guido Chow MD   Next Visit   Visit date not found Guido Chow MD   ED visits in past 90 days: 0        Note composed:8:57 PM 05/21/2025

## 2025-05-28 ENCOUNTER — OFFICE VISIT (OUTPATIENT)
Dept: PODIATRY | Facility: CLINIC | Age: 70
End: 2025-05-28
Payer: MEDICARE

## 2025-05-28 VITALS — HEIGHT: 66 IN | WEIGHT: 192.88 LBS | BODY MASS INDEX: 31 KG/M2

## 2025-05-28 DIAGNOSIS — E10.42 CONTROLLED TYPE 1 DIABETES MELLITUS WITH DIABETIC POLYNEUROPATHY: Primary | ICD-10-CM

## 2025-05-28 DIAGNOSIS — L84 CORN OR CALLUS: ICD-10-CM

## 2025-05-28 PROCEDURE — 11056 PARNG/CUTG B9 HYPRKR LES 2-4: CPT | Mod: PBBFAC,PO | Performed by: PODIATRIST

## 2025-05-28 PROCEDURE — 99212 OFFICE O/P EST SF 10 MIN: CPT | Mod: PBBFAC,PO | Performed by: PODIATRIST

## 2025-05-28 PROCEDURE — 99999 PR PBB SHADOW E&M-EST. PATIENT-LVL II: CPT | Mod: PBBFAC,,, | Performed by: PODIATRIST

## 2025-06-11 ENCOUNTER — PATIENT MESSAGE (OUTPATIENT)
Dept: GASTROENTEROLOGY | Facility: CLINIC | Age: 70
End: 2025-06-11
Payer: MEDICARE

## 2025-07-08 LAB
LEFT EYE DM RETINOPATHY: POSITIVE
RIGHT EYE DM RETINOPATHY: POSITIVE

## 2025-07-10 ENCOUNTER — PATIENT OUTREACH (OUTPATIENT)
Dept: ADMINISTRATIVE | Facility: HOSPITAL | Age: 70
End: 2025-07-10
Payer: MEDICARE

## 2025-08-08 ENCOUNTER — TELEPHONE (OUTPATIENT)
Dept: GASTROENTEROLOGY | Facility: CLINIC | Age: 70
End: 2025-08-08
Payer: MEDICARE

## 2025-08-08 DIAGNOSIS — Z86.0100 HISTORY OF COLON POLYPS: Primary | ICD-10-CM

## 2025-08-08 DIAGNOSIS — Z12.11 SCREENING FOR COLON CANCER: ICD-10-CM

## 2025-08-08 NOTE — TELEPHONE ENCOUNTER
Spoke with patient via phone. Screening colon rescheduled to 9/25 with Dr. Gusman. Hx polyps. Denies any use of diabetic weight loss medication or blood thinners. Prep instructions sent via portal & mail.

## 2025-08-08 NOTE — TELEPHONE ENCOUNTER
Copied from CRM #[id number]. Topic: General Inquiry - Patient Advice  >> Aug 8, 2025 10:23 AM Phan wrote:  Type: Needs Medical Advice  Who Called:  pt   Best Call Back Number: [phone]  Additional Information: Pt calling to schedule colonoscopy. Please call abck to advise, thanks!

## 2025-08-27 ENCOUNTER — LAB VISIT (OUTPATIENT)
Dept: LAB | Facility: HOSPITAL | Age: 70
End: 2025-08-27
Attending: INTERNAL MEDICINE
Payer: MEDICARE

## 2025-08-27 DIAGNOSIS — E10.65 POOR CONTROL TYPE I DIABETES MELLITUS: Primary | ICD-10-CM

## 2025-08-27 DIAGNOSIS — Z79.899 POLYPHARMACY: ICD-10-CM

## 2025-08-27 DIAGNOSIS — I51.9 MYXEDEMA HEART DISEASE: ICD-10-CM

## 2025-08-27 DIAGNOSIS — E03.9 MYXEDEMA HEART DISEASE: ICD-10-CM

## 2025-08-27 LAB
ALBUMIN SERPL-MCNC: 3.9 G/DL (ref 3.5–5.2)
ALBUMIN/CREAT UR: 3 UG/MG
ALP SERPL-CCNC: 68 UNIT/L (ref 55–135)
ALT SERPL-CCNC: 16 UNIT/L (ref 10–44)
ANION GAP (SMH): 6 MMOL/L (ref 8–16)
AST SERPL-CCNC: 24 UNIT/L (ref 10–40)
BILIRUB SERPL-MCNC: 0.7 MG/DL (ref 0.1–1)
BUN SERPL-MCNC: 9 MG/DL (ref 8–23)
CALCIUM SERPL-MCNC: 9.2 MG/DL (ref 8.7–10.5)
CHLORIDE SERPL-SCNC: 105 MMOL/L (ref 95–110)
CHOLEST SERPL-MCNC: 152 MG/DL (ref 120–199)
CHOLEST/HDLC SERPL: 2.9 {RATIO} (ref 2–5)
CO2 SERPL-SCNC: 29 MMOL/L (ref 23–29)
CREAT SERPL-MCNC: 1 MG/DL (ref 0.5–1.4)
CREAT UR-MCNC: 255 MG/DL (ref 15–325)
GFR SERPLBLD CREATININE-BSD FMLA CKD-EPI: >60 ML/MIN/1.73/M2
GLUCOSE SERPL-MCNC: 113 MG/DL (ref 70–110)
HDLC SERPL-MCNC: 52 MG/DL (ref 40–75)
HDLC SERPL: 34.2 % (ref 20–50)
LDLC SERPL CALC-MCNC: 79 MG/DL (ref 63–159)
MICROALBUMIN UR-MCNC: 7.7 UG/ML
NONHDLC SERPL-MCNC: 100 MG/DL
POTASSIUM SERPL-SCNC: 4 MMOL/L (ref 3.5–5.1)
PROT SERPL-MCNC: 6.6 GM/DL (ref 6–8.4)
SODIUM SERPL-SCNC: 140 MMOL/L (ref 136–145)
TRIGL SERPL-MCNC: 105 MG/DL (ref 30–150)
TSH SERPL-ACNC: 0.83 UIU/ML (ref 0.34–5.6)

## 2025-08-27 PROCEDURE — 84443 ASSAY THYROID STIM HORMONE: CPT

## 2025-08-27 PROCEDURE — 83718 ASSAY OF LIPOPROTEIN: CPT

## 2025-08-27 PROCEDURE — 82570 ASSAY OF URINE CREATININE: CPT

## 2025-08-27 PROCEDURE — 84075 ASSAY ALKALINE PHOSPHATASE: CPT

## 2025-08-27 PROCEDURE — 36415 COLL VENOUS BLD VENIPUNCTURE: CPT

## (undated) DEVICE — SUT ETHILON 3-0 PS2 18 BLK

## (undated) DEVICE — BLADE SAW 16.0MM X 7.0MM

## (undated) DEVICE — SHOE MED-SURG WOMENS LARGE 8.5

## (undated) DEVICE — DRAPE EXTREMITY W/ABC NON-SLIP

## (undated) DEVICE — STRAP OR TABLE 5IN X 72IN

## (undated) DEVICE — GLOVE SURG BIOGEL LATEX SZ 7.5

## (undated) DEVICE — DRAPE THREE-QTR REINF 53X77IN

## (undated) DEVICE — STOCKINET 4INX48

## (undated) DEVICE — BOWL STERILE LARGE 32OZ

## (undated) DEVICE — APPLICATOR CHLORAPREP ORN 26ML

## (undated) DEVICE — PAD CAST SPECIALIST STRL 4

## (undated) DEVICE — SYR 10CC LUER LOCK

## (undated) DEVICE — NDL HYPO REG 25G X 1 1/2

## (undated) DEVICE — DRESSING XEROFORM FOIL PK 1X8

## (undated) DEVICE — SUT 3-0 VICRYL / SH (J416)

## (undated) DEVICE — BANDAGE MATRIX HK LOOP 4IN 5YD

## (undated) DEVICE — SEE L#120831

## (undated) DEVICE — KIT SAHARA DRAPE DRAW/LIFT

## (undated) DEVICE — SUT 4-0 VICRYL / SH

## (undated) DEVICE — BLADE SCALP OPHTL RND TIP

## (undated) DEVICE — BLADE SURG #15 CARBON STEEL

## (undated) DEVICE — Device